# Patient Record
Sex: FEMALE | Race: WHITE | NOT HISPANIC OR LATINO | Employment: OTHER | URBAN - METROPOLITAN AREA
[De-identification: names, ages, dates, MRNs, and addresses within clinical notes are randomized per-mention and may not be internally consistent; named-entity substitution may affect disease eponyms.]

---

## 2017-02-06 DIAGNOSIS — E03.9 ACQUIRED HYPOTHYROIDISM: Primary | Chronic | ICD-10-CM

## 2017-02-06 NOTE — TELEPHONE ENCOUNTER
Levothyroxine     Last Written Prescription Date: 02/3/16  Last Quantity: 204, # refills: 3  Last Office Visit with G, P or [facility] prescribing provider: 08/31/16        TSH   Date Value Ref Range Status   12/15/2015 2.13 0.40 - 4.00 mU/L Final

## 2017-02-09 RX ORDER — LEVOTHYROXINE SODIUM 25 UG/1
TABLET ORAL
Qty: 204 TABLET | Refills: 0 | Status: SHIPPED | OUTPATIENT
Start: 2017-02-09 | End: 2017-03-28

## 2017-03-21 DIAGNOSIS — N18.4 CHRONIC KIDNEY DISEASE, STAGE IV (SEVERE) (H): Primary | ICD-10-CM

## 2017-03-21 DIAGNOSIS — D64.9 ANEMIA: ICD-10-CM

## 2017-03-22 ENCOUNTER — MYC MEDICAL ADVICE (OUTPATIENT)
Dept: FAMILY MEDICINE | Facility: CLINIC | Age: 77
End: 2017-03-22

## 2017-03-22 DIAGNOSIS — E11.22 TYPE 2 DIABETES MELLITUS WITH STAGE 4 CHRONIC KIDNEY DISEASE (H): Primary | ICD-10-CM

## 2017-03-22 DIAGNOSIS — E03.9 ACQUIRED HYPOTHYROIDISM: Chronic | ICD-10-CM

## 2017-03-22 DIAGNOSIS — N18.4 TYPE 2 DIABETES MELLITUS WITH STAGE 4 CHRONIC KIDNEY DISEASE (H): Primary | ICD-10-CM

## 2017-03-23 DIAGNOSIS — E03.9 ACQUIRED HYPOTHYROIDISM: Chronic | ICD-10-CM

## 2017-03-23 DIAGNOSIS — E11.22 TYPE 2 DIABETES MELLITUS WITH STAGE 4 CHRONIC KIDNEY DISEASE (H): ICD-10-CM

## 2017-03-23 DIAGNOSIS — N18.4 CHRONIC KIDNEY DISEASE, STAGE IV (SEVERE) (H): ICD-10-CM

## 2017-03-23 DIAGNOSIS — D64.9 ANEMIA: ICD-10-CM

## 2017-03-23 DIAGNOSIS — N18.4 TYPE 2 DIABETES MELLITUS WITH STAGE 4 CHRONIC KIDNEY DISEASE (H): ICD-10-CM

## 2017-03-23 LAB
ANION GAP SERPL CALCULATED.3IONS-SCNC: 7 MMOL/L (ref 3–14)
BUN SERPL-MCNC: 31 MG/DL (ref 7–30)
CALCIUM SERPL-MCNC: 8.9 MG/DL (ref 8.5–10.1)
CHLORIDE SERPL-SCNC: 111 MMOL/L (ref 94–109)
CO2 SERPL-SCNC: 25 MMOL/L (ref 20–32)
CREAT SERPL-MCNC: 1.68 MG/DL (ref 0.52–1.04)
CREAT UR-MCNC: 192 MG/DL
GFR SERPL CREATININE-BSD FRML MDRD: 29 ML/MIN/1.7M2
GLUCOSE SERPL-MCNC: 84 MG/DL (ref 70–99)
HBA1C MFR BLD: 6.2 % (ref 4.3–6)
HGB BLD-MCNC: 12.5 G/DL (ref 11.7–15.7)
POTASSIUM SERPL-SCNC: 4.6 MMOL/L (ref 3.4–5.3)
PROT UR-MCNC: 0.22 G/L
PROT/CREAT 24H UR: 0.11 G/G CR (ref 0–0.2)
PTH-INTACT SERPL-MCNC: 60 PG/ML (ref 12–72)
SODIUM SERPL-SCNC: 143 MMOL/L (ref 133–144)
TSH SERPL DL<=0.005 MIU/L-ACNC: 1.27 MU/L (ref 0.4–4)

## 2017-03-23 PROCEDURE — 84443 ASSAY THYROID STIM HORMONE: CPT | Performed by: INTERNAL MEDICINE

## 2017-03-23 PROCEDURE — 83036 HEMOGLOBIN GLYCOSYLATED A1C: CPT | Performed by: INTERNAL MEDICINE

## 2017-03-23 PROCEDURE — 80048 BASIC METABOLIC PNL TOTAL CA: CPT | Performed by: INTERNAL MEDICINE

## 2017-03-23 PROCEDURE — 36415 COLL VENOUS BLD VENIPUNCTURE: CPT | Performed by: INTERNAL MEDICINE

## 2017-03-23 PROCEDURE — 84156 ASSAY OF PROTEIN URINE: CPT | Performed by: INTERNAL MEDICINE

## 2017-03-23 PROCEDURE — 83970 ASSAY OF PARATHORMONE: CPT | Performed by: INTERNAL MEDICINE

## 2017-03-23 PROCEDURE — 85018 HEMOGLOBIN: CPT | Performed by: INTERNAL MEDICINE

## 2017-03-28 ENCOUNTER — OFFICE VISIT (OUTPATIENT)
Dept: FAMILY MEDICINE | Facility: CLINIC | Age: 77
End: 2017-03-28
Payer: COMMERCIAL

## 2017-03-28 VITALS
TEMPERATURE: 96.5 F | HEART RATE: 70 BPM | BODY MASS INDEX: 26 KG/M2 | WEIGHT: 161.8 LBS | SYSTOLIC BLOOD PRESSURE: 139 MMHG | HEIGHT: 66 IN | DIASTOLIC BLOOD PRESSURE: 59 MMHG

## 2017-03-28 DIAGNOSIS — H81.10 BPPV (BENIGN PAROXYSMAL POSITIONAL VERTIGO), UNSPECIFIED LATERALITY: ICD-10-CM

## 2017-03-28 DIAGNOSIS — K21.9 GASTROESOPHAGEAL REFLUX DISEASE WITHOUT ESOPHAGITIS: ICD-10-CM

## 2017-03-28 DIAGNOSIS — E11.22 TYPE 2 DIABETES MELLITUS WITH STAGE 4 CHRONIC KIDNEY DISEASE, WITHOUT LONG-TERM CURRENT USE OF INSULIN (H): Primary | ICD-10-CM

## 2017-03-28 DIAGNOSIS — E03.9 ACQUIRED HYPOTHYROIDISM: Chronic | ICD-10-CM

## 2017-03-28 DIAGNOSIS — N18.4 TYPE 2 DIABETES MELLITUS WITH STAGE 4 CHRONIC KIDNEY DISEASE, WITHOUT LONG-TERM CURRENT USE OF INSULIN (H): Primary | ICD-10-CM

## 2017-03-28 DIAGNOSIS — E78.5 HYPERLIPIDEMIA LDL GOAL <100: ICD-10-CM

## 2017-03-28 DIAGNOSIS — R10.9 FLANK PAIN: ICD-10-CM

## 2017-03-28 DIAGNOSIS — N30.00 ACUTE CYSTITIS WITHOUT HEMATURIA: ICD-10-CM

## 2017-03-28 DIAGNOSIS — I15.0 RENOVASCULAR HYPERTENSION WITH GOAL BLOOD PRESSURE LESS THAN 130/80: Chronic | ICD-10-CM

## 2017-03-28 LAB
ALBUMIN UR-MCNC: NEGATIVE MG/DL
APPEARANCE UR: ABNORMAL
BILIRUB UR QL STRIP: NEGATIVE
COLOR UR AUTO: YELLOW
GLUCOSE UR STRIP-MCNC: NEGATIVE MG/DL
HGB UR QL STRIP: NEGATIVE
KETONES UR STRIP-MCNC: NEGATIVE MG/DL
LEUKOCYTE ESTERASE UR QL STRIP: ABNORMAL
NITRATE UR QL: NEGATIVE
NON-SQ EPI CELLS #/AREA URNS LPF: ABNORMAL /LPF
PH UR STRIP: 5.5 PH (ref 5–7)
RBC #/AREA URNS AUTO: ABNORMAL /HPF (ref 0–2)
SP GR UR STRIP: 1.02 (ref 1–1.03)
URATE CRY #/AREA URNS HPF: ABNORMAL /HPF
URN SPEC COLLECT METH UR: ABNORMAL
UROBILINOGEN UR STRIP-ACNC: 0.2 EU/DL (ref 0.2–1)
WBC #/AREA URNS AUTO: ABNORMAL /HPF (ref 0–2)

## 2017-03-28 PROCEDURE — 81001 URINALYSIS AUTO W/SCOPE: CPT | Performed by: INTERNAL MEDICINE

## 2017-03-28 PROCEDURE — 99214 OFFICE O/P EST MOD 30 MIN: CPT | Performed by: INTERNAL MEDICINE

## 2017-03-28 RX ORDER — CIPROFLOXACIN 250 MG/1
250 TABLET, FILM COATED ORAL DAILY
Qty: 3 TABLET | Refills: 0 | Status: SHIPPED | OUTPATIENT
Start: 2017-03-28 | End: 2017-05-19

## 2017-03-28 RX ORDER — GLIPIZIDE 2.5 MG/1
2.5 TABLET, EXTENDED RELEASE ORAL DAILY
Qty: 90 TABLET | Refills: 3 | Status: CANCELLED | OUTPATIENT
Start: 2017-03-28

## 2017-03-28 RX ORDER — LISINOPRIL 10 MG/1
10 TABLET ORAL 2 TIMES DAILY
Qty: 180 TABLET | Refills: 3 | Status: SHIPPED | OUTPATIENT
Start: 2017-03-28 | End: 2018-05-07

## 2017-03-28 RX ORDER — LEVOTHYROXINE SODIUM 25 UG/1
TABLET ORAL
Qty: 216 TABLET | Refills: 3 | Status: SHIPPED | OUTPATIENT
Start: 2017-03-28 | End: 2018-04-23

## 2017-03-28 RX ORDER — HYDROCHLOROTHIAZIDE 50 MG/1
50 TABLET ORAL EVERY MORNING
Qty: 90 TABLET | Refills: 3 | Status: SHIPPED | OUTPATIENT
Start: 2017-03-28 | End: 2018-06-12

## 2017-03-28 RX ORDER — AMLODIPINE BESYLATE 10 MG/1
10 TABLET ORAL DAILY
Qty: 90 TABLET | Refills: 4 | Status: SHIPPED | OUTPATIENT
Start: 2017-03-28 | End: 2018-04-02

## 2017-03-28 RX ORDER — ROSUVASTATIN CALCIUM 10 MG/1
10 TABLET, COATED ORAL DAILY
Qty: 90 TABLET | Refills: 3 | Status: SHIPPED | OUTPATIENT
Start: 2017-03-28 | End: 2018-06-11

## 2017-03-28 NOTE — MR AVS SNAPSHOT
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  You may be receiving a survey in the mail from [employer] regarding your visit today.  Please take a few minutes to complete and return the survey to let us know how we are doing.      If you have questions or concerns, please contact us via [employer] or you can contact your care team at [phone].    Our Clinic hours are:  Monday 6:40 am  to 7:00 pm  Tuesday -Friday 6:40 am to 5:00 pm    The [place] outpatient lab hours are:  Monday - Friday 6:10 am to 4:45 pm  Saturdays 7:00 am to 11:00 am  Appointments are required, call [phone]    If you have clinical questions after hours or would like to schedule an appointment,  call the clinic at [phone].      [facility] [place] [address]      1. Stop Glipizide.  If blood sugar are going up see Dr. Horton.  Goal fasting blood sugar < 140, goal random blood sugar is < 170.  2. Other refills given  3. See physical therapy for the dizziness, likely vertigo  4. Can try the meclizine for dizziness until you are able to see physical therapy.  5. Fasting blood work - lipid and A1c in 6 months.      Benign Paroxysmal Positional  Vertigo  Benign paroxysmal positional vertigo (BPPV) is a problem with the inner ear. The inner ear contains the vestibular system. This system is what helps you keep your balance. BPPV causes a feeling of spinning. It is a common problem of the vestibular system.  Understanding the vestibular system  The vestibular system of the ear is made up of very tiny parts. They include the utricle, saccule, and semicircular canals. The utricle is a tiny organ that contains calcium crystals. In some people, the crystals can move into the semicircular canals. When this happens, the system no longer works as it should. This causes BPPV. Benign means it is not life-threatening. Paroxysmal means it happens suddenly. Positional means that it happens when you move your head. Vertigo is a feeling of spinning.  What causes BPPV?  Causes include injury to your head or neck. Other problems with the vestibular system may cause BPPV. In many people, the cause of BPPV is not known.  Symptoms of BPPV  You many have repeated feelings of spinning (vertigo). The vertigo usually lasts less than 1 minute. Some movements, suchas rolling over in bed, can bring on vertigo.  Diagnosing BPPV  Your primary health care provider may diagnose and treat your BPPV. Or you may see an ear, nose, and throat doctor (otolaryngologist). In some cases, you may see a nervous system doctor (neurologist).  The health care provider will ask about your symptoms and your medical history. He or she will examine you. You may have hearing and balance tests. As part of the exam, your health care provider may have you move your head and body in certain ways. If you have BPPV, the movements can bring on vertigo. Your provider will also look for abnormal movements of your eyes. You may have other tests to check your vestibular or nervous systems.  Treatment for BPPV  Your health care provider may try to move the calcium crystals. This is done by having you move your head and  neck in certain ways. This treatment is safe and often works well. You may also be told to do these movements at home. You may still have vertigo for a few weeks. Your health care provider will recheck your symptoms, usually in about a month. Special physical therapy may also be part of treatment.  In rare cases surgery may be needed for BPPV that does not go away.     When to call the health care provider  Call your health care provider right away if you have any of these:    Symptoms that do not go away with treatment    Symptoms that get worse    New symptoms        5126-2967 [employer]. [address] [place] [address]. All rights reserved. This information is not intended as a substitute for professional medical care. Always follow your healthcare professional's instructions.              Follow-ups after your visit        Additional Services     PHYSICAL THERAPY REFERRAL       *This therapy referral will be filtered to a centralized scheduling office at [facility] and the patient will receive a call to schedule an appointment at a [place] location most convenient for them. *     [facility] provides Physical Therapy evaluation and treatment and many specialty services across the [place] system.  If requesting a specialty program, please choose from the list below.    If you have not heard from the scheduling office within 2 business days, please call [phone] for all locations, with the exception of [place], please call [phone].  Treatment: Evaluation & Treatment  Special Instructions/Modalities:   Special Programs: Balance/Vestibular    Please be aware that coverage of these services is subject to the terms and limitations of your health insurance plan.  Call member services at your health plan with any benefit or coverage questions.      **Note to Provider:  If you are referring outside of [place] for the therapy appointment, please  "list the name of the location in the  special instructions  above, print the referral and give to the patient to schedule the appointment.                  Future tests that were ordered for you today     Open Future Orders        Priority Expected Expires Ordered    Hemoglobin A1c Routine 9/26/2017 12/27/2017 3/28/2017    Lipid panel reflex to direct LDL Routine 9/26/2017 12/27/2017 3/28/2017            Who to contact     If you have questions or need follow up information about today's clinic visit or your schedule please contact [facility] directly at [phone].  Normal or non-critical lab and imaging results will be communicated to you by [employer], letter or phone within 4 business days after the clinic has received the results. If you do not hear from us within 7 days, please contact the clinic through [employer] or phone. If you have a critical or abnormal lab result, we will notify you by phone as soon as possible.  Submit refill requests through [employer] or call your pharmacy and they will forward the refill request to us. Please allow 3 business days for your refill to be completed.          Additional Information About Your Visit        [employer] Information     [employer] gives you secure access to your electronic health record. If you see a primary care provider, you can also send messages to your care team and make appointments. If you have questions, please call your primary care clinic.  If you do not have a primary care provider, please call [phone] and they will assist you.        Care EveryWhere ID     This is your Care EveryWhere ID. This could be used by other organizations to access your [place] medical records  [id number]        Your Vitals Were     Pulse Temperature Height BMI (Body Mass Index)          70 96.5  F (35.8  C) (Tympanic) 5' 5.5\" (1.664 m) 26.52 kg/m2         Blood Pressure from Last 3 Encounters:   03/28/17 139/59   12/03/16 132/71   09/27/16 123/63    Weight from " Last 3 Encounters:   03/28/17 161 lb 12.8 oz (73.4 kg)   09/27/16 153 lb (69.4 kg)   09/22/16 153 lb (69.4 kg)              We Performed the Following     PHYSICAL THERAPY REFERRAL     UA reflex to Microscopic and Culture     Urine Microscopic          Today's Medication Changes          These changes are accurate as of: 3/28/17  8:17 AM.  If you have any questions, ask your nurse or doctor.               These medicines have changed or have updated prescriptions.        Dose/Directions    * ciprofloxacin 250 MG tablet   Commonly known as:  CIPRO   This may have changed:  Another medication with the same name was added. Make sure you understand how and when to take each.   Used for:  Acute cystitis with hematuria        Dose:  250 mg   Take 1 tablet (250 mg) by mouth 2 times daily   Quantity:  14 tablet   Refills:  0       * ciprofloxacin 250 MG tablet   Commonly known as:  CIPRO   This may have changed:  You were already taking a medication with the same name, and this prescription was added. Make sure you understand how and when to take each.   Used for:  Acute cystitis without hematuria   Changed by:  Li Horton DO        Dose:  250 mg   Take 1 tablet (250 mg) by mouth daily   Quantity:  3 tablet   Refills:  0       levothyroxine 25 MCG tablet   Commonly known as:  SYNTHROID   This may have changed:  additional instructions   Used for:  Acquired hypothyroidism   Changed by:  Li Horton DO        75 mcg (3 tabs) on Sunday, Monday, Wednesday, Friday. 50 mcg (2 tabs) on Tuesday, Thursday, Saturday   Quantity:  216 tablet   Refills:  3       * Notice:  This list has 2 medication(s) that are the same as other medications prescribed for you. Read the directions carefully, and ask your doctor or other care provider to review them with you.      Stop taking these medicines if you haven't already. Please contact your care team if you have questions.     glipiZIDE 2.5 MG 24 hr tablet   Commonly known  as:  glipiZIDE XL   Stopped by:  Li Horton DO                Where to get your medicines      These medications were sent to [place] [address] [place] [facility] - [address]  [address] [facility], [facility] [address]     Phone:  [phone]     amLODIPine 10 MG tablet    ciprofloxacin 250 MG tablet    hydrochlorothiazide 50 MG tablet    levothyroxine 25 MCG tablet    lisinopril 10 MG tablet    omeprazole 20 MG CR capsule    rosuvastatin 10 MG tablet                Primary Care Provider Office Phone # Fax #    Li Horton [phone]       [facility] [address] [facility] [address]        Thank you!     Thank you for choosing [facility]  for your care. Our goal is always to provide you with excellent care. Hearing back from our patients is one way we can continue to improve our services. Please take a few minutes to complete the written survey that you may receive in the mail after your visit with us. Thank you!             Your Updated Medication List - Protect others around you: Learn how to safely use, store and throw away your medicines at www.disposemymeds.org.          This list is accurate as of: 3/28/17  8:17 AM.  Always use your most recent med list.                   Brand Name Dispense Instructions for use    amLODIPine 10 MG tablet    NORVASC    90 tablet    Take 1 tablet (10 mg) by mouth daily       ascorbic acid 500 MG tablet    VITAMIN C    90 Tab    ONE TABLET twice DAILY       aspirin 325 MG EC tablet     100    1 TABLET BY MOUTH DAILY       BENEFIBER Tabs      2 TABLETS ORALLY DAILY       calcium-vitamin D 600-400 MG-UNIT per tablet    CALTRATE    100 tablet    Take 1 tablet by mouth 2 times daily.       * ciprofloxacin 250 MG tablet    CIPRO    14 tablet    Take 1 tablet (250 mg) by mouth 2 times daily       * ciprofloxacin 250 MG tablet    CIPRO    3 tablet    Take 1 tablet (250 mg) by mouth daily       cloNIDine 0.1  MG tablet    CATAPRES    180 Tab    ONE Twice DAILY as needed for SBP>160, diastolic BP>100       estradiol 0.1 MG/GM cream    ESTRACE VAGINAL    30 g    Place  vaginally. Apply 1-2 cc around urethra vaginal daily       hydrochlorothiazide 50 MG tablet    HYDRODIURIL    90 tablet    Take 1 tablet (50 mg) by mouth every morning       levothyroxine 25 MCG tablet    SYNTHROID    216 tablet    75 mcg (3 tabs) on Sunday, Monday, Wednesday, Friday. 50 mcg (2 tabs) on Tuesday, Thursday, Saturday       lisinopril 10 MG tablet    PRINIVIL/ZESTRIL    180 tablet    Take 1 tablet (10 mg) by mouth 2 times daily       multivitamin per tablet      1 TABLET BY MOUTH DAILY       omeprazole 20 MG CR capsule    priLOSEC    60 capsule    Take 20 mg every 3rd day as needed       pyridoxine 50 MG Tabs    VITAMIN B-6    1 bottle    1 daily       rosuvastatin 10 MG tablet    CRESTOR    90 tablet    Take 1 tablet (10 mg) by mouth daily       * Notice:  This list has 2 medication(s) that are the same as other medications prescribed for you. Read the directions carefully, and ask your doctor or other care provider to review them with you.

## 2017-03-28 NOTE — PATIENT INSTRUCTIONS
Thank you for choosing [facility].  You may be receiving a survey in the mail from [employer] regarding your visit today.  Please take a few minutes to complete and return the survey to let us know how we are doing.      If you have questions or concerns, please contact us via [employer] or you can contact your care team at [phone].    Our Clinic hours are:  Monday 6:40 am  to 7:00 pm  Tuesday -Friday 6:40 am to 5:00 pm    The [place] outpatient lab hours are:  Monday - Friday 6:10 am to 4:45 pm  Saturdays 7:00 am to 11:00 am  Appointments are required, call [phone]    If you have clinical questions after hours or would like to schedule an appointment,  call the clinic at [phone].      [facility] [address] [place] [address]      1. Stop Glipizide.  If blood sugar are going up see Dr. Horton.  Goal fasting blood sugar < 140, goal random blood sugar is < 170.  2. Other refills given  3. See physical therapy for the dizziness, likely vertigo  4. Can try the meclizine for dizziness until you are able to see physical therapy.  5. Fasting blood work - lipid and A1c in 6 months.      Benign Paroxysmal Positional Vertigo  Benign paroxysmal positional vertigo (BPPV) is a problem with the inner ear. The inner ear contains the vestibular system. This system is what helps you keep your balance. BPPV causes a feeling of spinning. It is a common problem of the vestibular system.  Understanding the vestibular system  The vestibular system of the ear is made up of very tiny parts. They include the utricle, saccule, and semicircular canals. The utricle is a tiny organ that contains calcium crystals. In some people, the crystals can move into the semicircular canals. When this happens, the system no longer works as it should. This causes BPPV. Benign means it is not life-threatening. Paroxysmal means it happens suddenly. Positional means that it happens when you move your head. Vertigo is  a feeling of spinning.  What causes BPPV?  Causes include injury to your head or neck. Other problems with the vestibular system may cause BPPV. In many people, the cause of BPPV is not known.  Symptoms of BPPV  You many have repeated feelings of spinning (vertigo). The vertigo usually lasts less than 1 minute. Some movements, suchas rolling over in bed, can bring on vertigo.  Diagnosing BPPV  Your primary health care provider may diagnose and treat your BPPV. Or you may see an ear, nose, and throat doctor (otolaryngologist). In some cases, you may see a nervous system doctor (neurologist).  The health care provider will ask about your symptoms and your medical history. He or she will examine you. You may have hearing and balance tests. As part of the exam, your health care provider may have you move your head and body in certain ways. If you have BPPV, the movements can bring on vertigo. Your provider will also look for abnormal movements of your eyes. You may have other tests to check your vestibular or nervous systems.  Treatment for BPPV  Your health care provider may try to move the calcium crystals. This is done by having you move your head and neck in certain ways. This treatment is safe and often works well. You may also be told to do these movements at home. You may still have vertigo for a few weeks. Your health care provider will recheck your symptoms, usually in about a month. Special physical therapy may also be part of treatment.  In rare cases surgery may be needed for BPPV that does not go away.     When to call the health care provider  Call your health care provider right away if you have any of these:    Symptoms that do not go away with treatment    Symptoms that get worse    New symptoms        [id number] [employer]. [address] [place] [address], [place] [address]. All rights reserved. This information is not intended as a substitute for professional medical care. Always follow your  healthcare professional's instructions.

## 2017-03-28 NOTE — PROGRESS NOTES
SUBJECTIVE:                                                    Lupe Lepe is a 77 year old female who presents to clinic today for the following health issues:      Diabetes Follow-up      Patient is checking blood sugars: rarely.  Results range from 120 to 140    Diabetic concerns: None     Symptoms of hypoglycemia (low blood sugar): none     Paresthesias (numbness or burning in feet) or sores: No     Date of last diabetic eye exam: due in November    She did have a low blood sugar 48, doesn't happen often     Hyperlipidemia Follow-Up      Rate your low fat/cholesterol diet?: good    Taking statin?  Yes, no muscle aches from statin    Other lipid medications/supplements?:  none     Hypertension Follow-up      Outpatient blood pressures are being checked at home.  Results are 130-135/80.    Low Salt Diet: no added salt         Amount of exercise or physical activity: 2-3 days/week for an average of 45-60 minutes    Problems taking medications regularly: No    Medication side effects: none    Diet: low salt    Chief Complaint   Patient presents with     Diabetes     Lab Result Notice     Dizziness     for about a month has noticed when lies down for bed or gets up has to sit on the side of the bed for awhile.       Refill Request     needs 90 days at a time and omprezole needs more than 30 for a month needs 60 pill and 3 months at a time.     Dizziness:  Started 1 month ago.  When laying in bed at night, feels like room is spinning.  It also occurs first thing in AM.  Lasts a few minutes. It never occurs throughout the day or with position changes.  The dizziness does not improve with closing eyes.  Staying still seems to help.  The dizziness is occurring every night and AM.    3-4 days ago noted severe left back/flank pain, lasted 12 hours or so.  Never has back problems.  Is wondering about UTI. Yesterday she noted very mild dysuria.    Current Outpatient Prescriptions   Medication Sig Dispense Refill      "levothyroxine (SYNTHROID) 25 MCG tablet Take  by mouth. (3 tabs) p.o. q. Sundays, Monday, Wednesday, Fridays. (2 tabs) p.o. q. Tuesday, Thursdays, Saturdays 204 tablet 0     rosuvastatin (CRESTOR) 10 MG tablet Take 1 tablet (10 mg) by mouth daily 90 tablet 1     omeprazole (PRILOSEC) 20 MG capsule Take 20 mg every 3rd day as needed 30 capsule 1     lisinopril (PRINIVIL,ZESTRIL) 10 MG tablet Take 1 tablet (10 mg) by mouth 2 times daily 180 tablet 3     glipiZIDE (GLIPIZIDE XL) 2.5 MG 24 hr tablet Take 1 tablet (2.5 mg) by mouth daily 90 tablet 3     amLODIPine (NORVASC) 10 MG tablet Take 1 tablet (10 mg) by mouth daily 90 tablet 4     hydrochlorothiazide (HYDRODIURIL) 50 MG tablet Take 1 tablet (50 mg) by mouth every morning 90 tablet 3     calcium-vitamin D (CALTRATE) 600-400 MG-UNIT per tablet Take 1 tablet by mouth 2 times daily. 100 tablet 3     VITAMIN C 500 MG PO TABS ONE TABLET twice DAILY 90 Tab 3     ASPIRIN 325 MG OR TBEC 1 TABLET BY MOUTH DAILY 100 3     BENEFIBER OR TABS 2 TABLETS ORALLY DAILY  0     VITAMIN B-6 50 MG OR TABS 1 daily 1 bottle 1 year     MULTIVITAMINS OR TABS 1 TABLET BY MOUTH DAILY  0     ciprofloxacin (CIPRO) 250 MG tablet Take 1 tablet (250 mg) by mouth 2 times daily 14 tablet 0     estradiol (ESTRACE VAGINAL) 0.1 MG/GM vaginal cream Place  vaginally. Apply 1-2 cc around urethra vaginal daily 30 g 11     CLONIDINE HCL 0.1 MG PO TABS ONE Twice DAILY as needed for SBP>160, diastolic BP>100 180 Tab 4       Reviewed and updated as needed this visit by clinical staff  Allergies       Reviewed and updated as needed this visit by Provider         ROS:  Constitutional, HEENT, cardiovascular, pulmonary, gi and gu systems are negative, except as otherwise noted.    OBJECTIVE:                                                    /82  Pulse 74  Temp 96.5  F (35.8  C) (Tympanic)  Ht 5' 5.5\" (1.664 m)  Wt 161 lb 12.8 oz (73.4 kg)  BMI 26.52 kg/m2  Body mass index is 26.52 kg/(m^2).   Blood " "pressure 139/59, pulse 70, temperature 96.5  F (35.8  C), temperature source Tympanic, height 5' 5.5\" (1.664 m), weight 161 lb 12.8 oz (73.4 kg).      GENERAL APPEARANCE: healthy, alert and no distress  RESP: lungs clear to auscultation - no rales, rhonchi or wheezes  CV: regular rates and rhythm, normal S1 S2, no S3 or S4 and no murmur, click or rub  MS: extremities normal- no gross deformities noted and no flank pain to palpation/percussion  NEURO: Normal strength and tone, mentation intact, speech normal, DTR symmetrically normal in lower extremities, gait normal including heel/toe/tandem walking, cranial nerves 2-12 intact and Romberg negative    Diagnostic Test Results:  Results for orders placed or performed in visit on 03/28/17 (from the past 24 hour(s))   UA reflex to Microscopic and Culture   Result Value Ref Range    Color Urine Yellow     Appearance Urine Slightly Cloudy     Glucose Urine Negative NEG mg/dL    Bilirubin Urine Negative NEG    Ketones Urine Negative NEG mg/dL    Specific Gravity Urine 1.025 1.003 - 1.035    Blood Urine Negative NEG    pH Urine 5.5 5.0 - 7.0 pH    Protein Albumin Urine Negative NEG mg/dL    Urobilinogen Urine 0.2 0.2 - 1.0 EU/dL    Nitrite Urine Negative NEG    Leukocyte Esterase Urine Small (A) NEG    Source Midstream Urine    Urine Microscopic   Result Value Ref Range    WBC Urine 2-5 (A) 0 - 2 /HPF    RBC Urine O - 2 0 - 2 /HPF    Squamous Epithelial /LPF Urine Few FEW /LPF    Uric Acid Crystals Few (A) NEG /HPF        ASSESSMENT/PLAN:                                                      1. Type 2 diabetes mellitus with stage 4 chronic kidney disease, without long-term current use of insulin (H) - stable.  Stop glipizide due to hypoglycemia, patient at goal A1c.  - Hemoglobin A1c; Future  - Lipid panel reflex to direct LDL; Future  - Urine Microscopic    2. Acquired hypothyroidism -  - stable, refill provided  - levothyroxine (SYNTHROID) 25 MCG tablet; 75 mcg (3 tabs) on " Sunday, Monday, Wednesday, Friday. 50 mcg (2 tabs) on Tuesday, Thursday, Saturday  Dispense: 216 tablet; Refill: 3    3. Hyperlipidemia LDL goal <100 -  - stable, refill provided  - rosuvastatin (CRESTOR) 10 MG tablet; Take 1 tablet (10 mg) by mouth daily  Dispense: 90 tablet; Refill: 3    4. Gastroesophageal reflux disease without esophagitis -  - stable, refill provided  - omeprazole (PRILOSEC) 20 MG CR capsule; Take 20 mg every 3rd day as needed  Dispense: 60 capsule; Refill: 3    5. Renovascular hypertension with goal blood pressure less than 130/80 -  - stable, refill provided  - lisinopril (PRINIVIL/ZESTRIL) 10 MG tablet; Take 1 tablet (10 mg) by mouth 2 times daily  Dispense: 180 tablet; Refill: 3  - amLODIPine (NORVASC) 10 MG tablet; Take 1 tablet (10 mg) by mouth daily  Dispense: 90 tablet; Refill: 4  - hydrochlorothiazide (HYDRODIURIL) 50 MG tablet; Take 1 tablet (50 mg) by mouth every morning  Dispense: 90 tablet; Refill: 3    6. BPPV (benign paroxysmal positional vertigo), unspecified laterality - symptoms seem most like BPPV.  Neuro exam negative.  Offered meclizine, patient declined which is reasonable.  - PHYSICAL THERAPY REFERRAL    7. Flank pain - symptoms and UA not convincing for UTI.  Advised to increase hydration and monitor symptoms.  If symptoms progress, ok to start antibiotic.   - UA reflex to Microscopic and Culture    8. Acute cystitis without hematuria  - ciprofloxacin (CIPRO) 250 MG tablet; Take 1 tablet (250 mg) by mouth daily  Dispense: 3 tablet; Refill: 0      Li Horton DO  [facility]

## 2017-04-07 ENCOUNTER — TRANSFERRED RECORDS (OUTPATIENT)
Dept: HEALTH INFORMATION MANAGEMENT | Facility: CLINIC | Age: 77
End: 2017-04-07

## 2017-05-07 DIAGNOSIS — E03.9 ACQUIRED HYPOTHYROIDISM: Chronic | ICD-10-CM

## 2017-05-08 NOTE — TELEPHONE ENCOUNTER
Levothyroxine    Last Written Prescription Date: 03/28/17  Last Quantity: 216, # refills: 3  Last Office Visit with G, P or [facility] prescribing provider: 03/28/17        TSH   Date Value Ref Range Status   03/23/2017 1.27 0.40 - 4.00 mU/L Final

## 2017-05-09 RX ORDER — LEVOTHYROXINE SODIUM 25 UG/1
TABLET ORAL
Qty: 204 TABLET | Refills: 0 | OUTPATIENT
Start: 2017-05-09

## 2017-05-09 NOTE — TELEPHONE ENCOUNTER
Prescription approved per [employer] [facility] Refill Protocol.    RX sent in 3/2017.  Pharmacy has script    Ada LAINEZ RN

## 2017-05-15 ENCOUNTER — HOSPITAL ENCOUNTER (OUTPATIENT)
Dept: PHYSICAL THERAPY | Facility: CLINIC | Age: 77
Setting detail: THERAPIES SERIES
End: 2017-05-15
Attending: INTERNAL MEDICINE
Payer: MEDICARE

## 2017-05-15 PROCEDURE — 97112 NEUROMUSCULAR REEDUCATION: CPT | Mod: GP | Performed by: PHYSICAL THERAPIST

## 2017-05-15 PROCEDURE — G8981 BODY POS CURRENT STATUS: HCPCS | Mod: GP,CI | Performed by: PHYSICAL THERAPIST

## 2017-05-15 PROCEDURE — G8982 BODY POS GOAL STATUS: HCPCS | Mod: GP,CH | Performed by: PHYSICAL THERAPIST

## 2017-05-15 PROCEDURE — 40000840 ZZHC STATISTIC PT VESTIBULAR VISIT: Performed by: PHYSICAL THERAPIST

## 2017-05-15 PROCEDURE — 97161 PT EVAL LOW COMPLEX 20 MIN: CPT | Mod: GP | Performed by: PHYSICAL THERAPIST

## 2017-05-15 NOTE — PROGRESS NOTES
[facility]        OUTPATIENT PHYSICAL THERAPY FUNCTIONAL EVALUATION  PLAN OF TREATMENT FOR OUTPATIENT REHABILITATION  (COMPLETE FOR INITIAL CLAIMS ONLY)  Patient's Last Name, First Name, M.I.  YOB: 1940  Lupe Lepe     Provider's Name   [facility]   Medical Record No.  [id number]     Start of Care Date:  05/15/17   Onset Date:  03/28/17   Type:     _X__PT   ____OT  ____SLP Medical Diagnosis:  motion sensitivity     PT Diagnosis:  motion sensitivity Visits from SOC:  1                              __________________________________________________________________________________  Plan of Treatment/Functional Goals:  balance training, neuromuscular re-education           GOALS     pt will be able to lie down/get up without sx in 2wk  05/29/17       pt will be able to work in garden without loss of balance in 2-4 wks  06/15/17              Therapy Frequency:  1 time/week   Predicted Duration of Therapy Intervention:  every 2 wks x4wk, pt instructed to cancel next visit if she feels 100% better    Kris Hoenk, PT                                    I CERTIFY THE NEED FOR THESE SERVICES FURNISHED UNDER        THIS PLAN OF TREATMENT AND WHILE UNDER MY CARE     (Physician co-signature of this document indicates review and certification of the therapy plan).                Certification Date From:  05/15/17   Certification Date To:  06/15/17    Referring Provider:  Dr. HANS Horton    Initial Assessment  See Epic Evaluation- Start of Care Date: 05/15/17

## 2017-05-15 NOTE — PROGRESS NOTES
"  [facility]   PHYSICAL THERAPY EVALUATION    05/15/17 0900   Quick Adds   Quick Adds Certification;Vestibular Eval   Type of Visit Initial OP PT Evaluation   General Information   Start of Care Date 05/15/17   Referring Physician Dr. HANS Horton   Orders Evaluate and Treat as Indicated   Order Date 03/28/17   Medical Diagnosis BPPV   Onset of illness/injury or Date of Surgery 03/28/17   Surgical/Medical history reviewed Yes   Pertinent history of current problem (include personal factors and/or comorbidities that impact the POC) Sx since Feb 2017, not sure on date.  Sx with lying down in bed, getting up, rolling over, sx last 10-15 seconds  Feels more moving in her head, said things have never visually spun.  However she does close her eyes to make it go away.  Hx of vertigo years ago due to ear infection and that was much worse sx of dizziness and off balance.  Was in the garden the other day, stooping over and kept falling forward, hit her nose.  So she decided she better come in.   Prior level of function comment garden, indep living, golfs weekly   Patient role/Employment history Retired   Patient/Family Goals Statement get rid of dizziness   Fall Risk Screen   Fall screen completed by PT   Per patient - Fall 2 or more times in past year? No   Per patient - Fall with injury in past year? No   Is patient a fall risk? No   System Outcome Measures   Outcome Measures BPPV   Dizziness Handicap Inventory (score out of 100) A decrease in score by 17.18 or greater indicates a clinically significant change in symptoms. 16   Was BPPV resolved at end of session? (was not BPPV)   Gait   Gait Comments gait is normal, gait with horiz head turns normal, gait with vertical turns showed path deviation an dincreased moving sensation   Balance   Balance Comments pt able to stand FT EO horiz head turns no LOB, FT EO vertical head trurns pt reached out away from body, FA 3\" EC with horiz head turns normal, vertical turns again " same arms splaying out   Balance Special Tests   Balance Special Tests Modified CTSIB Conditions   Balance Special Tests Modified CTSIB Conditions   Condition 1, seconds 30 Seconds   Condition 2, seconds 30 Seconds   Cervicogenic Screen   Neck ROM WNL   Vertebral Artery Test Normal   Transverse Ligament Test Normal   Oculomotor Exam   Smooth Pursuit Normal   Saccades Normal   VOR Normal   Infrared Goggle Exam or Frenzel Lense Exam   Vestibular Suppressant in Last 24 Hours? No   Exam completed with Room Light   Spontaneous Nystagmus Negative   Positional Testing comments lightheaded sensation sitting up from all test positions - pt reports orthostatics have been done and they were ok, - does have HX of HTN and is on many meds   [place]-Hallpike (right) Negative   [place]-Hallpike (Left) Negative   [place]-Hallpike (left) comments moving sensation getting into position   [facility] Supine Roll Test (Right) Negative   [facility] Supine Roll Test (Left) Negative   Supine Neck Extension Test Negative   Planned Therapy Interventions   Planned Therapy Interventions balance training;neuromuscular re-education   Clinical Impression   Criteria for Skilled Therapeutic Interventions Met yes, treatment indicated   PT Diagnosis motion sensitivity   Functional limitations due to impairments changing positions too quickly   Clinical Presentation Stable/Uncomplicated   Clinical Presentation Rationale HTN, meds may play roll in sx   Clinical Decision Making (Complexity) Low complexity   Therapy Frequency 1 time/week   Predicted Duration of Therapy Intervention (days/wks) every 2 wks x4wk, pt instructed to cancel next visit if she feels 100% better   Risk & Benefits of therapy have been explained Yes   Patient, Family & other staff in agreement with plan of care Yes   Education Assessment   Preferred Learning Style Listening;Pictures/video   Barriers to Learning No barriers   GOALS   PT Eval Goals 1;2   Goal 1   Goal Description pt will be able to lie  down/get up without sx in 2wk   Target Date 05/29/17   Goal 2   Goal Description pt will be able to work ingardne without loss of balance in 2-4 wks   Target Date 06/15/17   Total Evaluation Time   Total Evaluation Time (Minutes) 20   Therapy Certification   Certification date from 05/15/17   Certification date to 06/15/17   Medical Diagnosis motion sensitivity   Certification I certify the need for these services furnished under this plan of treatment and while under my care.  (Physician co-signature of this document indicates review and certification of the therapy plan).   Kris Hoenk, PT #[id number]  [facility]

## 2017-05-19 ENCOUNTER — OFFICE VISIT (OUTPATIENT)
Dept: FAMILY MEDICINE | Facility: CLINIC | Age: 77
End: 2017-05-19
Payer: COMMERCIAL

## 2017-05-19 VITALS
WEIGHT: 161 LBS | HEART RATE: 71 BPM | DIASTOLIC BLOOD PRESSURE: 72 MMHG | TEMPERATURE: 96.8 F | HEIGHT: 66 IN | BODY MASS INDEX: 25.88 KG/M2 | SYSTOLIC BLOOD PRESSURE: 117 MMHG

## 2017-05-19 DIAGNOSIS — N30.00 ACUTE CYSTITIS WITHOUT HEMATURIA: Primary | ICD-10-CM

## 2017-05-19 DIAGNOSIS — R39.89 URINARY PROBLEM: ICD-10-CM

## 2017-05-19 DIAGNOSIS — R82.90 NONSPECIFIC FINDING ON EXAMINATION OF URINE: ICD-10-CM

## 2017-05-19 LAB
ALBUMIN UR-MCNC: NEGATIVE MG/DL
APPEARANCE UR: CLEAR
BACTERIA #/AREA URNS HPF: ABNORMAL /HPF
BILIRUB UR QL STRIP: NEGATIVE
COLOR UR AUTO: YELLOW
GLUCOSE UR STRIP-MCNC: NEGATIVE MG/DL
HGB UR QL STRIP: NEGATIVE
KETONES UR STRIP-MCNC: NEGATIVE MG/DL
LEUKOCYTE ESTERASE UR QL STRIP: ABNORMAL
NITRATE UR QL: NEGATIVE
NON-SQ EPI CELLS #/AREA URNS LPF: ABNORMAL /LPF
PH UR STRIP: 6 PH (ref 5–7)
RBC #/AREA URNS AUTO: ABNORMAL /HPF (ref 0–2)
SP GR UR STRIP: 1.01 (ref 1–1.03)
URN SPEC COLLECT METH UR: ABNORMAL
UROBILINOGEN UR STRIP-ACNC: 0.2 EU/DL (ref 0.2–1)
WBC #/AREA URNS AUTO: ABNORMAL /HPF (ref 0–2)

## 2017-05-19 PROCEDURE — 81001 URINALYSIS AUTO W/SCOPE: CPT | Performed by: INTERNAL MEDICINE

## 2017-05-19 PROCEDURE — 99213 OFFICE O/P EST LOW 20 MIN: CPT | Performed by: INTERNAL MEDICINE

## 2017-05-19 PROCEDURE — 87088 URINE BACTERIA CULTURE: CPT | Performed by: INTERNAL MEDICINE

## 2017-05-19 PROCEDURE — 87186 SC STD MICRODIL/AGAR DIL: CPT | Performed by: INTERNAL MEDICINE

## 2017-05-19 PROCEDURE — 87086 URINE CULTURE/COLONY COUNT: CPT | Performed by: INTERNAL MEDICINE

## 2017-05-19 RX ORDER — CIPROFLOXACIN 250 MG/1
250 TABLET, FILM COATED ORAL DAILY
Qty: 7 TABLET | Refills: 0 | Status: SHIPPED | OUTPATIENT
Start: 2017-05-19 | End: 2017-08-03

## 2017-05-19 NOTE — MR AVS SNAPSHOT
After Visit Summary   5/19/2017    Lupe Lepe    MRN: [id number]           Patient Information     Date Of Birth          1940        Visit Information        Provider Department      5/19/2017 3:40 PM Li Horton, DO [facility]        Today's Diagnoses     Acute cystitis without hematuria    -  1    Urinary problem        Nonspecific finding on examination of urine          Care Instructions          Thank you for choosing [facility]  You may be receiving a survey in the mail from [facility] regarding your visit today.  Please take a few minutes to complete and return the survey to let us know how we are doing.      If you have questions or concerns, please contact us via [employer] or you can contact your care team at [phone].    Our Clinic hours are:  Monday 6:40 am  to 7:00 pm  Tuesday -Friday 6:40 am to 5:00 pm    The [place] outpatient lab hours are:  Monday - Friday 6:10 am to 4:45 pm  Saturdays 7:00 am to 11:00 am  Appointments are required, call [phone]    If you have clinical questions after hours or would like to schedule an appointment,  call the clinic at [phone].      [facility] [place] [address]          Follow-ups after your visit        Your next 10 appointments already scheduled     May 31, 2017  8:00 AM CDT   Vestibular Treatment with Kris Hoenk, PT   [facility] ([facility])    [address] [address] [phone]              Who to contact     If you have questions or need follow up information about today's clinic visit or your schedule please contact [facility] directly at [phone].  Normal or non-critical lab and imaging results will be communicated to you by MyChart, letter or phone within 4 business days after the clinic has received the results. If you do not hear from us within 7 days, please contact the  "clinic through [employer] or phone. If you have a critical or abnormal lab result, we will notify you by phone as soon as possible.  Submit refill requests through TORIA or call your pharmacy and they will forward the refill request to us. Please allow 3 business days for your refill to be completed.          Additional Information About Your Visit        [employer] Information     TORIA gives you secure access to your electronic health record. If you see a primary care provider, you can also send messages to your care team and make appointments. If you have questions, please call your primary care clinic.  If you do not have a primary care provider, please call [phone] and they will assist you.        Care EveryWhere ID     This is your Care EveryWhere ID. This could be used by other organizations to access your [facility] medical records  [id number]        Your Vitals Were     Pulse Temperature Height BMI (Body Mass Index)          71 96.8  F (36  C) (Tympanic) 5' 5.5\" (1.664 m) 26.38 kg/m2         Blood Pressure from Last 3 Encounters:   05/19/17 117/72   03/28/17 139/59   12/03/16 132/71    Weight from Last 3 Encounters:   05/19/17 161 lb (73 kg)   03/28/17 161 lb 12.8 oz (73.4 kg)   09/27/16 153 lb (69.4 kg)              We Performed the Following     *UA reflex to Microscopic and Culture ([place] [facility] (except [place] and [place])     Urine Culture Aerobic Bacterial     Urine Microscopic          Today's Medication Changes          These changes are accurate as of: 5/19/17  4:04 PM.  If you have any questions, ask your nurse or doctor.               Start taking these medicines.        Dose/Directions    ciprofloxacin 250 MG tablet   Commonly known as:  CIPRO   Used for:  Acute cystitis without hematuria   Started by:  Li Horton DO        Dose:  250 mg   Take 1 tablet (250 mg) by mouth daily   Quantity:  7 tablet   Refills:  0            Where to get your medicines      These " medications were sent to [facility] [address] [place] [facility] - [place], [place] - [address] [place] [address]  [address] [facility] [address]     Phone:  [phone]     ciprofloxacin 250 MG tablet                Primary Care Provider Office Phone # Fax #    Li Horton [phone]       [facility] [address] [facility] [address]        Thank you!     Thank you for choosing [facility]  for your care. Our goal is always to provide you with excellent care. Hearing back from our patients is one way we can continue to improve our services. Please take a few minutes to complete the written survey that you may receive in the mail after your visit with us. Thank you!             Your Updated Medication List - Protect others around you: Learn how to safely use, store and throw away your medicines at www.disposemymeds.org.          This list is accurate as of: 5/19/17  4:04 PM.  Always use your most recent med list.                   Brand Name Dispense Instructions for use    amLODIPine 10 MG tablet    NORVASC    90 tablet    Take 1 tablet (10 mg) by mouth daily       ascorbic acid 500 MG tablet    VITAMIN C    90 Tab    ONE TABLET twice DAILY       aspirin 325 MG EC tablet     100    1 TABLET BY MOUTH DAILY       BENEFIBER Tabs      2 TABLETS ORALLY DAILY       calcium-vitamin D 600-400 MG-UNIT per tablet    CALTRATE    100 tablet    Take 1 tablet by mouth 2 times daily.       ciprofloxacin 250 MG tablet    CIPRO    7 tablet    Take 1 tablet (250 mg) by mouth daily       cloNIDine 0.1 MG tablet    CATAPRES    180 Tab    ONE Twice DAILY as needed for SBP>160, diastolic BP>100       hydrochlorothiazide 50 MG tablet    HYDRODIURIL    90 tablet    Take 1 tablet (50 mg) by mouth every morning       levothyroxine 25 MCG tablet    SYNTHROID    216 tablet    75 mcg (3 tabs) on Sunday, Monday, Wednesday, Friday. 50 mcg (2 tabs) on Tuesday, Thursday, Saturday       lisinopril 10 MG tablet     PRINIVIL/ZESTRIL    180 tablet    Take 1 tablet (10 mg) by mouth 2 times daily       multivitamin per tablet      1 TABLET BY MOUTH DAILY       omeprazole 20 MG CR capsule    priLOSEC    60 capsule    Take 20 mg every 3rd day as needed       pyridoxine 50 MG Tabs    VITAMIN B-6    1 bottle    1 daily       rosuvastatin 10 MG tablet    CRESTOR    90 tablet    Take 1 tablet (10 mg) by mouth daily

## 2017-05-19 NOTE — NURSING NOTE
"Chief Complaint   Patient presents with     Urinary Problem     requent and burning 2 days.+     Medication Request     please reorder prilosec for 90 days is using it daily now.  Please renew       Initial /71 (BP Location: Right leg, Patient Position: Chair, Cuff Size: Adult Regular)  Pulse 71  Temp 96.8  F (36  C) (Tympanic)  Ht 5' 5.5\" (1.664 m)  Wt 161 lb (73 kg)  BMI 26.38 kg/m2 Estimated body mass index is 26.38 kg/(m^2) as calculated from the following:    Height as of this encounter: 5' 5.5\" (1.664 m).    Weight as of this encounter: 161 lb (73 kg).  Medication Reconciliation: complete  "

## 2017-05-19 NOTE — PATIENT INSTRUCTIONS
Thank you for choosing [facility].  You may be receiving a survey in the mail from [employer] regarding your visit today.  Please take a few minutes to complete and return the survey to let us know how we are doing.      If you have questions or concerns, please contact us via Lin[employer]ia or you can contact your care team at [phone].    Our Clinic hours are:  Monday 6:40 am  to 7:00 pm  Tuesday -Friday 6:40 am to 5:00 pm    The [place] outpatient lab hours are:  Monday - Friday 6:10 am to 4:45 pm  Saturdays 7:00 am to 11:00 am  Appointments are required, call [phone]    If you have clinical questions after hours or would like to schedule an appointment,  call the clinic at [phone].      [facility] [place], [place] - [address] [place] [address]

## 2017-05-19 NOTE — PROGRESS NOTES
SUBJECTIVE:                                                    Lupe Lepe is a 77 year old female who presents to clinic today for the following health issues:      URINARY TRACT SYMPTOMS     Onset: 2-3 days    Description:   Painful urination (Dysuria): YES  Blood in urine (Hematuria): no   Delay in urine (Hesitency): no     Intensity: moderate    Progression of Symptoms:  worsening    Accompanying Signs & Symptoms:  Fever/chills: no   Flank pain YES  Nausea and vomiting: no   Any vaginal symptoms: none  Abdominal/Pelvic Pain: YES   History:   History of frequent UTI's: YES  History of kidney stones: no   Sexually Active: no   Possibility of pregnancy: No    Precipitating factors:   none         Therapies Tried and outcome: Increase fluid intake      Chief Complaint   Patient presents with     Urinary Problem     requent and burning 2 days.+     Last UTI 9/16 pan-sens E Coli. She got cipro and this worked well.    Current Outpatient Prescriptions   Medication Sig Dispense Refill     levothyroxine (SYNTHROID) 25 MCG tablet 75 mcg (3 tabs) on Sunday, Monday, Wednesday, Friday. 50 mcg (2 tabs) on Tuesday, Thursday, Saturday 216 tablet 3     rosuvastatin (CRESTOR) 10 MG tablet Take 1 tablet (10 mg) by mouth daily 90 tablet 3     omeprazole (PRILOSEC) 20 MG CR capsule Take 20 mg every 3rd day as needed 60 capsule 3     lisinopril (PRINIVIL/ZESTRIL) 10 MG tablet Take 1 tablet (10 mg) by mouth 2 times daily 180 tablet 3     amLODIPine (NORVASC) 10 MG tablet Take 1 tablet (10 mg) by mouth daily 90 tablet 4     hydrochlorothiazide (HYDRODIURIL) 50 MG tablet Take 1 tablet (50 mg) by mouth every morning 90 tablet 3     calcium-vitamin D (CALTRATE) 600-400 MG-UNIT per tablet Take 1 tablet by mouth 2 times daily. 100 tablet 3     VITAMIN C 500 MG PO TABS ONE TABLET twice DAILY 90 Tab 3     ASPIRIN 325 MG OR TBEC 1 TABLET BY MOUTH DAILY 100 3     BENEFIBER OR TABS 2 TABLETS ORALLY DAILY  0     VITAMIN B-6 50 MG OR TABS  "1 daily 1 bottle 1 year     MULTIVITAMINS OR TABS 1 TABLET BY MOUTH DAILY  0     CLONIDINE HCL 0.1 MG PO TABS ONE Twice DAILY as needed for SBP>160, diastolic BP>100 180 Tab 4       Reviewed and updated as needed this visit by clinical staff  Allergies       Reviewed and updated as needed this visit by Provider         ROS:  Constitutional, HEENT, cardiovascular, pulmonary, gi and gu systems are negative, except as otherwise noted.    OBJECTIVE:                                                    /72  Pulse 71  Temp 96.8  F (36  C) (Tympanic)  Ht 5' 5.5\" (1.664 m)  Wt 161 lb (73 kg)  BMI 26.38 kg/m2  Body mass index is 26.38 kg/(m^2).  GENERAL APPEARANCE: healthy, alert and no distress  ABDOMEN: soft, nontender, without hepatosplenomegaly or masses, bowel sounds normal and no CVA tenderness  MS: extremities normal- no gross deformities noted    Diagnostic Test Results:  Results for orders placed or performed in visit on 05/19/17 (from the past 24 hour(s))   *UA reflex to Microscopic and Culture ([facility] (except [place] and [place])   Result Value Ref Range    Color Urine Yellow     Appearance Urine Clear     Glucose Urine Negative NEG mg/dL    Bilirubin Urine Negative NEG    Ketones Urine Negative NEG mg/dL    Specific Gravity Urine 1.015 1.003 - 1.035    Blood Urine Negative NEG    pH Urine 6.0 5.0 - 7.0 pH    Protein Albumin Urine Negative NEG mg/dL    Urobilinogen Urine 0.2 0.2 - 1.0 EU/dL    Nitrite Urine Negative NEG    Leukocyte Esterase Urine Small (A) NEG    Source Midstream Urine    Urine Microscopic   Result Value Ref Range    WBC Urine 25-50 (A) 0 - 2 /HPF    RBC Urine O - 2 0 - 2 /HPF    Squamous Epithelial /LPF Urine Few FEW /LPF    Bacteria Urine Few (A) NEG /HPF        ASSESSMENT/PLAN:                                                        ICD-10-CM    1. Acute cystitis without hematuria N30.00 ciprofloxacin (CIPRO) 250 MG tablet   2. Urinary problem R39.89 *UA reflex " to Microscopic and Culture ([place] (except [place] and [place])     Urine Microscopic   3. Nonspecific finding on examination of urine R82.90 Urine Culture Aerobic Bacterial       Dose adjusted due to kidney function    Li Horton,   [facility]

## 2017-05-22 LAB
BACTERIA SPEC CULT: ABNORMAL
MICRO REPORT STATUS: ABNORMAL
MICROORGANISM SPEC CULT: ABNORMAL
SPECIMEN SOURCE: ABNORMAL

## 2017-06-16 NOTE — PROGRESS NOTES
Discharge Note -Physical Therapy    NAME:  Lupe Lepe  MRN:   [id number]    S:    Pt did not follow up for therapy as recommended.    O:  Objective information is not available as pt has not returned for therapy.  Last objective information or progress note will serve as final entry.    A:   Pt did not return for further treatment.    Status of goals is unknown due to lack of followup by patient.    P:  Discharge from PT this date.      Medicare G-code:  Patient did not attend a final scheduled session prior to discharge.  Unable to determine discharge functional status.    Kris Hoenk, PT #[id number]  [facility]

## 2017-06-16 NOTE — ADDENDUM NOTE
Encounter addended by: Hoenk, Kris, PT on: 6/16/2017  1:32 PM<BR>     Actions taken: Sign clinical note, Episode resolved

## 2017-08-03 ENCOUNTER — HOSPITAL ENCOUNTER (EMERGENCY)
Facility: CLINIC | Age: 77
Discharge: HOME | End: 2017-08-03
Attending: PHYSICIAN ASSISTANT | Admitting: PHYSICIAN ASSISTANT
Payer: MEDICARE

## 2017-08-03 VITALS
RESPIRATION RATE: 16 BRPM | WEIGHT: 155 LBS | OXYGEN SATURATION: 98 % | HEIGHT: 66 IN | TEMPERATURE: 97.6 F | SYSTOLIC BLOOD PRESSURE: 135 MMHG | HEART RATE: 90 BPM | BODY MASS INDEX: 24.91 KG/M2 | DIASTOLIC BLOOD PRESSURE: 75 MMHG

## 2017-08-03 DIAGNOSIS — N30.00 ACUTE CYSTITIS WITHOUT HEMATURIA: ICD-10-CM

## 2017-08-03 LAB
ALBUMIN UR-MCNC: 10 MG/DL
APPEARANCE UR: ABNORMAL
BACTERIA #/AREA URNS HPF: ABNORMAL /HPF
BILIRUB UR QL STRIP: NEGATIVE
COLOR UR AUTO: YELLOW
GLUCOSE UR STRIP-MCNC: NEGATIVE MG/DL
HGB UR QL STRIP: NEGATIVE
HYALINE CASTS #/AREA URNS LPF: 10 /LPF (ref 0–2)
KETONES UR STRIP-MCNC: NEGATIVE MG/DL
LEUKOCYTE ESTERASE UR QL STRIP: ABNORMAL
MUCOUS THREADS #/AREA URNS LPF: PRESENT /LPF
NITRATE UR QL: POSITIVE
PH UR STRIP: 5.5 PH (ref 5–7)
RBC #/AREA URNS AUTO: 0 /HPF (ref 0–2)
SP GR UR STRIP: 1.01 (ref 1–1.03)
URN SPEC COLLECT METH UR: ABNORMAL
UROBILINOGEN UR STRIP-MCNC: NORMAL MG/DL (ref 0–2)
WBC #/AREA URNS AUTO: 121 /HPF (ref 0–2)
WBC CLUMPS #/AREA URNS HPF: PRESENT /HPF

## 2017-08-03 PROCEDURE — 81001 URINALYSIS AUTO W/SCOPE: CPT | Performed by: PHYSICIAN ASSISTANT

## 2017-08-03 PROCEDURE — 99213 OFFICE O/P EST LOW 20 MIN: CPT | Performed by: PHYSICIAN ASSISTANT

## 2017-08-03 PROCEDURE — 87088 URINE BACTERIA CULTURE: CPT | Performed by: PHYSICIAN ASSISTANT

## 2017-08-03 PROCEDURE — 99213 OFFICE O/P EST LOW 20 MIN: CPT

## 2017-08-03 PROCEDURE — 87186 SC STD MICRODIL/AGAR DIL: CPT | Performed by: PHYSICIAN ASSISTANT

## 2017-08-03 PROCEDURE — 87086 URINE CULTURE/COLONY COUNT: CPT | Performed by: PHYSICIAN ASSISTANT

## 2017-08-03 RX ORDER — CIPROFLOXACIN 250 MG/1
250 TABLET, FILM COATED ORAL DAILY
Qty: 7 TABLET | Refills: 0 | Status: SHIPPED | OUTPATIENT
Start: 2017-08-03 | End: 2017-08-10

## 2017-08-03 NOTE — ED AVS SNAPSHOT
[facility] Emergency Department    [address] [address]    Phone:  [phone]    Fax:  [phone]                                       Lupe Lepe   MRN: [id number]    Department:  [facility] Emergency Department   Date of Visit:  8/3/2017           After Visit Summary Signature Page     I have received my discharge instructions, and my questions have been answered. I have discussed any challenges I see with this plan with the nurse or doctor.    ..........................................................................................................................................  Patient/Patient Representative Signature      ..........................................................................................................................................  Patient Representative Print Name and Relationship to Patient    ..................................................               ................................................  Date                                            Time    ..........................................................................................................................................  Reviewed by Signature/Title    ...................................................              ..............................................  Date                                                            Time

## 2017-08-03 NOTE — ED AVS SNAPSHOT
[facility] Emergency Department    [address] [address]    Phone:  [phone]    Fax:  [phone]                                       Lupe Lepe   MRN: [id number]    Department:  [facility] Emergency Department   Date of Visit:  8/3/2017           Patient Information     Date Of Birth          1940        Your diagnoses for this visit were:     Acute cystitis without hematuria        You were seen by Claudia Hua PA-C.      Follow-up Information     Follow up with Li Horton DO In 2 days.    Specialty:  Internal Medicine    Why:  if no improvement or sooner if new or worsening symptoms     Contact information:    [facility]  [address] [address]  [phone]          Discharge Instructions         Anatomy of the Female Urinary Tract  Your urinary tract helps get rid of urine (your body s liquid waste). The kidneys collect chemicals and water your body doesn t need. This is turned into urine. Urine travels out of the kidneys through the ureters to the bladder. The bladder holds urine until you re ready to release it. The urethra carries urine from the bladder out of the body. The main sphincter muscle circles the mid-urethra.      Front view of female urinary tract.     Date Last Reviewed: 1/1/2017 2000-2017 [employer]. [address] [place] [address]. All rights reserved. This information is not intended as a substitute for professional medical care. Always follow your healthcare professional's instructions.          24 Hour Appointment Hotline       To make an appointment at any [facility], call [phone] ([phone]). If you don't have a family doctor or clinic, we will help you find one. [facility] are conveniently located to serve the needs of you and your family.             Review of your medicines      Our records show that you are taking the medicines listed below. If  these are incorrect, please call your family doctor or clinic.        Dose / Directions Last dose taken    amLODIPine 10 MG tablet   Commonly known as:  NORVASC   Dose:  10 mg   Quantity:  90 tablet        Take 1 tablet (10 mg) by mouth daily   Refills:  4        ascorbic acid 500 MG tablet   Commonly known as:  VITAMIN C   Quantity:  90 Tab        ONE TABLET twice DAILY   Refills:  3        aspirin 325 MG EC tablet   Quantity:  100        1 TABLET BY MOUTH DAILY   Refills:  3        BENEFIBER Tabs        2 TABLETS ORALLY DAILY   Refills:  0        calcium-vitamin D 600-400 MG-UNIT per tablet   Commonly known as:  CALTRATE   Dose:  1 tablet   Quantity:  100 tablet        Take 1 tablet by mouth 2 times daily.   Refills:  3        ciprofloxacin 250 MG tablet   Commonly known as:  CIPRO   Dose:  250 mg   Quantity:  7 tablet        Take 1 tablet (250 mg) by mouth daily for 7 days   Refills:  0        cloNIDine 0.1 MG tablet   Commonly known as:  CATAPRES   Quantity:  180 Tab        ONE Twice DAILY as needed for SBP>160, diastolic BP>100   Refills:  4        hydrochlorothiazide 50 MG tablet   Commonly known as:  HYDRODIURIL   Dose:  50 mg   Quantity:  90 tablet        Take 1 tablet (50 mg) by mouth every morning   Refills:  3        levothyroxine 25 MCG tablet   Commonly known as:  SYNTHROID   Quantity:  216 tablet        75 mcg (3 tabs) on Sunday, Monday, Wednesday, Friday. 50 mcg (2 tabs) on Tuesday, Thursday, Saturday   Refills:  3        lisinopril 10 MG tablet   Commonly known as:  PRINIVIL/ZESTRIL   Dose:  10 mg   Indication:  High Blood Pressure   Quantity:  180 tablet        Take 1 tablet (10 mg) by mouth 2 times daily   Refills:  3        multivitamin per tablet        1 TABLET BY MOUTH DAILY   Refills:  0        omeprazole 20 MG CR capsule   Commonly known as:  priLOSEC   Quantity:  60 capsule        Take 20 mg every 3rd day as needed   Refills:  3        pyridoxine 50 MG Tabs   Commonly known as:  VITAMIN B-6    Quantity:  1 bottle        1 daily   Refills:  1 year        rosuvastatin 10 MG tablet   Commonly known as:  CRESTOR   Dose:  10 mg   Quantity:  90 tablet        Take 1 tablet (10 mg) by mouth daily   Refills:  3                Prescriptions were sent or printed at these locations (1 Prescription)                   [facility] [address] [facility] [employer] - [address] [address]    Telephone:  [phone]   Fax:  [phone]   Hours:                  E-Prescribed (1 of 1)         ciprofloxacin (CIPRO) 250 MG tablet                Procedures and tests performed during your visit     UA reflex to Microscopic      Orders Needing Specimen Collection     None      Pending Results     No orders found from 8/1/2017 to 8/4/2017.            Pending Culture Results     No orders found from 8/1/2017 to 8/4/2017.            Pending Results Instructions     If you had any lab results that were not finalized at the time of your Discharge, you can call the ED Lab Result RN at [phone]. You will be contacted by this team for any positive Lab results or changes in treatment. The nurses are available 7 days a week from 10A to 6:30P.  You can leave a message 24 hours per day and they will return your call.        Test Results From Your Hospital Stay        8/3/2017  7:30 PM      Component Results     Component Value Ref Range & Units Status    Color Urine Yellow  Final    Appearance Urine Slightly Cloudy  Final    Glucose Urine Negative NEG mg/dL Final    Bilirubin Urine Negative NEG Final    Ketones Urine Negative NEG mg/dL Final    Specific Gravity Urine 1.013 1.003 - 1.035 Final    Blood Urine Negative NEG Final    pH Urine 5.5 5.0 - 7.0 pH Final    Protein Albumin Urine 10 (A) NEG mg/dL Final    Urobilinogen mg/dL Normal 0.0 - 2.0 mg/dL Final    Nitrite Urine Positive (A) NEG Final    Leukocyte Esterase Urine Large (A) NEG Final    Source Midstream Urine  Final    RBC Urine 0 0 - 2 /HPF  Final    WBC Urine 121 (H) 0 - 2 /HPF Final    WBC Clumps Present (A) NEG /HPF Final    Bacteria Urine Moderate (A) NEG /HPF Final    Mucous Urine Present (A) NEG /LPF Final    Hyaline Casts 10 (H) 0 - 2 /LPF Final                Thank you for choosing [facility]       Thank you for choosing [facility] for your care. Our goal is always to provide you with excellent care. Hearing back from our patients is one way we can continue to improve our services. Please take a few minutes to complete the written survey that you may receive in the mail after you visit with us. Thank you!        [employer] Information     [employer] gives you secure access to your electronic health record. If you see a primary care provider, you can also send messages to your care team and make appointments. If you have questions, please call your primary care clinic.  If you do not have a primary care provider, please call [phone] and they will assist you.        Care EveryWhere ID     This is your Care EveryWhere ID. This could be used by other organizations to access your [facility] medical records  [id number]        Equal Access to Services     DARIO ABDI : Hadii nya Taylor, wakasey moreno, qaalmaz hastings, martinez white . So [facility] [phone].    ATENCIÓN: Si habla español, tiene a saldana disposición servicios gratuitos de asistencia lingüística. Llame al [phone].    We comply with applicable federal civil rights laws and [place] laws. We do not discriminate on the basis of race, color, national origin, age, disability sex, sexual orientation or gender identity.            After Visit Summary       This is your record. Keep this with you and show to your community pharmacist(s) and doctor(s) at your next visit.

## 2017-08-04 NOTE — ED PROVIDER NOTES
History     Chief Complaint   Patient presents with     Urinary Frequency     Pt c/o urinary frequency and lower abdominal pain     HPI  Lupe Lepe is a 77 year old female who presents to the urgent care with concerns over possible urinary tract infection.  Beginning today patient complains of dysuria, increased frequency, urgency, suprapubic pain.  She denies any fever, chills, myalgias, cough, dyspnea, wheezing, nausea, vomiting or vaginal complaints.  She states a history of frequent urinary tract infections, most recently treated 2-3 months prior to arrival.       I have reviewed the Medications, Allergies, Past Medical and Surgical History, and Social History in the Epic system.    Allergies:   Allergies   Allergen Reactions     Bactrim      Acute renal failure and hyperkalemia     Ace Inhibitors Other (See Comments)     Hyperkalemia       Atorvastatin Calcium Itching     ELEVATED LFTS     Crestor [Rosuvastatin Calcium] Other (See Comments)     Elevated LFTS   In high doses     Penicillins Hives       No current facility-administered medications on file prior to encounter.   Current Outpatient Prescriptions on File Prior to Encounter:  ciprofloxacin (CIPRO) 250 MG tablet Take 1 tablet (250 mg) by mouth daily   levothyroxine (SYNTHROID) 25 MCG tablet 75 mcg (3 tabs) on Sunday, Monday, Wednesday, Friday. 50 mcg (2 tabs) on Tuesday, Thursday, Saturday   rosuvastatin (CRESTOR) 10 MG tablet Take 1 tablet (10 mg) by mouth daily   omeprazole (PRILOSEC) 20 MG CR capsule Take 20 mg every 3rd day as needed   lisinopril (PRINIVIL/ZESTRIL) 10 MG tablet Take 1 tablet (10 mg) by mouth 2 times daily   amLODIPine (NORVASC) 10 MG tablet Take 1 tablet (10 mg) by mouth daily   hydrochlorothiazide (HYDRODIURIL) 50 MG tablet Take 1 tablet (50 mg) by mouth every morning   calcium-vitamin D (CALTRATE) 600-400 MG-UNIT per tablet Take 1 tablet by mouth 2 times daily.   VITAMIN C 500 MG PO TABS ONE TABLET twice DAILY    CLONIDINE HCL 0.1 MG PO TABS ONE Twice DAILY as needed for SBP>160, diastolic BP>100   ASPIRIN 325 MG OR TBEC 1 TABLET BY MOUTH DAILY   BENEFIBER OR TABS 2 TABLETS ORALLY DAILY   VITAMIN B-6 50 MG OR TABS 1 daily   MULTIVITAMINS OR TABS 1 TABLET BY MOUTH DAILY       Patient Active Problem List   Diagnosis     Paroxysmal atrial fibrillation (H)     Renovascular hypertension with goal blood pressure less than 130/80     Acquired hypothyroidism     Osteopenia     Giant cell arteritis (H)     Stricture of artery (H)     Renal artery stenosis (H)     Gastroesophageal reflux disease without esophagitis     Abdominal cramps     Frequent Urinary Tract Infections     Type 2 diabetes mellitus with stage 4 chronic kidney disease, without long-term current use of insulin (H)     Hyperlipidemia LDL goal <100     Advanced directives, counseling/discussion     Anemia of renal disease     Iron deficiency anemia, unspecified     Chronic kidney disease, stage IV (severe) (H)     CKD (chronic kidney disease) stage 4, GFR 15-29 ml/min (H)       Past Surgical History:   Procedure Laterality Date     COLONOSCOPY  8/25/2008     HC COLONOSCOPY THRU STOMA, DIAGNOSTIC  2000, 5/06, 8/08    Normal     HYSTERECTOMY, SIS  1970's     SURGICAL HISTORY OF -   1948    SIS, for fibroids     SURGICAL HISTORY OF -   12/16/2008    Esophagogastroduodenoscopy with biopsy     TONSILLECTOMY      Tonsilectomy       Social History   Substance Use Topics     Smoking status: Never Smoker     Smokeless tobacco: Never Used     Alcohol use No       Most Recent Immunizations   Administered Date(s) Administered     Influenza (H1N1) 12/21/2009     Influenza (High Dose) 3 valent vaccine 10/13/2016     Influenza (IIV3) 09/12/2011     Pneumococcal (PCV 13) 10/06/2015     Pneumococcal 23 valent 03/09/2009     TD (ADULT, 7+) 09/29/2008     Zoster vaccine, live 03/19/2012       BMI: Estimated body mass index is 25.02 kg/(m^2) as calculated from the following:    Height  "as of this encounter: 1.676 m (5' 6\").    Weight as of this encounter: 70.3 kg (155 lb).  Review of Systems  CONSTITUTIONAL:NEGATIVE for fever, chills, change in weight  INTEGUMENTARY/SKIN: NEGATIVE for worrisome rashes, moles or lesions  RESP:NEGATIVE for significant cough or SOB  GI: POSITIVE for suprapubic abdominal pain and NEGATIVE for nausea, vomiting or diarrhea   : POSITIVE for dysuria, increased frequency, urgency, burning NEGATIVE for hematuria   Physical Exam   /75  Pulse 90  Temp 97.6  F (36.4  C) (Oral)  Resp 16  Ht 1.676 m (5' 6\")  Wt 70.3 kg (155 lb)  SpO2 98%  BMI 25.02 kg/m2  Physical Exam  GENERAL APPEARANCE: healthy, alert and no distress  RESP: lungs clear to auscultation - no rales, rhonchi or wheezes  CV: regular rates and rhythm, normal S1 S2, no murmur noted  ABDOMEN:  soft, nontender, no HSM or masses and bowel sounds normal  BACK: No CVA tenderness  SKIN: no suspicious lesions or rashes  ED Course     ED Course     Procedures        Critical Care time:  none            Labs Ordered and Resulted from Time of ED Arrival Up to the Time of Departure from the ED   URINE MACROSCOPIC WITH REFLEX TO MICRO - Abnormal; Notable for the following:        Result Value    Protein Albumin Urine 10 (*)     Nitrite Urine Positive (*)     Leukocyte Esterase Urine Large (*)     WBC Urine 121 (*)     WBC Clumps Present (*)     Bacteria Urine Moderate (*)     Mucous Urine Present (*)     Hyaline Casts 10 (*)     All other components within normal limits     Assessments & Plan (with Medical Decision Making)     I have reviewed the nursing notes.    I have reviewed the findings, diagnosis, plan and need for follow up with the patient.       Discharge Medication List as of 8/3/2017  7:43 PM        Final diagnoses:   Acute cystitis without hematuria    77-year-old female with history of frequent urinary tract infections presents to the urgent care with concerns over dysuria, increased frequency, " urgency which began earlier today.  She had stable vital signs upon arrival.  Physical exam findings as described above.  Her urinalysis positive for infection.  I do not suspect pyelonephritis or nephrolithiasis at this time.  Patient will be discharged home stable on Cipro 250 mg daily given her renal function pending urine culture results from today's visit.  She was instructed to follow up with PCP if no improvement in three days.  Worrisome reasons to seek care sooner discussed.      Disclaimer: This note consists of symbols derived from keyboarding, dictation, and/or voice recognition software. As a result, there may be errors in the script that have gone undetected.  Please consider this when interpreting information found in the chart.    8/3/2017   [facility] EMERGENCY DEPARTMENT     Claudia Hua PA-C  08/04/17 0135

## 2017-08-04 NOTE — DISCHARGE INSTRUCTIONS
Anatomy of the Female Urinary Tract  Your urinary tract helps get rid of urine (your body s liquid waste). The kidneys collect chemicals and water your body doesn t need. This is turned into urine. Urine travels out of the kidneys through the ureters to the bladder. The bladder holds urine until you re ready to release it. The urethra carries urine from the bladder out of the body. The main sphincter muscle circles the mid-urethra.      Front view of female urinary tract.     Date Last Reviewed: 1/1/2017 2000-2017 [employer]. [address] [place] [address], [place] [address]. All rights reserved. This information is not intended as a substitute for professional medical care. Always follow your healthcare professional's instructions.

## 2017-09-22 ENCOUNTER — TRANSFERRED RECORDS (OUTPATIENT)
Dept: HEALTH INFORMATION MANAGEMENT | Facility: CLINIC | Age: 77
End: 2017-09-22

## 2017-09-28 ENCOUNTER — ALLIED HEALTH/NURSE VISIT (OUTPATIENT)
Dept: FAMILY MEDICINE | Facility: CLINIC | Age: 77
End: 2017-09-28
Payer: COMMERCIAL

## 2017-09-28 DIAGNOSIS — Z23 NEED FOR PROPHYLACTIC VACCINATION AND INOCULATION AGAINST INFLUENZA: Primary | ICD-10-CM

## 2017-09-28 PROCEDURE — 90471 IMMUNIZATION ADMIN: CPT

## 2017-09-28 PROCEDURE — 90662 IIV NO PRSV INCREASED AG IM: CPT

## 2017-09-28 PROCEDURE — 99207 ZZC NO CHARGE NURSE ONLY: CPT

## 2017-09-28 NOTE — PROGRESS NOTES
Injectable Influenza Immunization Documentation    1.  Is the person to be vaccinated sick today?   No    2. Does the person to be vaccinated have an allergy to a component   of the vaccine?   No    3. Has the person to be vaccinated ever had a serious reaction   to influenza vaccine in the past?   No    4. Has the person to be vaccinated ever had Guillain-Barré syndrome?   No    Form completed by Laney Cornell

## 2017-09-28 NOTE — MR AVS SNAPSHOT
After Visit Summary   9/28/2017    Lupe Lepe    MRN: [id number]           Patient Information     Date Of Birth          1940        Visit Information        Provider Department      9/28/2017 3:20 PM [facility]        Today's Diagnoses     Need for prophylactic vaccination and inoculation against influenza    -  1       Follow-ups after your visit        Who to contact     If you have questions or need follow up information about today's clinic visit or your schedule please contact [facility] directly at [phone].  Normal or non-critical lab and imaging results will be communicated to you by Competitorhart, letter or phone within 4 business days after the clinic has received the results. If you do not hear from us within 7 days, please contact the clinic through QuesComt or phone. If you have a critical or abnormal lab result, we will notify you by phone as soon as possible.  Submit refill requests through [email or web address] or call your pharmacy and they will forward the refill request to us. Please allow 3 business days for your refill to be completed.          Additional Information About Your Visit        MyChart Information     [email or web address] gives you secure access to your electronic health record. If you see a primary care provider, you can also send messages to your care team and make appointments. If you have questions, please call your primary care clinic.  If you do not have a primary care provider, please call [phone] and they will assist you.        Care EveryWhere ID     This is your Care EveryWhere ID. This could be used by other organizations to access your [place] medical records  [id number]         Blood Pressure from Last 3 Encounters:   08/03/17 135/75   05/19/17 117/72   03/28/17 139/59    Weight from Last 3 Encounters:   08/03/17 155 lb (70.3 kg)   05/19/17 161 lb (73 kg)   03/28/17 161 lb 12.8 oz (73.4 kg)              We  Performed the Following     FLU VACCINE, INCREASED ANTIGEN, PRESV FREE, AGE 65+ [68442]     Vaccine Administration, Initial [40575]        Primary Care Provider Office Phone # Fax #    Li Horton [phone] [address] [facility] [address]        Equal Access to Services     DARIO ABDI : Hadii aad ku hadasho Soomaali, waaxda luqadaha, qaybta kaalmada adeegyada, waxbarbara meyerin hayaan adesteph khjuliantrista white . So [facility] [phone].    ATENCIÓN: Si habla español, tiene a saldana disposición servicios gratuitos de asistencia lingüística. Llame al [phone].    We comply with applicable federal civil rights laws and [place] laws. We do not discriminate on the basis of race, color, national origin, age, disability sex, sexual orientation or gender identity.            Thank you!     Thank you for choosing [facility]  for your care. Our goal is always to provide you with excellent care. Hearing back from our patients is one way we can continue to improve our services. Please take a few minutes to complete the written survey that you may receive in the mail after your visit with us. Thank you!             Your Updated Medication List - Protect others around you: Learn how to safely use, store and throw away your medicines at www.disposemymeds.org.          This list is accurate as of: 9/28/17  3:42 PM.  Always use your most recent med list.                   Brand Name Dispense Instructions for use Diagnosis    amLODIPine 10 MG tablet    NORVASC    90 tablet    Take 1 tablet (10 mg) by mouth daily    Renovascular hypertension with goal blood pressure less than 130/80       ascorbic acid 500 MG tablet    VITAMIN C    90 Tab    ONE TABLET twice DAILY    UTI (urinary tract infection)       aspirin 325 MG EC tablet     100    1 TABLET BY MOUTH DAILY        BENEFIBER Tabs      2 TABLETS ORALLY DAILY        calcium-vitamin D 600-400 MG-UNIT per tablet    CALTRATE    100 tablet     Take 1 tablet by mouth 2 times daily.    Disorder of bone and cartilage, unspecified       cloNIDine 0.1 MG tablet    CATAPRES    180 Tab    ONE Twice DAILY as needed for SBP>160, diastolic BP>100    Essential hypertension, benign       hydrochlorothiazide 50 MG tablet    HYDRODIURIL    90 tablet    Take 1 tablet (50 mg) by mouth every morning    Renovascular hypertension with goal blood pressure less than 130/80       levothyroxine 25 MCG tablet    SYNTHROID    216 tablet    75 mcg (3 tabs) on Sunday, Monday, Wednesday, Friday. 50 mcg (2 tabs) on Tuesday, Thursday, Saturday    Acquired hypothyroidism       lisinopril 10 MG tablet    PRINIVIL/ZESTRIL    180 tablet    Take 1 tablet (10 mg) by mouth 2 times daily    Renovascular hypertension with goal blood pressure less than 130/80       multivitamin per tablet      1 TABLET BY MOUTH DAILY        omeprazole 20 MG CR capsule    priLOSEC    60 capsule    Take 20 mg every 3rd day as needed    Gastroesophageal reflux disease without esophagitis       pyridoxine 50 MG Tabs    VITAMIN B-6    1 bottle    1 daily    Disturbance of skin sensation       rosuvastatin 10 MG tablet    CRESTOR    90 tablet    Take 1 tablet (10 mg) by mouth daily    Hyperlipidemia LDL goal <100

## 2017-10-09 ENCOUNTER — DOCUMENTATION ONLY (OUTPATIENT)
Dept: OTHER | Facility: CLINIC | Age: 77
End: 2017-10-09

## 2017-10-09 DIAGNOSIS — Z71.89 ACP (ADVANCE CARE PLANNING): Chronic | ICD-10-CM

## 2017-11-13 ENCOUNTER — HOSPITAL ENCOUNTER (EMERGENCY)
Facility: CLINIC | Age: 77
Discharge: HOME | End: 2017-11-13
Attending: PHYSICIAN ASSISTANT | Admitting: PHYSICIAN ASSISTANT
Payer: MEDICARE

## 2017-11-13 ENCOUNTER — ALLIED HEALTH/NURSE VISIT (OUTPATIENT)
Dept: FAMILY MEDICINE | Facility: CLINIC | Age: 77
End: 2017-11-13
Payer: COMMERCIAL

## 2017-11-13 VITALS
TEMPERATURE: 97.5 F | SYSTOLIC BLOOD PRESSURE: 115 MMHG | BODY MASS INDEX: 25.02 KG/M2 | DIASTOLIC BLOOD PRESSURE: 67 MMHG | WEIGHT: 155 LBS | OXYGEN SATURATION: 96 % | HEART RATE: 86 BPM | RESPIRATION RATE: 16 BRPM

## 2017-11-13 DIAGNOSIS — R30.0 DYSURIA: Primary | ICD-10-CM

## 2017-11-13 DIAGNOSIS — N30.01 ACUTE CYSTITIS WITH HEMATURIA: ICD-10-CM

## 2017-11-13 DIAGNOSIS — R35.0 URINARY FREQUENCY: ICD-10-CM

## 2017-11-13 LAB
ALBUMIN UR-MCNC: 30 MG/DL
APPEARANCE UR: ABNORMAL
BACTERIA #/AREA URNS HPF: ABNORMAL /HPF
BILIRUB UR QL STRIP: NEGATIVE
COLOR UR AUTO: YELLOW
GLUCOSE UR STRIP-MCNC: NEGATIVE MG/DL
HGB UR QL STRIP: ABNORMAL
KETONES UR STRIP-MCNC: NEGATIVE MG/DL
LEUKOCYTE ESTERASE UR QL STRIP: ABNORMAL
MUCOUS THREADS #/AREA URNS LPF: PRESENT /LPF
NITRATE UR QL: POSITIVE
PH UR STRIP: 5.5 PH (ref 5–7)
RBC #/AREA URNS AUTO: 7 /HPF (ref 0–2)
SOURCE: ABNORMAL
SP GR UR STRIP: 1.01 (ref 1–1.03)
UROBILINOGEN UR STRIP-MCNC: NORMAL MG/DL (ref 0–2)
WBC #/AREA URNS AUTO: 132 /HPF (ref 0–2)
WBC CLUMPS #/AREA URNS HPF: PRESENT /HPF
YEAST #/AREA URNS HPF: ABNORMAL /HPF

## 2017-11-13 PROCEDURE — 99207 ZZC NO CHARGE NURSE ONLY: CPT

## 2017-11-13 PROCEDURE — 87088 URINE BACTERIA CULTURE: CPT | Performed by: NURSE PRACTITIONER

## 2017-11-13 PROCEDURE — 87186 SC STD MICRODIL/AGAR DIL: CPT | Performed by: NURSE PRACTITIONER

## 2017-11-13 PROCEDURE — 99213 OFFICE O/P EST LOW 20 MIN: CPT

## 2017-11-13 PROCEDURE — 99213 OFFICE O/P EST LOW 20 MIN: CPT | Performed by: PHYSICIAN ASSISTANT

## 2017-11-13 PROCEDURE — 87086 URINE CULTURE/COLONY COUNT: CPT | Performed by: NURSE PRACTITIONER

## 2017-11-13 PROCEDURE — 81001 URINALYSIS AUTO W/SCOPE: CPT | Performed by: NURSE PRACTITIONER

## 2017-11-13 RX ORDER — CIPROFLOXACIN 250 MG/1
250 TABLET, FILM COATED ORAL 2 TIMES DAILY
Qty: 14 TABLET | Refills: 0 | Status: SHIPPED | OUTPATIENT
Start: 2017-11-13 | End: 2017-11-20

## 2017-11-13 ASSESSMENT — ENCOUNTER SYMPTOMS
FREQUENCY: 1
DYSURIA: 1

## 2017-11-13 NOTE — ED PROVIDER NOTES
History     Chief Complaint   Patient presents with     UTI     s/s of UTI over the weekend.      HPI    Lupe Lepe is a 77 year old female who  presents today with urinary symptoms. Symptoms of dysuria, urgency, frequency, burning and suprapubic pain and pressure have been going on for 5day(s).  Hematuria no.  sudden onset, still present and worseningand mild.  This patient does have a history of urinary tract infections.   Vaginal symptoms none   Patient denies fevers, nausea/vomiting, abdominal pain, or mid back pain. Patient states she has stage 4 kidney disease, but usually takes cipro for her UTIs.     Problem list, Medication list, Allergies, and Medical/Social/Surgical histories reviewed in [facility] and updated as appropriate.      Problem List:    Patient Active Problem List    Diagnosis Date Noted     Renal artery stenosis (H) 08/25/2006     Priority: High     Left. Likely due to temporal arteritis. S/p left angioplasty 8/06. Increased BP 10/06. Renal duplex- Increased velocity left RA, ratios within normal limits. Right RA velocity slightly increased. MRA- normal left renal artery. Moderate right renal artery stenosis. Probable splenic artery stenosis. Angio 10/06 with bilateral renal artery angioplasty. Admit [facility] increased BP 11/06- renal angiogram this time shows no stenosis and no gradient. Renal ultrasound 6/08- normal. Renal ultrasound 6/08- normal with elevated pressures.  MRA 4/10- minimal narrowing main right renal artery.       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 Duplex 7/06- Right SCV, brachial biphasic. Right radial monophasic, right ulnar biphasic-triphasic. Left SCV triphasic. Left brachial biphasic, left radial and ulnar monophasic. Brachial wrist indices  0.57 right/0.58 left. Angio 8/06- c/w arteritis, right RA stenosis. Repeat angiogram 6/06 no intracranial arteritis or stenosis, mild left intracranial artery  plaque. Duplex 9/06- Right SCV and axillary triphasic. Right brachial, radial and ulnar monophasic. Left SCV biphasic-triphasic.  Left axillary and brachial monophasic. Left radial and ulnar monophasic-biphasic. Brachial wrist indices 0.63 right/0.60 left.  Duplex 2/08 unchanged. MRA 4/10- right vertebral occluded  Followed by Dr Aly at [place]        Giant cell arteritis (H) 03/27/2006     Priority: High     Prolonged low grade temps. Normal wbc, lft, UA, BCX. , CRP 92. Normal monospot, lymes screen. CT chest abdomen and pelvis negative 4/06. Bcx negative. BM BX negative. Fungal, AFB culture negative. Temporal artery biopsy positive. Flare with prednisone taper. Cellcept started 11/06, stopped 8/08 due to frequent UTIs. Started MTX 12/08 after [place] eval. Off prednisone 8/09.     Needs blood pressure checked in legs.  Sees DR. Ivory [facility]ly at [facility].       CKD (chronic kidney disease) stage 4, GFR 15-29 ml/min (H) 08/31/2016     Priority: Medium     Proteinuria on ACEi, follows with Dr. Clarke of [facility]       Iron deficiency anemia, unspecified 08/30/2016     Priority: Medium     Diagnosis updated by automated process. Provider to review and confirm.       Chronic kidney disease, stage IV (severe) (H) 08/30/2016     Priority: Medium     Anemia of renal disease 06/09/2016     Priority: Medium     Type 2 diabetes mellitus with stage 4 chronic kidney disease, without long-term current use of insulin (H) 10/31/2010     Priority: Medium     DX 2006. 2003 2hour-glucose 202. FBS >126x2 1/06. No retinopathy, peripheral neuropathy.  Was on insulin previously, was able to get off this in 2014 or so.       Hyperlipidemia LDL goal <100 10/31/2010     Priority: Medium     Renovascular hypertension with goal blood pressure less than 130/80      Priority: Medium     Hospitalized with malignant HTN [facility] 11/06. Angio- no recurrant renal artery stenosis. Metanephrines normal.  Aldosterone was undetectably  low.  Urine catecholamines within normal limits.  Renin activity normal.        ACP (advance care planning) 05/06/2011     Priority: Low     Advance Care Planning 6/9/2016: ACP Review of Chart / Resources Provided:  Reviewed chart for advance care plan.  Lupe Lepe has no plan or code status on file however states presence of ACP document. Copy requested.   Added by Ivelisse Olivares  Pt has completed an Advance/Health Care Directive (HCD), to bring in copy to be scanned into Epic.           Frequent Urinary Tract Infections 01/23/2009     Priority: Low     Cysto 7/09 normal        Abdominal cramps 10/16/2008     Priority: Low     Episodic severe cramping associated with emesis since 2007. GI evaluation 10/08. [place] evaluation 12/08. 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 Rx jeanne-pac for H. Pylori with resolution of symptoms 1/09. Recurrent 2012, shorter symptoms.        Gastroesophageal reflux disease without esophagitis      Priority: Low     Paroxysmal atrial fibrillation (H)      Priority: Low     paroxymal single episode 2002. ECHO, Adenosine cardiolite normal.       Acquired hypothyroidism      Priority: Low     Osteopenia      Priority: Low     dexa 1999, 2003. Dexa 7/06 -lumbar spine -1.9,  right femur -1.0,  left femur is -1.6. Boniva started 2007.  Dexa 2/08- L spine -2.4, right femoral neck -1.4, left femoral neck -2.0. Dexa 5/11- L1-L4  -1.8 ,  right femoral neck -1.7, left femoral neck -1.9.            Past Medical History:    Past Medical History:   Diagnosis Date     Abdominal cramps 10/16/2008     Abnormal CT scan 1/12/2009     Closed fracture of unspecified part of fibula      Hemorrhage of rectum and anus      HERPES ZOSTER NOS 5/23/2007     HYPOSMIA      IRON DEFIC ANEMIA NOS 3/27/2006     JOINT EFFUSION-L/LEG 4/7/2006     Other closed fractures of distal end of radius (alone)      Polymyalgia rheumatica (H)        Past Surgical History:    Past Surgical History:    Procedure Laterality Date     COLONOSCOPY  8/25/2008      COLONOSCOPY THRU STOMA, DIAGNOSTIC  2000, 5/06, 8/08    Normal     HYSTERECTOMY, SIS  1970's     SURGICAL HISTORY OF -   1948    SIS, for fibroids     SURGICAL HISTORY OF -   12/16/2008    Esophagogastroduodenoscopy with biopsy     TONSILLECTOMY      Tonsilectomy       Family History:    Family History   Problem Relation Age of Onset     CANCER Mother      ovarian CA     CANCER Father      lung CA     HEART DISEASE Maternal Grandmother      HEART DISEASE Maternal Grandfather      Depression Sister      sucide     Breast Cancer Sister        Social History:  Marital Status:   [2]  Social History   Substance Use Topics     Smoking status: Never Smoker     Smokeless tobacco: Never Used     Alcohol use No        Medications:      ciprofloxacin (CIPRO) 250 MG tablet   levothyroxine (SYNTHROID) 25 MCG tablet   rosuvastatin (CRESTOR) 10 MG tablet   omeprazole (PRILOSEC) 20 MG CR capsule   lisinopril (PRINIVIL/ZESTRIL) 10 MG tablet   amLODIPine (NORVASC) 10 MG tablet   hydrochlorothiazide (HYDRODIURIL) 50 MG tablet   calcium-vitamin D (CALTRATE) 600-400 MG-UNIT per tablet   VITAMIN C 500 MG PO TABS   CLONIDINE HCL 0.1 MG PO TABS   ASPIRIN 325 MG OR TBEC   BENEFIBER OR TABS   VITAMIN B-6 50 MG OR TABS   MULTIVITAMINS OR TABS         Review of Systems   Genitourinary: Positive for dysuria, frequency and urgency.   All other systems reviewed and are negative.      Physical Exam   BP: 115/67 (LLE per pt request)  Pulse: 86  Temp: 97.5  F (36.4  C)  Resp: 16  Weight: 70.3 kg (155 lb)  SpO2: 96 %      Physical Exam     /67  Pulse 86  Temp 97.5  F (36.4  C) (Oral)  Resp 16  Wt 70.3 kg (155 lb)  SpO2 96%  BMI 25.02 kg/m2    GENERAL APPEARANCE: healthy, alert and no distress  RESP: lungs clear to auscultation - no rales, rhonchi or wheezes  CV: regular rates and rhythm, normal S1 S2, no murmur noted  ABDOMEN:  soft, nontender, no HSM or masses and bowel  sounds normal  BACK: No CVA tenderness  SKIN: no suspicious lesions or rashes    Results for orders placed or performed during the hospital encounter of 11/13/17 (from the past 24 hour(s))   UA with Microscopic   Result Value Ref Range    Color Urine Yellow     Appearance Urine Slightly Cloudy     Glucose Urine Negative NEG^Negative mg/dL    Bilirubin Urine Negative NEG^Negative    Ketones Urine Negative NEG^Negative mg/dL    Specific Gravity Urine 1.011 1.003 - 1.035    Blood Urine Trace (A) NEG^Negative    pH Urine 5.5 5.0 - 7.0 pH    Protein Albumin Urine 30 (A) NEG^Negative mg/dL    Urobilinogen mg/dL Normal 0.0 - 2.0 mg/dL    Nitrite Urine Positive (A) NEG^Negative    Leukocyte Esterase Urine Large (A) NEG^Negative    Source Midstream Urine     WBC Urine 132 (H) 0 - 2 /HPF    RBC Urine 7 (H) 0 - 2 /HPF    WBC Clumps Present (A) NEG^Negative /HPF    Bacteria Urine Few (A) NEG^Negative /HPF    Yeast Urine Few (A) NEG^Negative /HPF    Mucous Urine Present (A) NEG^Negative /LPF         ED Course     ED Course     Procedures              Critical Care time:  none               Labs Ordered and Resulted from Time of ED Arrival Up to the Time of Departure from the ED   ROUTINE UA WITH MICROSCOPIC - Abnormal; Notable for the following:        Result Value    Blood Urine Trace (*)     Protein Albumin Urine 30 (*)     Nitrite Urine Positive (*)     Leukocyte Esterase Urine Large (*)     WBC Urine 132 (*)     RBC Urine 7 (*)     WBC Clumps Present (*)     Bacteria Urine Few (*)     Yeast Urine Few (*)     Mucous Urine Present (*)     All other components within normal limits   URINE CULTURE AEROBIC BACTERIAL       Assessments & Plan (with Medical Decision Making)     I have reviewed the nursing notes.    I have reviewed the findings, diagnosis, plan and need for follow up with the patient.    Will treat with cipro 250mg BID x 7 days due to patient history of tolerating this medication in the past with her kidney issues.         Discharge Medication List as of 11/13/2017  1:00 PM      START taking these medications    Details   ciprofloxacin (CIPRO) 250 MG tablet Take 1 tablet (250 mg) by mouth 2 times daily for 7 days, Disp-14 tablet, R-0, E-Prescribe             Final diagnoses:   Acute cystitis with hematuria       11/13/2017   [facility] EMERGENCY DEPARTMENT     Codie Nugent PA-C  11/13/17 [id number]

## 2017-11-13 NOTE — MR AVS SNAPSHOT
"              After Visit Summary   11/13/2017    Lupe Lepe    MRN: [id number]           Patient Information     Date Of Birth          1940        Visit Information        Provider Department      11/13/2017 11:15 AM FL ROBERT MEAD/MASOOD RN [facility]        Today's Diagnoses     Dysuria    -  1    Urinary frequency           Follow-ups after your visit        Your next 10 appointments already scheduled     Nov 15, 2017  8:00 AM CST   SHORT with Li Horton, DO   [facility] ([facility]    [address] [address]   [phone]            Nov 17, 2017  1:45 PM CST   MA SCREENING DIGITAL BILATERAL with [address] [facility] ([facility])    [address] [address]   [phone]           Do not use any powder, lotion or deodorant under your arms or on your breast. If you do, we will ask you to remove it before your exam.  Wear comfortable, two-piece clothing.  If you have any allergies, tell your care team.  Bring any previous mammograms from other facilities or have them mailed to the breast center. Three-dimensional (3D) mammograms are available at [place] locations in [facility], [place], [place], [facility], [place], [place], and [place]. [facility] locations include [place] and [facility] in [place]. Benefits of 3D mammograms include: - Improved rate of cancer detection - Decreases your chance of having to go back for more tests, which means fewer: - \"False-positive\" results (This means that there is an abnormal area but it isn't cancer.) - Invasive testing procedures, such as a biopsy or surgery - Can provide clearer images of the breast if you have dense breast tissue. 3D mammography is an optional exam that anyone can have with a 2D mammogram. It doesn't replace or take the place of a 2D mammogram. 2D mammograms remain an effective screening test for " all women.  Not all insurance companies cover the cost of a 3D mammogram. Check with your insurance.              Who to contact     If you have questions or need follow up information about today's clinic visit or your schedule please contact [facility] directly at [phone].  Normal or non-critical lab and imaging results will be communicated to you by MyChart, letter or phone within 4 business days after the clinic has received the results. If you do not hear from us within 7 days, please contact the clinic through MovieSethart or phone. If you have a critical or abnormal lab result, we will notify you by phone as soon as possible.  Submit refill requests through Virdia or call your pharmacy and they will forward the refill request to us. Please allow 3 business days for your refill to be completed.          Additional Information About Your Visit        MovieSethart Information     Virdia gives you secure access to your electronic health record. If you see a primary care provider, you can also send messages to your care team and make appointments. If you have questions, please call your primary care clinic.  If you do not have a primary care provider, please call [phone] and they will assist you.        Care EveryWhere ID     This is your Care EveryWhere ID. This could be used by other organizations to access your [place] medical records  [id number]         Blood Pressure from Last 3 Encounters:   11/13/17 115/67   08/03/17 135/75   05/19/17 117/72    Weight from Last 3 Encounters:   11/13/17 155 lb (70.3 kg)   08/03/17 155 lb (70.3 kg)   05/19/17 161 lb (73 kg)              Today, you had the following     No orders found for display       Primary Care Provider Office Phone # Fax #    Li Ivory Horton [phone] [address] [facility] [address]        Equal Access to Services     ADRIO ABDI : Gatito Taylor, maria g moreno, moi yi  martinez hastingssteph adalibernadine maganaaan ah. So [facility] [phone].    ATENCIÓN: Si habla goldie, tiene a saldana disposición servicios gratuitos de asistencia lingüística. Srinivas al [phone].    We comply with applicable federal civil rights laws and [place] laws. We do not discriminate on the basis of race, color, national origin, age, disability, sex, sexual orientation, or gender identity.            Thank you!     Thank you for choosing [facility]  for your care. Our goal is always to provide you with excellent care. Hearing back from our patients is one way we can continue to improve our services. Please take a few minutes to complete the written survey that you may receive in the mail after your visit with us. Thank you!             Your Updated Medication List - Protect others around you: Learn how to safely use, store and throw away your medicines at www.disposemymeds.org.          This list is accurate as of: 11/13/17 12:21 PM.  Always use your most recent med list.                   Brand Name Dispense Instructions for use Diagnosis    amLODIPine 10 MG tablet    NORVASC    90 tablet    Take 1 tablet (10 mg) by mouth daily    Renovascular hypertension with goal blood pressure less than 130/80       ascorbic acid 500 MG tablet    VITAMIN C    90 Tab    ONE TABLET twice DAILY    UTI (urinary tract infection)       aspirin 325 MG EC tablet     100    1 TABLET BY MOUTH DAILY        BENEFIBER Tabs      2 TABLETS ORALLY DAILY        calcium-vitamin D 600-400 MG-UNIT per tablet    CALTRATE    100 tablet    Take 1 tablet by mouth 2 times daily.    Disorder of bone and cartilage, unspecified       cloNIDine 0.1 MG tablet    CATAPRES    180 Tab    ONE Twice DAILY as needed for SBP>160, diastolic BP>100    Essential hypertension, benign       hydrochlorothiazide 50 MG tablet    HYDRODIURIL    90 tablet    Take 1 tablet (50 mg) by mouth every morning    Renovascular hypertension with goal blood  pressure less than 130/80       levothyroxine 25 MCG tablet    SYNTHROID    216 tablet    75 mcg (3 tabs) on Sunday, Monday, Wednesday, Friday. 50 mcg (2 tabs) on Tuesday, Thursday, Saturday    Acquired hypothyroidism       lisinopril 10 MG tablet    PRINIVIL/ZESTRIL    180 tablet    Take 1 tablet (10 mg) by mouth 2 times daily    Renovascular hypertension with goal blood pressure less than 130/80       multivitamin per tablet      1 TABLET BY MOUTH DAILY        omeprazole 20 MG CR capsule    priLOSEC    60 capsule    Take 20 mg every 3rd day as needed    Gastroesophageal reflux disease without esophagitis       pyridoxine 50 MG Tabs    VITAMIN B-6    1 bottle    1 daily    Disturbance of skin sensation       rosuvastatin 10 MG tablet    CRESTOR    90 tablet    Take 1 tablet (10 mg) by mouth daily    Hyperlipidemia LDL goal <100

## 2017-11-13 NOTE — ED AVS SNAPSHOT
[facility] Emergency Department    [address] [address]    Phone:  [phone]    Fax:  [phone]                                       Lupe Lepe   MRN: [id number]    Department:  [facility] Emergency Department   Date of Visit:  11/13/2017           After Visit Summary Signature Page     I have received my discharge instructions, and my questions have been answered. I have discussed any challenges I see with this plan with the nurse or doctor.    ..........................................................................................................................................  Patient/Patient Representative Signature      ..........................................................................................................................................  Patient Representative Print Name and Relationship to Patient    ..................................................               ................................................  Date                                            Time    ..........................................................................................................................................  Reviewed by Signature/Title    ...................................................              ..............................................  Date                                                            Time

## 2017-11-13 NOTE — NURSING NOTE
Pt reports that she has had a history of UTI's.  Over the past couple days, she had developed painful urination and urinary frequency again.  Pt feels certain she has another UTI.  Denies visible blood in urine, fever, of kidney (flank) pain.    Last office visit 5/2017.    Dr Horton is out of the office today.  Pt needs to be seen by a provider for further evaluation.    Pt elects to go to urgent care/ED as she does not want to wait for a clinic appt.  Tashia Elizabeth RN

## 2017-11-14 LAB
BACTERIA SPEC CULT: ABNORMAL
Lab: ABNORMAL
SPECIMEN SOURCE: ABNORMAL

## 2017-11-15 ENCOUNTER — ANESTHESIA EVENT (OUTPATIENT)
Dept: GASTROENTEROLOGY | Facility: CLINIC | Age: 77
End: 2017-11-15
Payer: MEDICARE

## 2017-11-15 ENCOUNTER — OFFICE VISIT (OUTPATIENT)
Dept: FAMILY MEDICINE | Facility: CLINIC | Age: 77
End: 2017-11-15
Payer: COMMERCIAL

## 2017-11-15 VITALS
SYSTOLIC BLOOD PRESSURE: 133 MMHG | DIASTOLIC BLOOD PRESSURE: 78 MMHG | TEMPERATURE: 96.8 F | WEIGHT: 159 LBS | HEART RATE: 88 BPM | BODY MASS INDEX: 25.66 KG/M2

## 2017-11-15 DIAGNOSIS — E11.22 TYPE 2 DIABETES MELLITUS WITH STAGE 4 CHRONIC KIDNEY DISEASE, WITHOUT LONG-TERM CURRENT USE OF INSULIN (H): ICD-10-CM

## 2017-11-15 DIAGNOSIS — M31.6 GIANT CELL ARTERITIS (H): ICD-10-CM

## 2017-11-15 DIAGNOSIS — K92.0 COFFEE GROUND EMESIS: Primary | ICD-10-CM

## 2017-11-15 DIAGNOSIS — N18.4 TYPE 2 DIABETES MELLITUS WITH STAGE 4 CHRONIC KIDNEY DISEASE, WITHOUT LONG-TERM CURRENT USE OF INSULIN (H): ICD-10-CM

## 2017-11-15 LAB
ALBUMIN SERPL-MCNC: 3.8 G/DL (ref 3.4–5)
ALP SERPL-CCNC: 104 U/L (ref 40–150)
ALT SERPL W P-5'-P-CCNC: 25 U/L (ref 0–50)
ANION GAP SERPL CALCULATED.3IONS-SCNC: 11 MMOL/L (ref 3–14)
AST SERPL W P-5'-P-CCNC: 17 U/L (ref 0–45)
BILIRUB SERPL-MCNC: 0.5 MG/DL (ref 0.2–1.3)
BUN SERPL-MCNC: 44 MG/DL (ref 7–30)
CALCIUM SERPL-MCNC: 8.9 MG/DL (ref 8.5–10.1)
CHLORIDE SERPL-SCNC: 110 MMOL/L (ref 94–109)
CHOLEST SERPL-MCNC: 120 MG/DL
CO2 SERPL-SCNC: 21 MMOL/L (ref 20–32)
CREAT SERPL-MCNC: 2.27 MG/DL (ref 0.52–1.04)
CRP SERPL-MCNC: 16.4 MG/L (ref 0–8)
ERYTHROCYTE [DISTWIDTH] IN BLOOD BY AUTOMATED COUNT: 11.9 % (ref 10–15)
ERYTHROCYTE [SEDIMENTATION RATE] IN BLOOD BY WESTERGREN METHOD: 47 MM/H (ref 0–30)
GFR SERPL CREATININE-BSD FRML MDRD: 21 ML/MIN/1.7M2
GLUCOSE SERPL-MCNC: 126 MG/DL (ref 70–99)
HBA1C MFR BLD: 6.6 % (ref 4.3–6)
HCT VFR BLD AUTO: 35.4 % (ref 35–47)
HDLC SERPL-MCNC: 46 MG/DL
HGB BLD-MCNC: 12 G/DL (ref 11.7–15.7)
IRON SATN MFR SERPL: 21 % (ref 15–46)
IRON SERPL-MCNC: 51 UG/DL (ref 35–180)
LDLC SERPL CALC-MCNC: 52 MG/DL
MCH RBC QN AUTO: 29.3 PG (ref 26.5–33)
MCHC RBC AUTO-ENTMCNC: 33.9 G/DL (ref 31.5–36.5)
MCV RBC AUTO: 86 FL (ref 78–100)
NONHDLC SERPL-MCNC: 74 MG/DL
PLATELET # BLD AUTO: 186 10E9/L (ref 150–450)
POTASSIUM SERPL-SCNC: 5.1 MMOL/L (ref 3.4–5.3)
PROT SERPL-MCNC: 7.4 G/DL (ref 6.8–8.8)
RBC # BLD AUTO: 4.1 10E12/L (ref 3.8–5.2)
SODIUM SERPL-SCNC: 142 MMOL/L (ref 133–144)
TIBC SERPL-MCNC: 242 UG/DL (ref 240–430)
TRIGL SERPL-MCNC: 110 MG/DL
WBC # BLD AUTO: 4.9 10E9/L (ref 4–11)

## 2017-11-15 PROCEDURE — 80061 LIPID PANEL: CPT | Performed by: INTERNAL MEDICINE

## 2017-11-15 PROCEDURE — 85027 COMPLETE CBC AUTOMATED: CPT | Performed by: INTERNAL MEDICINE

## 2017-11-15 PROCEDURE — 86140 C-REACTIVE PROTEIN: CPT | Performed by: INTERNAL MEDICINE

## 2017-11-15 PROCEDURE — 83550 IRON BINDING TEST: CPT | Performed by: INTERNAL MEDICINE

## 2017-11-15 PROCEDURE — 99214 OFFICE O/P EST MOD 30 MIN: CPT | Performed by: INTERNAL MEDICINE

## 2017-11-15 PROCEDURE — 83036 HEMOGLOBIN GLYCOSYLATED A1C: CPT | Performed by: INTERNAL MEDICINE

## 2017-11-15 PROCEDURE — 36415 COLL VENOUS BLD VENIPUNCTURE: CPT | Performed by: INTERNAL MEDICINE

## 2017-11-15 PROCEDURE — 85652 RBC SED RATE AUTOMATED: CPT | Performed by: INTERNAL MEDICINE

## 2017-11-15 PROCEDURE — 83540 ASSAY OF IRON: CPT | Performed by: INTERNAL MEDICINE

## 2017-11-15 PROCEDURE — 80053 COMPREHEN METABOLIC PANEL: CPT | Performed by: INTERNAL MEDICINE

## 2017-11-15 RX ORDER — OMEPRAZOLE 40 MG/1
40 CAPSULE, DELAYED RELEASE ORAL 2 TIMES DAILY
Qty: 28 CAPSULE | Refills: 0 | Status: SHIPPED | OUTPATIENT
Start: 2017-11-15 | End: 2017-11-29

## 2017-11-15 ASSESSMENT — ENCOUNTER SYMPTOMS: DYSRHYTHMIAS: 1

## 2017-11-15 NOTE — MR AVS SNAPSHOT
"              After Visit Summary   11/15/2017    Lupe Lepe    MRN: [id number]           Patient Information     Date Of Birth          1940        Visit Information        Provider Department      11/15/2017 8:00 AM Li Horton, DO [facility]        Today's Diagnoses     Coffee ground emesis    -  1      Care Instructions    1. Schedule upper endoscopy ASAP.  Please call [phone] to schedule  2. Blood work today.  3. Stop aspirin until after upper endoscopy  4. Start Omeprazole 40 mg twice daily x 2 weeks.  Longer course may be needed depending on upper endoscopy  5. If another episode of vomiting black liquid, be seen in ER          Follow-ups after your visit        Additional Services     GASTROENTEROLOGY ADULT REF PROCEDURE ONLY                 Your next 10 appointments already scheduled     Nov 17, 2017  1:45 PM CST   MA SCREENING DIGITAL BILATERAL with [id number] [place] [address] Imaging ([facility])    [address] [facility] [id number] [phone]           Do not use any powder, lotion or deodorant under your arms or on your breast. If you do, we will ask you to remove it before your exam.  Wear comfortable, two-piece clothing.  If you have any allergies, tell your care team.  Bring any previous mammograms from other facilities or have them mailed to the breast center. Three-dimensional (3D) mammograms are available at [place] locations in [facility], [place], [place], [facility], [place], [place], and [place]. -[facility] locations include [place] and [facility] [place] in [place]. Benefits of 3D mammograms include: - Improved rate of cancer detection - Decreases your chance of having to go back for more tests, which means fewer: - \"False-positive\" results (This means that there is an abnormal area but it isn't cancer.) - Invasive testing procedures, such as a biopsy or surgery - Can provide clearer images of the " breast if you have dense breast tissue. 3D mammography is an optional exam that anyone can have with a 2D mammogram. It doesn't replace or take the place of a 2D mammogram. 2D mammograms remain an effective screening test for all women.  Not all insurance companies cover the cost of a 3D mammogram. Check with your insurance.              Who to contact     If you have questions or need follow up information about today's clinic visit or your schedule please contact [facility] directly at [phone].  Normal or non-critical lab and imaging results will be communicated to you by Yuenimeihart, letter or phone within 4 business days after the clinic has received the results. If you do not hear from us within 7 days, please contact the clinic through iMove or phone. If you have a critical or abnormal lab result, we will notify you by phone as soon as possible.  Submit refill requests through iMove or call your pharmacy and they will forward the refill request to us. Please allow 3 business days for your refill to be completed.          Additional Information About Your Visit        iMove Information     iMove gives you secure access to your electronic health record. If you see a primary care provider, you can also send messages to your care team and make appointments. If you have questions, please call your primary care clinic.  If you do not have a primary care provider, please call [phone] and they will assist you.        Care EveryWhere ID     This is your Care EveryWhere ID. This could be used by other organizations to access your [place] medical records  [id number]        Your Vitals Were     Pulse Temperature BMI (Body Mass Index)             88 96.8  F (36  C) (Tympanic) 25.66 kg/m2          Blood Pressure from Last 3 Encounters:   11/15/17 133/78   11/13/17 115/67   08/03/17 135/75    Weight from Last 3 Encounters:   11/15/17 159 lb (72.1 kg)   11/13/17 155 lb (70.3 kg)   08/03/17 155  lb (70.3 kg)              We Performed the Following     CBC with platelets     Comprehensive metabolic panel     CRP, inflammation     ESR: Erythrocyte sedimentation rate     GASTROENTEROLOGY ADULT REF PROCEDURE ONLY     Iron and iron binding capacity          Today's Medication Changes          These changes are accurate as of: 11/15/17  8:49 AM.  If you have any questions, ask your nurse or doctor.               These medicines have changed or have updated prescriptions.        Dose/Directions    omeprazole 40 MG capsule   Commonly known as:  priLOSEC   This may have changed:    - medication strength  - how much to take  - how to take this  - when to take this  - additional instructions   Used for:  Coffee ground emesis   Changed by:  Li Horton DO        Dose:  40 mg   Take 1 capsule (40 mg) by mouth 2 times daily for 14 days Take 30-60 minutes before a meal.   Quantity:  28 capsule   Refills:  0            Where to get your medicines      These medications were sent to [place] [address] [facility] - [address] [address]     Phone:  [phone]     omeprazole 40 MG capsule                Primary Care Provider Office Phone # Fax #    Li Horton [phone] [address] [facility] [address]        Equal Access to Services     DARIO ABDI AH: Hadii nya abelo Soangie, waaxda luqadaha, qaybta kaalmada adeegyada, martinez carney. So [facility] [phone].    ATENCIÓN: Si habla español, tiene a saldana disposición servicios gratuitos de asistencia lingüística. Srinivas al [phone].    We comply with applicable federal civil rights laws and [place] laws. We do not discriminate on the basis of race, color, national origin, age, disability, sex, sexual orientation, or gender identity.            Thank you!     Thank you for choosing [facility]  for your care. Our goal is always to provide  you with excellent care. Hearing back from our patients is one way we can continue to improve our services. Please take a few minutes to complete the written survey that you may receive in the mail after your visit with us. Thank you!             Your Updated Medication List - Protect others around you: Learn how to safely use, store and throw away your medicines at www.disposemymeds.org.          This list is accurate as of: 11/15/17  8:49 AM.  Always use your most recent med list.                   Brand Name Dispense Instructions for use Diagnosis    amLODIPine 10 MG tablet    NORVASC    90 tablet    Take 1 tablet (10 mg) by mouth daily    Renovascular hypertension with goal blood pressure less than 130/80       ascorbic acid 500 MG tablet    VITAMIN C    90 Tab    ONE TABLET twice DAILY    UTI (urinary tract infection)       aspirin 325 MG EC tablet     100    1 TABLET BY MOUTH DAILY        BENEFIBER Tabs      2 TABLETS ORALLY DAILY        calcium-vitamin D 600-400 MG-UNIT per tablet    CALTRATE    100 tablet    Take 1 tablet by mouth 2 times daily.    Disorder of bone and cartilage, unspecified       ciprofloxacin 250 MG tablet    CIPRO    14 tablet    Take 1 tablet (250 mg) by mouth 2 times daily for 7 days        cloNIDine 0.1 MG tablet    CATAPRES    180 Tab    ONE Twice DAILY as needed for SBP>160, diastolic BP>100    Essential hypertension, benign       hydrochlorothiazide 50 MG tablet    HYDRODIURIL    90 tablet    Take 1 tablet (50 mg) by mouth every morning    Renovascular hypertension with goal blood pressure less than 130/80       levothyroxine 25 MCG tablet    SYNTHROID    216 tablet    75 mcg (3 tabs) on Sunday, Monday, Wednesday, Friday. 50 mcg (2 tabs) on Tuesday, Thursday, Saturday    Acquired hypothyroidism       lisinopril 10 MG tablet    PRINIVIL/ZESTRIL    180 tablet    Take 1 tablet (10 mg) by mouth 2 times daily    Renovascular hypertension with goal blood pressure less than 130/80        multivitamin per tablet      1 TABLET BY MOUTH DAILY        omeprazole 40 MG capsule    priLOSEC    28 capsule    Take 1 capsule (40 mg) by mouth 2 times daily for 14 days Take 30-60 minutes before a meal.    Coffee ground emesis       pyridoxine 50 MG Tabs    VITAMIN B-6    1 bottle    1 daily    Disturbance of skin sensation       rosuvastatin 10 MG tablet    CRESTOR    90 tablet    Take 1 tablet (10 mg) by mouth daily    Hyperlipidemia LDL goal <100

## 2017-11-15 NOTE — PATIENT INSTRUCTIONS
1. Schedule upper endoscopy ASAP.  Please call [phone] to schedule  2. Blood work today.  3. Stop aspirin until after upper endoscopy  4. Start Omeprazole 40 mg twice daily x 2 weeks.  Longer course may be needed depending on upper endoscopy  5. If another episode of vomiting black liquid, be seen in ER

## 2017-11-15 NOTE — PROGRESS NOTES
SUBJECTIVE:   Lupe Lepe is a 77 year old female who presents to clinic today for the following health issues:    Concern - stomach upset  Onset: 3-4 weeks    Description:   Abdominal pain/cramping/upset, nausea, vomiting, heartburn    Intensity: moderate    Progression of Symptoms:  same and intermittent    Accompanying Signs & Symptoms:  Random times and no warning of symptoms    Previous history of similar problem:   none    Precipitating factors:   Worsened by: n/a    Alleviating factors:Improved by: n/a    Therapies Tried and outcome: none    Started about 1 month ago.  Episodes are occurring ~ 1-2 x week.    Gets episodes of abdominal cramping followed by vomiting.  Feels crummy next day.    Rarely eats at restaurants or food cooked by other people    She had episode of coffee ground emesis on 11/5.    Doesn't eat gluten x 2 years, and hasn't had heartburn in 2 years.      On 11/5 she had severe heartburn, but otherwise no recent gerd.    In between episodes she has no pain, no nausea, no stool changes, no fevers, normal appetite.     She doesn't use NSAIDs.  No caffeine or EtOH.  She takes the full dose aspirin due to GCA and renal artery stenosis.  Follows with Vascular Dr. Aly at [place].    Recently treated for UTI on 11/13.  Given cipro      Current Outpatient Prescriptions   Medication Sig Dispense Refill     ciprofloxacin (CIPRO) 250 MG tablet Take 1 tablet (250 mg) by mouth 2 times daily for 7 days 14 tablet 0     levothyroxine (SYNTHROID) 25 MCG tablet 75 mcg (3 tabs) on Sunday, Monday, Wednesday, Friday. 50 mcg (2 tabs) on Tuesday, Thursday, Saturday 216 tablet 3     rosuvastatin (CRESTOR) 10 MG tablet Take 1 tablet (10 mg) by mouth daily 90 tablet 3     omeprazole (PRILOSEC) 20 MG CR capsule Take 20 mg every 3rd day as needed 60 capsule 3     lisinopril (PRINIVIL/ZESTRIL) 10 MG tablet Take 1 tablet (10 mg) by mouth 2 times daily 180 tablet 3     amLODIPine (NORVASC) 10 MG tablet Take 1  tablet (10 mg) by mouth daily 90 tablet 4     hydrochlorothiazide (HYDRODIURIL) 50 MG tablet Take 1 tablet (50 mg) by mouth every morning 90 tablet 3     CLONIDINE HCL 0.1 MG PO TABS ONE Twice DAILY as needed for SBP>160, diastolic BP>100 180 Tab 4     ASPIRIN 325 MG OR TBEC 1 TABLET BY MOUTH DAILY 100 3     BENEFIBER OR TABS 2 TABLETS ORALLY DAILY  0     VITAMIN B-6 50 MG OR TABS 1 daily 1 bottle 1 year     MULTIVITAMINS OR TABS 1 TABLET BY MOUTH DAILY  0     calcium-vitamin D (CALTRATE) 600-400 MG-UNIT per tablet Take 1 tablet by mouth 2 times daily. (Patient not taking: Reported on 11/15/2017) 100 tablet 3     VITAMIN C 500 MG PO TABS ONE TABLET twice DAILY (Patient not taking: No sig reported) 90 Tab 3       Reviewed and updated as needed this visit by clinical staffTobacco  Allergies  Med Hx  Surg Hx  Fam Hx  Soc Hx      Reviewed and updated as needed this visit by Provider         ROS:  Constitutional, HEENT, cardiovascular, pulmonary, gi and gu systems are negative, except as otherwise noted.      OBJECTIVE:   /78 (BP Location: Right leg, Patient Position: Sitting, Cuff Size: Adult Regular)  Pulse 88  Temp 96.8  F (36  C) (Tympanic)  Wt 159 lb (72.1 kg)  BMI 25.66 kg/m2  Body mass index is 25.66 kg/(m^2).  GENERAL APPEARANCE: healthy, alert and no distress  NECK: no adenopathy, no asymmetry, masses, or scars and thyroid normal to palpation  RESP: lungs clear to auscultation - no rales, rhonchi or wheezes  CV: regular rates and rhythm, normal S1 S2, no S3 or S4 and no murmur, click or rub  ABDOMEN: soft, nontender, without hepatosplenomegaly or masses and bowel sounds normal    Results for orders placed or performed in visit on 11/15/17 (from the past 24 hour(s))   CBC with platelets   Result Value Ref Range    WBC 4.9 4.0 - 11.0 10e9/L    RBC Count 4.10 3.8 - 5.2 10e12/L    Hemoglobin 12.0 11.7 - 15.7 g/dL    Hematocrit 35.4 35.0 - 47.0 %    MCV 86 78 - 100 fl    MCH 29.3 26.5 - 33.0 pg    MCHC  33.9 31.5 - 36.5 g/dL    RDW 11.9 10.0 - 15.0 %    Platelet Count 186 150 - 450 10e9/L   Comprehensive metabolic panel   Result Value Ref Range    Sodium 142 133 - 144 mmol/L    Potassium 5.1 3.4 - 5.3 mmol/L    Chloride 110 (H) 94 - 109 mmol/L    Carbon Dioxide 21 20 - 32 mmol/L    Anion Gap 11 3 - 14 mmol/L    Glucose 126 (H) 70 - 99 mg/dL    Urea Nitrogen 44 (H) 7 - 30 mg/dL    Creatinine 2.27 (H) 0.52 - 1.04 mg/dL    GFR Estimate 21 (L) >60 mL/min/1.7m2    GFR Estimate If Black 25 (L) >60 mL/min/1.7m2    Calcium 8.9 8.5 - 10.1 mg/dL    Bilirubin Total 0.5 0.2 - 1.3 mg/dL    Albumin 3.8 3.4 - 5.0 g/dL    Protein Total 7.4 6.8 - 8.8 g/dL    Alkaline Phosphatase 104 40 - 150 U/L    ALT 25 0 - 50 U/L    AST 17 0 - 45 U/L   ESR: Erythrocyte sedimentation rate   Result Value Ref Range    Sed Rate 47 (H) 0 - 30 mm/h   CRP, inflammation   Result Value Ref Range    CRP Inflammation 16.4 (H) 0.0 - 8.0 mg/L   Iron and iron binding capacity   Result Value Ref Range    Iron 51 35 - 180 ug/dL    Iron Binding Cap 242 240 - 430 ug/dL    Iron Saturation Index 21 15 - 46 %   Hemoglobin A1c   Result Value Ref Range    Hemoglobin A1C 6.6 (H) 4.3 - 6.0 %   Lipid panel reflex to direct LDL   Result Value Ref Range    Cholesterol 120 <200 mg/dL    Triglycerides 110 <150 mg/dL    HDL Cholesterol 46 (L) >49 mg/dL    LDL Cholesterol Calculated 52 <100 mg/dL    Non HDL Cholesterol 74 <130 mg/dL         ASSESSMENT/PLAN:       1. Coffee ground emesis - the nausea and vomiting episodes sound like gastric ulcer or gastritis.  She had 1 episode of coffee ground emesis 1 week ago which warrants EGD.  Stop ASA, start PPI.  May need longer than 2 week course pending EGD evaluation.  EGD scheduled for tomorrow  - GASTROENTEROLOGY ADULT REF PROCEDURE ONLY  - CBC with platelets  - Comprehensive metabolic panel  - ESR: Erythrocyte sedimentation rate  - CRP, inflammation  - Iron and iron binding capacity  - omeprazole (PRILOSEC) 40 MG capsule; Take 1  capsule (40 mg) by mouth 2 times daily for 14 days Take 30-60 minutes before a meal.  Dispense: 28 capsule; Refill: 0    2. Type 2 diabetes mellitus with stage 4 chronic kidney disease, without long-term current use of insulin (H)  - Hemoglobin A1c  - Lipid panel reflex to direct LDL    3. Giant cell arteritis (H) - the arteritis was nearly 10 years ago, so she will be fine off ASA for a short time period.  Her inflammatory markers are slightly elevated, so if EGD is entirely normal, then may consider more testing - other vasculitis, etc.    1. Schedule upper endoscopy ASAP.  Please call [phone] to schedule  2. Blood work today.  3. Stop aspirin until after upper endoscopy  4. Start Omeprazole 40 mg twice daily x 2 weeks.  Longer course may be needed depending on upper endoscopy  5. If another episode of vomiting black liquid, be seen in ER    Li Horton, DO  [facility]

## 2017-11-15 NOTE — NURSING NOTE
"Chief Complaint   Patient presents with     Abdominal Pain     abdominal pain/upset, nausea, heartburn, vomited black last week, cramping       Initial /78 (BP Location: Right leg, Patient Position: Sitting, Cuff Size: Adult Regular)  Pulse 88  Temp 96.8  F (36  C) (Tympanic)  Wt 159 lb (72.1 kg)  BMI 25.66 kg/m2 Estimated body mass index is 25.66 kg/(m^2) as calculated from the following:    Height as of 8/3/17: 5' 6\" (1.676 m).    Weight as of this encounter: 159 lb (72.1 kg).  Medication Reconciliation: complete    "

## 2017-11-15 NOTE — ANESTHESIA PREPROCEDURE EVALUATION
Anesthesia Evaluation     . Pt has had prior anesthetic. Type: General           ROS/MED HX    ENT/Pulmonary: Comment: HYPOSMIA - neg pulmonary ROS     Neurologic:  - neg neurologic ROS     Cardiovascular: Comment: Giant cell arteritis (H)     Stricture of artery (H)      Paroxysmal atrial fibrillation     Mitral regurg    (+) Dyslipidemia, hypertension----. : . . . :. dysrhythmias a-fib, . Previous cardiac testing date:results:date: results:ECG reviewed date:12-15-15 results:Sinus Rhythm   Low voltage -possible pulmonary disease.     ABNORMAL date: results:          METS/Exercise Tolerance:     Hematologic:     (+) Anemia, -      Musculoskeletal: Comment: Osteopenia  Polymyalgia rheumatica         GI/Hepatic: Comment: Hemorrhage of rectum and anus  Abdominal cramps  coffee ground emesis    (+) GERD       Renal/Genitourinary: Comment: Chronic kidney disease, stage IV (severe)   Frequent Urinary Tract Infections  Renal artery stenosis  Renovascular hypertension with goal blood pressure less than 130/80    (+) chronic renal disease,       Endo:     (+) type II DM thyroid problem hypothyroidism, .      Psychiatric:  - neg psychiatric ROS       Infectious Disease:  - neg infectious disease ROS       Malignancy:         Other: Comment: HERPES ZOSTER                    Physical Exam  Normal systems: cardiovascular, pulmonary and dental    Airway   Mallampati: II  TM distance: >3 FB  Neck ROM: full    Dental     Cardiovascular       Pulmonary                     Anesthesia Plan      History & Physical Review  History and physical reviewed and following examination; no interval change.    ASA Status:  3 .    NPO Status:  > 8 hours    Plan for MAC Reason for MAC:  Other - see comments  PONV prophylaxis:  Ondansetron (or other 5HT-3) and Dexamethasone or Solumedrol       Postoperative Care  Postoperative pain management:  IV analgesics and Oral pain medications.      Consents  Anesthetic plan, risks, benefits and  alternatives discussed with:  Patient..                          .

## 2017-11-16 ENCOUNTER — SURGERY (OUTPATIENT)
Age: 77
End: 2017-11-16

## 2017-11-16 ENCOUNTER — HOSPITAL ENCOUNTER (OUTPATIENT)
Facility: CLINIC | Age: 77
Discharge: HOME | End: 2017-11-16
Attending: SURGERY | Admitting: SURGERY
Payer: MEDICARE

## 2017-11-16 ENCOUNTER — ANESTHESIA (OUTPATIENT)
Dept: GASTROENTEROLOGY | Facility: CLINIC | Age: 77
End: 2017-11-16
Payer: MEDICARE

## 2017-11-16 VITALS
TEMPERATURE: 97.9 F | OXYGEN SATURATION: 96 % | RESPIRATION RATE: 16 BRPM | DIASTOLIC BLOOD PRESSURE: 79 MMHG | SYSTOLIC BLOOD PRESSURE: 122 MMHG

## 2017-11-16 LAB — UPPER GI ENDOSCOPY: NORMAL

## 2017-11-16 PROCEDURE — 25000128 H RX IP 250 OP 636: Performed by: SURGERY

## 2017-11-16 PROCEDURE — 25000125 ZZHC RX 250: Performed by: SURGERY

## 2017-11-16 PROCEDURE — 43235 EGD DIAGNOSTIC BRUSH WASH: CPT | Performed by: SURGERY

## 2017-11-16 PROCEDURE — 25000128 H RX IP 250 OP 636: Performed by: NURSE ANESTHETIST, CERTIFIED REGISTERED

## 2017-11-16 PROCEDURE — 25000125 ZZHC RX 250: Performed by: NURSE ANESTHETIST, CERTIFIED REGISTERED

## 2017-11-16 PROCEDURE — 37000008 ZZH ANESTHESIA TECHNICAL FEE, 1ST 30 MIN: Performed by: SURGERY

## 2017-11-16 RX ORDER — SODIUM CHLORIDE, SODIUM LACTATE, POTASSIUM CHLORIDE, CALCIUM CHLORIDE 600; 310; 30; 20 MG/100ML; MG/100ML; MG/100ML; MG/100ML
INJECTION, SOLUTION INTRAVENOUS CONTINUOUS
Status: DISCONTINUED | OUTPATIENT
Start: 2017-11-16 | End: 2017-11-16 | Stop reason: HOSPADM

## 2017-11-16 RX ORDER — LIDOCAINE 40 MG/G
CREAM TOPICAL
Status: DISCONTINUED | OUTPATIENT
Start: 2017-11-16 | End: 2017-11-16 | Stop reason: HOSPADM

## 2017-11-16 RX ORDER — PROPOFOL 10 MG/ML
INJECTION, EMULSION INTRAVENOUS CONTINUOUS PRN
Status: DISCONTINUED | OUTPATIENT
Start: 2017-11-16 | End: 2017-11-16

## 2017-11-16 RX ORDER — ONDANSETRON 2 MG/ML
4 INJECTION INTRAMUSCULAR; INTRAVENOUS
Status: DISCONTINUED | OUTPATIENT
Start: 2017-11-16 | End: 2017-11-16 | Stop reason: HOSPADM

## 2017-11-16 RX ORDER — SODIUM CHLORIDE 9 MG/ML
INJECTION, SOLUTION INTRAVENOUS CONTINUOUS
Status: DISCONTINUED | OUTPATIENT
Start: 2017-11-16 | End: 2017-11-16 | Stop reason: HOSPADM

## 2017-11-16 RX ORDER — PROPOFOL 10 MG/ML
INJECTION, EMULSION INTRAVENOUS PRN
Status: DISCONTINUED | OUTPATIENT
Start: 2017-11-16 | End: 2017-11-16

## 2017-11-16 RX ADMIN — SODIUM CHLORIDE, POTASSIUM CHLORIDE, SODIUM LACTATE AND CALCIUM CHLORIDE: 600; 310; 30; 20 INJECTION, SOLUTION INTRAVENOUS at 06:52

## 2017-11-16 RX ADMIN — PROPOFOL 40 MG: 10 INJECTION, EMULSION INTRAVENOUS at 07:37

## 2017-11-16 RX ADMIN — PROPOFOL 100 MCG/KG/MIN: 10 INJECTION, EMULSION INTRAVENOUS at 07:37

## 2017-11-16 RX ADMIN — LIDOCAINE HYDROCHLORIDE 1 ML: 10 INJECTION, SOLUTION EPIDURAL; INFILTRATION; INTRACAUDAL; PERINEURAL at 06:52

## 2017-11-16 RX ADMIN — LIDOCAINE HYDROCHLORIDE 50 MG: 10 INJECTION, SOLUTION EPIDURAL; INFILTRATION; INTRACAUDAL; PERINEURAL at 07:37

## 2017-11-16 NOTE — H&P
77 year old year old female here for upper endoscopy for hematemesis.        Patient Active Problem List   Diagnosis     Paroxysmal atrial fibrillation (H)     Renovascular hypertension with goal blood pressure less than 130/80     Acquired hypothyroidism     Osteopenia     Giant cell arteritis (H)     Stricture of artery (H)     Renal artery stenosis (H)     Gastroesophageal reflux disease without esophagitis     Abdominal cramps     Frequent Urinary Tract Infections     Type 2 diabetes mellitus with stage 4 chronic kidney disease, without long-term current use of insulin (H)     Hyperlipidemia LDL goal <100     ACP (advance care planning)     Anemia of renal disease     Iron deficiency anemia, unspecified     Chronic kidney disease, stage IV (severe) (H)     CKD (chronic kidney disease) stage 4, GFR 15-29 ml/min (H)       Past Medical History:   Diagnosis Date     Abdominal cramps 10/16/2008    Episodic severe cramping associated with emesis since 2007. GI evaluation 10/08. [facility] evaluation 12/08. 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 Rx jeanne-pac for H. 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 1 year follow-up recommended. CT 5/11-  previously seen cystic structure in pancreas is no longer seen, mass in left kideney is unchanged from prior [facility] study. This suggests a nonaggressive process andargues against even a low grade liposarcoma     Closed fracture of unspecified part of fibula     1997     Hemorrhage of rectum and anus     negative colonoscopy 2000, food poisoning     HERPES ZOSTER NOS 5/23/2007    left  flank 5/07     HYPOSMIA     negative MRI head 1999, now resolved     IRON DEFIC ANEMIA NOS 3/27/2006    Microcytic. B12, folate normal. Retic count 1.8,  %FE 6, Ferritin 163. OBT negativex3. BM biopsy negative 4/06 with no stainable iron stores. EGD 5/06- mild  esophagitis. Colonoscopy 5/06- normal. Resolved with iron, treatment of temporal arteritis.     JOINT EFFUSION-L/LEG 4/7/2006    Right knee. Mri 4/06-   Extensive tears of both the medial and lateral menisci.  Moderate chondromalacia patella with mild chondromalacia elsewhere in the knee. Moderate-sized joint effusion. No other abnormal mass is identified.      Other closed fractures of distal end of radius (alone)     1996     Polymyalgia rheumatica (H)     History of steroid reponsive myalgias and moderately elevated ESR 1999, 2000, 2004       Past Surgical History:   Procedure Laterality Date     COLONOSCOPY  8/25/2008      COLONOSCOPY THRU STOMA, DIAGNOSTIC  2000, 5/06, 8/08    Normal     HYSTERECTOMY, SIS  1970's     SURGICAL HISTORY OF -   1948    SIS, for fibroids     SURGICAL HISTORY OF -   12/16/2008    Esophagogastroduodenoscopy with biopsy     TONSILLECTOMY      Tonsilectomy       Family History   Problem Relation Age of Onset     CANCER Mother      ovarian CA     CANCER Father      lung CA     HEART DISEASE Maternal Grandmother      HEART DISEASE Maternal Grandfather      Depression Sister      sucide     Breast Cancer Sister        No current outpatient prescriptions on file.       Allergies   Allergen Reactions     Bactrim      Acute renal failure and hyperkalemia     Ace Inhibitors Other (See Comments)     Hyperkalemia       Atorvastatin Calcium Itching     ELEVATED LFTS     Crestor [Rosuvastatin Calcium] Other (See Comments)     Elevated LFTS   In high doses     Penicillins Hives     Sulfamethoxazole-Trimethoprim      Other reaction(s): Other - Describe In Comment Field  Kidney shutdown     Ciprofloxacin Itching and Rash     Has taken oral without problems       Pt reports that she has never smoked. She has never used smokeless tobacco. She reports that she does not drink alcohol or use illicit drugs.    Exam:    Awake, Alert OX3  Lungs - CTA bilaterally  CV - RRR, no murmurs, distal pulses  intact  Abd - soft, non-distended, non-tender, +BS  Extr - No cyanosis or edema    A/P 77 year old year old female in need of upper endoscopy for hematemesis. Risks, benefits, alternatives, and complications were discussed including the possibility of perforation and the patient agreed to proceed.    Angel Guillen MD

## 2017-11-16 NOTE — ANESTHESIA CARE TRANSFER NOTE
Patient: Lupe Lepe    Procedure(s):  Gastroscopy - Wound Class: II-Clean Contaminated    Diagnosis: coffee ground emesis  Diagnosis Additional Information: No value filed.    Anesthesia Type:   MAC     Note:  Airway :Room Air  Patient transferred to:Phase II  Handoff Report: Identifed the Patient, Identified the Reponsible Provider, Reviewed the pertinent medical history, Discussed the surgical course, Reviewed Intra-OP anesthesia mangement and issues during anesthesia, Set expectations for post-procedure period and Allowed opportunity for questions and acknowledgement of understanding      Vitals: (Last set prior to Anesthesia Care Transfer)    CRNA VITALS  11/16/2017 0717 - 11/16/2017 0747      11/16/2017             Pulse: 79    Ht Rate: 77    SpO2: (!)  89 %                Electronically Signed By: SHAWNA Silva CRNA  November 16, 2017  7:47 AM

## 2017-11-16 NOTE — ANESTHESIA POSTPROCEDURE EVALUATION
Patient: Lupe Lepe    Procedure(s):  Gastroscopy - Wound Class: II-Clean Contaminated    Diagnosis:coffee ground emesis  Diagnosis Additional Information: No value filed.    Anesthesia Type:  MAC    Note:  Anesthesia Post Evaluation    Patient location during evaluation: Bedside  Patient participation: Able to fully participate in evaluation  Pain management: adequate  Airway patency: patent  Cardiovascular status: acceptable  Respiratory status: acceptable  Hydration status: stable  PONV: none     Anesthetic complications: None          Last vitals:  Vitals:    11/16/17 0642   BP: 116/64   Resp: 16   Temp: 36.6  C (97.9  F)   SpO2: 95%         Electronically Signed By: SHAWNA Silva CRNA  November 16, 2017  7:48 AM

## 2017-11-17 ENCOUNTER — HOSPITAL ENCOUNTER (OUTPATIENT)
Dept: MAMMOGRAPHY | Facility: CLINIC | Age: 77
Discharge: HOME | End: 2017-11-17
Attending: INTERNAL MEDICINE | Admitting: INTERNAL MEDICINE
Payer: MEDICARE

## 2017-11-17 DIAGNOSIS — Z12.31 VISIT FOR SCREENING MAMMOGRAM: ICD-10-CM

## 2017-11-17 PROCEDURE — G0202 SCR MAMMO BI INCL CAD: HCPCS

## 2017-11-22 ENCOUNTER — TELEPHONE (OUTPATIENT)
Dept: FAMILY MEDICINE | Facility: CLINIC | Age: 77
End: 2017-11-22

## 2017-11-22 NOTE — TELEPHONE ENCOUNTER
She spoke to kidney provider - her GFR is elevated and in stage 4 kidney disease. GFR is 21. She has appt next Thursday 11/30. She already started the ASA. She is going to pass on starting omeprazole because of her kidneys.

## 2017-11-22 NOTE — TELEPHONE ENCOUNTER
Follow-up from upper endoscopy.  Patient should complete omeprazole 2 week course.  Ok to restart aspirin after omeprazole is complete.  If symptoms return, patient should be seen again.

## 2017-11-30 ENCOUNTER — TRANSFERRED RECORDS (OUTPATIENT)
Dept: HEALTH INFORMATION MANAGEMENT | Facility: CLINIC | Age: 77
End: 2017-11-30

## 2017-12-04 DIAGNOSIS — N18.4 CHRONIC KIDNEY DISEASE, STAGE IV (SEVERE) (H): Primary | ICD-10-CM

## 2017-12-18 DIAGNOSIS — N18.4 CHRONIC KIDNEY DISEASE, STAGE IV (SEVERE) (H): ICD-10-CM

## 2017-12-18 LAB
ANION GAP SERPL CALCULATED.3IONS-SCNC: 7 MMOL/L (ref 3–14)
BUN SERPL-MCNC: 40 MG/DL (ref 7–30)
CALCIUM SERPL-MCNC: 9.1 MG/DL (ref 8.5–10.1)
CHLORIDE SERPL-SCNC: 110 MMOL/L (ref 94–109)
CO2 SERPL-SCNC: 22 MMOL/L (ref 20–32)
CREAT SERPL-MCNC: 1.79 MG/DL (ref 0.52–1.04)
GFR SERPL CREATININE-BSD FRML MDRD: 27 ML/MIN/1.7M2
GLUCOSE SERPL-MCNC: 126 MG/DL (ref 70–99)
POTASSIUM SERPL-SCNC: 4.8 MMOL/L (ref 3.4–5.3)
SODIUM SERPL-SCNC: 139 MMOL/L (ref 133–144)

## 2017-12-18 PROCEDURE — 36415 COLL VENOUS BLD VENIPUNCTURE: CPT | Performed by: INTERNAL MEDICINE

## 2017-12-18 PROCEDURE — 80048 BASIC METABOLIC PNL TOTAL CA: CPT | Performed by: INTERNAL MEDICINE

## 2018-01-02 NOTE — H&P
[facility]  [facility]  [address]  [facility] [place], [place] [address]  [phone]  Dept: [phone]    OPHTHALMOLOGY PRE-OPERATIVE  HISTORY AND PHYSICAL    DATE OF H/P:  2018    DATE OF SURGERY:  2018  PROCEDURE:  Procedure(s):  PHACOEMULSIFICATION WITH STANDARD INTRAOCULAR LENS IMPLANT, Right Eye  LENS IMPLANT:  ZCB00 +25.5  REFRACTIVE GOAL:  PL Sph  SURGEON:  Sekou Dobbins MD    ANESTHESIA:  TOPICAL / MAC    OR CASE REQUIREMENTS:    DEMOGRAPHICS:  Demographic Information on Lupe Lepe:    Lupe Lepe  Gender: female  : 1940  [address] [facility] [id number] [phone] (home) NONE (work)    Medical Record: [id number]  Social Security Number: [id number]  Pharmacy: [facility] [id number] [place] [address]  Primary Care Provider: Li Horton    Parent's names are: Data Unavailable (mother) and Data Unavailable (father).    Insurance: Payor: [employer] / Plan: [employer] PLATINUM BLUE / Product Type: PPO /     OCULAR HISTORY:  Cataracts    HISTORIES:  Past Medical History:   Diagnosis Date     Abdominal cramps 10/16/2008    Episodic severe cramping associated with emesis since . GI evaluation 10/08. [facility] evaluation . 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 Rx jeanne-pac for H. 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 1 year follow-up recommended. CT -  previously seen cystic structure in pancreas is no longer seen, mass in left kideney is unchanged from prior [facility] study. This suggests a nonaggressive process andargues against even a low grade liposarcoma     Closed fracture of unspecified part of fibula          Hemorrhage of rectum and anus     negative colonoscopy , food poisoning     HERPES ZOSTER NOS 2007    left  flank      HYPOSMIA     negative MRI head  1999, now resolved     IRON DEFIC ANEMIA NOS 3/27/2006    Microcytic. B12, folate normal. Retic count 1.8,  %FE 6, Ferritin 163. OBT negativex3. BM biopsy negative 4/06 with no stainable iron stores. EGD 5/06- mild esophagitis. Colonoscopy 5/06- normal. Resolved with iron, treatment of temporal arteritis.     JOINT EFFUSION-L/LEG 4/7/2006    Right knee. Mri 4/06-   Extensive tears of both the medial and lateral menisci.  Moderate chondromalacia patella with mild chondromalacia elsewhere in the knee. Moderate-sized joint effusion. No other abnormal mass is identified.      Other closed fractures of distal end of radius (alone)     1996     Polymyalgia rheumatica (H)     History of steroid reponsive myalgias and moderately elevated ESR 1999, 2000, 2004       Past Surgical History:   Procedure Laterality Date     COLONOSCOPY  8/25/2008     ESOPHAGOSCOPY, GASTROSCOPY, DUODENOSCOPY (EGD), COMBINED N/A 11/16/2017    Procedure: COMBINED ESOPHAGOSCOPY, GASTROSCOPY, DUODENOSCOPY (EGD);  Gastroscopy;  Surgeon: Angel Guillen MD;  Location: [facility] COLONOSCOPY THRU STOMA, DIAGNOSTIC  2000, 5/06, 8/08    Normal     HYSTERECTOMY, SIS  1970's     SURGICAL HISTORY OF -   1948    SIS, for fibroids     SURGICAL HISTORY OF -   12/16/2008    Esophagogastroduodenoscopy with biopsy     TONSILLECTOMY      Tonsilectomy       Family History   Problem Relation Age of Onset     CANCER Mother      ovarian CA     CANCER Father      lung CA     HEART DISEASE Maternal Grandmother      HEART DISEASE Maternal Grandfather      Depression Sister      sucide     Breast Cancer Sister        Social History   Substance Use Topics     Smoking status: Never Smoker     Smokeless tobacco: Never Used     Alcohol use No       MEDICATIONS:  No current facility-administered medications for this encounter.      Current Outpatient Prescriptions   Medication Sig     levothyroxine (SYNTHROID) 25 MCG tablet 75 mcg (3 tabs) on Sunday, Monday, Wednesday, Friday.  50 mcg (2 tabs) on Tuesday, Thursday, Saturday     rosuvastatin (CRESTOR) 10 MG tablet Take 1 tablet (10 mg) by mouth daily     lisinopril (PRINIVIL/ZESTRIL) 10 MG tablet Take 1 tablet (10 mg) by mouth 2 times daily     amLODIPine (NORVASC) 10 MG tablet Take 1 tablet (10 mg) by mouth daily     hydrochlorothiazide (HYDRODIURIL) 50 MG tablet Take 1 tablet (50 mg) by mouth every morning     calcium-vitamin D (CALTRATE) 600-400 MG-UNIT per tablet Take 1 tablet by mouth 2 times daily.     VITAMIN C 500 MG PO TABS ONE TABLET twice DAILY     CLONIDINE HCL 0.1 MG PO TABS ONE Twice DAILY as needed for SBP>160, diastolic BP>100     ASPIRIN 325 MG OR TBEC 1 TABLET BY MOUTH DAILY     BENEFIBER OR TABS 2 TABLETS ORALLY DAILY     VITAMIN B-6 50 MG OR TABS 1 daily     MULTIVITAMINS OR TABS 1 TABLET BY MOUTH DAILY       ALLERGIES:     Allergies   Allergen Reactions     Bactrim      Acute renal failure and hyperkalemia     Ace Inhibitors Other (See Comments)     Hyperkalemia       Atorvastatin Calcium Itching     ELEVATED LFTS     Crestor [Rosuvastatin Calcium] Other (See Comments)     Elevated LFTS   In high doses     Penicillins Hives     Sulfamethoxazole-Trimethoprim      Other reaction(s): Other - Describe In Comment Field  Kidney shutdown     Ciprofloxacin Itching and Rash     Has taken oral without problems       PERTINENT SYSTEMS REVIEW:    1. Yes: CAD, s/p stent - Do you have a history of heart attack, stroke, stent, bypass or surgery on an artery in the head, neck, heart or legs?  2. No - Do you ever have any pain or discomfort in your chest?  3. No - Do you have a history of  Heart Failure?  4. No - Are you troubled by shortness of breath when walking: On the level, up a slight hill or at night?  5. No - Do you currently have a cold, bronchitis or other respiratory infection?  6. No - Do you have a cough, shortness of breath or wheezing?  7. No - Do you sometimes get pains in the calves of your legs when you walk?  8. No -  Do you or anyone in your family have previous history of blood clots?  9. No - Do you or does anyone in your family have a serious bleeding problem such as prolonged bleeding following surgeries or cuts?  10. No - Have you ever had problems with anemia or been told to take iron pills?  11. No - Have you had any abnormal blood loss such as black, tarry or bloody stools, or abnormal vaginal bleeding?  12. No - Have you ever had a blood transfusion?  13. No - Have you or any of your relatives ever had problems with anesthesia?  14. No - Do you have sleep apnea, excessive snoring or daytime drowsiness?  15. No - Do you have any prosthetic heart valves?  16. No - Do you have prosthetic joints?    EXAMINATION:  Vitals were reviewed                       Vison:  Va, right - 20/25; left - 20/25   BAT, right - 20/80, left - 20/80  HEENT:  Cataract, otherwise unremarkable.  LUNGS:  Clear  CV:  Regular rate and rhythm without murmur  ABD:  Soft and nontender  NEURO:  Alert and nonfocal    IMPRESSION:  Patient cleared for ophthalmic surgery.  Low risk with monitored, light sedation.  I have assessed the patient's DVT risk, and no additional orders necessary.    PLAN:  Procedure(s):  PHACOEMULSIFICATION WITH STANDARD INTRAOCULAR LENS IMPLANT, Right Eye      Sekou Dobbins MD

## 2018-01-05 ENCOUNTER — ANESTHESIA EVENT (OUTPATIENT)
Dept: SURGERY | Facility: CLINIC | Age: 78
End: 2018-01-05
Payer: MEDICARE

## 2018-01-05 ASSESSMENT — ENCOUNTER SYMPTOMS: DYSRHYTHMIAS: 1

## 2018-01-05 NOTE — ANESTHESIA PREPROCEDURE EVALUATION
Anesthesia Evaluation     . Pt has had prior anesthetic. Type: General and MAC    No history of anesthetic complications          ROS/MED HX    ENT/Pulmonary:  - neg pulmonary ROS     Neurologic:  - neg neurologic ROS     Cardiovascular: Comment: Giant cell arteritis    (+) Dyslipidemia, hypertension----. : . . . :. dysrhythmias a-fib, . Previous cardiac testing date:results:date: results:ECG reviewed date:12/15/15 results:Sinus Rhythm   Low voltage -possible pulmonary disease.     ABNORMAL date: results:          METS/Exercise Tolerance:     Hematologic:     (+) Anemia, -      Musculoskeletal:   (+) arthritis, , , other musculoskeletal- Osteopenia      GI/Hepatic:     (+) GERD Asymptomatic on medication,       Renal/Genitourinary: Comment: Renal artery stenosis    (+) chronic renal disease (Stage 4 CKD), type: CRI, Pt does not require dialysis,       Endo:     (+) type II DM (no longer- was d/t pt taking steroids for extended period of time) thyroid problem hypothyroidism, .      Psychiatric:  - neg psychiatric ROS       Infectious Disease:  - neg infectious disease ROS       Malignancy:      - no malignancy   Other:                     Physical Exam  Normal systems: cardiovascular, pulmonary and dental    Airway   Mallampati: II  TM distance: >3 FB  Neck ROM: full    Dental     Cardiovascular       Pulmonary                     Anesthesia Plan      History & Physical Review  History and physical reviewed and following examination; no interval change.    ASA Status:  3 .    NPO Status:  > 8 hours    Plan for MAC Reason for MAC:  Deep or markedly invasive procedure (G8)         Postoperative Care      Consents  Anesthetic plan, risks, benefits and alternatives discussed with:  Patient..                          .

## 2018-01-08 ENCOUNTER — ANESTHESIA (OUTPATIENT)
Dept: SURGERY | Facility: CLINIC | Age: 78
End: 2018-01-08
Payer: MEDICARE

## 2018-01-08 ENCOUNTER — HOSPITAL ENCOUNTER (OUTPATIENT)
Facility: CLINIC | Age: 78
Discharge: HOME | End: 2018-01-08
Attending: OPHTHALMOLOGY | Admitting: OPHTHALMOLOGY
Payer: MEDICARE

## 2018-01-08 ENCOUNTER — SURGERY (OUTPATIENT)
Age: 78
End: 2018-01-08

## 2018-01-08 VITALS
HEIGHT: 66 IN | BODY MASS INDEX: 24.91 KG/M2 | DIASTOLIC BLOOD PRESSURE: 63 MMHG | WEIGHT: 155 LBS | OXYGEN SATURATION: 95 % | TEMPERATURE: 98.1 F | SYSTOLIC BLOOD PRESSURE: 142 MMHG | HEART RATE: 76 BPM | RESPIRATION RATE: 16 BRPM

## 2018-01-08 PROCEDURE — 25000128 H RX IP 250 OP 636: Performed by: OPHTHALMOLOGY

## 2018-01-08 PROCEDURE — 25000125 ZZHC RX 250: Performed by: OPHTHALMOLOGY

## 2018-01-08 PROCEDURE — 71000022 ZZH RECOVERY CATRACT PACKAGE: Performed by: OPHTHALMOLOGY

## 2018-01-08 PROCEDURE — 37000012 ZZH ANESTHESIA CATARACT PACKAGE: Performed by: OPHTHALMOLOGY

## 2018-01-08 PROCEDURE — 25000128 H RX IP 250 OP 636: Performed by: NURSE ANESTHETIST, CERTIFIED REGISTERED

## 2018-01-08 PROCEDURE — 25000125 ZZHC RX 250: Performed by: NURSE ANESTHETIST, CERTIFIED REGISTERED

## 2018-01-08 PROCEDURE — 36000025 ZZH CATARACT SURGICAL PACKAGE: Performed by: OPHTHALMOLOGY

## 2018-01-08 PROCEDURE — V2632 POST CHMBR INTRAOCULAR LENS: HCPCS | Performed by: OPHTHALMOLOGY

## 2018-01-08 DEVICE — EYE IMP IOL AMO PCL TECNIS ZCB00 25.5: Type: IMPLANTABLE DEVICE | Site: EYE | Status: FUNCTIONAL

## 2018-01-08 RX ORDER — BALANCED SALT SOLUTION 6.4; .75; .48; .3; 3.9; 1.7 MG/ML; MG/ML; MG/ML; MG/ML; MG/ML; MG/ML
SOLUTION OPHTHALMIC PRN
Status: DISCONTINUED | OUTPATIENT
Start: 2018-01-08 | End: 2018-01-08 | Stop reason: HOSPADM

## 2018-01-08 RX ORDER — PROPARACAINE HYDROCHLORIDE 5 MG/ML
SOLUTION/ DROPS OPHTHALMIC PRN
Status: DISCONTINUED | OUTPATIENT
Start: 2018-01-08 | End: 2018-01-08 | Stop reason: HOSPADM

## 2018-01-08 RX ORDER — PHENYLEPHRINE HYDROCHLORIDE 25 MG/ML
1 SOLUTION/ DROPS OPHTHALMIC
Status: COMPLETED | OUTPATIENT
Start: 2018-01-08 | End: 2018-01-08

## 2018-01-08 RX ORDER — LIDOCAINE 40 MG/G
CREAM TOPICAL
Status: DISCONTINUED | OUTPATIENT
Start: 2018-01-08 | End: 2018-01-08 | Stop reason: HOSPADM

## 2018-01-08 RX ORDER — SODIUM CHLORIDE, SODIUM LACTATE, POTASSIUM CHLORIDE, CALCIUM CHLORIDE 600; 310; 30; 20 MG/100ML; MG/100ML; MG/100ML; MG/100ML
INJECTION, SOLUTION INTRAVENOUS CONTINUOUS
Status: DISCONTINUED | OUTPATIENT
Start: 2018-01-08 | End: 2018-01-08 | Stop reason: HOSPADM

## 2018-01-08 RX ORDER — TROPICAMIDE 10 MG/ML
1 SOLUTION/ DROPS OPHTHALMIC
Status: COMPLETED | OUTPATIENT
Start: 2018-01-08 | End: 2018-01-08

## 2018-01-08 RX ORDER — ONDANSETRON 2 MG/ML
4 INJECTION INTRAMUSCULAR; INTRAVENOUS EVERY 30 MIN PRN
Status: DISCONTINUED | OUTPATIENT
Start: 2018-01-08 | End: 2018-01-08 | Stop reason: HOSPADM

## 2018-01-08 RX ORDER — ONDANSETRON 4 MG/1
4 TABLET, ORALLY DISINTEGRATING ORAL EVERY 30 MIN PRN
Status: DISCONTINUED | OUTPATIENT
Start: 2018-01-08 | End: 2018-01-08 | Stop reason: HOSPADM

## 2018-01-08 RX ORDER — NALOXONE HYDROCHLORIDE 0.4 MG/ML
.1-.4 INJECTION, SOLUTION INTRAMUSCULAR; INTRAVENOUS; SUBCUTANEOUS
Status: DISCONTINUED | OUTPATIENT
Start: 2018-01-08 | End: 2018-01-08 | Stop reason: HOSPADM

## 2018-01-08 RX ORDER — FENTANYL CITRATE 50 UG/ML
25-50 INJECTION, SOLUTION INTRAMUSCULAR; INTRAVENOUS
Status: DISCONTINUED | OUTPATIENT
Start: 2018-01-08 | End: 2018-01-08 | Stop reason: HOSPADM

## 2018-01-08 RX ORDER — MEPERIDINE HYDROCHLORIDE 25 MG/ML
12.5 INJECTION INTRAMUSCULAR; INTRAVENOUS; SUBCUTANEOUS
Status: DISCONTINUED | OUTPATIENT
Start: 2018-01-08 | End: 2018-01-08 | Stop reason: HOSPADM

## 2018-01-08 RX ORDER — CYCLOPENTOLATE HYDROCHLORIDE 10 MG/ML
1 SOLUTION/ DROPS OPHTHALMIC
Status: COMPLETED | OUTPATIENT
Start: 2018-01-08 | End: 2018-01-08

## 2018-01-08 RX ADMIN — PHENYLEPHRINE HYDROCHLORIDE 1 DROP: 25 SOLUTION/ DROPS OPHTHALMIC at 09:55

## 2018-01-08 RX ADMIN — Medication 1.4 ML GIVEN: at 11:16

## 2018-01-08 RX ADMIN — CYCLOPENTOLATE HYDROCHLORIDE 1 DROP: 10 SOLUTION OPHTHALMIC at 10:01

## 2018-01-08 RX ADMIN — EPINEPHRINE 0.7 ML: 1 INJECTION, SOLUTION INTRAMUSCULAR; SUBCUTANEOUS at 11:17

## 2018-01-08 RX ADMIN — PHENYLEPHRINE HYDROCHLORIDE 1 DROP: 25 SOLUTION/ DROPS OPHTHALMIC at 10:09

## 2018-01-08 RX ADMIN — TROPICAMIDE 1 DROP: 10 SOLUTION/ DROPS OPHTHALMIC at 09:56

## 2018-01-08 RX ADMIN — CYCLOPENTOLATE HYDROCHLORIDE 1 DROP: 10 SOLUTION OPHTHALMIC at 10:08

## 2018-01-08 RX ADMIN — LIDOCAINE HYDROCHLORIDE 0.5 TUBE: 20 JELLY TOPICAL at 11:16

## 2018-01-08 RX ADMIN — CYCLOPENTOLATE HYDROCHLORIDE 1 DROP: 10 SOLUTION OPHTHALMIC at 09:54

## 2018-01-08 RX ADMIN — MIDAZOLAM 1 MG: 1 INJECTION INTRAMUSCULAR; INTRAVENOUS at 11:08

## 2018-01-08 RX ADMIN — BALANCED SALT SOLUTION 250 ML: 6.4; .75; .48; .3; 3.9; 1.7 SOLUTION OPHTHALMIC at 11:16

## 2018-01-08 RX ADMIN — MOXIFLOXACIN HYDROCHLORIDE 0.7 ML: 5 SOLUTION/ DROPS OPHTHALMIC at 11:27

## 2018-01-08 RX ADMIN — PROPARACAINE HYDROCHLORIDE 2 DROP: 5 SOLUTION/ DROPS OPHTHALMIC at 11:16

## 2018-01-08 RX ADMIN — PHENYLEPHRINE HYDROCHLORIDE 1 DROP: 25 SOLUTION/ DROPS OPHTHALMIC at 10:02

## 2018-01-08 RX ADMIN — MIDAZOLAM 1 MG: 1 INJECTION INTRAMUSCULAR; INTRAVENOUS at 11:05

## 2018-01-08 RX ADMIN — TROPICAMIDE 1 DROP: 10 SOLUTION/ DROPS OPHTHALMIC at 10:10

## 2018-01-08 RX ADMIN — TROPICAMIDE 1 DROP: 10 SOLUTION/ DROPS OPHTHALMIC at 10:03

## 2018-01-08 RX ADMIN — LIDOCAINE HYDROCHLORIDE 0.2 ML: 10 INJECTION, SOLUTION EPIDURAL; INFILTRATION; INTRACAUDAL; PERINEURAL at 09:52

## 2018-01-08 NOTE — ANESTHESIA POSTPROCEDURE EVALUATION
Patient: Lupe Lepe    Procedure(s):  Right Cataract Removal with Implant - Wound Class: I-Clean    Diagnosis:cataract  Diagnosis Additional Information: No value filed.    Anesthesia Type:  MAC    Note:  Anesthesia Post Evaluation    Patient location during evaluation: Phase 2 and Bedside  Patient participation: Able to fully participate in evaluation  Level of consciousness: awake and alert  Pain management: adequate  Airway patency: patent  Cardiovascular status: acceptable  Respiratory status: acceptable  Hydration status: acceptable  PONV: none     Anesthetic complications: None          Last vitals:  Vitals:    01/08/18 0937 01/08/18 1135   BP: 139/84 142/63   Pulse: 85 76   Resp: 16 16   Temp: 36.7  C (98.1  F)    SpO2: 98% 95%         Electronically Signed By: SHAWNA Barnes CRNA  January 8, 2018  11:53 AM

## 2018-01-08 NOTE — OP NOTE
OPHTHALMOLOGY OPERATIVE NOTE    PATIENT: Lupe Lepe  DATE OF SURGERY: 1/8/2018  PREOPERATIVE DIAGNOSIS:  Senile Nuclear Cataract, Right eye  POSTOPERATIVE DIAGNOSIS:  Senile Nuclear Cataract, Right eye  OPERATIVE PROCEDURE:  Phacoemulsification with placement of intraocular lens  SURGEON:  Sekou Dobbins MD  ANESTHESIA:  Topical / MAC  EBL:  None  SPECIMENS:  None  COMPLICATIONS:  None    PROCEDURE:  The patient was brought to the operating room at [facility].  The right eye was prepped and draped in the usual fashion for cataract surgery.  A wire lid speculum was inserted.  A super sharp blade was used to make a paracentesis at the 11 O'clock position.  The super sharp blade was used to make a partial thickness temporal groove, which was 3 mm in length.  0.8 mL of non-preserved epi-Shugarcaine was injected into the anterior chamber.  Viscoelastic was used to inflate the anterior chamber through a cannula.  A 2.5 mm microkeratome was used to make a temporal clear corneal incision in a two-plane fashion.  A cystotome needle and forceps were used to make a capsulorrhexis.  Hydrodissection and hydrodelineation were performed with Balance Salt Solution.  The lens was then phacoemulsified and removed without complications.  The cortical material was removed with bimanual irrigation and aspiration.  The capsular bag was filled with viscoelastic.  A posterior chamber intraocular lens, preselected and recorded, was folded and inserted into the capsular bag.  The viscoelastic was removed with the irrigation and aspiration tip.  Balanced Salt Solution and Vigamox was used to refill the anterior chamber.  The wounds were checked for water tightness and required no suture.  The wire lid speculum was removed.  The patient's right eye was cleaned and a drop of each post-operative drop was placed, followed by a hurt shield.  The patient tolerated the procedure well, and there were no  complications.      Sekou Dobbins MD

## 2018-01-08 NOTE — ANESTHESIA CARE TRANSFER NOTE
Patient: Lupe Lepe    Procedure(s):  Right Cataract Removal with Implant - Wound Class: I-Clean    Diagnosis: cataract  Diagnosis Additional Information: No value filed.    Anesthesia Type:   MAC     Note:  Airway :Room Air  Patient transferred to:Phase II  Comments: Patient's VSS. Spontaneous respirations. Patient awake and oriented. IV patent. Report to RN.Handoff Report: Identifed the Patient, Identified the Reponsible Provider, Reviewed the pertinent medical history, Discussed the surgical course, Reviewed Intra-OP anesthesia mangement and issues during anesthesia, Set expectations for post-procedure period and Allowed opportunity for questions and acknowledgement of understanding      Vitals: (Last set prior to Anesthesia Care Transfer)    CRNA VITALS  1/8/2018 1059 - 1/8/2018 1129      1/8/2018             Pulse: 74    SpO2: 94 %                Electronically Signed By: SHAWNA Barnes CRNA  January 8, 2018  11:29 AM

## 2018-01-19 ENCOUNTER — OFFICE VISIT (OUTPATIENT)
Dept: FAMILY MEDICINE | Facility: CLINIC | Age: 78
End: 2018-01-19
Payer: COMMERCIAL

## 2018-01-19 VITALS
WEIGHT: 158 LBS | DIASTOLIC BLOOD PRESSURE: 78 MMHG | OXYGEN SATURATION: 97 % | SYSTOLIC BLOOD PRESSURE: 172 MMHG | HEART RATE: 72 BPM | TEMPERATURE: 97 F | BODY MASS INDEX: 25.5 KG/M2

## 2018-01-19 DIAGNOSIS — N18.4 TYPE 2 DIABETES MELLITUS WITH STAGE 4 CHRONIC KIDNEY DISEASE, WITHOUT LONG-TERM CURRENT USE OF INSULIN (H): ICD-10-CM

## 2018-01-19 DIAGNOSIS — N30.00 ACUTE CYSTITIS WITHOUT HEMATURIA: Primary | ICD-10-CM

## 2018-01-19 DIAGNOSIS — E11.22 TYPE 2 DIABETES MELLITUS WITH STAGE 4 CHRONIC KIDNEY DISEASE, WITHOUT LONG-TERM CURRENT USE OF INSULIN (H): ICD-10-CM

## 2018-01-19 DIAGNOSIS — R82.90 NONSPECIFIC FINDING ON EXAMINATION OF URINE: ICD-10-CM

## 2018-01-19 DIAGNOSIS — R30.0 DYSURIA: ICD-10-CM

## 2018-01-19 LAB
ALBUMIN UR-MCNC: NEGATIVE MG/DL
APPEARANCE UR: CLEAR
BACTERIA #/AREA URNS HPF: ABNORMAL /HPF
BILIRUB UR QL STRIP: NEGATIVE
COLOR UR AUTO: YELLOW
GLUCOSE UR STRIP-MCNC: NEGATIVE MG/DL
HGB UR QL STRIP: NEGATIVE
KETONES UR STRIP-MCNC: NEGATIVE MG/DL
LEUKOCYTE ESTERASE UR QL STRIP: ABNORMAL
NITRATE UR QL: NEGATIVE
NON-SQ EPI CELLS #/AREA URNS LPF: ABNORMAL /LPF
PH UR STRIP: 6.5 PH (ref 5–7)
RBC #/AREA URNS AUTO: ABNORMAL /HPF
SOURCE: ABNORMAL
SP GR UR STRIP: 1.01 (ref 1–1.03)
UROBILINOGEN UR STRIP-ACNC: 0.2 EU/DL (ref 0.2–1)
WBC #/AREA URNS AUTO: >100 /HPF

## 2018-01-19 PROCEDURE — 87186 SC STD MICRODIL/AGAR DIL: CPT | Performed by: NURSE PRACTITIONER

## 2018-01-19 PROCEDURE — 87088 URINE BACTERIA CULTURE: CPT | Performed by: NURSE PRACTITIONER

## 2018-01-19 PROCEDURE — 99213 OFFICE O/P EST LOW 20 MIN: CPT | Performed by: NURSE PRACTITIONER

## 2018-01-19 PROCEDURE — 80048 BASIC METABOLIC PNL TOTAL CA: CPT | Performed by: NURSE PRACTITIONER

## 2018-01-19 PROCEDURE — 81001 URINALYSIS AUTO W/SCOPE: CPT | Performed by: NURSE PRACTITIONER

## 2018-01-19 PROCEDURE — 82043 UR ALBUMIN QUANTITATIVE: CPT | Performed by: NURSE PRACTITIONER

## 2018-01-19 PROCEDURE — 87086 URINE CULTURE/COLONY COUNT: CPT | Performed by: NURSE PRACTITIONER

## 2018-01-19 PROCEDURE — 36415 COLL VENOUS BLD VENIPUNCTURE: CPT | Performed by: NURSE PRACTITIONER

## 2018-01-19 RX ORDER — CIPROFLOXACIN 250 MG/1
250 TABLET, FILM COATED ORAL 2 TIMES DAILY
Qty: 10 TABLET | Refills: 0 | Status: SHIPPED | OUTPATIENT
Start: 2018-01-19 | End: 2019-07-02

## 2018-01-19 NOTE — MR AVS SNAPSHOT
"              After Visit Summary   1/19/2018    Lupe Lpee    MRN: [id number]           Patient Information     Date Of Birth          1940        Visit Information        Provider Department      1/19/2018 2:00 PM Fatimah Abdullahi APRN CNP [facility]        Today's Diagnoses     Dysuria    -  1    Nonspecific finding on examination of urine        Acute cystitis without hematuria          Care Instructions    Antibiotics as directed   Urine culture is pending, will contact you if there is a change in treatment therapy.   Drink plenty of fluids. Prevention and treatment of UTI's discussed.   Signs and symptoms of pyelonephritis mentioned.   RTC if any worsening symptoms or if not improving as expected.   After completion of your antibiotic return for a repeat urinalysis.   You will just need to call the clinic to make a lab only appointment   Follow-up with your primary care provider next week and as needed.    Indications for emergent return to emergency department discussed with patient, who verbalized good understanding and agreement.  Patient understands the limitations of today's evaluation.            * BLADDER INFECTION,Female (Adult)    A bladder infection (\"cystitis\" or \"UTI\") usually causes a constant urge to urinate and a burning when passing urine. Urine may be cloudy, smelly or dark. There may be pain in the lower abdomen. A bladder infection occurs when bacteria from the vaginal area enter the bladder opening (urethra). This can occur from sexual intercourse, wearing tight clothing, dehydration and other factors.  HOME CARE:  1. Drink lots of fluids (at least 6-8 glasses a day, unless you must restrict fluids for other medical reasons). This will force the medicine into your urinary system and flush the bacteria out of your body. Cranberry juice has been shown to help clear out the bacteria.  2. Avoid sexual intercourse until your symptoms are gone.  3. A bladder " infection is treated with antibiotics. You may also be given Pyridium (generic = phenazopyridine) to reduce the burning sensation. This medicine will cause your urine to become a bright orange color. The orange urine may stain clothing. You may wear a pad or panty-liner to protect clothing.  PREVENTING FUTURE INFECTIONS:  1. Always wipe from front to back after a bowel movement.  2. Keep the genital area clean and dry.  3. Drink plenty of fluids each day to avoid dehydration.  4. Urinate right after intercourse to flush out the bladder.  5. Wear cotton underwear and cotton-lined panty hose; avoid tight-fitting pants.  6. If you are on birth control pills and are having frequent bladder infections, discuss with your doctor.  FOLLOW UP: Return to this facility or see your doctor if ALL symptoms are not gone after three days of treatment.  GET PROMPT MEDICAL ATTENTION if any of the following occur:    Fever over 101 F (38.3 C)    No improvement by the third day of treatment    Increasing back or abdominal pain    Repeated vomiting; unable to keep medicine down    Weakness, dizziness or fainting    Vaginal discharge    Pain, redness or swelling in the labia (outer vaginal area)    1099-3954 [employer]. [address] [place] [address]. All rights reserved. This information is not intended as a substitute for professional medical care. Always follow your healthcare professional's instructions.  This information has been modified by your health care provider with permission from the publisher.            Follow-ups after your visit        Your next 10 appointments already scheduled     Jan 29, 2018   Procedure with Sekou Dobbins MD   [facility] (--)    [address] [place] [address] [phone]           The medical center is located at [address] [facility]. (between [address] in [place], [place] miles north of [place]).              Future tests  that were ordered for you today     Open Future Orders        Priority Expected Expires Ordered    **UA reflex to Microscopic FUTURE 14d Routine 1/26/2018 2/2/2018 1/19/2018            Who to contact     If you have questions or need follow up information about today's clinic visit or your schedule please contact [facility] directly at [phone].  Normal or non-critical lab and imaging results will be communicated to you by MyChart, letter or phone within 4 business days after the clinic has received the results. If you do not hear from us within 7 days, please contact the clinic through [employer] or phone. If you have a critical or abnormal lab result, we will notify you by phone as soon as possible.  Submit refill requests through [employer] or call your pharmacy and they will forward the refill request to us. Please allow 3 business days for your refill to be completed.          Additional Information About Your Visit        [employer] Information     [employer] gives you secure access to your electronic health record. If you see a primary care provider, you can also send messages to your care team and make appointments. If you have questions, please call your primary care clinic.  If you do not have a primary care provider, please call [phone] and they will assist you.        Care EveryWhere ID     This is your Care EveryWhere ID. This could be used by other organizations to access your [facility] medical records  [id number]        Your Vitals Were     Pulse Temperature Pulse Oximetry BMI (Body Mass Index)          72 97  F (36.1  C) (Tympanic) 97% 25.5 kg/m2         Blood Pressure from Last 3 Encounters:   01/19/18 172/78   01/08/18 142/63   11/16/17 122/79    Weight from Last 3 Encounters:   01/19/18 158 lb (71.7 kg)   01/08/18 155 lb (70.3 kg)   11/15/17 159 lb (72.1 kg)              We Performed the Following     *UA reflex to Microscopic and Culture ([place] and [facility] (except [place] and [place])     Urine Culture Aerobic Bacterial     Urine Microscopic          Today's Medication Changes          These changes are accurate as of: 1/19/18  2:30 PM.  If you have any questions, ask your nurse or doctor.               Start taking these medicines.        Dose/Directions    ciprofloxacin 250 MG tablet   Commonly known as:  CIPRO   Used for:  Acute cystitis without hematuria   Started by:  Fatimah Abdullahi APRN CNP        Dose:  250 mg   Take 1 tablet (250 mg) by mouth 2 times daily for 5 days   Quantity:  10 tablet   Refills:  0            Where to get your medicines      These medications were sent to [place] [address] [place] [facility] - [address] [address]     Phone:  [phone]     ciprofloxacin 250 MG tablet                Primary Care Provider Office Phone # Fax #    Li Dodson DO [place] [phone] [address] [facility] [address]        Equal Access to Services     ME[facility]UL [facility] Hospital[facility]ANS : Hadii nya galicia hadasho Soomaali, waaxda luqadaha, qaybta kaalmada adeegyada, martinez aguilar haycindi white . So [facility] [phone].    ATENCIÓN: Si habla español, tiene a saldana disposición servicios gratuitos de asistencia lingüística. Srinivas al [phone].    We comply with applicable federal civil rights laws and [place] laws. We do not discriminate on the basis of race, color, national origin, age, disability, sex, sexual orientation, or gender identity.            Thank you!     Thank you for choosing [facility]  for your care. Our goal is always to provide you with excellent care. Hearing back from our patients is one way we can continue to improve our services. Please take a few minutes to complete the written survey that you may receive in the mail after your visit with us. Thank you!             Your Updated Medication List - Protect others around you: Learn how to safely use, store and throw away your  medicines at www.disposemymeds.org.          This list is accurate as of: 1/19/18  2:30 PM.  Always use your most recent med list.                   Brand Name Dispense Instructions for use Diagnosis    amLODIPine 10 MG tablet    NORVASC    90 tablet    Take 1 tablet (10 mg) by mouth daily    Renovascular hypertension with goal blood pressure less than 130/80       aspirin 325 MG EC tablet     100    1 TABLET BY MOUTH DAILY        BENEFIBER Tabs      2 TABLETS ORALLY DAILY        calcium-vitamin D 600-400 MG-UNIT per tablet    CALTRATE    100 tablet    Take 1 tablet by mouth 2 times daily.    Disorder of bone and cartilage, unspecified       ciprofloxacin 250 MG tablet    CIPRO    10 tablet    Take 1 tablet (250 mg) by mouth 2 times daily for 5 days    Acute cystitis without hematuria       hydrochlorothiazide 50 MG tablet    HYDRODIURIL    90 tablet    Take 1 tablet (50 mg) by mouth every morning    Renovascular hypertension with goal blood pressure less than 130/80       levothyroxine 25 MCG tablet    SYNTHROID    216 tablet    75 mcg (3 tabs) on Sunday, Monday, Wednesday, Friday. 50 mcg (2 tabs) on Tuesday, Thursday, Saturday    Acquired hypothyroidism       lisinopril 10 MG tablet    PRINIVIL/ZESTRIL    180 tablet    Take 1 tablet (10 mg) by mouth 2 times daily    Renovascular hypertension with goal blood pressure less than 130/80       multivitamin per tablet      1 TABLET BY MOUTH DAILY        pyridoxine 50 MG Tabs    VITAMIN B-6    1 bottle    1 daily    Disturbance of skin sensation       rosuvastatin 10 MG tablet    CRESTOR    90 tablet    Take 1 tablet (10 mg) by mouth daily    Hyperlipidemia LDL goal <100

## 2018-01-19 NOTE — PATIENT INSTRUCTIONS
"Antibiotics as directed   Urine culture is pending, will contact you if there is a change in treatment therapy.   Drink plenty of fluids. Prevention and treatment of UTI's discussed.   Signs and symptoms of pyelonephritis mentioned.   RTC if any worsening symptoms or if not improving as expected.   After completion of your antibiotic return for a repeat urinalysis.   You will just need to call the clinic to make a lab only appointment   Follow-up with your primary care provider next week and as needed.    Indications for emergent return to emergency department discussed with patient, who verbalized good understanding and agreement.  Patient understands the limitations of today's evaluation.            * BLADDER INFECTION,Female (Adult)    A bladder infection (\"cystitis\" or \"UTI\") usually causes a constant urge to urinate and a burning when passing urine. Urine may be cloudy, smelly or dark. There may be pain in the lower abdomen. A bladder infection occurs when bacteria from the vaginal area enter the bladder opening (urethra). This can occur from sexual intercourse, wearing tight clothing, dehydration and other factors.  HOME CARE:  1. Drink lots of fluids (at least 6-8 glasses a day, unless you must restrict fluids for other medical reasons). This will force the medicine into your urinary system and flush the bacteria out of your body. Cranberry juice has been shown to help clear out the bacteria.  2. Avoid sexual intercourse until your symptoms are gone.  3. A bladder infection is treated with antibiotics. You may also be given Pyridium (generic = phenazopyridine) to reduce the burning sensation. This medicine will cause your urine to become a bright orange color. The orange urine may stain clothing. You may wear a pad or panty-liner to protect clothing.  PREVENTING FUTURE INFECTIONS:  1. Always wipe from front to back after a bowel movement.  2. Keep the genital area clean and dry.  3. Drink plenty of fluids " each day to avoid dehydration.  4. Urinate right after intercourse to flush out the bladder.  5. Wear cotton underwear and cotton-lined panty hose; avoid tight-fitting pants.  6. If you are on birth control pills and are having frequent bladder infections, discuss with your doctor.  FOLLOW UP: Return to this facility or see your doctor if ALL symptoms are not gone after three days of treatment.  GET PROMPT MEDICAL ATTENTION if any of the following occur:    Fever over 101 F (38.3 C)    No improvement by the third day of treatment    Increasing back or abdominal pain    Repeated vomiting; unable to keep medicine down    Weakness, dizziness or fainting    Vaginal discharge    Pain, redness or swelling in the labia (outer vaginal area)    2234-3187 [facility]. [address] [place] [address], [place] [address]. All rights reserved. This information is not intended as a substitute for professional medical care. Always follow your healthcare professional's instructions.  This information has been modified by your health care provider with permission from the publisher.

## 2018-01-19 NOTE — NURSING NOTE
"Chief Complaint   Patient presents with     UTI       Initial /78 (BP Location: Right leg, Cuff Size: Adult Regular)  Pulse 72  Temp 97  F (36.1  C) (Tympanic)  Wt 158 lb (71.7 kg)  SpO2 97%  BMI 25.5 kg/m2 Estimated body mass index is 25.5 kg/(m^2) as calculated from the following:    Height as of 1/8/18: 5' 6\" (1.676 m).    Weight as of this encounter: 158 lb (71.7 kg).  Medication Reconciliation: complete    Health Maintenance that is potentially due pending provider review:  NONE    n/a    Is there anyone who you would like to be able to receive your results? Not Applicable  If yes have patient fill out KERRY  Barry Asif M.A.        "

## 2018-01-19 NOTE — PROGRESS NOTES
SUBJECTIVE:   Lupe Lepe is a 77 year old female who presents to clinic today for the following health issues:    URINARY TRACT SYMPTOMS  Onset: This morning     Description:   Painful urination (Dysuria): YES  Blood in urine (Hematuria): no   Delay in urine (Hesitency): yes- slightly     Intensity: mild    Progression of Symptoms:  same    Accompanying Signs & Symptoms:  Fever/chills: no   Flank pain no   Nausea and vomiting: no   Any vaginal symptoms: none  Abdominal/Pelvic Pain: no     History:   History of frequent UTI's: YES  History of kidney stones: no, just kidney disease  Sexually Active: no   Possibility of pregnancy: No    Precipitating factors:   Burning     Therapies Tried and outcome: Increase fluid intake      Problem list and histories reviewed & adjusted, as indicated.  Additional history: as documented    Patient Active Problem List   Diagnosis     Paroxysmal atrial fibrillation (H)     Renovascular hypertension with goal blood pressure less than 130/80     Acquired hypothyroidism     Osteopenia     Giant cell arteritis (H)     Stricture of artery (H)     Renal artery stenosis (H)     Gastroesophageal reflux disease without esophagitis     Abdominal cramps     Frequent Urinary Tract Infections     Type 2 diabetes mellitus with stage 4 chronic kidney disease, without long-term current use of insulin (H)     Hyperlipidemia LDL goal <100     ACP (advance care planning)     Anemia of renal disease     Iron deficiency anemia, unspecified     Chronic kidney disease, stage IV (severe) (H)     CKD (chronic kidney disease) stage 4, GFR 15-29 ml/min (H)     Past Surgical History:   Procedure Laterality Date     COLONOSCOPY  8/25/2008     ESOPHAGOSCOPY, GASTROSCOPY, DUODENOSCOPY (EGD), COMBINED N/A 11/16/2017    Procedure: COMBINED ESOPHAGOSCOPY, GASTROSCOPY, DUODENOSCOPY (EGD);  Gastroscopy;  Surgeon: Angel Guillen MD;  Location: [facility] COLONOSCOPY THRU STOMA, DIAGNOSTIC  2000, 5/06, 8/08     Normal     HYSTERECTOMY, SIS  1970's     PHACOEMULSIFICATION WITH STANDARD INTRAOCULAR LENS IMPLANT Right 1/8/2018    Procedure: PHACOEMULSIFICATION WITH STANDARD INTRAOCULAR LENS IMPLANT;  Right Cataract Removal with Implant;  Surgeon: Sekou Dobbins MD;  Location: [place] OR     SURGICAL HISTORY OF -   1948    [facility], for fibroids     SURGICAL HISTORY OF -   12/16/2008    Esophagogastroduodenoscopy with biopsy     TONSILLECTOMY      Tonsilectomy       Social History   Substance Use Topics     Smoking status: Never Smoker     Smokeless tobacco: Never Used     Alcohol use No     Family History   Problem Relation Age of Onset     CANCER Mother      ovarian CA     CANCER Father      lung CA     HEART DISEASE Maternal Grandmother      HEART DISEASE Maternal Grandfather      Depression Sister      sucide     Breast Cancer Sister          Current Outpatient Prescriptions   Medication Sig Dispense Refill     levothyroxine (SYNTHROID) 25 MCG tablet 75 mcg (3 tabs) on Sunday, Monday, Wednesday, Friday. 50 mcg (2 tabs) on Tuesday, Thursday, Saturday 216 tablet 3     rosuvastatin (CRESTOR) 10 MG tablet Take 1 tablet (10 mg) by mouth daily 90 tablet 3     lisinopril (PRINIVIL/ZESTRIL) 10 MG tablet Take 1 tablet (10 mg) by mouth 2 times daily 180 tablet 3     amLODIPine (NORVASC) 10 MG tablet Take 1 tablet (10 mg) by mouth daily 90 tablet 4     hydrochlorothiazide (HYDRODIURIL) 50 MG tablet Take 1 tablet (50 mg) by mouth every morning 90 tablet 3     calcium-vitamin D (CALTRATE) 600-400 MG-UNIT per tablet Take 1 tablet by mouth 2 times daily. 100 tablet 3     ASPIRIN 325 MG OR TBEC 1 TABLET BY MOUTH DAILY 100 3     BENEFIBER OR TABS 2 TABLETS ORALLY DAILY  0     VITAMIN B-6 50 MG OR TABS 1 daily 1 bottle 1 year     MULTIVITAMINS OR TABS 1 TABLET BY MOUTH DAILY  0     Allergies   Allergen Reactions     Bactrim      Acute renal failure and hyperkalemia     Ace Inhibitors Other (See Comments)     Hyperkalemia       Atorvastatin  Calcium Itching     ELEVATED LFTS     Crestor [Rosuvastatin Calcium] Other (See Comments)     Elevated LFTS   In high doses     Penicillins Hives     Sulfamethoxazole-Trimethoprim      Other reaction(s): Other - Describe In Comment Field  Kidney shutdown     Ciprofloxacin Itching and Rash     Has taken oral without problems         Reviewed and updated as needed this visit by clinical staff     Reviewed and updated as needed this visit by Provider         ROS:  Constitutional, HEENT, cardiovascular, pulmonary, gi and gu systems are negative, except as otherwise noted.      OBJECTIVE:   /78 (BP Location: Right leg, Cuff Size: Adult Regular)  Pulse 72  Temp 97  F (36.1  C) (Tympanic)  Wt 158 lb (71.7 kg)  SpO2 97%  BMI 25.5 kg/m2  Body mass index is 25.5 kg/(m^2).   GENERAL: healthy, alert and no distress  EYES: Eyes grossly normal to inspection, PERRL and conjunctivae and sclerae normal  HENT: ear canals and TM's normal, nose and mouth without ulcers or lesions  NECK: no adenopathy, no asymmetry, masses, or scars and thyroid normal to palpation  RESP: lungs clear to auscultation - no rales, rhonchi or wheezes  CV: regular rate and rhythm, normal S1 S2, no S3 or S4, no murmur, click or rub, no peripheral edema and peripheral pulses strong  ABDOMEN: soft, nontender, no hepatosplenomegaly, no masses and bowel sounds normal  MS: no gross musculoskeletal defects noted, no edema  SKIN: no suspicious lesions or rashes  NEURO: Normal strength and tone, mentation intact and speech normal  PSYCH: mentation appears normal, affect normal/bright    Results for orders placed or performed in visit on 01/19/18   *UA reflex to Microscopic and Culture ([place] [facility] (except [place] and [place])   Result Value Ref Range    Color Urine Yellow     Appearance Urine Clear     Glucose Urine Negative NEG^Negative mg/dL    Bilirubin Urine Negative NEG^Negative    Ketones Urine Negative NEG^Negative mg/dL     Specific Gravity Urine 1.010 1.003 - 1.035    Blood Urine Negative NEG^Negative    pH Urine 6.5 5.0 - 7.0 pH    Protein Albumin Urine Negative NEG^Negative mg/dL    Urobilinogen Urine 0.2 0.2 - 1.0 EU/dL    Nitrite Urine Negative NEG^Negative    Leukocyte Esterase Urine Large (A) NEG^Negative    Source Midstream Urine    Urine Microscopic   Result Value Ref Range    WBC Urine >100 (A) OTO2^O - 2 /HPF    RBC Urine O - 2 OTO2^O - 2 /HPF    Squamous Epithelial /LPF Urine Few FEW^Few /LPF    Bacteria Urine Few (A) NEG^Negative /HPF         ASSESSMENT:       ICD-10-CM    1. Acute cystitis without hematuria N30.00 ciprofloxacin (CIPRO) 250 MG tablet     **UA reflex to Microscopic FUTURE 14d     Basic metabolic panel   2. Type 2 diabetes mellitus with stage 4 chronic kidney disease, without long-term current use of insulin (H) E11.22 Albumin Random Urine Quantitative with Creat Ratio    N18.4    3. Nonspecific finding on examination of urine R82.90 Urine Culture Aerobic Bacterial   4. Dysuria R30.0 *UA reflex to Microscopic and Culture ([place] [facility] (except [place] and [place])     Urine Microscopic     Urine Culture Aerobic Bacterial         PLAN:   Patient has chronic renal disease will start on Cipro 250 mg twice daily for 5 days creatinine clearance was 58.  Will repeat another basic metabolic panel to ensure the creatinine has not elevated.  Will recheck urine lab only visit in 5 days if cleared no further intervention if still present we will extend out the Cipro.         Patient Instructions   Antibiotics as directed   Urine culture is pending, will contact you if there is a change in treatment therapy.   Drink plenty of fluids. Prevention and treatment of UTI's discussed.   Signs and symptoms of pyelonephritis mentioned.   RTC if any worsening symptoms or if not improving as expected.   After completion of your antibiotic return for a repeat urinalysis.   You will just need to call the clinic to  "make a lab only appointment   Follow-up with your primary care provider next week and as needed.    Indications for emergent return to emergency department discussed with patient, who verbalized good understanding and agreement.  Patient understands the limitations of today's evaluation.            * BLADDER INFECTION,Female (Adult)    A bladder infection (\"cystitis\" or \"UTI\") usually causes a constant urge to urinate and a burning when passing urine. Urine may be cloudy, smelly or dark. There may be pain in the lower abdomen. A bladder infection occurs when bacteria from the vaginal area enter the bladder opening (urethra). This can occur from sexual intercourse, wearing tight clothing, dehydration and other factors.  HOME CARE:  1. Drink lots of fluids (at least 6-8 glasses a day, unless you must restrict fluids for other medical reasons). This will force the medicine into your urinary system and flush the bacteria out of your body. Cranberry juice has been shown to help clear out the bacteria.  2. Avoid sexual intercourse until your symptoms are gone.  3. A bladder infection is treated with antibiotics. You may also be given Pyridium (generic = phenazopyridine) to reduce the burning sensation. This medicine will cause your urine to become a bright orange color. The orange urine may stain clothing. You may wear a pad or panty-liner to protect clothing.  PREVENTING FUTURE INFECTIONS:  1. Always wipe from front to back after a bowel movement.  2. Keep the genital area clean and dry.  3. Drink plenty of fluids each day to avoid dehydration.  4. Urinate right after intercourse to flush out the bladder.  5. Wear cotton underwear and cotton-lined panty hose; avoid tight-fitting pants.  6. If you are on birth control pills and are having frequent bladder infections, discuss with your doctor.  FOLLOW UP: Return to this facility or see your doctor if ALL symptoms are not gone after three days of treatment.  GET PROMPT " MEDICAL ATTENTION if any of the following occur:    Fever over 101 F (38.3 C)    No improvement by the third day of treatment    Increasing back or abdominal pain    Repeated vomiting; unable to keep medicine down    Weakness, dizziness or fainting    Vaginal discharge    Pain, redness or swelling in the labia (outer vaginal area)    1728-2224 [employer]. [address] [place] [address]. All rights reserved. This information is not intended as a substitute for professional medical care. Always follow your healthcare professional's instructions.  This information has been modified by your health care provider with permission from the publisher.          SHAWNA Juan [facility]

## 2018-01-20 LAB
ANION GAP SERPL CALCULATED.3IONS-SCNC: 6 MMOL/L (ref 3–14)
BUN SERPL-MCNC: 31 MG/DL (ref 7–30)
CALCIUM SERPL-MCNC: 9.4 MG/DL (ref 8.5–10.1)
CHLORIDE SERPL-SCNC: 108 MMOL/L (ref 94–109)
CO2 SERPL-SCNC: 23 MMOL/L (ref 20–32)
CREAT SERPL-MCNC: 1.51 MG/DL (ref 0.52–1.04)
CREAT UR-MCNC: 40 MG/DL
GFR SERPL CREATININE-BSD FRML MDRD: 33 ML/MIN/1.7M2
GLUCOSE SERPL-MCNC: 92 MG/DL (ref 70–99)
MICROALBUMIN UR-MCNC: 9 MG/L
MICROALBUMIN/CREAT UR: 21.66 MG/G CR (ref 0–25)
POTASSIUM SERPL-SCNC: 5.1 MMOL/L (ref 3.4–5.3)
SODIUM SERPL-SCNC: 137 MMOL/L (ref 133–144)

## 2018-01-22 LAB
BACTERIA SPEC CULT: ABNORMAL
Lab: ABNORMAL
SPECIMEN SOURCE: ABNORMAL

## 2018-01-23 ENCOUNTER — OFFICE VISIT (OUTPATIENT)
Dept: OTOLARYNGOLOGY | Facility: CLINIC | Age: 78
End: 2018-01-23
Payer: COMMERCIAL

## 2018-01-23 VITALS
BODY MASS INDEX: 25.39 KG/M2 | TEMPERATURE: 97.9 F | DIASTOLIC BLOOD PRESSURE: 63 MMHG | WEIGHT: 158 LBS | SYSTOLIC BLOOD PRESSURE: 130 MMHG | HEIGHT: 66 IN | HEART RATE: 79 BPM

## 2018-01-23 DIAGNOSIS — H61.22 IMPACTED CERUMEN OF LEFT EAR: Primary | ICD-10-CM

## 2018-01-23 PROCEDURE — 69210 REMOVE IMPACTED EAR WAX UNI: CPT | Mod: LT | Performed by: OTOLARYNGOLOGY

## 2018-01-23 PROCEDURE — 99212 OFFICE O/P EST SF 10 MIN: CPT | Mod: 25 | Performed by: OTOLARYNGOLOGY

## 2018-01-23 ASSESSMENT — PAIN SCALES - GENERAL: PAINLEVEL: NO PAIN (0)

## 2018-01-23 NOTE — MR AVS SNAPSHOT
After Visit Summary   1/23/2018    Lupe Lepe    MRN: [id number]           Patient Information     Date Of Birth          1940        Visit Information        Provider Department      1/23/2018 3:30 PM Roberto Rivas MD [facility]        Today's Diagnoses     Impacted cerumen of left ear    -  1      Care Instructions    Per Physician's instructions            Follow-ups after your visit        Your next 10 appointments already scheduled     Jan 29, 2018   Procedure with Sekou Dobbins MD   [facility] (--)    [address] [id number] [phone]           The medical center is located at [address]. (between [address] and [address] in [place], [place] miles north of [place]).              Who to contact     If you have questions or need follow up information about today's clinic visit or your schedule please contact [facility] directly at [phone].  Normal or non-critical lab and imaging results will be communicated to you by [employer], letter or phone within 4 business days after the clinic has received the results. If you do not hear from us within 7 days, please contact the clinic through [employer] or phone. If you have a critical or abnormal lab result, we will notify you by phone as soon as possible.  Submit refill requests through [employer] or call your pharmacy and they will forward the refill request to us. Please allow 3 business days for your refill to be completed.          Additional Information About Your Visit        MyChart Information     [employer] gives you secure access to your electronic health record. If you see a primary care provider, you can also send messages to your care team and make appointments. If you have questions, please call your primary care clinic.  If you do not have a primary care provider, please call [phone] and they will assist you.        Care EveryWhere ID     This  "is your Care EveryWhere ID. This could be used by other organizations to access your [place] medical records  [id number]        Your Vitals Were     Pulse Temperature Height BMI (Body Mass Index)          79 97.9  F (36.6  C) (Oral) 1.676 m (5' 6\") 25.5 kg/m2         Blood Pressure from Last 3 Encounters:   01/23/18 130/63   01/19/18 172/78   01/08/18 142/63    Weight from Last 3 Encounters:   01/23/18 71.7 kg (158 lb)   01/19/18 71.7 kg (158 lb)   01/08/18 70.3 kg (155 lb)              We Performed the Following     Remove [facility]        Primary Care Provider Office Phone # Fax #    Li HortonDO [phone] [address] [address]        Equal Access to Services     DARIO ABDI : Hadii nya galicia hadasho Soomaali, waaxda luqadaha, qaybta kaalmada adeegyada, martinez aguilar haycindi white . So [facility] [phone].    ATENCIÓN: Si habla español, tiene a saldana disposición servicios gratuitos de asistencia lingüística. Llame al [phone].    We comply with applicable federal civil rights laws and [place] laws. We do not discriminate on the basis of race, color, national origin, age, disability, sex, sexual orientation, or gender identity.            Thank you!     Thank you for choosing [facility]  for your care. Our goal is always to provide you with excellent care. Hearing back from our patients is one way we can continue to improve our services. Please take a few minutes to complete the written survey that you may receive in the mail after your visit with us. Thank you!             Your Updated Medication List - Protect others around you: Learn how to safely use, store and throw away your medicines at www.disposemymeds.org.          This list is accurate as of: 1/23/18  4:21 PM.  Always use your most recent med list.                   Brand Name Dispense Instructions for use Diagnosis    amLODIPine 10 MG tablet    NORVASC    90 tablet    Take 1 " tablet (10 mg) by mouth daily    Renovascular hypertension with goal blood pressure less than 130/80       aspirin 325 MG EC tablet     100    1 TABLET BY MOUTH DAILY        BENEFIBER Tabs      2 TABLETS ORALLY DAILY        calcium-vitamin D 600-400 MG-UNIT per tablet    CALTRATE    100 tablet    Take 1 tablet by mouth 2 times daily.    Disorder of bone and cartilage, unspecified       ciprofloxacin 250 MG tablet    CIPRO    10 tablet    Take 1 tablet (250 mg) by mouth 2 times daily for 5 days    Acute cystitis without hematuria       hydrochlorothiazide 50 MG tablet    HYDRODIURIL    90 tablet    Take 1 tablet (50 mg) by mouth every morning    Renovascular hypertension with goal blood pressure less than 130/80       levothyroxine 25 MCG tablet    SYNTHROID    216 tablet    75 mcg (3 tabs) on Sunday, Monday, Wednesday, Friday. 50 mcg (2 tabs) on Tuesday, Thursday, Saturday    Acquired hypothyroidism       lisinopril 10 MG tablet    PRINIVIL/ZESTRIL    180 tablet    Take 1 tablet (10 mg) by mouth 2 times daily    Renovascular hypertension with goal blood pressure less than 130/80       multivitamin per tablet      1 TABLET BY MOUTH DAILY        pyridoxine 50 MG Tabs    VITAMIN B-6    1 bottle    1 daily    Disturbance of skin sensation       rosuvastatin 10 MG tablet    CRESTOR    90 tablet    Take 1 tablet (10 mg) by mouth daily    Hyperlipidemia LDL goal <100

## 2018-01-23 NOTE — NURSING NOTE
"Initial /63 (BP Location: Right leg, Patient Position: Sitting, Cuff Size: Adult Regular)  Pulse 79  Temp 97.9  F (36.6  C) (Oral)  Ht 1.676 m (5' 6\")  Wt 71.7 kg (158 lb)  BMI 25.5 kg/m2 Estimated body mass index is 25.5 kg/(m^2) as calculated from the following:    Height as of this encounter: 1.676 m (5' 6\").    Weight as of this encounter: 71.7 kg (158 lb). .    Padmini Beckwith CMA    "

## 2018-01-23 NOTE — H&P
[facility]  TOTAL EYE CARE  [address]  [facility] [address]  [phone]  Dept: [phone]    OPHTHALMOLOGY PRE-OPERATIVE  HISTORY AND PHYSICAL    DATE OF H/P:  2018    DATE OF SURGERY:  2018  PROCEDURE:  Procedure(s):  PHACOEMULSIFICATION WITH STANDARD INTRAOCULAR LENS IMPLANT, Left Eye  LENS IMPLANT:  [id number] +25.5  REFRACTIVE GOAL:  PL Sph  SURGEON:  Sekou Dobbins MD    ANESTHESIA:  TOPICAL / MAC    OR CASE REQUIREMENTS:    DEMOGRAPHICS:  Demographic Information on Lupe Lepe:    Lupe Lepe  Gender: female  : 1940  [address] [facility] [id number] [phone] (home) NONE (work)    Medical Record: [id number]  Social Security Number: [id number]  Pharmacy: [facility] [id number] [place] [address]  Primary Care Provider: Li Horton    Parent's names are: Data Unavailable (mother) and Data Unavailable (father).    Insurance: Payor: GÃ¼dpod / Plan: GÃ[employer]dpod PLATINUM BLUE / Product Type: PPO /     OCULAR HISTORY:  Cataracts, s/p IOL OD.    HISTORIES:  Past Medical History:   Diagnosis Date     Abdominal cramps 10/16/2008    Episodic severe cramping associated with emesis since . GI evaluation 10/08. [place] evaluation . 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 Rx jeanne-pac for H. 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 1 year follow-up recommended. CT -  previously seen cystic structure in pancreas is no longer seen, mass in left kideney is unchanged from prior [facility] study. This suggests a nonaggressive process andargues against even a low grade liposarcoma     Arthritis      Closed fracture of unspecified part of fibula          Heart disease      Hemorrhage of rectum and anus     negative colonoscopy , food poisoning     HERPES ZOSTER NOS 2007    left   flank 5/07     Hypertension      HYPOSMIA     negative MRI head 1999, now resolved     IRON DEFIC ANEMIA NOS 3/27/2006    Microcytic. B12, folate normal. Retic count 1.8,  %FE 6, Ferritin 163. OBT negativex3. BM biopsy negative 4/06 with no stainable iron stores. EGD 5/06- mild esophagitis. Colonoscopy 5/06- normal. Resolved with iron, treatment of temporal arteritis.     JOINT EFFUSION-L/LEG 4/7/2006    Right knee. Mri 4/06-   Extensive tears of both the medial and lateral menisci.  Moderate chondromalacia patella with mild chondromalacia elsewhere in the knee. Moderate-sized joint effusion. No other abnormal mass is identified.      Other closed fractures of distal end of radius (alone)     1996     Polymyalgia rheumatica (H)     History of steroid reponsive myalgias and moderately elevated ESR 1999, 2000, 2004     Thyroid disease        Past Surgical History:   Procedure Laterality Date     COLONOSCOPY  8/25/2008     ESOPHAGOSCOPY, GASTROSCOPY, DUODENOSCOPY (EGD), COMBINED N/A 11/16/2017    Procedure: COMBINED ESOPHAGOSCOPY, GASTROSCOPY, DUODENOSCOPY (EGD);  Gastroscopy;  Surgeon: Angel Guillen MD;  Location: [facility]     HC COLONOSCOPY THRU STOMA, DIAGNOSTIC  2000, 5/06, 8/08    Normal     HYSTERECTOMY, SIS  1970's     PHACOEMULSIFICATION WITH STANDARD INTRAOCULAR LENS IMPLANT Right 1/8/2018    Procedure: PHACOEMULSIFICATION WITH STANDARD INTRAOCULAR LENS IMPLANT;  Right Cataract Removal with Implant;  Surgeon: Sekou Dobbins MD;  Location: [facility]     SURGICAL HISTORY OF -   1948    SIS, for fibroids     SURGICAL HISTORY OF -   12/16/2008    Esophagogastroduodenoscopy with biopsy     TONSILLECTOMY      Tonsilectomy       Family History   Problem Relation Age of Onset     CANCER Mother      ovarian CA     CANCER Father      lung CA     HEART DISEASE Maternal Grandmother      HEART DISEASE Maternal Grandfather      Depression Sister      sucide     Breast Cancer Sister        Social History   Substance Use  Topics     Smoking status: Never Smoker     Smokeless tobacco: Never Used     Alcohol use No       MEDICATIONS:  No current facility-administered medications for this encounter.      Current Outpatient Prescriptions   Medication Sig     ciprofloxacin (CIPRO) 250 MG tablet Take 1 tablet (250 mg) by mouth 2 times daily for 5 days     levothyroxine (SYNTHROID) 25 MCG tablet 75 mcg (3 tabs) on Sunday, Monday, Wednesday, Friday. 50 mcg (2 tabs) on Tuesday, Thursday, Saturday     rosuvastatin (CRESTOR) 10 MG tablet Take 1 tablet (10 mg) by mouth daily     lisinopril (PRINIVIL/ZESTRIL) 10 MG tablet Take 1 tablet (10 mg) by mouth 2 times daily     amLODIPine (NORVASC) 10 MG tablet Take 1 tablet (10 mg) by mouth daily     hydrochlorothiazide (HYDRODIURIL) 50 MG tablet Take 1 tablet (50 mg) by mouth every morning     calcium-vitamin D (CALTRATE) 600-400 MG-UNIT per tablet Take 1 tablet by mouth 2 times daily.     ASPIRIN 325 MG OR TBEC 1 TABLET BY MOUTH DAILY     BENEFIBER OR TABS 2 TABLETS ORALLY DAILY     VITAMIN B-6 50 MG OR TABS 1 daily     MULTIVITAMINS OR TABS 1 TABLET BY MOUTH DAILY       ALLERGIES:     Allergies   Allergen Reactions     Bactrim      Acute renal failure and hyperkalemia     Ace Inhibitors Other (See Comments)     Hyperkalemia       Atorvastatin Calcium Itching     ELEVATED LFTS     Crestor [Rosuvastatin Calcium] Other (See Comments)     Elevated LFTS   In high doses     Penicillins Hives     Sulfamethoxazole-Trimethoprim      Other reaction(s): Other - Describe In Comment Field  Kidney shutdown     Ciprofloxacin Itching and Rash     Has taken oral without problems       PERTINENT SYSTEMS REVIEW:    1. No - Do you have a history of heart attack, stroke, stent, bypass or surgery on an artery in the head, neck, heart or legs?  2. No - Do you ever have any pain or discomfort in your chest?  3. No - Do you have a history of  Heart Failure?  4. No - Are you troubled by shortness of breath when walking: On  the level, up a slight hill or at night?  5. No - Do you currently have a cold, bronchitis or other respiratory infection?  6. No - Do you have a cough, shortness of breath or wheezing?  7. No - Do you sometimes get pains in the calves of your legs when you walk?  8. No - Do you or anyone in your family have previous history of blood clots?  9. No - Do you or does anyone in your family have a serious bleeding problem such as prolonged bleeding following surgeries or cuts?  10. No - Have you ever had problems with anemia or been told to take iron pills?  11. No - Have you had any abnormal blood loss such as black, tarry or bloody stools, or abnormal vaginal bleeding?  12. No - Have you ever had a blood transfusion?  13. No - Have you or any of your relatives ever had problems with anesthesia?  14. No - Do you have sleep apnea, excessive snoring or daytime drowsiness?  15. No - Do you have any prosthetic heart valves?  16. No - Do you have prosthetic joints?    EXAMINATION:  Vitals were reviewed                       Vison:  Va, left - 20/40   BAT, left - 20/80  HEENT:  Cataract, otherwise unremarkable.  LUNGS:  Clear  CV:  Regular rate and rhythm without murmur  ABD:  Soft and nontender  NEURO:  Alert and nonfocal    IMPRESSION:  Patient cleared for ophthalmic surgery.  Low risk with monitored, light sedation.  I have assessed the patient's DVT risk, and no additional orders necessary.    PLAN:  Procedure(s):  PHACOEMULSIFICATION WITH STANDARD INTRAOCULAR LENS IMPLANT, Left Eye      Sekou Dobbins MD

## 2018-01-23 NOTE — PROGRESS NOTES
History of Present Illness - Lupe Lepe is a 77 year old female who presents with a 1 week history of intermittent decrease in hearing in the left ear. She does not wear hearing aids and has no prior history of ear surgery. She has experienced cerumen impactions in the past. She does not use q-tips.    Past Medical History -   Patient Active Problem List   Diagnosis     Paroxysmal atrial fibrillation (H)     Renovascular hypertension with goal blood pressure less than 130/80     Acquired hypothyroidism     Osteopenia     Giant cell arteritis (H)     Stricture of artery (H)     Renal artery stenosis (H)     Gastroesophageal reflux disease without esophagitis     Abdominal cramps     Frequent Urinary Tract Infections     Type 2 diabetes mellitus with stage 4 chronic kidney disease, without long-term current use of insulin (H)     Hyperlipidemia LDL goal <100     ACP (advance care planning)     Anemia of renal disease     Iron deficiency anemia, unspecified     Chronic kidney disease, stage IV (severe) (H)     CKD (chronic kidney disease) stage 4, GFR 15-29 ml/min (H)       Current Medications -   Current Outpatient Prescriptions:      ciprofloxacin (CIPRO) 250 MG tablet, Take 1 tablet (250 mg) by mouth 2 times daily for 5 days, Disp: 10 tablet, Rfl: 0     levothyroxine (SYNTHROID) 25 MCG tablet, 75 mcg (3 tabs) on Sunday, Monday, Wednesday, Friday. 50 mcg (2 tabs) on Tuesday, Thursday, Saturday, Disp: 216 tablet, Rfl: 3     rosuvastatin (CRESTOR) 10 MG tablet, Take 1 tablet (10 mg) by mouth daily, Disp: 90 tablet, Rfl: 3     lisinopril (PRINIVIL/ZESTRIL) 10 MG tablet, Take 1 tablet (10 mg) by mouth 2 times daily, Disp: 180 tablet, Rfl: 3     amLODIPine (NORVASC) 10 MG tablet, Take 1 tablet (10 mg) by mouth daily, Disp: 90 tablet, Rfl: 4     hydrochlorothiazide (HYDRODIURIL) 50 MG tablet, Take 1 tablet (50 mg) by mouth every morning, Disp: 90 tablet, Rfl: 3     calcium-vitamin D (CALTRATE) 600-400 MG-UNIT per  "tablet, Take 1 tablet by mouth 2 times daily., Disp: 100 tablet, Rfl: 3     ASPIRIN 325 MG OR TBEC, 1 TABLET BY MOUTH DAILY, Disp: 100, Rfl: 3     BENEFIBER OR TABS, 2 TABLETS ORALLY DAILY, Disp: , Rfl: 0     VITAMIN B-6 50 MG OR TABS, 1 daily, Disp: 1 bottle, Rfl: 1 year     MULTIVITAMINS OR TABS, 1 TABLET BY MOUTH DAILY, Disp: , Rfl: 0    Allergies -   Allergies   Allergen Reactions     Bactrim      Acute renal failure and hyperkalemia     Ace Inhibitors Other (See Comments)     Hyperkalemia       Atorvastatin Calcium Itching     ELEVATED LFTS     Crestor [Rosuvastatin Calcium] Other (See Comments)     Elevated LFTS   In high doses     Penicillins Hives     Sulfamethoxazole-Trimethoprim      Other reaction(s): Other - Describe In Comment Field  Kidney shutdown     Ciprofloxacin Itching and Rash     Has taken oral without problems       Social History -   Social History     Social History     Marital status:      Spouse name: N/A     Number of children: N/A     Years of education: N/A     Social History Main Topics     Smoking status: Never Smoker     Smokeless tobacco: Never Used     Alcohol use No     Drug use: No     Sexual activity: Yes     Partners: Male     Other Topics Concern     Parent/Sibling W/ Cabg, Mi Or Angioplasty Before 65f 55m? No     Social History Narrative       Family History -   Family History   Problem Relation Age of Onset     CANCER Mother      ovarian CA     CANCER Father      lung CA     HEART DISEASE Maternal Grandmother      HEART DISEASE Maternal Grandfather      Depression Sister      sucide     Breast Cancer Sister        Review of Systems - As per HPI and PMHx, otherwise 7 system review of the head and neck negative. 10+ system review negative.    Exam:  /63 (BP Location: Right leg, Patient Position: Sitting, Cuff Size: Adult Regular)  Pulse 79  Temp 97.9  F (36.6  C) (Oral)  Ht 1.676 m (5' 6\")  Wt 71.7 kg (158 lb)  BMI 25.5 kg/m2  General - The patient is well " nourished and well developed, and appears to have good nutritional status.  Alert and oriented to person and place, answers questions and cooperates with examination appropriately.   Head and Face - Normocephalic and atraumatic, with no gross asymmetry noted of the contour of the facial features.  The facial nerve is intact, with strong symmetric movements.  Eyes - Extraocular movements intact.  Sclera were not icteric or injected, conjunctiva were pink and moist.  Ears - After cerumen removed, normal ear exam.    Procedure: Cerumen Disimpaction  Diagnosis: Cerumen Impaction    Procedure and Findings  Ears - On examination of the ears, I found that the  ears were impacted with dense cerumen obscuring visualization of the left TM.  Therefore, I positioned the patient and I used the binocular surgical microscope to assist in visualization of the affected ear(s).  I began by using a cerumen loop to gently lift the edges of the cerumen mass away from the walls of the external canal.  Once I did this, I was able to suction away fragments of wax and debris using suction.  Once the mass was loose enough, the entire plug was pulled from the canal with microforceps.  The tympanic membrane was intact without sign of perforation or middle ear effusion and minimal ear canal trauma.             A/P - Lupe Lepe is a 77 year old female with left cerumen impaction and collapsed external ear canal. I removed the cerumen and she felt much better. I advised her to return if there is concern about hearing being down in the future.      Dr. Roberto Rivas MD  Otolaryngology  [facility]

## 2018-01-23 NOTE — LETTER
1/23/2018         RE: Lupe Lepe  [address] [facility] [address]        Dear Colleague,    Thank you for referring your patient, Lupe Lepe, to the [facility]. Please see a copy of my visit note below.    History of Present Illness - Lupe Lepe is a 77 year old female who presents with a 1 week history of intermittent decrease in hearing in the left ear. She does not wear hearing aids and has no prior history of ear surgery. She has experienced cerumen impactions in the past. She does not use q-tips.    Past Medical History -   Patient Active Problem List   Diagnosis     Paroxysmal atrial fibrillation (H)     Renovascular hypertension with goal blood pressure less than 130/80     Acquired hypothyroidism     Osteopenia     Giant cell arteritis (H)     Stricture of artery (H)     Renal artery stenosis (H)     Gastroesophageal reflux disease without esophagitis     Abdominal cramps     Frequent Urinary Tract Infections     Type 2 diabetes mellitus with stage 4 chronic kidney disease, without long-term current use of insulin (H)     Hyperlipidemia LDL goal <100     ACP (advance care planning)     Anemia of renal disease     Iron deficiency anemia, unspecified     Chronic kidney disease, stage IV (severe) (H)     CKD (chronic kidney disease) stage 4, GFR 15-29 ml/min (H)       Current Medications -   Current Outpatient Prescriptions:      ciprofloxacin (CIPRO) 250 MG tablet, Take 1 tablet (250 mg) by mouth 2 times daily for 5 days, Disp: 10 tablet, Rfl: 0     levothyroxine (SYNTHROID) 25 MCG tablet, 75 mcg (3 tabs) on Sunday, Monday, Wednesday, Friday. 50 mcg (2 tabs) on Tuesday, Thursday, Saturday, Disp: 216 tablet, Rfl: 3     rosuvastatin (CRESTOR) 10 MG tablet, Take 1 tablet (10 mg) by mouth daily, Disp: 90 tablet, Rfl: 3     lisinopril (PRINIVIL/ZESTRIL) 10 MG tablet, Take 1 tablet (10 mg) by mouth 2 times daily, Disp: 180 tablet, Rfl: 3     amLODIPine (NORVASC)  10 MG tablet, Take 1 tablet (10 mg) by mouth daily, Disp: 90 tablet, Rfl: 4     hydrochlorothiazide (HYDRODIURIL) 50 MG tablet, Take 1 tablet (50 mg) by mouth every morning, Disp: 90 tablet, Rfl: 3     calcium-vitamin D (CALTRATE) 600-400 MG-UNIT per tablet, Take 1 tablet by mouth 2 times daily., Disp: 100 tablet, Rfl: 3     ASPIRIN 325 MG OR TBEC, 1 TABLET BY MOUTH DAILY, Disp: 100, Rfl: 3     BENEFIBER OR TABS, 2 TABLETS ORALLY DAILY, Disp: , Rfl: 0     VITAMIN B-6 50 MG OR TABS, 1 daily, Disp: 1 bottle, Rfl: 1 year     MULTIVITAMINS OR TABS, 1 TABLET BY MOUTH DAILY, Disp: , Rfl: 0    Allergies -   Allergies   Allergen Reactions     Bactrim      Acute renal failure and hyperkalemia     Ace Inhibitors Other (See Comments)     Hyperkalemia       Atorvastatin Calcium Itching     ELEVATED LFTS     Crestor [Rosuvastatin Calcium] Other (See Comments)     Elevated LFTS   In high doses     Penicillins Hives     Sulfamethoxazole-Trimethoprim      Other reaction(s): Other - Describe In Comment Field  Kidney shutdown     Ciprofloxacin Itching and Rash     Has taken oral without problems       Social History -   Social History     Social History     Marital status:      Spouse name: N/A     Number of children: N/A     Years of education: N/A     Social History Main Topics     Smoking status: Never Smoker     Smokeless tobacco: Never Used     Alcohol use No     Drug use: No     Sexual activity: Yes     Partners: Male     Other Topics Concern     Parent/Sibling W/ Cabg, Mi Or Angioplasty Before 65f 55m? No     Social History Narrative       Family History -   Family History   Problem Relation Age of Onset     CANCER Mother      ovarian CA     CANCER Father      lung CA     HEART DISEASE Maternal Grandmother      HEART DISEASE Maternal Grandfather      Depression Sister      sucide     Breast Cancer Sister        Review of Systems - As per HPI and PMHx, otherwise 7 system review of the head and neck negative. 10+ system  "review negative.    Exam:  /63 (BP Location: Right leg, Patient Position: Sitting, Cuff Size: Adult Regular)  Pulse 79  Temp 97.9  F (36.6  C) (Oral)  Ht 1.676 m (5' 6\")  Wt 71.7 kg (158 lb)  BMI 25.5 kg/m2  General - The patient is well nourished and well developed, and appears to have good nutritional status.  Alert and oriented to person and place, answers questions and cooperates with examination appropriately.   Head and Face - Normocephalic and atraumatic, with no gross asymmetry noted of the contour of the facial features.  The facial nerve is intact, with strong symmetric movements.  Eyes - Extraocular movements intact.  Sclera were not icteric or injected, conjunctiva were pink and moist.  Ears - After cerumen removed, normal ear exam.    Procedure: Cerumen Disimpaction  Diagnosis: Cerumen Impaction    Procedure and Findings  Ears - On examination of the ears, I found that the  ears were impacted with dense cerumen obscuring visualization of the left TM.  Therefore, I positioned the patient and I used the binocular surgical microscope to assist in visualization of the affected ear(s).  I began by using a cerumen loop to gently lift the edges of the cerumen mass away from the walls of the external canal.  Once I did this, I was able to suction away fragments of wax and debris using suction.  Once the mass was loose enough, the entire plug was pulled from the canal with microforceps.  The tympanic membrane was intact without sign of perforation or middle ear effusion and minimal ear canal trauma.             A/P - Lupemilton Lepe is a 77 year old female with left cerumen impaction and collapsed external ear canal. I removed the cerumen and she felt much better. I advised her to return if there is concern about hearing being down in the future.      Dr. Roberto Rivas MD  Otolaryngology  [facility]      Again, thank you for allowing me to participate in the care of your patient.  "       Sincerely,        Roberto Rivas MD

## 2018-01-26 ENCOUNTER — ANESTHESIA EVENT (OUTPATIENT)
Dept: SURGERY | Facility: CLINIC | Age: 78
End: 2018-01-26
Payer: MEDICARE

## 2018-01-26 ASSESSMENT — ENCOUNTER SYMPTOMS: DYSRHYTHMIAS: 1

## 2018-01-26 NOTE — ANESTHESIA PREPROCEDURE EVALUATION
Anesthesia Evaluation     . Pt has had prior anesthetic. Type: General and MAC    No history of anesthetic complications          ROS/MED HX    ENT/Pulmonary:  - neg pulmonary ROS     Neurologic:  - neg neurologic ROS     Cardiovascular: Comment: Giant cell arteritis    (+) Dyslipidemia, hypertension----. : . . . :. dysrhythmias a-fib, . Previous cardiac testing date:results:date: results:ECG reviewed date:12/15/15 results:Sinus Rhythm   Low voltage -possible pulmonary disease.     ABNORMAL date: results:          METS/Exercise Tolerance:  4 - Raking leaves, gardening   Hematologic:     (+) Anemia, -      Musculoskeletal:   (+) arthritis, , , other musculoskeletal- Osteopenia      GI/Hepatic:     (+) GERD Asymptomatic on medication,       Renal/Genitourinary: Comment: Renal artery stenosis    (+) chronic renal disease (Stage 4 CKD), type: CRI, Pt does not require dialysis,       Endo:     (+) type II DM (no longer- was d/t pt taking steroids for extended period of time) Last HgA1c: 6.6 date: 11/15/17 thyroid problem hypothyroidism, .      Psychiatric:  - neg psychiatric ROS       Infectious Disease:  - neg infectious disease ROS       Malignancy:      - no malignancy   Other: Comment: Left cataract                      Physical Exam  Normal systems: cardiovascular, pulmonary and dental    Airway   Mallampati: II  TM distance: >3 FB  Neck ROM: full    Dental     Cardiovascular       Pulmonary                         Anesthesia Plan      History & Physical Review      ASA Status:  3 .    NPO Status:  > 8 hours    Plan for MAC Reason for MAC:  Procedure to face, neck, head or breast         Postoperative Care  Postoperative pain management:  Oral pain medications.      Consents  Anesthetic plan, risks, benefits and alternatives discussed with:  Patient..                          .

## 2018-01-26 NOTE — PROGRESS NOTES
Please Notify Lupe  of test results urine culture positive on cipro which is sensitive. As discussed should return to a lab only appointment to recheck urine.     Fatimah Abdullahi CNP

## 2018-01-29 ENCOUNTER — HOSPITAL ENCOUNTER (OUTPATIENT)
Facility: CLINIC | Age: 78
Discharge: HOME | End: 2018-01-29
Attending: OPHTHALMOLOGY | Admitting: OPHTHALMOLOGY
Payer: MEDICARE

## 2018-01-29 ENCOUNTER — ANESTHESIA (OUTPATIENT)
Dept: SURGERY | Facility: CLINIC | Age: 78
End: 2018-01-29
Payer: MEDICARE

## 2018-01-29 ENCOUNTER — SURGERY (OUTPATIENT)
Age: 78
End: 2018-01-29

## 2018-01-29 VITALS
SYSTOLIC BLOOD PRESSURE: 150 MMHG | OXYGEN SATURATION: 95 % | HEIGHT: 66 IN | TEMPERATURE: 98.6 F | RESPIRATION RATE: 16 BRPM | DIASTOLIC BLOOD PRESSURE: 76 MMHG | WEIGHT: 158 LBS | BODY MASS INDEX: 25.39 KG/M2

## 2018-01-29 PROCEDURE — 36000025 ZZH CATARACT SURGICAL PACKAGE: Performed by: OPHTHALMOLOGY

## 2018-01-29 PROCEDURE — 71000022 ZZH RECOVERY CATRACT PACKAGE: Performed by: OPHTHALMOLOGY

## 2018-01-29 PROCEDURE — 25000125 ZZHC RX 250: Performed by: OPHTHALMOLOGY

## 2018-01-29 PROCEDURE — 25000128 H RX IP 250 OP 636: Performed by: OPHTHALMOLOGY

## 2018-01-29 PROCEDURE — 25000128 H RX IP 250 OP 636: Performed by: NURSE ANESTHETIST, CERTIFIED REGISTERED

## 2018-01-29 PROCEDURE — 25000125 ZZHC RX 250: Performed by: NURSE ANESTHETIST, CERTIFIED REGISTERED

## 2018-01-29 PROCEDURE — 37000012 ZZH ANESTHESIA CATARACT PACKAGE: Performed by: OPHTHALMOLOGY

## 2018-01-29 PROCEDURE — V2632 POST CHMBR INTRAOCULAR LENS: HCPCS | Performed by: OPHTHALMOLOGY

## 2018-01-29 DEVICE — EYE IMP IOL AMO PCL TECNIS ZCB00 25.5: Type: IMPLANTABLE DEVICE | Site: EYE | Status: FUNCTIONAL

## 2018-01-29 RX ORDER — LIDOCAINE 40 MG/G
CREAM TOPICAL
Status: DISCONTINUED | OUTPATIENT
Start: 2018-01-29 | End: 2018-01-29 | Stop reason: HOSPADM

## 2018-01-29 RX ORDER — PHENYLEPHRINE HYDROCHLORIDE 25 MG/ML
1 SOLUTION/ DROPS OPHTHALMIC
Status: COMPLETED | OUTPATIENT
Start: 2018-01-29 | End: 2018-01-29

## 2018-01-29 RX ORDER — SODIUM CHLORIDE, SODIUM LACTATE, POTASSIUM CHLORIDE, CALCIUM CHLORIDE 600; 310; 30; 20 MG/100ML; MG/100ML; MG/100ML; MG/100ML
INJECTION, SOLUTION INTRAVENOUS CONTINUOUS
Status: DISCONTINUED | OUTPATIENT
Start: 2018-01-29 | End: 2018-01-29 | Stop reason: HOSPADM

## 2018-01-29 RX ORDER — PROPARACAINE HYDROCHLORIDE 5 MG/ML
SOLUTION/ DROPS OPHTHALMIC PRN
Status: DISCONTINUED | OUTPATIENT
Start: 2018-01-29 | End: 2018-01-29 | Stop reason: HOSPADM

## 2018-01-29 RX ORDER — CYCLOPENTOLATE HYDROCHLORIDE 10 MG/ML
1 SOLUTION/ DROPS OPHTHALMIC
Status: COMPLETED | OUTPATIENT
Start: 2018-01-29 | End: 2018-01-29

## 2018-01-29 RX ORDER — BALANCED SALT SOLUTION 6.4; .75; .48; .3; 3.9; 1.7 MG/ML; MG/ML; MG/ML; MG/ML; MG/ML; MG/ML
SOLUTION OPHTHALMIC PRN
Status: DISCONTINUED | OUTPATIENT
Start: 2018-01-29 | End: 2018-01-29 | Stop reason: HOSPADM

## 2018-01-29 RX ORDER — TROPICAMIDE 10 MG/ML
1 SOLUTION/ DROPS OPHTHALMIC
Status: COMPLETED | OUTPATIENT
Start: 2018-01-29 | End: 2018-01-29

## 2018-01-29 RX ADMIN — PHENYLEPHRINE HYDROCHLORIDE 1 DROP: 25 SOLUTION/ DROPS OPHTHALMIC at 09:49

## 2018-01-29 RX ADMIN — TROPICAMIDE 1 DROP: 10 SOLUTION/ DROPS OPHTHALMIC at 09:46

## 2018-01-29 RX ADMIN — TROPICAMIDE 1 DROP: 10 SOLUTION/ DROPS OPHTHALMIC at 09:41

## 2018-01-29 RX ADMIN — CYCLOPENTOLATE HYDROCHLORIDE 1 DROP: 10 SOLUTION/ DROPS OPHTHALMIC at 09:45

## 2018-01-29 RX ADMIN — EPINEPHRINE 0.5 ML: 1 INJECTION, SOLUTION INTRAMUSCULAR; SUBCUTANEOUS at 10:49

## 2018-01-29 RX ADMIN — CYCLOPENTOLATE HYDROCHLORIDE 1 DROP: 10 SOLUTION/ DROPS OPHTHALMIC at 09:41

## 2018-01-29 RX ADMIN — LIDOCAINE HYDROCHLORIDE 1 ML: 10 INJECTION, SOLUTION EPIDURAL; INFILTRATION; INTRACAUDAL; PERINEURAL at 09:43

## 2018-01-29 RX ADMIN — CYCLOPENTOLATE HYDROCHLORIDE 1 DROP: 10 SOLUTION/ DROPS OPHTHALMIC at 09:49

## 2018-01-29 RX ADMIN — BALANCED SALT SOLUTION 200 ML: 6.4; .75; .48; .3; 3.9; 1.7 SOLUTION OPHTHALMIC at 10:57

## 2018-01-29 RX ADMIN — PROPARACAINE HYDROCHLORIDE 2 DROP: 5 SOLUTION/ DROPS OPHTHALMIC at 10:50

## 2018-01-29 RX ADMIN — MOXIFLOXACIN HYDROCHLORIDE 0.5 ML: 5 SOLUTION/ DROPS OPHTHALMIC at 11:00

## 2018-01-29 RX ADMIN — MIDAZOLAM 2 MG: 1 INJECTION INTRAMUSCULAR; INTRAVENOUS at 10:40

## 2018-01-29 RX ADMIN — TROPICAMIDE 1 DROP: 10 SOLUTION/ DROPS OPHTHALMIC at 09:50

## 2018-01-29 RX ADMIN — LIDOCAINE HYDROCHLORIDE 1 TUBE: 20 JELLY TOPICAL at 10:50

## 2018-01-29 RX ADMIN — PHENYLEPHRINE HYDROCHLORIDE 1 DROP: 25 SOLUTION/ DROPS OPHTHALMIC at 09:39

## 2018-01-29 RX ADMIN — PHENYLEPHRINE HYDROCHLORIDE 1 DROP: 25 SOLUTION/ DROPS OPHTHALMIC at 09:45

## 2018-01-29 NOTE — OP NOTE
CATARACT OPERATIVE NOTE    PATIENT: Lupe Lepe  DATE OF SURGERY: 1/29/2018  PREOPERATIVE DIAGNOSIS:  Senile Nuclear Cataract, Left eye  POSTOPERATIVE DIAGNOSIS:  Senile Nuclear Cataract, Left eye  OPERATIVE PROCEDURE:  Phacoemulsification and placement of intraocular lens  SURGEON:  Sekou Dobbins MD  ANESTHESIA:  Topical / MAC  EBL:  None  SPECIMENS:  None  COMPLICATIONS:  None    PROCEDURE:  The patient was brought to the operating room at [facility].  The left eye was prepped and draped in the usual fashion for cataract surgery.  A wire lid speculum was inserted.  A super sharp blade was used to make a paracentesis at the 5 O'clock position.  The super sharp blade was used to make a partial thickness temporal groove, which was 3 mm in length.  0.8 mL of non-preserved epi-Shugarcaine was injected into the anterior chamber.  Viscoelastic was used to inflate the anterior chamber through a cannula.  A 2.5 mm microkeratome was used to make a temporal clear corneal incision in a two-plane fashion.  A cystotome needle and forceps were used to make a capsulorrhexis.  Hydrodissection and hydrodelineation were performed with Balance Salt Solution.  The lens was then phacoemulsified and removed without complications.  The cortical material was removed with bimanual irrigation and aspiration.  The capsular bag was filled with viscoelastic.  A posterior chamber intraocular lens, preselected and recorded, was folded and inserted into the capsular bag.  The viscoelastic was removed with the irrigation and aspiration tip.  Balanced Salt Solution with Vigamox, 150mg/0.1mL, was used to refill the anterior chamber.  The wounds were checked for water tightness and required no suture.  The wire lid speculum was removed.  The patient's left eye was cleaned and a drop of each post-operative drop was placed, followed by a hurt shield.  The patient tolerated the procedure well, and there were no  complications.      Sekou Dobbins MD

## 2018-01-29 NOTE — ANESTHESIA CARE TRANSFER NOTE
Patient: Lupe Lepe    Procedure(s):  Left Cataract Removal with Implant - Wound Class: I-Clean    Diagnosis: cataract  Diagnosis Additional Information: No value filed.    Anesthesia Type:   MAC     Note:  Airway :Room Air  Patient transferred to:Phase II  Handoff Report: Identifed the Patient, Identified the Reponsible Provider, Reviewed the pertinent medical history, Discussed the surgical course, Reviewed Intra-OP anesthesia mangement and issues during anesthesia, Set expectations for post-procedure period and Allowed opportunity for questions and acknowledgement of understanding      Vitals: (Last set prior to Anesthesia Care Transfer)    CRNA VITALS  1/29/2018 1030 - 1/29/2018 1100      1/29/2018             Pulse: 71    SpO2: 95 %                Electronically Signed By: SHAWNA [place] CRNA  January 29, 2018  11:00 AM

## 2018-02-05 DIAGNOSIS — N30.00 ACUTE CYSTITIS WITHOUT HEMATURIA: ICD-10-CM

## 2018-02-05 LAB
ALBUMIN UR-MCNC: NEGATIVE MG/DL
APPEARANCE UR: CLEAR
BILIRUB UR QL STRIP: NEGATIVE
COLOR UR AUTO: YELLOW
GLUCOSE UR STRIP-MCNC: NEGATIVE MG/DL
HGB UR QL STRIP: NEGATIVE
KETONES UR STRIP-MCNC: NEGATIVE MG/DL
LEUKOCYTE ESTERASE UR QL STRIP: ABNORMAL
NITRATE UR QL: NEGATIVE
PH UR STRIP: 5.5 PH (ref 5–7)
RBC #/AREA URNS AUTO: ABNORMAL /HPF
SOURCE: ABNORMAL
SP GR UR STRIP: 1.01 (ref 1–1.03)
UROBILINOGEN UR STRIP-ACNC: 0.2 EU/DL (ref 0.2–1)
WBC #/AREA URNS AUTO: ABNORMAL /HPF

## 2018-02-05 PROCEDURE — 81001 URINALYSIS AUTO W/SCOPE: CPT | Performed by: NURSE PRACTITIONER

## 2018-02-06 NOTE — PROGRESS NOTES
Please Notify Lupe  of test results repeat urine negative for infection.  IF symptom persist should be reseen     Fatimah Abdullahi CNP

## 2018-04-02 DIAGNOSIS — I15.0 RENOVASCULAR HYPERTENSION WITH GOAL BLOOD PRESSURE LESS THAN 130/80: Chronic | ICD-10-CM

## 2018-04-02 NOTE — TELEPHONE ENCOUNTER
Routing refill request to provider for review/approval because:  Labs out of range:  See below    Lianne Romero RN

## 2018-04-02 NOTE — TELEPHONE ENCOUNTER
"Requested Prescriptions   Pending Prescriptions Disp Refills     amLODIPine (NORVASC) 10 MG tablet [Pharmacy Med Name: AMLODIPINE BESYLATE 10 MG TAB]  Last Written Prescription Date:  03/2/17  Last Fill Quantity: 90,  # refills: 3   Last office visit: 1/19/2018 with prescribing provider:  01/19/18   Future Office Visit:     90 tablet 2     Sig: TAKE 1 TABLET (10 MG) BY MOUTH DAILY    Calcium Channel Blockers Protocol  Failed    4/2/2018  1:06 AM       Failed - Blood pressure under 140/90 in past 12 months    BP Readings from Last 3 Encounters:   01/29/18 150/76   01/23/18 130/63   01/19/18 172/78          Failed - Normal serum creatinine on file in past 12 months    Recent Labs   Lab Test  01/19/18   1431   CR  1.51*          Passed - Recent (12 mo) or future (30 days) visit within the authorizing provider's specialty    Patient had office visit in the last 12 months or has a visit in the next 30 days with authorizing provider or within the authorizing provider's specialty.  See \"Patient Info\" tab in inbasket, or \"Choose Columns\" in Meds & Orders section of the refill encounter.           Passed - Patient is age 18 or older       Passed - No active pregnancy on record       Passed - No positive pregnancy test in past 12 months          "

## 2018-04-03 RX ORDER — AMLODIPINE BESYLATE 10 MG/1
TABLET ORAL
Qty: 90 TABLET | Refills: 3 | Status: SHIPPED | OUTPATIENT
Start: 2018-04-03 | End: 2019-03-25

## 2018-04-23 DIAGNOSIS — E03.9 ACQUIRED HYPOTHYROIDISM: Chronic | ICD-10-CM

## 2018-04-23 NOTE — TELEPHONE ENCOUNTER
"Requested Prescriptions   Pending Prescriptions Disp Refills     levothyroxine (SYNTHROID/LEVOTHROID) 25 MCG tablet [Pharmacy Med Name: LEVOTHYROXINE 25 MCG TABLET]  Last Written Prescription Date:  17  Last Fill Quantity: 216,  # refills: 3   Last office visit: 2018 with prescribing provider:  18   Future Office Visit:     216 tablet 3     Si MCG (3 TABS) ON , MONDAY, WEDNESDAY, FRIDAY. 50 MCG (2 TABS) ON TUESDAY, THURSDAY, SATURDAY    Thyroid Protocol Failed    2018  5:30 PM       Failed - Normal TSH on file in past 12 months    Recent Labs   Lab Test  17   0912   TSH  1.27          Passed - Patient is 12 years or older       Passed - Recent (12 mo) or future (30 days) visit within the authorizing provider's specialty    Patient had office visit in the last 12 months or has a visit in the next 30 days with authorizing provider or within the authorizing provider's specialty.  See \"Patient Info\" tab in inbasket, or \"Choose Columns\" in Meds & Orders section of the refill encounter.         Passed - No active pregnancy on record    If patient is pregnant or has had a positive pregnancy test, please check TSH       Passed - No positive pregnancy test in past 12 months    If patient is pregnant or has had a positive pregnancy test, please check TSH          "

## 2018-04-24 RX ORDER — LEVOTHYROXINE SODIUM 25 UG/1
TABLET ORAL
Qty: 216 TABLET | Refills: 3 | Status: SHIPPED | OUTPATIENT
Start: 2018-04-24 | End: 2019-03-18

## 2018-04-24 NOTE — TELEPHONE ENCOUNTER
Routing refill request to provider for review/approval because:  Labs not current:  TSH    Thank you  Ada LAINEZ RN

## 2018-05-07 ENCOUNTER — TELEPHONE (OUTPATIENT)
Dept: FAMILY MEDICINE | Facility: CLINIC | Age: 78
End: 2018-05-07

## 2018-05-07 DIAGNOSIS — E03.9 ACQUIRED HYPOTHYROIDISM: Chronic | ICD-10-CM

## 2018-05-07 DIAGNOSIS — I15.0 RENOVASCULAR HYPERTENSION WITH GOAL BLOOD PRESSURE LESS THAN 130/80: Chronic | ICD-10-CM

## 2018-05-07 LAB — TSH SERPL DL<=0.005 MIU/L-ACNC: 1.15 MU/L (ref 0.4–4)

## 2018-05-07 PROCEDURE — 36415 COLL VENOUS BLD VENIPUNCTURE: CPT | Performed by: INTERNAL MEDICINE

## 2018-05-07 PROCEDURE — 84443 ASSAY THYROID STIM HORMONE: CPT | Performed by: INTERNAL MEDICINE

## 2018-05-07 NOTE — TELEPHONE ENCOUNTER
"Requested Prescriptions   Pending Prescriptions Disp Refills     lisinopril (PRINIVIL/ZESTRIL) 10 MG tablet [Pharmacy Med Name: LISINOPRIL 10 MG TABLET]  Last Written Prescription Date:  03/28/2017  Last Fill Quantity: 180,  # refills: 3   Last office visit: 1/19/2018 with prescribing provider:  km   Future Office Visit:     180 tablet 2     Sig: TAKE 1 TABLET (10 MG) BY MOUTH 2 TIMES DAILY    ACE Inhibitors (Including Combos) Protocol Failed    5/7/2018  1:26 AM       Failed - Blood pressure under 140/90 in past 12 months    BP Readings from Last 3 Encounters:   01/29/18 150/76   01/23/18 130/63   01/19/18 172/78                Failed - Normal serum creatinine on file in past 12 months    Recent Labs   Lab Test  01/19/18   1431   CR  1.51*            Passed - Recent (12 mo) or future (30 days) visit within the authorizing provider's specialty    Patient had office visit in the last 12 months or has a visit in the next 30 days with authorizing provider or within the authorizing provider's specialty.  See \"Patient Info\" tab in inbasket, or \"Choose Columns\" in Meds & Orders section of the refill encounter.           Passed - Patient is age 18 or older       Passed - No active pregnancy on record       Passed - Normal serum potassium on file in past 12 months    Recent Labs   Lab Test  01/19/18   1431   POTASSIUM  5.1            Passed - No positive pregnancy test in past 12 months        Axel Pantoja RT (R)    "

## 2018-05-08 RX ORDER — LISINOPRIL 10 MG/1
TABLET ORAL
Qty: 180 TABLET | Refills: 3 | Status: SHIPPED | OUTPATIENT
Start: 2018-05-08 | End: 2019-05-21

## 2018-05-10 ENCOUNTER — TELEPHONE (OUTPATIENT)
Dept: FAMILY MEDICINE | Facility: CLINIC | Age: 78
End: 2018-05-10

## 2018-05-10 ENCOUNTER — ALLIED HEALTH/NURSE VISIT (OUTPATIENT)
Dept: FAMILY MEDICINE | Facility: CLINIC | Age: 78
End: 2018-05-10
Payer: COMMERCIAL

## 2018-05-10 VITALS — HEART RATE: 69 BPM | OXYGEN SATURATION: 94 % | SYSTOLIC BLOOD PRESSURE: 127 MMHG | DIASTOLIC BLOOD PRESSURE: 71 MMHG

## 2018-05-10 DIAGNOSIS — Z01.30 BP CHECK: Primary | ICD-10-CM

## 2018-05-10 PROCEDURE — 99207 ZZC NO CHARGE NURSE ONLY: CPT

## 2018-05-10 NOTE — TELEPHONE ENCOUNTER
Pt in clinic today for BP check.  Pt takes HCTZ 50, Norvasc 10 mg once daily and lisinopril 10 mg BID .  Pt denies symptoms of hypertension including HA blurry vision, numbness/tingling or chest pain.     /71 (BP Location: Left leg, Patient Position: Supine, Cuff Size: Adult Regular)  Pulse 69  SpO2 94%    Elva TALAVERA RN

## 2018-05-10 NOTE — MR AVS SNAPSHOT
After Visit Summary   5/10/2018    Lupe Lepe    MRN: [id number]           Patient Information     Date Of Birth          1940        Visit Information        Provider Department      5/10/2018 2:00 PM [facility]        Today's Diagnoses     BP check    -  1       Follow-ups after your visit        Who to contact     If you have questions or need follow up information about today's clinic visit or your schedule please contact [facility] directly at [phone].  Normal or non-critical lab and imaging results will be communicated to you by Vital Insighthart, letter or phone within 4 business days after the clinic has received the results. If you do not hear from us within 7 days, please contact the clinic through [employer] Dxt or phone. If you have a critical or abnormal lab result, we will notify you by phone as soon as possible.  Submit refill requests through TapMyBack or call your pharmacy and they will forward the refill request to us. Please allow 3 business days for your refill to be completed.          Additional Information About Your Visit        MyChart Information     TapMyBack gives you secure access to your electronic health record. If you see a primary care provider, you can also send messages to your care team and make appointments. If you have questions, please call your primary care clinic.  If you do not have a primary care provider, please call [phone] and they will assist you.        Care EveryWhere ID     This is your Care EveryWhere ID. This could be used by other organizations to access your [place] medical records  [id number]        Your Vitals Were     Pulse Pulse Oximetry                69 94%           Blood Pressure from Last 3 Encounters:   05/10/18 127/71   01/29/18 150/76   01/23/18 130/63    Weight from Last 3 Encounters:   01/29/18 158 lb (71.7 kg)   01/23/18 158 lb (71.7 kg)   01/19/18 158 lb (71.7 kg)              Today, you  had the following     No orders found for display       Primary Care Provider Office Phone # Fax #    Li Horton,  [phone] [address] [facility] [address]        Equal Access to Services     DARIO ABDI : Hadii nya galicia hadyeno Soomaali, waaxda luqadaha, qaybta kaalmada adeegyada, waxbarbara betsyin elizabethn tian kenisha christopher carney. So [facility] [phone].    ATENCIÓN: Si habla español, tiene a saldana disposición servicios gratuitos de asistencia lingüística. Llame al [phone].    We comply with applicable federal civil rights laws and [place] laws. We do not discriminate on the basis of race, color, national origin, age, disability, sex, sexual orientation, or gender identity.            Thank you!     Thank you for choosing [facility]  for your care. Our goal is always to provide you with excellent care. Hearing back from our patients is one way we can continue to improve our services. Please take a few minutes to complete the written survey that you may receive in the mail after your visit with us. Thank you!             Your Updated Medication List - Protect others around you: Learn how to safely use, store and throw away your medicines at www.disposemymeds.org.          This list is accurate as of 5/10/18  2:08 PM.  Always use your most recent med list.                   Brand Name Dispense Instructions for use Diagnosis    amLODIPine 10 MG tablet    NORVASC    90 tablet    TAKE 1 TABLET (10 MG) BY MOUTH DAILY    Renovascular hypertension with goal blood pressure less than 130/80       aspirin 325 MG EC tablet     100    1 TABLET BY MOUTH DAILY        BENEFIBER Tabs      2 TABLETS ORALLY DAILY        calcium-vitamin D 600-400 MG-UNIT per tablet    CALTRATE    100 tablet    Take 1 tablet by mouth 2 times daily.    Disorder of bone and cartilage, unspecified       hydrochlorothiazide 50 MG tablet    HYDRODIURIL    90 tablet    Take 1 tablet (50 mg) by mouth every morning     Renovascular hypertension with goal blood pressure less than 130/80       levothyroxine 25 MCG tablet    SYNTHROID/LEVOTHROID    216 tablet    75 MCG (3 TABS) ON SUNDAY, MONDAY, WEDNESDAY, FRIDAY. 50 MCG (2 TABS) ON TUESDAY, THURSDAY, SATURDAY    Acquired hypothyroidism       lisinopril 10 MG tablet    PRINIVIL/ZESTRIL    180 tablet    TAKE 1 TABLET (10 MG) BY MOUTH 2 TIMES DAILY    Renovascular hypertension with goal blood pressure less than 130/80       multivitamin per tablet      1 TABLET BY MOUTH DAILY        pyridoxine 50 MG Tabs    VITAMIN B-6    1 bottle    1 daily    Disturbance of skin sensation       rosuvastatin 10 MG tablet    CRESTOR    90 tablet    Take 1 tablet (10 mg) by mouth daily    Hyperlipidemia LDL goal <100

## 2018-06-03 ENCOUNTER — HOSPITAL ENCOUNTER (EMERGENCY)
Facility: CLINIC | Age: 78
Discharge: HOME | End: 2018-06-03
Attending: PHYSICIAN ASSISTANT | Admitting: PHYSICIAN ASSISTANT
Payer: MEDICARE

## 2018-06-03 VITALS
DIASTOLIC BLOOD PRESSURE: 78 MMHG | HEIGHT: 66 IN | OXYGEN SATURATION: 97 % | BODY MASS INDEX: 24.91 KG/M2 | SYSTOLIC BLOOD PRESSURE: 138 MMHG | TEMPERATURE: 97 F | WEIGHT: 155 LBS | RESPIRATION RATE: 16 BRPM

## 2018-06-03 DIAGNOSIS — N39.0 URINARY TRACT INFECTION WITHOUT HEMATURIA, SITE UNSPECIFIED: Primary | ICD-10-CM

## 2018-06-03 LAB
ALBUMIN UR-MCNC: NEGATIVE MG/DL
APPEARANCE UR: ABNORMAL
BACTERIA #/AREA URNS HPF: ABNORMAL /HPF
BILIRUB UR QL STRIP: NEGATIVE
COLOR UR AUTO: YELLOW
GLUCOSE UR STRIP-MCNC: NEGATIVE MG/DL
HGB UR QL STRIP: ABNORMAL
HYALINE CASTS #/AREA URNS LPF: 4 /LPF (ref 0–2)
KETONES UR STRIP-MCNC: NEGATIVE MG/DL
LEUKOCYTE ESTERASE UR QL STRIP: ABNORMAL
NITRATE UR QL: NEGATIVE
PH UR STRIP: 5 PH (ref 5–7)
RBC #/AREA URNS AUTO: 3 /HPF (ref 0–2)
SOURCE: ABNORMAL
SP GR UR STRIP: 1.01 (ref 1–1.03)
SQUAMOUS #/AREA URNS AUTO: <1 /HPF (ref 0–1)
UROBILINOGEN UR STRIP-MCNC: 0 MG/DL (ref 0–2)
WBC #/AREA URNS AUTO: 124 /HPF (ref 0–5)
WBC CLUMPS #/AREA URNS HPF: PRESENT /HPF

## 2018-06-03 PROCEDURE — 99213 OFFICE O/P EST LOW 20 MIN: CPT | Performed by: PHYSICIAN ASSISTANT

## 2018-06-03 PROCEDURE — 81001 URINALYSIS AUTO W/SCOPE: CPT | Performed by: PHYSICIAN ASSISTANT

## 2018-06-03 PROCEDURE — 87186 SC STD MICRODIL/AGAR DIL: CPT | Performed by: PHYSICIAN ASSISTANT

## 2018-06-03 PROCEDURE — 87088 URINE BACTERIA CULTURE: CPT | Performed by: PHYSICIAN ASSISTANT

## 2018-06-03 PROCEDURE — 87086 URINE CULTURE/COLONY COUNT: CPT | Performed by: PHYSICIAN ASSISTANT

## 2018-06-03 PROCEDURE — G0463 HOSPITAL OUTPT CLINIC VISIT: HCPCS

## 2018-06-03 RX ORDER — CIPROFLOXACIN 250 MG/1
250 TABLET, FILM COATED ORAL 2 TIMES DAILY
Qty: 20 TABLET | Refills: 0 | Status: SHIPPED | OUTPATIENT
Start: 2018-06-03 | End: 2018-06-13

## 2018-06-03 ASSESSMENT — ENCOUNTER SYMPTOMS
FLANK PAIN: 0
HEMATURIA: 0
CONSTITUTIONAL NEGATIVE: 1
FEVER: 0
FREQUENCY: 1
ROS GI COMMENTS: SUPRAPUBIC PRESSURE
BACK PAIN: 1
DYSURIA: 1

## 2018-06-03 NOTE — ED PROVIDER NOTES
History     Chief Complaint   Patient presents with     UTI     HPI  Lupe Lepe is a 78 year old female with hx CKD stage 4, renal artery stenosis, type 2 diabetes mellitus, paroxysmal atrial fibrillation who presents with complaints of dysuria and increased urinary urgency and frequency for the past 2 days.  Patient states the symptoms feel consistent with her past urinary tract infections.  She has history of recurrent UTIs.  Patient has not been on antibiotics about the past 5 months.  Patient also complains of associated left-sided low back pain and suprapubic abdominal pressure.  She denies fevers, chills, nausea, vomiting, flank pain, or hematuria.      Problem List:    Patient Active Problem List    Diagnosis Date Noted     Renal artery stenosis (H) 08/25/2006     Priority: High     Left. Likely due to temporal arteritis. S/p left angioplasty 8/06. Increased BP 10/06. Renal duplex- Increased velocity left RA, ratios within normal limits. Right RA velocity slightly increased. MRA- normal left renal artery. Moderate right renal artery stenosis. Probable splenic artery stenosis. Angio 10/06 with bilateral renal artery angioplasty. Admit [facility] increased BP 11/06- renal angiogram this time shows no stenosis and no gradient. Renal ultrasound 6/08- normal. Renal ultrasound 6/08- normal with elevated pressures.  MRA 4/10- minimal narrowing main right renal artery.       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 Duplex 7/06- Right SCV, brachial biphasic. Right radial monophasic, right ulnar biphasic-triphasic. Left SCV triphasic. Left brachial biphasic, left radial and ulnar monophasic. Brachial wrist indices  0.57 right/0.58 left. Angio 8/06- c/w arteritis, right RA stenosis. Repeat angiogram 6/06 no intracranial arteritis or stenosis, mild left intracranial artery plaque. Duplex 9/06- Right SCV and axillary triphasic. Right brachial, radial  and ulnar monophasic. Left SCV biphasic-triphasic.  Left axillary and brachial monophasic. Left radial and ulnar monophasic-biphasic. Brachial wrist indices 0.63 right/0.60 left.  Duplex 2/08 unchanged. MRA 4/10- right vertebral occluded  Followed by Dr Aly at [place]        Giant cell arteritis (H) 03/27/2006     Priority: High     Prolonged low grade temps. Normal wbc, lft, UA, BCX. , CRP 92. Normal monospot, lymes screen. CT chest abdomen and pelvis negative 4/06. Bcx negative. BM BX negative. Fungal, AFB culture negative. Temporal artery biopsy positive. Flare with prednisone taper. Cellcept started 11/06, stopped 8/08 due to frequent UTIs. Started MTX 12/08 after [place] eval. Off prednisone 8/09.     Needs blood pressure checked in legs.  Sees DR. Ivory Aly at [facility].       CKD (chronic kidney disease) stage 4, GFR 15-29 ml/min (H) 08/31/2016     Priority: Medium     Proteinuria on ACEi, follows with Dr. Clarke of [facility]       Iron deficiency anemia, unspecified 08/30/2016     Priority: Medium     Diagnosis updated by automated process. Provider to review and confirm.       Chronic kidney disease, stage IV (severe) (H) 08/30/2016     Priority: Medium     Anemia of renal disease 06/09/2016     Priority: Medium     Type 2 diabetes mellitus with stage 4 chronic kidney disease, without long-term current use of insulin (H) 10/31/2010     Priority: Medium     DX 2006. 2003 2hour-glucose 202. FBS >126x2 1/06. No retinopathy, peripheral neuropathy.  Was on insulin previously, was able to get off this in 2014 or so.       Hyperlipidemia LDL goal <100 10/31/2010     Priority: Medium     Renovascular hypertension with goal blood pressure less than 130/80      Priority: Medium     Hospitalized with malignant HTN [facility] 11/06. Angio- no recurrant renal artery stenosis. Metanephrines normal.  Aldosterone was undetectably low.  Urine catecholamines within normal limits.  Renin activity normal.         ACP (advance care planning) 05/06/2011     Priority: Low     Advance Care Planning 6/9/2016: ACP Review of Chart / Resources Provided:  Reviewed chart for advance care plan.  Lupe Lepe has no plan or code status on file however states presence of ACP document. Copy requested.   Added by Ivelisse Olivares  Pt has completed an Advance/Health Care Directive (HCD), to bring in copy to be scanned into Epic.           Frequent Urinary Tract Infections 01/23/2009     Priority: Low     Cysto 7/09 normal        Abdominal cramps 10/16/2008     Priority: Low     Episodic severe cramping associated with emesis since 2007. GI evaluation 10/08. [place] evaluation 12/08. 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 Rx jeanne-pac for H. Pylori with resolution of symptoms 1/09. Recurrent 2012, shorter symptoms.        Gastroesophageal reflux disease without esophagitis      Priority: Low     Paroxysmal atrial fibrillation (H)      Priority: Low     paroxymal single episode 2002. ECHO, Adenosine cardiolite normal.       Acquired hypothyroidism      Priority: Low     Osteopenia      Priority: Low     dexa 1999, 2003. Dexa 7/06 -lumbar spine -1.9,  right femur -1.0,  left femur is -1.6. Boniva started 2007.  Dexa 2/08- L spine -2.4, right femoral neck -1.4, left femoral neck -2.0. Dexa 5/11- L1-L4  -1.8 ,  right femoral neck -1.7, left femoral neck -1.9.            Past Medical History:    Past Medical History:   Diagnosis Date     Abdominal cramps 10/16/2008     Abnormal CT scan 1/12/2009     Arthritis      Closed fracture of unspecified part of fibula      Heart disease      Hemorrhage of rectum and anus      HERPES ZOSTER NOS 5/23/2007     Hypertension      HYPOSMIA      IRON DEFIC ANEMIA NOS 3/27/2006     JOINT EFFUSION-L/LEG 4/7/2006     Other closed fractures of distal end of radius (alone)      Polymyalgia rheumatica (H)      Thyroid disease        Past Surgical History:    Past Surgical History:    Procedure Laterality Date     COLONOSCOPY  8/25/2008     ESOPHAGOSCOPY, GASTROSCOPY, DUODENOSCOPY (EGD), COMBINED N/A 11/16/2017    Procedure: COMBINED ESOPHAGOSCOPY, GASTROSCOPY, DUODENOSCOPY (EGD);  Gastroscopy;  Surgeon: Angel Guillen MD;  Location: [facility]     HC COLONOSCOPY THRU STOMA, DIAGNOSTIC  2000, 5/06, 8/08    Normal     HYSTERECTOMY, SIS  1970's     PHACOEMULSIFICATION WITH STANDARD INTRAOCULAR LENS IMPLANT Right 1/8/2018    Procedure: PHACOEMULSIFICATION WITH STANDARD INTRAOCULAR LENS IMPLANT;  Right Cataract Removal with Implant;  Surgeon: Sekou Dobbins MD;  Location: [facility]     PHACOEMULSIFICATION WITH STANDARD INTRAOCULAR LENS IMPLANT Left 1/29/2018    Procedure: PHACOEMULSIFICATION WITH STANDARD INTRAOCULAR LENS IMPLANT;  Left Cataract Removal with Implant;  Surgeon: Sekou Dobbins MD;  Location: [facility]     SURGICAL HISTORY OF -   1948    SIS, for fibroids     SURGICAL HISTORY OF -   12/16/2008    Esophagogastroduodenoscopy with biopsy     TONSILLECTOMY      Tonsilectomy       Family History:    Family History   Problem Relation Age of Onset     CANCER Mother      ovarian CA     CANCER Father      lung CA     HEART DISEASE Maternal Grandmother      HEART DISEASE Maternal Grandfather      Depression Sister      sucide     Breast Cancer Sister        Social History:  Marital Status:   [2]  Social History   Substance Use Topics     Smoking status: Never Smoker     Smokeless tobacco: Never Used     Alcohol use No        Medications:      ciprofloxacin (CIPRO) 250 MG tablet   amLODIPine (NORVASC) 10 MG tablet   ASPIRIN 325 MG OR TBEC   BENEFIBER OR TABS   calcium-vitamin D (CALTRATE) 600-400 MG-UNIT per tablet   hydrochlorothiazide (HYDRODIURIL) 50 MG tablet   levothyroxine (SYNTHROID/LEVOTHROID) 25 MCG tablet   lisinopril (PRINIVIL/ZESTRIL) 10 MG tablet   MULTIVITAMINS OR TABS   omeprazole (PRILOSEC) 20 MG CR capsule   rosuvastatin (CRESTOR) 10 MG tablet   VITAMIN B-6 50 MG  "OR TABS         Review of Systems   Constitutional: Negative.  Negative for fever.   Gastrointestinal:        Suprapubic pressure   Genitourinary: Positive for dysuria, frequency and urgency. Negative for flank pain and hematuria.   Musculoskeletal: Positive for back pain.   Skin: Negative.    All other systems reviewed and are negative.      Physical Exam   BP: 138/78  Heart Rate: 80  Temp: 97  F (36.1  C)  Resp: 16  Height: 167.6 cm (5' 6\")  Weight: 70.3 kg (155 lb)  SpO2: 97 %      Physical Exam   Constitutional: She appears well-developed and well-nourished. No distress.   HENT:   Head: Normocephalic and atraumatic.   Pulmonary/Chest: Effort normal.   Abdominal: Soft. She exhibits no distension. There is no tenderness. There is no rebound, no guarding and no CVA tenderness.   Neurological: She is alert.   Skin: Skin is warm and dry.       ED Course     ED Course     Procedures      Results for orders placed or performed during the hospital encounter of 06/03/18 (from the past 24 hour(s))   UA with Microscopic   Result Value Ref Range    Color Urine Yellow     Appearance Urine Slightly Cloudy     Glucose Urine Negative NEG^Negative mg/dL    Bilirubin Urine Negative NEG^Negative    Ketones Urine Negative NEG^Negative mg/dL    Specific Gravity Urine 1.012 1.003 - 1.035    Blood Urine Small (A) NEG^Negative    pH Urine 5.0 5.0 - 7.0 pH    Protein Albumin Urine Negative NEG^Negative mg/dL    Urobilinogen mg/dL 0.0 0.0 - 2.0 mg/dL    Nitrite Urine Negative NEG^Negative    Leukocyte Esterase Urine Large (A) NEG^Negative    Source Midstream Urine     WBC Urine 124 (H) 0 - 5 /HPF    RBC Urine 3 (H) 0 - 2 /HPF    WBC Clumps Present (A) NEG^Negative /HPF    Bacteria Urine Few (A) NEG^Negative /HPF    Squamous Epithelial /HPF Urine <1 0 - 1 /HPF    Hyaline Casts 4 (H) 0 - 2 /LPF       Medications - No data to display    Assessments & Plan (with Medical Decision Making)     Pt is a 78 year old female with hx CKD stage 4, " renal artery stenosis, type 2 diabetes mellitus, paroxysmal atrial fibrillation who presents with complaints of dysuria and increased urinary urgency and frequency for the past 2 days.  Patient states the symptoms feel consistent with her past urinary tract infections.  She has history of recurrent UTIs.  Patient has not been on antibiotics about the past 5 months.  Patient also complains of associated left-sided low back pain and suprapubic abdominal pressure.  Pt is afebrile on arrival.  Exam as above.  Urine was positive for large leuk est, 124 WBCs, and WBC clumps.  Urine was sent for culture.  Discussed results with patient.  Return precautions were reviewed.  Hand-outs were provided.    Patient was sent with Ciprofloxacin (pt states that she has tolerated oral Cipro without difficulties multiple times in the past) and was instructed to follow-up with PCP if no improvement in 5-7 days for continued care and management or sooner if new or worsening symptoms.  She is to return to the ED for persistent and/or worsening symptoms.  Patient expressed understanding of the diagnosis and plan and was discharged home in good condition.    I have reviewed the nursing notes.    I have reviewed the findings, diagnosis, plan and need for follow up with the patient.    New Prescriptions    CIPROFLOXACIN (CIPRO) 250 MG TABLET    Take 1 tablet (250 mg) by mouth 2 times daily for 10 days       Final diagnoses:   Urinary tract infection without hematuria, site unspecified       6/3/2018   [facility] EMERGENCY DEPARTMENT      Disclaimer:  This note consists of symbols derived from keyboarding, dictation and/or voice recognition software.  As a result, there may be errors in the script that have gone undetected.  Please consider this when interpreting information found in this chart.     Mariah Montiel PA-C  06/03/18 [id number]

## 2018-06-03 NOTE — ED AVS SNAPSHOT
[facility] Emergency Department    [address] [address]    Phone:  [phone]    Fax:  [phone]                                       Lupe Lepe   MRN: [id number]    Department:  [facility] Emergency Department   Date of Visit:  6/3/2018           After Visit Summary Signature Page     I have received my discharge instructions, and my questions have been answered. I have discussed any challenges I see with this plan with the nurse or doctor.    ..........................................................................................................................................  Patient/Patient Representative Signature      ..........................................................................................................................................  Patient Representative Print Name and Relationship to Patient    ..................................................               ................................................  Date                                            Time    ..........................................................................................................................................  Reviewed by Signature/Title    ...................................................              ..............................................  Date                                                            Time

## 2018-06-03 NOTE — DISCHARGE INSTRUCTIONS
Urinary Tract Infections in Women    Urinary tract infections (UTIs) are most often caused by bacteria. These bacteria enter the urinary tract. The bacteria may come from outside the body. Or they may travel from the skin outside the rectum or vagina into the urethra. Female anatomy makes it easy for bacteria from the bowel to enter a woman s urinary tract, which is the most common source of UTI. This means women develop UTIs more often than men. Pain in or around the urinary tract is a common UTI symptom. But the only way to know for sure if you have a UTI for the healthcare provider to test your urine. The two tests that may be done are the urinalysis and urine culture.  Types of UTIs    Cystitis. A bladder infection (cystitis) is the most common UTI in women. You may have urgent or frequent urination. You may also have pain, burning when you urinate, and bloody urine.    Urethritis. This is an inflamed urethra, which is the tube that carries urine from the bladder to outside the body. You may have lower stomach or back pain. You may also have urgent or frequent urination.    Pyelonephritis. This is a kidney infection. If not treated, it can be serious and damage your kidneys. In severe cases, you may need to stay in the hospital. You may have a fever and lower back pain.  Medicines to treat a UTI  Most UTIs are treated with antibiotics. These kill the bacteria. The length of time you need to take them depends on the type of infection. It may be as short as 3 days. If you have repeated UTIs, you may need a low-dose antibiotic for several months. Take antibiotics exactly as directed. Don t stop taking them until all of the medicine is gone. If you stop taking the antibiotic too soon, the infection may not go away. You may also develop a resistance to the antibiotic. This can make it much harder to treat.  Lifestyle changes to treat and prevent UTIs  The lifestyle changes below will help get rid of your UTI. They  may also help prevent future UTIs.    Drink plenty of fluids. This includes water, juice, or other caffeine-free drinks. Fluids help flush bacteria out of your body.    Empty your bladder. Always empty your bladder when you feel the urge to urinate. And always urinate before going to sleep. Urine that stays in your bladder can lead to infection. Try to urinate before and after sex as well.    Practice good personal hygiene. Wipe yourself from front to back after using the toilet. This helps keep bacteria from getting into the urethra.    Use condoms during sex. These help prevent UTIs caused by sexually transmitted bacteria. Also don't use spermicides during sex. These can increase the risk for UTIs. Choose other forms of birth control instead. For women who tend to get UTIs after sex, a low-dose of a preventive antibiotic may be used. Be sure to discuss this option with your healthcare provider.    Follow up with your healthcare provider as directed. He or she may test to make sure the infection has cleared. If needed, more treatment may be started.  Date Last Reviewed: 1/1/2017 2000-2017 [employer]. [address] [place] [address]. All rights reserved. This information is not intended as a substitute for professional medical care. Always follow your healthcare professional's instructions.

## 2018-06-03 NOTE — ED AVS SNAPSHOT
[facility]    [address] [facility] [address]    Phone:  [phone]    Fax:  [phone]                                       Lupe Lepe   MRN: [id number]    Department:  [facility]   Date of Visit:  6/3/2018           Patient Information     Date Of Birth          1940        Your diagnoses for this visit were:     Urinary tract infection without hematuria, site unspecified        You were seen by Mariah Montiel PA-C.      Follow-up Information     Follow up with Li Horton DO. Call in 5 days.    Specialty:  Internal Medicine    Why:  As needed, For persistent symptoms    Contact information:    [address] [place] [id number] [phone]          Follow up with [facility].    Specialty:  EMERGENCY MEDICINE    Why:  As needed, If symptoms worsen    Contact information:    [address] [place] [id number] [phone]    Additional information:    The medical center is located at   [address] [place] [address] (between [facility] and   [place] [address] in [place], four miles north   of [place]).        Discharge Instructions         Urinary Tract Infections in Women    Urinary tract infections (UTIs) are most often caused by bacteria. These bacteria enter the urinary tract. The bacteria may come from outside the body. Or they may travel from the skin outside the rectum or vagina into the urethra. Female anatomy makes it easy for bacteria from the bowel to enter a woman s urinary tract, which is the most common source of UTI. This means women develop UTIs more often than men. Pain in or around the urinary tract is a common UTI symptom. But the only way to know for sure if you have a UTI for the healthcare provider to test your urine. The two tests that may be done are the urinalysis and urine culture.  Types of UTIs    Cystitis. A bladder infection (cystitis) is the most common UTI in women.  You may have urgent or frequent urination. You may also have pain, burning when you urinate, and bloody urine.    Urethritis. This is an inflamed urethra, which is the tube that carries urine from the bladder to outside the body. You may have lower stomach or back pain. You may also have urgent or frequent urination.    Pyelonephritis. This is a kidney infection. If not treated, it can be serious and damage your kidneys. In severe cases, you may need to stay in the hospital. You may have a fever and lower back pain.  Medicines to treat a UTI  Most UTIs are treated with antibiotics. These kill the bacteria. The length of time you need to take them depends on the type of infection. It may be as short as 3 days. If you have repeated UTIs, you may need a low-dose antibiotic for several months. Take antibiotics exactly as directed. Don t stop taking them until all of the medicine is gone. If you stop taking the antibiotic too soon, the infection may not go away. You may also develop a resistance to the antibiotic. This can make it much harder to treat.  Lifestyle changes to treat and prevent UTIs  The lifestyle changes below will help get rid of your UTI. They may also help prevent future UTIs.    Drink plenty of fluids. This includes water, juice, or other caffeine-free drinks. Fluids help flush bacteria out of your body.    Empty your bladder. Always empty your bladder when you feel the urge to urinate. And always urinate before going to sleep. Urine that stays in your bladder can lead to infection. Try to urinate before and after sex as well.    Practice good personal hygiene. Wipe yourself from front to back after using the toilet. This helps keep bacteria from getting into the urethra.    Use condoms during sex. These help prevent UTIs caused by sexually transmitted bacteria. Also don't use spermicides during sex. These can increase the risk for UTIs. Choose other forms of birth control instead. For women who tend  to get UTIs after sex, a low-dose of a preventive antibiotic may be used. Be sure to discuss this option with your healthcare provider.    Follow up with your healthcare provider as directed. He or she may test to make sure the infection has cleared. If needed, more treatment may be started.  Date Last Reviewed: 1/1/2017 2000-2017 [employer]. [address] [place] [address]. All rights reserved. This information is not intended as a substitute for professional medical care. Always follow your healthcare professional's instructions.          24 Hour Appointment Hotline       To make an appointment at any [facility], call [phone] ([phone]). If you don't have a family doctor or clinic, we will help you find one. [facility] clinics are conveniently located to serve the needs of you and your family.             Review of your medicines      START taking        Dose / Directions Last dose taken    ciprofloxacin 250 MG tablet   Commonly known as:  CIPRO   Dose:  250 mg   Quantity:  20 tablet        Take 1 tablet (250 mg) by mouth 2 times daily for 10 days   Refills:  0          Our records show that you are taking the medicines listed below. If these are incorrect, please call your family doctor or clinic.        Dose / Directions Last dose taken    amLODIPine 10 MG tablet   Commonly known as:  NORVASC   Quantity:  90 tablet        TAKE 1 TABLET (10 MG) BY MOUTH DAILY   Refills:  3        aspirin 325 MG EC tablet   Quantity:  100        1 TABLET BY MOUTH DAILY   Refills:  3        BENEFIBER Tabs        2 TABLETS ORALLY DAILY   Refills:  0        calcium-vitamin D 600-400 MG-UNIT per tablet   Commonly known as:  CALTRATE   Dose:  1 tablet   Quantity:  100 tablet        Take 1 tablet by mouth 2 times daily.   Refills:  3        hydrochlorothiazide 50 MG tablet   Commonly known as:  HYDRODIURIL   Dose:  50 mg   Quantity:  90 tablet        Take 1 tablet (50 mg) by mouth every  morning   Refills:  3        levothyroxine 25 MCG tablet   Commonly known as:  SYNTHROID/LEVOTHROID   Quantity:  216 tablet        75 MCG (3 TABS) ON SUNDAY, MONDAY, WEDNESDAY, FRIDAY. 50 MCG (2 TABS) ON TUESDAY, THURSDAY, SATURDAY   Refills:  3        lisinopril 10 MG tablet   Commonly known as:  PRINIVIL/ZESTRIL   Quantity:  180 tablet        TAKE 1 TABLET (10 MG) BY MOUTH 2 TIMES DAILY   Refills:  3        multivitamin per tablet        1 TABLET BY MOUTH DAILY   Refills:  0        omeprazole 20 MG CR capsule   Commonly known as:  priLOSEC        TAKE 1 CAPSULE BY MOUTH EVERY 3RD DAY AS NEEDED   Refills:  3        pyridoxine 50 MG Tabs   Commonly known as:  VITAMIN B-6   Quantity:  1 bottle        1 daily   Refills:  1 year        rosuvastatin 10 MG tablet   Commonly known as:  CRESTOR   Dose:  10 mg   Quantity:  90 tablet        Take 1 tablet (10 mg) by mouth daily   Refills:  3                Prescriptions were sent or printed at these locations (1 Prescription)                   [facility] [address] [place] [id number]    Telephone:  [phone]   Fax:  [phone]   Hours:                  E-Prescribed (1 of 1)         ciprofloxacin (CIPRO) 250 MG tablet                Procedures and tests performed during your visit     UA with Microscopic    Urine Culture      Orders Needing Specimen Collection     None      Pending Results     Date and Time Order Name Status Description    6/3/2018 1400 Urine Culture In process             Pending Culture Results     Date and Time Order Name Status Description    6/3/2018 1400 Urine Culture In process             Pending Results Instructions     If you had any lab results that were not finalized at the time of your Discharge, you can call the ED Lab Result RN at [phone]. You will be contacted by this team for any positive Lab results or changes in treatment. The nurses are available 7 days a week from [facility]  to 6:30P.  You can leave a message 24 hours per day and they will return your call.        Test Results From Your Hospital Stay        6/3/2018  2:21 PM      Component Results     Component Value Ref Range & Units Status    Color Urine Yellow  Final    Appearance Urine Slightly Cloudy  Final    Glucose Urine Negative NEG^Negative mg/dL Final    Bilirubin Urine Negative NEG^Negative Final    Ketones Urine Negative NEG^Negative mg/dL Final    Specific Gravity Urine 1.012 1.003 - 1.035 Final    Blood Urine Small (A) NEG^Negative Final    pH Urine 5.0 5.0 - 7.0 pH Final    Protein Albumin Urine Negative NEG^Negative mg/dL Final    Urobilinogen mg/dL 0.0 0.0 - 2.0 mg/dL Final    Nitrite Urine Negative NEG^Negative Final    Leukocyte Esterase Urine Large (A) NEG^Negative Final    Source Midstream Urine  Final    WBC Urine 124 (H) 0 - 5 /HPF Final    RBC Urine 3 (H) 0 - 2 /HPF Final    WBC Clumps Present (A) NEG^Negative /HPF Final    Bacteria Urine Few (A) NEG^Negative /HPF Final    Squamous Epithelial /HPF Urine <1 0 - 1 /HPF Final    Hyaline Casts 4 (H) 0 - 2 /LPF Final         6/3/2018  2:08 PM                Thank you for choosing [facility]       Thank you for choosing [facility] for your care. Our goal is always to provide you with excellent care. Hearing back from our patients is one way we can continue to improve our services. Please take a few minutes to complete the written survey that you may receive in the mail after you visit with us. Thank you!        [employer] Information     [employer] gives you secure access to your electronic health record. If you see a primary care provider, you can also send messages to your care team and make appointments. If you have questions, please call your primary care clinic.  If you do not have a primary care provider, please call [phone] and they will assist you.        Care EveryWhere ID     This is your Care EveryWhere ID. This could be used by other organizations to access  your [facility] medical records  [id number]        Equal Access to Services     DARIO ABDI : Gatito Taylor, maria g moreno, moi hastings, martinez carney. So [facility] [phone].    ATENCIÓN: Si habla español, tiene a saldana disposición servicios gratuitos de asistencia lingüística. Llame al [phone].    We comply with applicable federal civil rights laws and [place] laws. We do not discriminate on the basis of race, color, national origin, age, disability, sex, sexual orientation, or gender identity.            After Visit Summary       This is your record. Keep this with you and show to your community pharmacist(s) and doctor(s) at your next visit.

## 2018-06-03 NOTE — ED TRIAGE NOTES
Patient here for UTI, symptoms started 3 days ago.  Patient presents ambulatory to the urgent care.  UA ordered per protocol.

## 2018-06-04 LAB
BACTERIA SPEC CULT: ABNORMAL
Lab: ABNORMAL
SPECIMEN SOURCE: ABNORMAL

## 2018-06-11 DIAGNOSIS — E78.5 HYPERLIPIDEMIA LDL GOAL <100: ICD-10-CM

## 2018-06-11 NOTE — TELEPHONE ENCOUNTER
"Requested Prescriptions   Pending Prescriptions Disp Refills     rosuvastatin (CRESTOR) 10 MG tablet [Pharmacy Med Name: ROSUVASTATIN CALCIUM 10 MG TAB]  Last Written Prescription Date:  03/28/17  Last Fill Quantity: 90,  # refills: 3   Last office visit: 5/10/2018 with prescribing provider:  05/10/18   Future Office Visit:     90 tablet 3     Sig: TAKE 1 TABLET (10 MG) BY MOUTH DAILY    Statins Protocol Passed    6/11/2018  1:52 AM       Passed - LDL on file in past 12 months    Recent Labs   Lab Test  11/15/17   0856   LDL  52            Passed - No abnormal creatine kinase in past 12 months    Recent Labs   Lab Test  02/16/11   1025   CKT  72          Passed - Recent (12 mo) or future (30 days) visit within the authorizing provider's specialty    Patient had office visit in the last 12 months or has a visit in the next 30 days with authorizing provider or within the authorizing provider's specialty.  See \"Patient Info\" tab in inbasket, or \"Choose Columns\" in Meds & Orders section of the refill encounter.           Passed - Patient is age 18 or older       Passed - No active pregnancy on record       Passed - No positive pregnancy test in past 12 months          "

## 2018-06-12 ENCOUNTER — TELEPHONE (OUTPATIENT)
Dept: FAMILY MEDICINE | Facility: CLINIC | Age: 78
End: 2018-06-12

## 2018-06-12 DIAGNOSIS — I15.0 RENOVASCULAR HYPERTENSION WITH GOAL BLOOD PRESSURE LESS THAN 130/80: Chronic | ICD-10-CM

## 2018-06-12 DIAGNOSIS — K21.9 GASTROESOPHAGEAL REFLUX DISEASE WITHOUT ESOPHAGITIS: ICD-10-CM

## 2018-06-12 RX ORDER — ROSUVASTATIN CALCIUM 10 MG/1
TABLET, COATED ORAL
Qty: 90 TABLET | Refills: 3 | Status: SHIPPED | OUTPATIENT
Start: 2018-06-12 | End: 2019-05-21

## 2018-06-12 NOTE — TELEPHONE ENCOUNTER
Routing refill request to provider for review/approval because:  Drug interaction warning  Macarena Fernando RNC

## 2018-06-13 NOTE — TELEPHONE ENCOUNTER
"Requested Prescriptions   Pending Prescriptions Disp Refills     omeprazole (PRILOSEC) 20 MG CR capsule [Pharmacy Med Name: OMEPRAZOLE DR 20 MG CAPSULE]  Last Written Prescription Date:  03/17/2018  HIstorical  Last Fill Quantity: 0,  # refills: 0   Last office visit: 01/19/2018 with prescribing provider:  km   Future Office Visit:     60 capsule 2     Sig: TAKE 1 CAPSULE BY MOUTH EVERY 3RD DAY AS NEEDED    PPI Protocol Passed    6/12/2018  6:07 PM       Passed - Not on Clopidogrel (unless Pantoprazole ordered)       Passed - No diagnosis of osteoporosis on record       Passed - Recent (12 mo) or future (30 days) visit within the authorizing provider's specialty    Patient had office visit in the last 12 months or has a visit in the next 30 days with authorizing provider or within the authorizing provider's specialty.  See \"Patient Info\" tab in inbasket, or \"Choose Columns\" in Meds & Orders section of the refill encounter.           Passed - Patient is age 18 or older       Passed - No active pregnacy on record       Passed - No positive pregnancy test in past 12 months        hydrochlorothiazide (HYDRODIURIL) 50 MG tablet [Pharmacy Med Name: HYDROCHLOROTHIAZIDE 50 MG TAB]  Last Written Prescription Date:  03/28/2017  Last Fill Quantity: 90,  # refills: 3   Last office visit: 01/19/2018 with prescribing provider:  km   Future Office Visit:     90 tablet 3     Sig: TAKE 1 TABLET (50 MG) BY MOUTH EVERY MORNING    Diuretics (Including Combos) Protocol Failed    6/12/2018  6:07 PM       Failed - Normal serum creatinine on file in past 12 months    Recent Labs   Lab Test  01/19/18   1431   CR  1.51*             Passed - Blood pressure under 140/90 in past 12 months    BP Readings from Last 3 Encounters:   06/03/18 138/78   05/10/18 127/71   01/29/18 150/76                Passed - Recent (12 mo) or future (30 days) visit within the authorizing provider's specialty    Patient had office visit in the last 12 months or " "has a visit in the next 30 days with authorizing provider or within the authorizing provider's specialty.  See \"Patient Info\" tab in inbasket, or \"Choose Columns\" in Meds & Orders section of the refill encounter.           Passed - Patient is age 18 or older       Passed - No active pregancy on record       Passed - Normal serum potassium on file in past 12 months    Recent Labs   Lab Test  01/19/18   1431   POTASSIUM  5.1                   Passed - Normal serum sodium on file in past 12 months    Recent Labs   Lab Test  01/19/18   1431   NA  137             Passed - No positive pregnancy test in past 12 months        Axel Pantoja RT (R)    "

## 2018-06-18 RX ORDER — HYDROCHLOROTHIAZIDE 50 MG/1
TABLET ORAL
Qty: 90 TABLET | Refills: 0 | Status: SHIPPED | OUTPATIENT
Start: 2018-06-18 | End: 2018-09-13

## 2018-06-18 NOTE — TELEPHONE ENCOUNTER
Message left for patient to return call to clinic.  CSS - ok to deliver message below.    Tresa CISNEROS RN

## 2018-06-18 NOTE — TELEPHONE ENCOUNTER
Refilled, recommend BMP recheck, lab only.     Felicia Walker, SHAWNA CNP, covering for Dr. Horton

## 2018-06-19 DIAGNOSIS — I15.0 RENOVASCULAR HYPERTENSION WITH GOAL BLOOD PRESSURE LESS THAN 130/80: Chronic | ICD-10-CM

## 2018-06-19 LAB
ANION GAP SERPL CALCULATED.3IONS-SCNC: 6 MMOL/L (ref 3–14)
BUN SERPL-MCNC: 42 MG/DL (ref 7–30)
CALCIUM SERPL-MCNC: 9 MG/DL (ref 8.5–10.1)
CHLORIDE SERPL-SCNC: 111 MMOL/L (ref 94–109)
CO2 SERPL-SCNC: 24 MMOL/L (ref 20–32)
CREAT SERPL-MCNC: 1.74 MG/DL (ref 0.52–1.04)
GFR SERPL CREATININE-BSD FRML MDRD: 28 ML/MIN/1.7M2
GLUCOSE SERPL-MCNC: 112 MG/DL (ref 70–99)
POTASSIUM SERPL-SCNC: 5 MMOL/L (ref 3.4–5.3)
SODIUM SERPL-SCNC: 141 MMOL/L (ref 133–144)

## 2018-06-19 PROCEDURE — 36415 COLL VENOUS BLD VENIPUNCTURE: CPT | Performed by: NURSE PRACTITIONER

## 2018-06-19 PROCEDURE — 80048 BASIC METABOLIC PNL TOTAL CA: CPT | Performed by: NURSE PRACTITIONER

## 2018-06-22 ENCOUNTER — TRANSFERRED RECORDS (OUTPATIENT)
Dept: HEALTH INFORMATION MANAGEMENT | Facility: CLINIC | Age: 78
End: 2018-06-22

## 2018-06-29 PROBLEM — I77.1 SUBCLAVIAN ARTERIAL STENOSIS (H): Status: ACTIVE | Noted: 2018-06-29

## 2018-06-30 ENCOUNTER — NURSE TRIAGE (OUTPATIENT)
Dept: NURSING | Facility: CLINIC | Age: 78
End: 2018-06-30

## 2018-06-30 NOTE — TELEPHONE ENCOUNTER
"  Reason for Disposition    [1] Sudden onset of severe flank pain AND [2] age > 60     \"I had diarrhea and flank pain \"suddenly\" started yesterday. It's continuing today. I was recently(6/3) in the ER(see epic) for the same sx and treated.\" Denies fever or other sx. Rates pain 6/10. Advised ER.    Additional Information    Negative: Passed out (i.e., lost consciousness, collapsed and was not responding)    Negative: Shock suspected (e.g., cold/pale/clammy skin, too weak to stand, low BP, rapid pulse)    Negative: Difficult to awaken or acting confused  (e.g., disoriented, slurred speech)    Negative: Sounds like a life-threatening emergency to the triager    Negative: Followed a major injury to the back (e.g., MVA, fall > 10 feet or 3 meters, penetrating injury, etc.)    Negative: Back pain or flank pain during pregnancy    Negative: Upper, mid or lower back pain that occurs mainly in the midline    Negative: [1] SEVERE pain (e.g., excruciating, scale 8-10) AND [2] present > 1 hour    Negative: [1] SEVERE pain (e.g., excruciating, scale 8-10) AND [2] not improved after pain medicine    Protocols used: FLANK PAIN-ADULT-    "

## 2018-07-30 NOTE — ED AVS SNAPSHOT
[facility]    [address] [facility] [address]    Phone:  [phone]    Fax:  [phone]                                       Lupe Lepe   MRN: [id number]    Department:  [facility]   Date of Visit:  11/13/2017           Patient Information     Date Of Birth          1940        Your diagnoses for this visit were:     Acute cystitis with hematuria        You were seen by Codie Nugent PA-C.      Follow-up Information     Follow up with Li Horton DO In 3 days.    Specialty:  Internal Medicine    Why:  As needed    Contact information:    [address] [facility] [id number] [phone]          Discharge Instructions       Use Medication as directed    Patient advised to call for any lab results (if obtained during visit) within 2-3 days.   Drink plenty of fluids.     Prevention and treatment of UTI's discussed.  Signs and symptoms of pyelonephritis mentioned.  Follow up with primary care provider for recheck in 3 days if no improvement of urinary symptoms.   Patient to return to clinic sooner if not improving as expected.   Go to ER if any worsening symptoms or signs/symptoms of phylonephritis occur.           Future Appointments        Provider Department Dept Phone Center    11/15/2017 8:00 AM Li Horton DO [facility] [phone] [facility]    11/17/2017 1:45 PM [facility] [place] [place] ROOM 2 [facility] [phone] [facility]      24 Hour Appointment Hotline       To make an appointment at any [facility], call [phone] ([phone]). If you don't have a family doctor or clinic, we will help you find one. [facility] are conveniently located to serve the needs of you and your family.             Review of your medicines      START taking        Dose / Directions Last dose taken    ciprofloxacin 250 MG tablet   Commonly known as:  CIPRO   Dose:  250 mg   Quantity:  ----- Message from Neelam Castrejon CNM sent at 7/30/2018  4:26 PM CDT -----  Regarding: FW: question about this patient   Can we please contact Jana and let her know we should repeat a CMP and bile acids when she is able to get into the lab. It is ok to wait for her next appointment unless her symptoms get worse again.  Thanks  felicia  ----- Message -----  From: Gracy Medina MD  Sent: 7/30/2018   3:58 PM  To: Neelam Castrejon CNM  Subject: RE: question about this patient                  Hi,  I don't think she needs to be induced but I would repeat the labs if she still has palmar and solar itching.  Rhonda    ----- Message -----  From: Neelam Castrejon CNM  Sent: 7/30/2018  11:10 AM  To: Gracy Medina MD  Subject: question about this patient                      Jana Shirley was a trasnfer of care at 33 weeks. She had previously been seen in [place]. At her first visit she looked like she had PUPPS, the inflammed skin was on her belly, legs, elbows, chest and axilla. She also complained of palmar and solar itching, every night \"like there were bugs under her skin.\"  Her CMP and bile acids were normal at that first visit. After that visit she started drinking dandelion root tea for the benefits it has on the liver and the rash significantly improved and now she only has the palmar and solar itching \"once in a blue moon.\"   She is 36w5d. I haven't retested her bile acids because the symptoms are so significantly improved.   I don't see a reason to induce her early, but I wanted to run it past you.   Thanks  Felicia         14 tablet        Take 1 tablet (250 mg) by mouth 2 times daily for 7 days   Refills:  0          Our records show that you are taking the medicines listed below. If these are incorrect, please call your family doctor or clinic.        Dose / Directions Last dose taken    amLODIPine 10 MG tablet   Commonly known as:  NORVASC   Dose:  10 mg   Quantity:  90 tablet        Take 1 tablet (10 mg) by mouth daily   Refills:  4        ascorbic acid 500 MG tablet   Commonly known as:  VITAMIN C   Quantity:  90 Tab        ONE TABLET twice DAILY   Refills:  3        aspirin 325 MG EC tablet   Quantity:  100        1 TABLET BY MOUTH DAILY   Refills:  3        BENEFIBER Tabs        2 TABLETS ORALLY DAILY   Refills:  0        calcium-vitamin D 600-400 MG-UNIT per tablet   Commonly known as:  CALTRATE   Dose:  1 tablet   Quantity:  100 tablet        Take 1 tablet by mouth 2 times daily.   Refills:  3        cloNIDine 0.1 MG tablet   Commonly known as:  CATAPRES   Quantity:  180 Tab        ONE Twice DAILY as needed for SBP>160, diastolic BP>100   Refills:  4        hydrochlorothiazide 50 MG tablet   Commonly known as:  HYDRODIURIL   Dose:  50 mg   Quantity:  90 tablet        Take 1 tablet (50 mg) by mouth every morning   Refills:  3        levothyroxine 25 MCG tablet   Commonly known as:  SYNTHROID   Quantity:  216 tablet        75 mcg (3 tabs) on Sunday, Monday, Wednesday, Friday. 50 mcg (2 tabs) on Tuesday, Thursday, Saturday   Refills:  3        lisinopril 10 MG tablet   Commonly known as:  PRINIVIL/ZESTRIL   Dose:  10 mg   Indication:  High Blood Pressure Disorder   Quantity:  180 tablet        Take 1 tablet (10 mg) by mouth 2 times daily   Refills:  3        multivitamin per tablet        1 TABLET BY MOUTH DAILY   Refills:  0        omeprazole 20 MG CR capsule   Commonly known as:  priLOSEC   Quantity:  60 capsule        Take 20 mg every 3rd day as needed   Refills:  3        pyridoxine 50 MG Tabs   Commonly known as:  VITAMIN  B-6   Quantity:  1 bottle        1 daily   Refills:  1 year        rosuvastatin 10 MG tablet   Commonly known as:  CRESTOR   Dose:  10 mg   Quantity:  90 tablet        Take 1 tablet (10 mg) by mouth daily   Refills:  3                Prescriptions were sent or printed at these locations (1 Prescription)                   [facility] [address] [facility] - [address] [address]    Telephone:  [phone]   Fax:  [phone]   Hours:                  E-Prescribed (1 of 1)         ciprofloxacin (CIPRO) 250 MG tablet                Procedures and tests performed during your visit     UA with Microscopic    Urine Culture      Orders Needing Specimen Collection     None      Pending Results     Date and Time Order Name Status Description    11/13/2017 1208 Urine Culture In process             Pending Culture Results     Date and Time Order Name Status Description    11/13/2017 1208 Urine Culture In process             Pending Results Instructions     If you had any lab results that were not finalized at the time of your Discharge, you can call the ED Lab Result RN at [phone]. You will be contacted by this team for any positive Lab results or changes in treatment. The nurses are available 7 days a week from 10A to 6:30P.  You can leave a message 24 hours per day and they will return your call.        Test Results From Your Hospital Stay        11/13/2017 12:32 PM         11/13/2017 12:45 PM      Component Results     Component Value Ref Range & Units Status    Color Urine Yellow  Final    Appearance Urine Slightly Cloudy  Final    Glucose Urine Negative NEG^Negative mg/dL Final    Bilirubin Urine Negative NEG^Negative Final    Ketones Urine Negative NEG^Negative mg/dL Final    Specific Gravity Urine 1.011 1.003 - 1.035 Final    Blood Urine Trace (A) NEG^Negative Final    pH Urine 5.5 5.0 - 7.0 pH Final    Protein Albumin Urine 30 (A) NEG^Negative mg/dL Final     Urobilinogen mg/dL Normal 0.0 - 2.0 mg/dL Final    Nitrite Urine Positive (A) NEG^Negative Final    Leukocyte Esterase Urine Large (A) NEG^Negative Final    Source Midstream Urine  Final    WBC Urine 132 (H) 0 - 2 /HPF Final    RBC Urine 7 (H) 0 - 2 /HPF Final    WBC Clumps Present (A) NEG^Negative /HPF Final    Bacteria Urine Few (A) NEG^Negative /HPF Final    Yeast Urine Few (A) NEG^Negative /HPF Final    Mucous Urine Present (A) NEG^Negative /LPF Final                Thank you for choosing [facility]       Thank you for choosing [facility] for your care. Our goal is always to provide you with excellent care. Hearing back from our patients is one way we can continue to improve our services. Please take a few minutes to complete the written survey that you may receive in the mail after you visit with us. Thank you!        [employer] Information     [facility] gives you secure access to your electronic health record. If you see a primary care provider, you can also send messages to your care team and make appointments. If you have questions, please call your primary care clinic.  If you do not have a primary care provider, please call [phone] and they will assist you.        Care EveryWhere ID     This is your Care EveryWhere ID. This could be used by other organizations to access your [facility] medical records  [id number]        Equal Access to Services     DARIO ABDI : Hadii nya dolan Soangie, waaxda luqadaha, qaybta kaalmada adeegyada, martinez carney. So [facility] [phone].    ATENCIÓN: Si habla español, tiene a saldana disposición servicios gratuitos de asistencia lingüística. Llame al [phone].    We comply with applicable federal civil rights laws and [place] laws. We do not discriminate on the basis of race, color, national origin, age, disability, sex, sexual orientation, or gender identity.            After Visit Summary       This is your record. Keep this with you and show  to your community pharmacist(s) and doctor(s) at your next visit.

## 2018-08-12 ENCOUNTER — HOSPITAL ENCOUNTER (EMERGENCY)
Facility: CLINIC | Age: 78
Discharge: HOME | End: 2018-08-12
Attending: NURSE PRACTITIONER | Admitting: NURSE PRACTITIONER
Payer: MEDICARE

## 2018-08-12 VITALS
WEIGHT: 154 LBS | HEIGHT: 66 IN | BODY MASS INDEX: 24.75 KG/M2 | RESPIRATION RATE: 16 BRPM | OXYGEN SATURATION: 99 % | TEMPERATURE: 98 F | DIASTOLIC BLOOD PRESSURE: 67 MMHG | SYSTOLIC BLOOD PRESSURE: 136 MMHG

## 2018-08-12 DIAGNOSIS — R82.71 BACTERIURIA WITH PYURIA: ICD-10-CM

## 2018-08-12 DIAGNOSIS — R31.0 GROSS HEMATURIA: ICD-10-CM

## 2018-08-12 DIAGNOSIS — R82.81 BACTERIURIA WITH PYURIA: ICD-10-CM

## 2018-08-12 LAB
ALBUMIN UR-MCNC: NEGATIVE MG/DL
ANION GAP SERPL CALCULATED.3IONS-SCNC: 8 MMOL/L (ref 3–14)
APPEARANCE UR: ABNORMAL
BACTERIA #/AREA URNS HPF: ABNORMAL /HPF
BILIRUB UR QL STRIP: NEGATIVE
BUN SERPL-MCNC: 30 MG/DL (ref 7–30)
CALCIUM SERPL-MCNC: 9.1 MG/DL (ref 8.5–10.1)
CHLORIDE SERPL-SCNC: 111 MMOL/L (ref 94–109)
CO2 SERPL-SCNC: 21 MMOL/L (ref 20–32)
COLOR UR AUTO: YELLOW
CREAT SERPL-MCNC: 1.68 MG/DL (ref 0.52–1.04)
GFR SERPL CREATININE-BSD FRML MDRD: 29 ML/MIN/1.7M2
GLUCOSE SERPL-MCNC: 145 MG/DL (ref 70–99)
GLUCOSE UR STRIP-MCNC: NEGATIVE MG/DL
HGB UR QL STRIP: ABNORMAL
HYALINE CASTS #/AREA URNS LPF: 1 /LPF (ref 0–2)
KETONES UR STRIP-MCNC: NEGATIVE MG/DL
LEUKOCYTE ESTERASE UR QL STRIP: ABNORMAL
NITRATE UR QL: NEGATIVE
PH UR STRIP: 5 PH (ref 5–7)
POTASSIUM SERPL-SCNC: 4.9 MMOL/L (ref 3.4–5.3)
RBC #/AREA URNS AUTO: 39 /HPF (ref 0–2)
SODIUM SERPL-SCNC: 140 MMOL/L (ref 133–144)
SOURCE: ABNORMAL
SP GR UR STRIP: 1.01 (ref 1–1.03)
SQUAMOUS #/AREA URNS AUTO: <1 /HPF (ref 0–1)
UROBILINOGEN UR STRIP-MCNC: 0 MG/DL (ref 0–2)
WBC #/AREA URNS AUTO: >182 /HPF (ref 0–5)
WBC CLUMPS #/AREA URNS HPF: PRESENT /HPF

## 2018-08-12 PROCEDURE — 99283 EMERGENCY DEPT VISIT LOW MDM: CPT | Mod: Z6 | Performed by: NURSE PRACTITIONER

## 2018-08-12 PROCEDURE — 80048 BASIC METABOLIC PNL TOTAL CA: CPT | Performed by: NURSE PRACTITIONER

## 2018-08-12 PROCEDURE — 87186 SC STD MICRODIL/AGAR DIL: CPT | Performed by: NURSE PRACTITIONER

## 2018-08-12 PROCEDURE — 87086 URINE CULTURE/COLONY COUNT: CPT | Performed by: NURSE PRACTITIONER

## 2018-08-12 PROCEDURE — 81001 URINALYSIS AUTO W/SCOPE: CPT | Performed by: NURSE PRACTITIONER

## 2018-08-12 PROCEDURE — 87088 URINE BACTERIA CULTURE: CPT | Performed by: NURSE PRACTITIONER

## 2018-08-12 PROCEDURE — 99283 EMERGENCY DEPT VISIT LOW MDM: CPT

## 2018-08-12 RX ORDER — CIPROFLOXACIN 250 MG/1
250 TABLET, FILM COATED ORAL DAILY
Qty: 3 TABLET | Refills: 0 | Status: SHIPPED | OUTPATIENT
Start: 2018-08-12 | End: 2018-08-12

## 2018-08-12 RX ORDER — CIPROFLOXACIN 250 MG/1
250 TABLET, FILM COATED ORAL DAILY
Qty: 7 TABLET | Refills: 0 | Status: SHIPPED | OUTPATIENT
Start: 2018-08-12 | End: 2018-08-19

## 2018-08-12 NOTE — DISCHARGE INSTRUCTIONS
"   * BLADDER INFECTION,Female (Adult)    A bladder infection (\"cystitis\" or \"UTI\") usually causes a constant urge to urinate and a burning when passing urine. Urine may be cloudy, smelly or dark. There may be pain in the lower abdomen. A bladder infection occurs when bacteria from the vaginal area enter the bladder opening (urethra). This can occur from sexual intercourse, wearing tight clothing, dehydration and other factors.  HOME CARE:  1. Drink lots of fluids (at least 6-8 glasses a day, unless you must restrict fluids for other medical reasons). This will force the medicine into your urinary system and flush the bacteria out of your body. Cranberry juice has been shown to help clear out the bacteria.  2. Avoid sexual intercourse until your symptoms are gone.  3. A bladder infection is treated with antibiotics. You may also be given Pyridium (generic = phenazopyridine) to reduce the burning sensation. This medicine will cause your urine to become a bright orange color. The orange urine may stain clothing. You may wear a pad or panty-liner to protect clothing.  PREVENTING FUTURE INFECTIONS:  1. Always wipe from front to back after a bowel movement.  2. Keep the genital area clean and dry.  3. Drink plenty of fluids each day to avoid dehydration.  4. Urinate right after intercourse to flush out the bladder.  5. Wear cotton underwear and cotton-lined panty hose; avoid tight-fitting pants.  6. If you are on birth control pills and are having frequent bladder infections, discuss with your doctor.  FOLLOW UP: Return to this facility or see your doctor if ALL symptoms are not gone after three days of treatment.  GET PROMPT MEDICAL ATTENTION if any of the following occur:    Fever over 101 F (38.3 C)    No improvement by the third day of treatment    Increasing back or abdominal pain    Repeated vomiting; unable to keep medicine down    Weakness, dizziness or fainting    Vaginal discharge    Pain, redness or swelling in " the labia (outer vaginal area)    3950-8561 [employer], [employer]. [address] [place] [address], [place] [address]. All rights reserved. This information is not intended as a substitute for professional medical care. Always follow your healthcare professional's instructions.  This information has been modified by your health care provider with permission from the publisher.

## 2018-08-12 NOTE — ED PROVIDER NOTES
History     Chief Complaint   Patient presents with     Urgency     frequency of urination-bladder discomfort     HPI    SUBJECTIVE:                                                    Lupe Lepe is a 78 year old female who presents to urgent care today for the following health issues:    Onset: today                 Accompanying Signs & Symptoms:                  Painful urination (Dysuria):YES-                  Delay in urine (Hesitency):YES-                  Color of urine: yellow                 Blood in urine (Hematuria):no  Any vaginal symptoms: none  Fever: no  Abdominal/Pelvic Pain: YES suprapubic pain  Back pain: YES- minimal per patient  Nausea and vomiting: no    History:    History of frequent UTI's: YES- several times per year (less than 6 per patient)  History of kidney stones: no  Sexually Active: no  Possibility of pregnancy: no    Therapies tried and outcome: no     No LMP recorded. Patient has had a hysterectomy.    Additional History: stage three renal disease patient reports that she alway needs Cipro for treatment for her urinary tract infections and this is the only medication that works for her.  Patient also requesting a BUN and creatinine and a glomerular filtration rate because she has stage III renal disease.    Problem List:    Patient Active Problem List    Diagnosis Date Noted     Renal artery stenosis (H) 08/25/2006     Priority: High     Left. Likely due to temporal arteritis. S/p left angioplasty 8/06. Increased BP 10/06. Renal duplex- Increased velocity left RA, ratios within normal limits. Right RA velocity slightly increased. MRA- normal left renal artery. Moderate right renal artery stenosis. Probable splenic artery stenosis. Angio 10/06 with bilateral renal artery angioplasty. Admit [facility] increased BP 11/06- renal angiogram this time shows no stenosis and no gradient. Renal ultrasound 6/08- normal. Renal ultrasound 6/08- normal with elevated pressures.  MRA 4/10- minimal  narrowing main right renal artery.       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 Duplex 7/06- Right SCV, brachial biphasic. Right radial monophasic, right ulnar biphasic-triphasic. Left SCV triphasic. Left brachial biphasic, left radial and ulnar monophasic. Brachial wrist indices  0.57 right/0.58 left. Angio 8/06- c/w arteritis, right RA stenosis. Repeat angiogram 6/06 no intracranial arteritis or stenosis, mild left intracranial artery plaque. Duplex 9/06- Right SCV and axillary triphasic. Right brachial, radial and ulnar monophasic. Left SCV biphasic-triphasic.  Left axillary and brachial monophasic. Left radial and ulnar monophasic-biphasic. Brachial wrist indices 0.63 right/0.60 left.  Duplex 2/08 unchanged. MRA 4/10- right vertebral occluded  Followed by Dr Aly at [place]        Giant cell arteritis (H) 03/27/2006     Priority: High     Prolonged low grade temps. Normal wbc, lft, UA, BCX. , CRP 92. Normal monospot, lymes screen. CT chest abdomen and pelvis negative 4/06. Bcx negative. BM BX negative. Fungal, AFB culture negative. Temporal artery biopsy positive. Flare with prednisone taper. Cellcept started 11/06, stopped 8/08 due to frequent UTIs. Started MTX 12/08 after [place] eval. Off prednisone 8/09.     Needs blood pressure checked in legs.  Sees DR. Ivory Aly at [facility].       Subclavian arterial stenosis (H) 06/29/2018     Priority: Medium     Cannot measure blood pressure in bilateral arms.  Blood pressure readings in leg are 10-20% higher than arm.       CKD (chronic kidney disease) stage 4, GFR 15-29 ml/min (H) 08/31/2016     Priority: Medium     Proteinuria on ACEi, follows with Dr. Clarke of [facility]       Iron deficiency anemia, unspecified 08/30/2016     Priority: Medium     Diagnosis updated by automated process. Provider to review and confirm.       Chronic kidney disease, stage IV (severe) (H)  08/30/2016     Priority: Medium     Anemia of renal disease 06/09/2016     Priority: Medium     Type 2 diabetes mellitus with stage 4 chronic kidney disease, without long-term current use of insulin (H) 10/31/2010     Priority: Medium     DX 2006. 2003 2hour-glucose 202. FBS >126x2 1/06. No retinopathy, peripheral neuropathy.  Was on insulin previously, was able to get off this in 2014 or so.       Hyperlipidemia LDL goal <100 10/31/2010     Priority: Medium     Renovascular hypertension with goal blood pressure less than 130/80      Priority: Medium     Hospitalized with malignant HTN [facility] 11/06. Angio- no recurrant renal artery stenosis. Metanephrines normal.  Aldosterone was undetectably low.  Urine catecholamines within normal limits.  Renin activity normal.        ACP (advance care planning) 05/06/2011     Priority: Low     Advance Care Planning 6/9/2016: ACP Review of Chart / Resources Provided:  Reviewed chart for advance care plan.  Lupe HOWARD Eddiestephy has no plan or code status on file however states presence of ACP document. Copy requested.   Added by Ivelisse Olivares  Pt has completed an Advance/Health Care Directive (HCD), to bring in copy to be scanned into Epic.           Frequent Urinary Tract Infections 01/23/2009     Priority: Low     Cysto 7/09 normal        Abdominal cramps 10/16/2008     Priority: Low     Episodic severe cramping associated with emesis since 2007. GI evaluation 10/08. [place] evaluation 12/08. 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 Rx jeanne-pac for H. Pylori with resolution of symptoms 1/09. Recurrent 2012, shorter symptoms.        Gastroesophageal reflux disease without esophagitis      Priority: Low     Paroxysmal atrial fibrillation (H)      Priority: Low     paroxymal single episode 2002. ECHO, Adenosine cardiolite normal.       Acquired hypothyroidism      Priority: Low     Osteopenia      Priority: Low     dexa 1999, 2003. Dexa 7/06 -lumbar spine -1.9,   right femur -1.0,  left femur is -1.6. Boniva started 2007.  Dexa 2/08- L spine -2.4, right femoral neck -1.4, left femoral neck -2.0. Dexa 5/11- L1-L4  -1.8 ,  right femoral neck -1.7, left femoral neck -1.9.            Past Medical History:    Past Medical History:   Diagnosis Date     Abdominal cramps 10/16/2008     Abnormal CT scan 1/12/2009     Arthritis      Closed fracture of unspecified part of fibula      Heart disease      Hemorrhage of rectum and anus      HERPES ZOSTER NOS 5/23/2007     Hypertension      HYPOSMIA      IRON DEFIC ANEMIA NOS 3/27/2006     JOINT EFFUSION-L/LEG 4/7/2006     Other closed fractures of distal end of radius (alone)      Polymyalgia rheumatica (H)      Thyroid disease        Past Surgical History:    Past Surgical History:   Procedure Laterality Date     COLONOSCOPY  8/25/2008     ESOPHAGOSCOPY, GASTROSCOPY, DUODENOSCOPY (EGD), COMBINED N/A 11/16/2017    Procedure: COMBINED ESOPHAGOSCOPY, GASTROSCOPY, DUODENOSCOPY (EGD);  Gastroscopy;  Surgeon: Angel Guillen MD;  Location: [facility]     HC COLONOSCOPY THRU STOMA, DIAGNOSTIC  2000, 5/06, 8/08    Normal     HYSTERECTOMY, SIS  1970's     PHACOEMULSIFICATION WITH STANDARD INTRAOCULAR LENS IMPLANT Right 1/8/2018    Procedure: PHACOEMULSIFICATION WITH STANDARD INTRAOCULAR LENS IMPLANT;  Right Cataract Removal with Implant;  Surgeon: Sekou Dobbins MD;  Location: [facility]     PHACOEMULSIFICATION WITH STANDARD INTRAOCULAR LENS IMPLANT Left 1/29/2018    Procedure: PHACOEMULSIFICATION WITH STANDARD INTRAOCULAR LENS IMPLANT;  Left Cataract Removal with Implant;  Surgeon: Sekou Dobbins MD;  Location: [facility]     SURGICAL HISTORY OF -   1948    SIS, for fibroids     SURGICAL HISTORY OF -   12/16/2008    Esophagogastroduodenoscopy with biopsy     TONSILLECTOMY      Tonsilectomy       Family History:    Family History   Problem Relation Age of Onset     Cancer Mother      ovarian CA     Cancer Father      lung CA     HEART DISEASE  "Maternal Grandmother      HEART DISEASE Maternal Grandfather      Depression Sister      sucide     Breast Cancer Sister        Social History:  Marital Status:   [2]  Social History   Substance Use Topics     Smoking status: Never Smoker     Smokeless tobacco: Never Used     Alcohol use No        Medications:      ciprofloxacin (CIPRO) 250 MG tablet   amLODIPine (NORVASC) 10 MG tablet   ASPIRIN 325 MG OR TBEC   BENEFIBER OR TABS   calcium-vitamin D (CALTRATE) 600-400 MG-UNIT per tablet   hydrochlorothiazide (HYDRODIURIL) 50 MG tablet   levothyroxine (SYNTHROID/LEVOTHROID) 25 MCG tablet   lisinopril (PRINIVIL/ZESTRIL) 10 MG tablet   MULTIVITAMINS OR TABS   omeprazole (PRILOSEC) 20 MG CR capsule   rosuvastatin (CRESTOR) 10 MG tablet   VITAMIN B-6 50 MG OR TABS       Review of Systems  CONSTITUTIONAL: NEGATIVE for fever, chills, change in weight  INTEGUMENTARY/SKIN: NEGATIVE for worrisome rashes, moles or lesions  ENT/MOUTH: NEGATIVE for ear, mouth and throat problems  RESP: NEGATIVE for significant cough or SOB  CV: NEGATIVE for chest pain, palpitations or peripheral edema  GI: NEGATIVE for nausea, abdominal pain, heartburn, or change in bowel habits   female: decreased urinary stream, dysuria and Hx urinary tract infection  MUSCULOSKELETAL: NEGATIVE for significant arthralgias or myalgia    Physical Exam   BP: 136/67  Heart Rate: 101  Temp: 98  F (36.7  C)  Resp: 16  Height: 167.6 cm (5' 6\")  Weight: 69.9 kg (154 lb)  SpO2: 99 %      Physical Exam    OBJECTIVE:                                                    /67  Temp 98  F (36.7  C)  Resp 16  Ht 1.676 m (5' 6\")  Wt 69.9 kg (154 lb)  SpO2 99%  BMI 24.86 kg/m2 Body mass index is 24.86 kg/(m^2).     She appears well, afebrile.  Patient is alert and orientated and well groomed.  Chest is atraumatic respirations are even and nonlabored.  Lung sounds are clear to auscultation.  Heart sounds are regular S1-S2 without murmurs or gallops  ABDOMEN: " soft, no hepatosplenomegaly, no masses, bowel sounds normal and tender to palpation suprapubic area  Bimanual exam is deferred  Skin is normal warm and moist without rashes or lesions       ED Course     ED Course     Procedures      Results for orders placed or performed during the hospital encounter of 08/12/18 (from the past 24 hour(s))   UA with Microscopic   Result Value Ref Range    Color Urine Yellow     Appearance Urine Slightly Cloudy     Glucose Urine Negative NEG^Negative mg/dL    Bilirubin Urine Negative NEG^Negative    Ketones Urine Negative NEG^Negative mg/dL    Specific Gravity Urine 1.011 1.003 - 1.035    Blood Urine Moderate (A) NEG^Negative    pH Urine 5.0 5.0 - 7.0 pH    Protein Albumin Urine Negative NEG^Negative mg/dL    Urobilinogen mg/dL 0.0 0.0 - 2.0 mg/dL    Nitrite Urine Negative NEG^Negative    Leukocyte Esterase Urine Large (A) NEG^Negative    Source Midstream Urine     WBC Urine >182 (H) 0 - 5 /HPF    RBC Urine 39 (H) 0 - 2 /HPF    WBC Clumps Present (A) NEG^Negative /HPF    Bacteria Urine Few (A) NEG^Negative /HPF    Squamous Epithelial /HPF Urine <1 0 - 1 /HPF    Hyaline Casts 1 0 - 2 /LPF   Basic metabolic panel   Result Value Ref Range    Sodium 140 133 - 144 mmol/L    Potassium 4.9 3.4 - 5.3 mmol/L    Chloride 111 (H) 94 - 109 mmol/L    Carbon Dioxide 21 20 - 32 mmol/L    Anion Gap 8 3 - 14 mmol/L    Glucose 145 (H) 70 - 99 mg/dL    Urea Nitrogen 30 7 - 30 mg/dL    Creatinine 1.68 (H) 0.52 - 1.04 mg/dL    GFR Estimate 29 (L) >60 mL/min/1.7m2    GFR Estimate If Black 35 (L) >60 mL/min/1.7m2    Calcium 9.1 8.5 - 10.1 mg/dL       Medications - No data to display    Assessments & Plan (with Medical Decision Making)     I have reviewed the nursing notes.    I have reviewed the findings, diagnosis, plan and need for follow up with the patient.    ASSESSMENT/PLAN:                                                    (R31.0) Gross hematuria  (N39.0) Bacteriuria with pyuria  Comment: suspect  urinary tract infection and stage 3 renal disease and Creatinine is stable from June of 2018 to present and dosed cipro accordingly  Plan: cipro 250 mg daily for 7 days as patient is adament that she always needs at least 7 days of therapy    Risks, benefits and alternatives of treatments discussed. Plan agreed on.      New Prescriptions    CIPROFLOXACIN (CIPRO) 250 MG TABLET    Take 1 tablet (250 mg) by mouth daily for 7 days       Final diagnoses:   Gross hematuria   Bacteriuria with pyuria       8/12/2018   [facility] EMERGENCY DEPARTMENT     Li Pagan, SHAWNA CNP  08/12/18 8017

## 2018-08-12 NOTE — ED AVS SNAPSHOT
[facility] Emergency Department    [address] [address]    Phone:  [phone]    Fax:  [phone]                                       Lupe Lepe   MRN: [id number]    Department:  [facility] Emergency Department   Date of Visit:  8/12/2018           After Visit Summary Signature Page     I have received my discharge instructions, and my questions have been answered. I have discussed any challenges I see with this plan with the nurse or doctor.    ..........................................................................................................................................  Patient/Patient Representative Signature      ..........................................................................................................................................  Patient Representative Print Name and Relationship to Patient    ..................................................               ................................................  Date                                            Time    ..........................................................................................................................................  Reviewed by Signature/Title    ...................................................              ..............................................  Date                                                            Time

## 2018-08-12 NOTE — ED AVS SNAPSHOT
" [facility] Emergency Department    [address] [address]    Phone:  [phone]    Fax:  [phone]                                       Lupe Lepe   MRN: [id number]    Department:  [facility] Emergency Department   Date of Visit:  8/12/2018           Patient Information     Date Of Birth          1940        Your diagnoses for this visit were:     Gross hematuria     Bacteriuria with pyuria        You were seen by Li Pagan APRN CNP.      Follow-up Information     Follow up with Li Horton DO In 1 week.    Specialty:  Internal Medicine    Why:  If symptoms worsen, As needed    Contact information:    [address] [facility] [id number] [phone]          Discharge Instructions          * BLADDER INFECTION,Female (Adult)    A bladder infection (\"cystitis\" or \"UTI\") usually causes a constant urge to urinate and a burning when passing urine. Urine may be cloudy, smelly or dark. There may be pain in the lower abdomen. A bladder infection occurs when bacteria from the vaginal area enter the bladder opening (urethra). This can occur from sexual intercourse, wearing tight clothing, dehydration and other factors.  HOME CARE:  1. Drink lots of fluids (at least 6-8 glasses a day, unless you must restrict fluids for other medical reasons). This will force the medicine into your urinary system and flush the bacteria out of your body. Cranberry juice has been shown to help clear out the bacteria.  2. Avoid sexual intercourse until your symptoms are gone.  3. A bladder infection is treated with antibiotics. You may also be given Pyridium (generic = phenazopyridine) to reduce the burning sensation. This medicine will cause your urine to become a bright orange color. The orange urine may stain clothing. You may wear a pad or panty-liner to protect clothing.  PREVENTING FUTURE INFECTIONS:  1. Always wipe from front to back after a bowel movement.  2. Keep the " genital area clean and dry.  3. Drink plenty of fluids each day to avoid dehydration.  4. Urinate right after intercourse to flush out the bladder.  5. Wear cotton underwear and cotton-lined panty hose; avoid tight-fitting pants.  6. If you are on birth control pills and are having frequent bladder infections, discuss with your doctor.  FOLLOW UP: Return to this facility or see your doctor if ALL symptoms are not gone after three days of treatment.  GET PROMPT MEDICAL ATTENTION if any of the following occur:    Fever over 101 F (38.3 C)    No improvement by the third day of treatment    Increasing back or abdominal pain    Repeated vomiting; unable to keep medicine down    Weakness, dizziness or fainting    Vaginal discharge    Pain, redness or swelling in the labia (outer vaginal area)    0315-8536 [employer]. [address] [place] [address], [place] [address]. All rights reserved. This information is not intended as a substitute for professional medical care. Always follow your healthcare professional's instructions.  This information has been modified by your health care provider with permission from the publisher.      24 Hour Appointment Hotline       To make an appointment at any [facility], call [phone] ([phone]). If you don't have a family doctor or clinic, we will help you find one. [facility] clinics are conveniently located to serve the needs of you and your family.             Review of your medicines      START taking        Dose / Directions Last dose taken    ciprofloxacin 250 MG tablet   Commonly known as:  CIPRO   Dose:  250 mg   Quantity:  7 tablet        Take 1 tablet (250 mg) by mouth daily for 7 days   Refills:  0          Our records show that you are taking the medicines listed below. If these are incorrect, please call your family doctor or clinic.        Dose / Directions Last dose taken    amLODIPine 10 MG tablet   Commonly known as:  NORVASC   Quantity:  90 tablet         TAKE 1 TABLET (10 MG) BY MOUTH DAILY   Refills:  3        aspirin 325 MG EC tablet   Quantity:  100        1 TABLET BY MOUTH DAILY   Refills:  3        BENEFIBER Tabs        2 TABLETS ORALLY DAILY   Refills:  0        calcium-vitamin D 600-400 MG-UNIT per tablet   Commonly known as:  CALTRATE   Dose:  1 tablet   Quantity:  100 tablet        Take 1 tablet by mouth 2 times daily.   Refills:  3        hydrochlorothiazide 50 MG tablet   Commonly known as:  HYDRODIURIL   Quantity:  90 tablet        TAKE 1 TABLET (50 MG) BY MOUTH EVERY MORNING   Refills:  0        levothyroxine 25 MCG tablet   Commonly known as:  SYNTHROID/LEVOTHROID   Quantity:  216 tablet        75 MCG (3 TABS) ON SUNDAY, MONDAY, WEDNESDAY, FRIDAY. 50 MCG (2 TABS) ON TUESDAY, THURSDAY, SATURDAY   Refills:  3        lisinopril 10 MG tablet   Commonly known as:  PRINIVIL/ZESTRIL   Quantity:  180 tablet        TAKE 1 TABLET (10 MG) BY MOUTH 2 TIMES DAILY   Refills:  3        multivitamin per tablet        1 TABLET BY MOUTH DAILY   Refills:  0        omeprazole 20 MG CR capsule   Commonly known as:  priLOSEC   Quantity:  60 capsule        TAKE 1 CAPSULE BY MOUTH EVERY 3RD DAY AS NEEDED   Refills:  0        pyridoxine 50 MG Tabs   Commonly known as:  VITAMIN B-6   Quantity:  1 bottle        1 daily   Refills:  1 year        rosuvastatin 10 MG tablet   Commonly known as:  CRESTOR   Quantity:  90 tablet        TAKE 1 TABLET (10 MG) BY MOUTH DAILY   Refills:  3                Prescriptions were sent or printed at these locations (1 Prescription)                   [facility] [address] [place] [address] [address]    Telephone:  [phone]   Fax:  [phone]   Hours:                  E-Prescribed (1 of 1)         ciprofloxacin (CIPRO) 250 MG tablet                Procedures and tests performed during your visit     Basic metabolic panel    UA with Microscopic    Urine Culture      Orders Needing Specimen  Collection     None      Pending Results     Date and Time Order Name Status Description    8/12/2018 1335 Urine Culture In process             Pending Culture Results     Date and Time Order Name Status Description    8/12/2018 1335 Urine Culture In process             Pending Results Instructions     If you had any lab results that were not finalized at the time of your Discharge, you can call the ED Lab Result RN at [phone]. You will be contacted by this team for any positive Lab results or changes in treatment. The nurses are available 7 days a week from 10A to 6:30P.  You can leave a message 24 hours per day and they will return your call.        Test Results From Your Hospital Stay        8/12/2018  1:54 PM      Component Results     Component Value Ref Range & Units Status    Color Urine Yellow  Final    Appearance Urine Slightly Cloudy  Final    Glucose Urine Negative NEG^Negative mg/dL Final    Bilirubin Urine Negative NEG^Negative Final    Ketones Urine Negative NEG^Negative mg/dL Final    Specific Gravity Urine 1.011 1.003 - 1.035 Final    Blood Urine Moderate (A) NEG^Negative Final    pH Urine 5.0 5.0 - 7.0 pH Final    Protein Albumin Urine Negative NEG^Negative mg/dL Final    Urobilinogen mg/dL 0.0 0.0 - 2.0 mg/dL Final    Nitrite Urine Negative NEG^Negative Final    Leukocyte Esterase Urine Large (A) NEG^Negative Final    Source Midstream Urine  Final    WBC Urine >182 (H) 0 - 5 /HPF Final    RBC Urine 39 (H) 0 - 2 /HPF Final    WBC Clumps Present (A) NEG^Negative /HPF Final    Bacteria Urine Few (A) NEG^Negative /HPF Final    Squamous Epithelial /HPF Urine <1 0 - 1 /HPF Final    Hyaline Casts 1 0 - 2 /LPF Final         8/12/2018  1:37 PM         8/12/2018  2:22 PM      Component Results     Component Value Ref Range & Units Status    Sodium 140 133 - 144 mmol/L Final    Potassium 4.9 3.4 - 5.3 mmol/L Final    Chloride 111 (H) 94 - 109 mmol/L Final    Carbon Dioxide 21 20 - 32 mmol/L Final     Anion Gap 8 3 - 14 mmol/L Final    Glucose 145 (H) 70 - 99 mg/dL Final    Urea Nitrogen 30 7 - 30 mg/dL Final    Creatinine 1.68 (H) 0.52 - 1.04 mg/dL Final    GFR Estimate 29 (L) >60 mL/min/1.7m2 Final    Non  GFR Calc    GFR Estimate If Black 35 (L) >60 mL/min/1.7m2 Final    African American GFR Calc    Calcium 9.1 8.5 - 10.1 mg/dL Final                Thank you for choosing [facility]       Thank you for choosing [facility] for your care. Our goal is always to provide you with excellent care. Hearing back from our patients is one way we can continue to improve our services. Please take a few minutes to complete the written survey that you may receive in the mail after you visit with us. Thank you!        [employer] Information     [employer] gives you secure access to your electronic health record. If you see a primary care provider, you can also send messages to your care team and make appointments. If you have questions, please call your primary care clinic.  If you do not have a primary care provider, please call [phone] and they will assist you.        Care EveryWhere ID     This is your Care EveryWhere ID. This could be used by other organizations to access your [facility] medical records  [id number]        Equal Access to Services     DARIO ABDI : Gatito Taylor, waaxda lujosephadaha, qaybta kaalmada darling, martinez carney. So [facility] [phone].    ATENCIÓN: Si habla español, tiene a saldana disposición servicios gratuitos de asistencia lingüística. Llame al [phone].    We comply with applicable federal civil rights laws and [place] laws. We do not discriminate on the basis of race, color, national origin, age, disability, sex, sexual orientation, or gender identity.            After Visit Summary       This is your record. Keep this with you and show to your community pharmacist(s) and doctor(s) at your next visit.

## 2018-08-14 LAB
BACTERIA SPEC CULT: ABNORMAL
Lab: ABNORMAL
SPECIMEN SOURCE: ABNORMAL

## 2018-08-22 ENCOUNTER — OFFICE VISIT (OUTPATIENT)
Dept: FAMILY MEDICINE | Facility: CLINIC | Age: 78
End: 2018-08-22
Payer: COMMERCIAL

## 2018-08-22 VITALS
WEIGHT: 155 LBS | SYSTOLIC BLOOD PRESSURE: 136 MMHG | OXYGEN SATURATION: 98 % | DIASTOLIC BLOOD PRESSURE: 82 MMHG | TEMPERATURE: 96.3 F | HEART RATE: 63 BPM | BODY MASS INDEX: 25.02 KG/M2

## 2018-08-22 DIAGNOSIS — M31.6 GIANT CELL ARTERITIS (H): ICD-10-CM

## 2018-08-22 DIAGNOSIS — I70.1 RENAL ARTERY STENOSIS (H): ICD-10-CM

## 2018-08-22 DIAGNOSIS — R30.0 DYSURIA: Primary | ICD-10-CM

## 2018-08-22 DIAGNOSIS — I48.0 PAROXYSMAL ATRIAL FIBRILLATION (H): ICD-10-CM

## 2018-08-22 LAB
ALBUMIN UR-MCNC: NEGATIVE MG/DL
APPEARANCE UR: CLEAR
BILIRUB UR QL STRIP: NEGATIVE
COLOR UR AUTO: YELLOW
GLUCOSE UR STRIP-MCNC: NEGATIVE MG/DL
HGB UR QL STRIP: NEGATIVE
KETONES UR STRIP-MCNC: NEGATIVE MG/DL
LEUKOCYTE ESTERASE UR QL STRIP: ABNORMAL
NITRATE UR QL: NEGATIVE
PH UR STRIP: 6 PH (ref 5–7)
RBC #/AREA URNS AUTO: ABNORMAL /HPF
SOURCE: ABNORMAL
SP GR UR STRIP: 1.01 (ref 1–1.03)
UROBILINOGEN UR STRIP-ACNC: 0.2 EU/DL (ref 0.2–1)
WBC #/AREA URNS AUTO: ABNORMAL /HPF

## 2018-08-22 PROCEDURE — 81001 URINALYSIS AUTO W/SCOPE: CPT | Performed by: INTERNAL MEDICINE

## 2018-08-22 PROCEDURE — 99213 OFFICE O/P EST LOW 20 MIN: CPT | Performed by: INTERNAL MEDICINE

## 2018-08-22 NOTE — PROGRESS NOTES
SUBJECTIVE:   Lupe Lepe is a 78 year old female who presents to clinic today for the following health issues:      ED/UC Followup:    Facility:  [facility]   Date of visit: 8/12/18  Reason for visit: Gross hematuria  Current Status: Not pain, but pt wants to make sure that she doesn't have it        URINARY TRACT SYMPTOMS  Onset: Follow up E.DNino    Description:   Painful urination (Dysuria): no   Blood in urine (Hematuria): no   Delay in urine (Hesitency): YES- somewhat    Intensity: mild    Progression of Symptoms:  improving and constant    Accompanying Signs & Symptoms:  Fever/chills: no   Flank pain YES- Left  Nausea and vomiting: no   Any vaginal symptoms: none  Abdominal/Pelvic Pain: no     History:   History of frequent UTI's: YES  History of kidney stones: no   Sexually Active: no   Possibility of pregnancy: No    Precipitating factors:   na    Therapies Tried and outcome: na        Current Outpatient Prescriptions   Medication Sig Dispense Refill     amLODIPine (NORVASC) 10 MG tablet TAKE 1 TABLET (10 MG) BY MOUTH DAILY 90 tablet 3     ASPIRIN 325 MG OR TBEC 1 TABLET BY MOUTH DAILY 100 3     calcium-vitamin D (CALTRATE) 600-400 MG-UNIT per tablet Take 1 tablet by mouth 2 times daily. 100 tablet 3     hydrochlorothiazide (HYDRODIURIL) 50 MG tablet TAKE 1 TABLET (50 MG) BY MOUTH EVERY MORNING 90 tablet 0     levothyroxine (SYNTHROID/LEVOTHROID) 25 MCG tablet 75 MCG (3 TABS) ON SUNDAY, MONDAY, WEDNESDAY, FRIDAY. 50 MCG (2 TABS) ON TUESDAY, THURSDAY, SATURDAY 216 tablet 3     lisinopril (PRINIVIL/ZESTRIL) 10 MG tablet TAKE 1 TABLET (10 MG) BY MOUTH 2 TIMES DAILY 180 tablet 3     MULTIVITAMINS OR TABS 1 TABLET BY MOUTH DAILY  0     omeprazole (PRILOSEC) 20 MG CR capsule TAKE 1 CAPSULE BY MOUTH EVERY 3RD DAY AS NEEDED 60 capsule 0     rosuvastatin (CRESTOR) 10 MG tablet TAKE 1 TABLET (10 MG) BY MOUTH DAILY 90 tablet 3     VITAMIN B-6 50 MG OR TABS 1 daily 1 bottle 1 year     BENEFIBER OR TABS 2  TABLETS ORALLY DAILY  0       Reviewed and updated as needed this visit by clinical staff  Tobacco  Allergies  Meds  Problems  Med Hx  Surg Hx  Fam Hx  Soc Hx        Reviewed and updated as needed this visit by Provider  Allergies  Meds  Problems         ROS:  Constitutional, HEENT, cardiovascular, pulmonary, gi and gu systems are negative, except as otherwise noted.    OBJECTIVE:     /82 (BP Location: Right leg, Patient Position: Sitting, Cuff Size: Adult Regular)  Pulse 63  Temp 96.3  F (35.7  C) (Tympanic)  Wt 155 lb (70.3 kg)  SpO2 98%  Breastfeeding? No  BMI 25.02 kg/m2  Body mass index is 25.02 kg/(m^2).  GENERAL APPEARANCE: healthy, alert and no distress    Diagnostic Test Results:  Results for orders placed or performed in visit on 08/22/18 (from the past 24 hour(s))   UA reflex to Microscopic and Culture   Result Value Ref Range    Color Urine Yellow     Appearance Urine Clear     Glucose Urine Negative NEG^Negative mg/dL    Bilirubin Urine Negative NEG^Negative    Ketones Urine Negative NEG^Negative mg/dL    Specific Gravity Urine 1.010 1.003 - 1.035    Blood Urine Negative NEG^Negative    pH Urine 6.0 5.0 - 7.0 pH    Protein Albumin Urine Negative NEG^Negative mg/dL    Urobilinogen Urine 0.2 0.2 - 1.0 EU/dL    Nitrite Urine Negative NEG^Negative    Leukocyte Esterase Urine Trace (A) NEG^Negative    Source Midstream Urine    Urine Microscopic   Result Value Ref Range    WBC Urine 5-10 (A) OTO5^0 - 5 /HPF    RBC Urine O - 2 OTO2^O - 2 /HPF       ASSESSMENT/PLAN:     1. Dysuria - discussed that she received appropriate treatment of UTI, and given that her symptoms have resolved, no further antibiotic treatment needed at this time.  Discussed how checking UA to 'ensure infection has resolved' is fraught with complications and how we use symptoms to guide further antibiotic therapy.  - UA reflex to Microscopic and Culture  - Urine Microscopic    2. Giant cell arteritis (H) - history of,  quiet currently    3. Paroxysmal atrial fibrillation (H) - lone episode in 2002.    4. Renal artery stenosis (H) - follows with nephrology    Greater than 15 minutes was spent face-to-face with the patient by the provider.  Greater than 50% was spent in counseling or coordinating care for this patient.      Li Horton,   [facility]

## 2018-08-22 NOTE — MR AVS SNAPSHOT
After Visit Summary   8/22/2018    Lupe Lepe    MRN: [id number]           Patient Information     Date Of Birth          1940        Visit Information        Provider Department      8/22/2018 9:40 AM Li Horton, DO [facility]        Today's Diagnoses     Dysuria    -  1       Follow-ups after your visit        Who to contact     If you have questions or need follow up information about today's clinic visit or your schedule please contact [facility] directly at [phone].  Normal or non-critical lab and imaging results will be communicated to you by cfgAdvancehart, letter or phone within 4 business days after the clinic has received the results. If you do not hear from us within 7 days, please contact the clinic through Wh[employer] Communication[employer] or phone. If you have a critical or abnormal lab result, we will notify you by phone as soon as possible.  Submit refill requests through ABPathfinder or call your pharmacy and they will forward the refill request to us. Please allow 3 business days for your refill to be completed.          Additional Information About Your Visit        MyChart Information     ABPathfinder gives you secure access to your electronic health record. If you see a primary care provider, you can also send messages to your care team and make appointments. If you have questions, please call your primary care clinic.  If you do not have a primary care provider, please call [phone] and they will assist you.        Care EveryWhere ID     This is your Care EveryWhere ID. This could be used by other organizations to access your [facility] medical records  [id number]        Your Vitals Were     Pulse Temperature Pulse Oximetry Breastfeeding? BMI (Body Mass Index)       63 96.3  F (35.7  C) (Tympanic) 98% No 25.02 kg/m2        Blood Pressure from Last 3 Encounters:   08/22/18 136/82   08/12/18 136/67   06/03/18 138/78    Weight from Last 3 Encounters:   08/22/18 155 lb  (70.3 kg)   08/12/18 154 lb (69.9 kg)   06/03/18 155 lb (70.3 kg)              We Performed the Following     UA reflex to Microscopic and Culture     Urine Microscopic        Primary Care Provider Office Phone # Fax #    Li Horton [phone] [address] [facility] [address]        Equal Access to Services     DARIO ABDI : Hadii aad ku hadasho Soomaali, waaxda luqadaha, qaybta kaalmada adeegyada, waxay idiin hayaan adeeg kharash la'aan ah. So [facility] [phone].    ATENCIÓN: Si habla espbhavana, tiene a saldana disposición servicios gratuitos de asistencia lingüística. Llame al [phone].    We comply with applicable federal civil rights laws and [place] laws. We do not discriminate on the basis of race, color, national origin, age, disability, sex, sexual orientation, or gender identity.            Thank you!     Thank you for choosing [facility]  for your care. Our goal is always to provide you with excellent care. Hearing back from our patients is one way we can continue to improve our services. Please take a few minutes to complete the written survey that you may receive in the mail after your visit with us. Thank you!             Your Updated Medication List - Protect others around you: Learn how to safely use, store and throw away your medicines at www.disposemymeds.org.          This list is accurate as of 8/22/18 10:06 AM.  Always use your most recent med list.                   Brand Name Dispense Instructions for use Diagnosis    amLODIPine 10 MG tablet    NORVASC    90 tablet    TAKE 1 TABLET (10 MG) BY MOUTH DAILY    Renovascular hypertension with goal blood pressure less than 130/80       aspirin 325 MG EC tablet     100    1 TABLET BY MOUTH DAILY        BENEFIBER Tabs      2 TABLETS ORALLY DAILY        calcium-vitamin D 600-400 MG-UNIT per tablet    CALTRATE    100 tablet    Take 1 tablet by mouth 2 times daily.    Disorder of bone and cartilage,  unspecified       hydrochlorothiazide 50 MG tablet    HYDRODIURIL    90 tablet    TAKE 1 TABLET (50 MG) BY MOUTH EVERY MORNING    Renovascular hypertension with goal blood pressure less than 130/80       levothyroxine 25 MCG tablet    SYNTHROID/LEVOTHROID    216 tablet    75 MCG (3 TABS) ON SUNDAY, MONDAY, WEDNESDAY, FRIDAY. 50 MCG (2 TABS) ON TUESDAY, THURSDAY, SATURDAY    Acquired hypothyroidism       lisinopril 10 MG tablet    PRINIVIL/ZESTRIL    180 tablet    TAKE 1 TABLET (10 MG) BY MOUTH 2 TIMES DAILY    Renovascular hypertension with goal blood pressure less than 130/80       multivitamin per tablet      1 TABLET BY MOUTH DAILY        omeprazole 20 MG CR capsule    priLOSEC    60 capsule    TAKE 1 CAPSULE BY MOUTH EVERY 3RD DAY AS NEEDED    Gastroesophageal reflux disease without esophagitis       pyridoxine 50 MG Tabs    VITAMIN B-6    1 bottle    1 daily    Disturbance of skin sensation       rosuvastatin 10 MG tablet    CRESTOR    90 tablet    TAKE 1 TABLET (10 MG) BY MOUTH DAILY    Hyperlipidemia LDL goal <100

## 2018-09-10 ENCOUNTER — TRANSFERRED RECORDS (OUTPATIENT)
Dept: HEALTH INFORMATION MANAGEMENT | Facility: CLINIC | Age: 78
End: 2018-09-10

## 2018-09-13 DIAGNOSIS — I15.0 RENOVASCULAR HYPERTENSION WITH GOAL BLOOD PRESSURE LESS THAN 130/80: Chronic | ICD-10-CM

## 2018-09-14 RX ORDER — HYDROCHLOROTHIAZIDE 50 MG/1
TABLET ORAL
Qty: 90 TABLET | Refills: 0 | Status: SHIPPED | OUTPATIENT
Start: 2018-09-14 | End: 2018-09-28 | Stop reason: ALTCHOICE

## 2018-09-14 NOTE — TELEPHONE ENCOUNTER
Renal function stable. Refilled. Recommend follow up with PCP in 2-3 months for BP recheck and labs.     Felicia Walker, SHAWNA CNP, covering for Dr. Horton

## 2018-09-14 NOTE — TELEPHONE ENCOUNTER
Routing refill request to provider for review/approval because:  Labs out of range:  Cr 1.68    Thank you    Ada LAINEZ RN

## 2018-09-14 NOTE — TELEPHONE ENCOUNTER
"Requested Prescriptions   Pending Prescriptions Disp Refills     hydrochlorothiazide (HYDRODIURIL) 50 MG tablet [Pharmacy Med Name: HYDROCHLOROTHIAZIDE 50 MG TAB] 90 tablet 0     Sig: TAKE 1 TABLET (50 MG) BY MOUTH EVERY MORNING    Diuretics (Including Combos) Protocol Failed    9/13/2018 11:41 PM       Failed - Normal serum creatinine on file in past 12 months    Recent Labs   Lab Test  08/12/18   1400   CR  1.68*             Passed - Blood pressure under 140/90 in past 12 months    BP Readings from Last 3 Encounters:   08/22/18 136/82   08/12/18 136/67   06/03/18 138/78                Passed - Recent (12 mo) or future (30 days) visit within the authorizing provider's specialty    Patient had office visit in the last 12 months or has a visit in the next 30 days with authorizing provider or within the authorizing provider's specialty.  See \"Patient Info\" tab in inbasket, or \"Choose Columns\" in Meds & Orders section of the refill encounter.           Passed - Patient is age 18 or older       Passed - No active pregancy on record       Passed - Normal serum potassium on file in past 12 months    Recent Labs   Lab Test  08/12/18   1400   POTASSIUM  4.9                   Passed - Normal serum sodium on file in past 12 months    Recent Labs   Lab Test  08/12/18   1400   NA  140             Passed - No positive pregnancy test in past 12 months        Last Written Prescription Date:  6/18/18  Last Fill Quantity: 90,  # refills: 0   Last office visit: 8/22/2018 with prescribing provider:  OLLIE   Future Office Visit:      "

## 2018-09-26 ENCOUNTER — OFFICE VISIT (OUTPATIENT)
Dept: FAMILY MEDICINE | Facility: CLINIC | Age: 78
End: 2018-09-26
Payer: COMMERCIAL

## 2018-09-26 VITALS
WEIGHT: 155 LBS | SYSTOLIC BLOOD PRESSURE: 130 MMHG | HEART RATE: 70 BPM | OXYGEN SATURATION: 96 % | TEMPERATURE: 96.6 F | BODY MASS INDEX: 25.02 KG/M2 | DIASTOLIC BLOOD PRESSURE: 64 MMHG

## 2018-09-26 DIAGNOSIS — Z23 NEED FOR PROPHYLACTIC VACCINATION AND INOCULATION AGAINST INFLUENZA: ICD-10-CM

## 2018-09-26 DIAGNOSIS — I15.0 RENOVASCULAR HYPERTENSION WITH GOAL BLOOD PRESSURE LESS THAN 130/80: Chronic | ICD-10-CM

## 2018-09-26 DIAGNOSIS — N18.4 CKD (CHRONIC KIDNEY DISEASE) STAGE 4, GFR 15-29 ML/MIN (H): Primary | Chronic | ICD-10-CM

## 2018-09-26 LAB
ANION GAP SERPL CALCULATED.3IONS-SCNC: 7 MMOL/L (ref 3–14)
BUN SERPL-MCNC: 34 MG/DL (ref 7–30)
CALCIUM SERPL-MCNC: 8.9 MG/DL (ref 8.5–10.1)
CHLORIDE SERPL-SCNC: 110 MMOL/L (ref 94–109)
CO2 SERPL-SCNC: 22 MMOL/L (ref 20–32)
CREAT SERPL-MCNC: 1.94 MG/DL (ref 0.52–1.04)
GFR SERPL CREATININE-BSD FRML MDRD: 25 ML/MIN/1.7M2
GLUCOSE SERPL-MCNC: 113 MG/DL (ref 70–99)
POTASSIUM SERPL-SCNC: 5 MMOL/L (ref 3.4–5.3)
SODIUM SERPL-SCNC: 139 MMOL/L (ref 133–144)

## 2018-09-26 PROCEDURE — 36415 COLL VENOUS BLD VENIPUNCTURE: CPT | Performed by: INTERNAL MEDICINE

## 2018-09-26 PROCEDURE — 99213 OFFICE O/P EST LOW 20 MIN: CPT | Mod: 25 | Performed by: INTERNAL MEDICINE

## 2018-09-26 PROCEDURE — 90662 IIV NO PRSV INCREASED AG IM: CPT | Performed by: INTERNAL MEDICINE

## 2018-09-26 PROCEDURE — G0008 ADMIN INFLUENZA VIRUS VAC: HCPCS | Performed by: INTERNAL MEDICINE

## 2018-09-26 PROCEDURE — 80048 BASIC METABOLIC PNL TOTAL CA: CPT | Performed by: INTERNAL MEDICINE

## 2018-09-26 RX ORDER — CLONIDINE HYDROCHLORIDE 0.1 MG/1
TABLET ORAL
Qty: 10 TABLET | Refills: 3 | Status: SHIPPED | OUTPATIENT
Start: 2018-09-26 | End: 2019-05-21

## 2018-09-26 NOTE — MR AVS SNAPSHOT
After Visit Summary   9/26/2018    Lupe Lepe    MRN: [id number]           Patient Information     Date Of Birth          1940        Visit Information        Provider Department      9/26/2018 3:20 PM Li Horton,  [facility]        Today's Diagnoses     CKD (chronic kidney disease) stage 4, GFR 15-29 ml/min (H)    -  1    Renovascular hypertension with goal blood pressure less than 130/80        Need for prophylactic vaccination and inoculation against influenza          Care Instructions    1. Check labs today.          Follow-ups after your visit        Future tests that were ordered for you today     Open Standing Orders        Priority Remaining Interval Expires Ordered    Basic metabolic panel Routine 12/12 monthly 9/26/2019 9/26/2018            Who to contact     If you have questions or need follow up information about today's clinic visit or your schedule please contact [facility] directly at [phone].  Normal or non-critical lab and imaging results will be communicated to you by Chainalyticshart, letter or phone within 4 business days after the clinic has received the results. If you do not hear from us within 7 days, please contact the clinic through Chainalyticshart or phone. If you have a critical or abnormal lab result, we will notify you by phone as soon as possible.  Submit refill requests through tenXer or call your pharmacy and they will forward the refill request to us. Please allow 3 business days for your refill to be completed.          Additional Information About Your Visit        MyChart Information     tenXer gives you secure access to your electronic health record. If you see a primary care provider, you can also send messages to your care team and make appointments. If you have questions, please call your primary care clinic.  If you do not have a primary care provider, please call [phone] and they will assist you.        Care  EveryWhere ID     This is your Care EveryWhere ID. This could be used by other organizations to access your [facility] medical records  [id number]        Your Vitals Were     Pulse Temperature Pulse Oximetry Breastfeeding? BMI (Body Mass Index)       70 96.6  F (35.9  C) (Tympanic) 96% No 25.02 kg/m2        Blood Pressure from Last 3 Encounters:   09/26/18 130/64   08/22/18 136/82   08/12/18 136/67    Weight from Last 3 Encounters:   09/26/18 155 lb (70.3 kg)   08/22/18 155 lb (70.3 kg)   08/12/18 154 lb (69.9 kg)              We Performed the Following     FLU VACCINE, INCREASED ANTIGEN, PRESV FREE, AGE 65+ [48862]     Vaccine Administration, Initial [46667]          Today's Medication Changes          These changes are accurate as of 9/26/18  3:50 PM.  If you have any questions, ask your nurse or doctor.               Start taking these medicines.        Dose/Directions    cloNIDine 0.1 MG tablet   Commonly known as:  CATAPRES   Used for:  Renovascular hypertension with goal blood pressure less than 130/80   Started by:  Li Horton,         ONE Twice DAILY as needed for SBP>180, diastolic BP>110   Quantity:  10 tablet   Refills:  3            Where to get your medicines      These medications were sent to [place] [address] [place] [id number]     Phone:  [phone]     cloNIDine 0.1 MG tablet                Primary Care Provider Office Phone # Fax #    Li Horton [phone] [address] [facility] [address]        Equal Access to Services     DARIO ABDI AH: Hadii nya ku hadasho Soomaali, waaxda luqadaha, qaybta kaalmada adedru, martinez carney. So [facility] [phone].    ATENCIÓN: Si habla español, tiene a saldana disposición servicios gratuitos de asistencia lingüística. Llame al [phone].    We comply with applicable federal civil rights laws and [place] laws. We do not  discriminate on the basis of race, color, national origin, age, disability, sex, sexual orientation, or gender identity.            Thank you!     Thank you for choosing [facility]  for your care. Our goal is always to provide you with excellent care. Hearing back from our patients is one way we can continue to improve our services. Please take a few minutes to complete the written survey that you may receive in the mail after your visit with us. Thank you!             Your Updated Medication List - Protect others around you: Learn how to safely use, store and throw away your medicines at www.disposemymeds.org.          This list is accurate as of 9/26/18  3:50 PM.  Always use your most recent med list.                   Brand Name Dispense Instructions for use Diagnosis    amLODIPine 10 MG tablet    NORVASC    90 tablet    TAKE 1 TABLET (10 MG) BY MOUTH DAILY    Renovascular hypertension with goal blood pressure less than 130/80       aspirin 325 MG EC tablet     100    1 TABLET BY MOUTH DAILY        BENEFIBER Tabs      2 TABLETS ORALLY DAILY        calcium carbonate 600 mg-vitamin D 400 units 600-400 MG-UNIT per tablet    CALTRATE    100 tablet    Take 1 tablet by mouth 2 times daily.    Disorder of bone and cartilage, unspecified       cloNIDine 0.1 MG tablet    CATAPRES    10 tablet    ONE Twice DAILY as needed for SBP>180, diastolic BP>110    Renovascular hypertension with goal blood pressure less than 130/80       hydrochlorothiazide 50 MG tablet    HYDRODIURIL    90 tablet    TAKE 1 TABLET (50 MG) BY MOUTH EVERY MORNING    Renovascular hypertension with goal blood pressure less than 130/80       levothyroxine 25 MCG tablet    SYNTHROID/LEVOTHROID    216 tablet    75 MCG (3 TABS) ON SUNDAY, MONDAY, WEDNESDAY, FRIDAY. 50 MCG (2 TABS) ON TUESDAY, THURSDAY, SATURDAY    Acquired hypothyroidism       lisinopril 10 MG tablet    PRINIVIL/ZESTRIL    180 tablet    TAKE 1 TABLET (10 MG) BY MOUTH 2 TIMES  DAILY    Renovascular hypertension with goal blood pressure less than 130/80       multivitamin per tablet      1 TABLET BY MOUTH DAILY        omeprazole 20 MG CR capsule    priLOSEC    60 capsule    TAKE 1 CAPSULE BY MOUTH EVERY 3RD DAY AS NEEDED    Gastroesophageal reflux disease without esophagitis       pyridoxine 50 MG Tabs    VITAMIN B-6    1 bottle    1 daily    Disturbance of skin sensation       rosuvastatin 10 MG tablet    CRESTOR    90 tablet    TAKE 1 TABLET (10 MG) BY MOUTH DAILY    Hyperlipidemia LDL goal <100

## 2018-09-26 NOTE — LETTER
September 27, 2018      Lupe Lepe  [address] [facility] [address]        Dear ,    We are writing to inform you of your test results.  Slight decrease in her GFR, slight increase in creatinine.  Potassium is upper end of normal at 5.      Resulted Orders   Basic metabolic panel   Result Value Ref Range    Sodium 139 133 - 144 mmol/L    Potassium 5.0 3.4 - 5.3 mmol/L    Chloride 110 (H) 94 - 109 mmol/L    Carbon Dioxide 22 20 - 32 mmol/L    Anion Gap 7 3 - 14 mmol/L    Glucose 113 (H) 70 - 99 mg/dL    Urea Nitrogen 34 (H) 7 - 30 mg/dL    Creatinine 1.94 (H) 0.52 - 1.04 mg/dL    GFR Estimate 25 (L) >60 mL/min/1.7m2      Comment:      Non  GFR Calc    GFR Estimate If Black 30 (L) >60 mL/min/1.7m2      Comment:       GFR Calc    Calcium 8.9 8.5 - 10.1 mg/dL       If you have any questions or concerns, please call the clinic at the number listed above.       Sincerely,        Li Horton DO

## 2018-09-26 NOTE — PROGRESS NOTES
Injectable Influenza Immunization Documentation    1.  Is the person to be vaccinated sick today?   No    2. Does the person to be vaccinated have an allergy to a component   of the vaccine?   No  Egg Allergy Algorithm Link    3. Has the person to be vaccinated ever had a serious reaction   to influenza vaccine in the past?   No    4. Has the person to be vaccinated ever had Guillain-Barré syndrome?   No    Form completed by SUSHIL Stover         SUBJECTIVE:   Lupe Lepe is a 78 year old female who presents to clinic today for the following health issues:      Chief Complaint   Patient presents with     Follow up Kidney Doctor     Blood test on the potassium:    No UTI symptoms today.    Pt wants to know if Dr. Horton has received the Report from the Kidney Doctor.    Medication question: CLONIDINE HCL 0.1 MG PO TABS - Pt doesn't know why she stop to have it. Also, pt unsure if this is the right dose for her, please reviewed.  She reports she takes it PRN if blood pressure > 180    [facility] [place] [facility]10.  Potassium is elevated at 5.5.  He started a new medication but she is unsure of the name.  She has not even started it yet.  The nephrologist wanted me to start this medication.  I have not received any records yet.  Patient confirmed w/his office that they will fax us the report.    She is having hyperkalemia.    [phone] Dr. Ceferino Savage    I reviewed her blood pressure meds as correct as below.      Current Outpatient Prescriptions   Medication Sig Dispense Refill     amLODIPine (NORVASC) 10 MG tablet TAKE 1 TABLET (10 MG) BY MOUTH DAILY 90 tablet 3     ASPIRIN 325 MG OR TBEC 1 TABLET BY MOUTH DAILY 100 3     BENEFIBER OR TABS 2 TABLETS ORALLY DAILY  0     calcium-vitamin D (CALTRATE) 600-400 MG-UNIT per tablet Take 1 tablet by mouth 2 times daily. 100 tablet 3     cloNIDine (CATAPRES) 0.1 MG tablet ONE Twice DAILY as needed for SBP>180, diastolic BP>110  10 tablet 3     hydrochlorothiazide (HYDRODIURIL) 50 MG tablet TAKE 1 TABLET (50 MG) BY MOUTH EVERY MORNING 90 tablet 0     levothyroxine (SYNTHROID/LEVOTHROID) 25 MCG tablet 75 MCG (3 TABS) ON SUNDAY, MONDAY, WEDNESDAY, FRIDAY. 50 MCG (2 TABS) ON TUESDAY, THURSDAY, SATURDAY 216 tablet 3     lisinopril (PRINIVIL/ZESTRIL) 10 MG tablet TAKE 1 TABLET (10 MG) BY MOUTH 2 TIMES DAILY 180 tablet 3     MULTIVITAMINS OR TABS 1 TABLET BY MOUTH DAILY  0     omeprazole (PRILOSEC) 20 MG CR capsule TAKE 1 CAPSULE BY MOUTH EVERY 3RD DAY AS NEEDED 60 capsule 0     rosuvastatin (CRESTOR) 10 MG tablet TAKE 1 TABLET (10 MG) BY MOUTH DAILY 90 tablet 3     VITAMIN B-6 50 MG OR TABS 1 daily 1 bottle 1 year       Reviewed and updated as needed this visit by clinical staff  Tobacco  Allergies  Meds  Problems  Med Hx  Surg Hx  Fam Hx  Soc Hx        Reviewed and updated as needed this visit by Provider  Allergies  Meds  Problems         ROS:  Constitutional, HEENT, cardiovascular, pulmonary, gi and gu systems are negative, except as otherwise noted.    OBJECTIVE:     /64 (BP Location: Right leg, Patient Position: Sitting, Cuff Size: Adult Regular)  Pulse 70  Temp 96.6  F (35.9  C) (Tympanic)  Wt 155 lb (70.3 kg)  SpO2 96%  Breastfeeding? No  BMI 25.02 kg/m2  Body mass index is 25.02 kg/(m^2).  GENERAL APPEARANCE: healthy, alert and no distress    Diagnostic Test Results:  No results found for this or any previous visit (from the past 24 hour(s)).    ASSESSMENT/PLAN:       ICD-10-CM    1. CKD (chronic kidney disease) stage 4, GFR 15-29 ml/min (H) N18.4 Basic metabolic panel     Basic metabolic panel   2. Renovascular hypertension with goal blood pressure less than 130/80 I15.0 cloNIDine (CATAPRES) 0.1 MG tablet   3. Need for prophylactic vaccination and inoculation against influenza Z23 FLU VACCINE, INCREASED ANTIGEN, PRESV FREE, AGE 65+ [60578]     Vaccine Administration, Initial [59820]     Await  records    9/28/2018  Addendum:    Received records from nephrology.  No changes were made to her meds.  Considered adding chlorthalidone instead of hydrochlorothiazide for better blood pressure control at her current GFR.      Li Horton, [facility]

## 2018-09-28 ENCOUNTER — TELEPHONE (OUTPATIENT)
Dept: FAMILY MEDICINE | Facility: CLINIC | Age: 78
End: 2018-09-28

## 2018-09-28 ENCOUNTER — ALLIED HEALTH/NURSE VISIT (OUTPATIENT)
Dept: FAMILY MEDICINE | Facility: CLINIC | Age: 78
End: 2018-09-28
Payer: COMMERCIAL

## 2018-09-28 DIAGNOSIS — N18.4 CKD (CHRONIC KIDNEY DISEASE) STAGE 4, GFR 15-29 ML/MIN (H): Chronic | ICD-10-CM

## 2018-09-28 DIAGNOSIS — Z00.00 HEALTHCARE MAINTENANCE: Primary | ICD-10-CM

## 2018-09-28 DIAGNOSIS — I15.0 RENOVASCULAR HYPERTENSION WITH GOAL BLOOD PRESSURE LESS THAN 130/80: Primary | Chronic | ICD-10-CM

## 2018-09-28 PROCEDURE — 99207 ZZC NO CHARGE NURSE ONLY: CPT

## 2018-09-28 RX ORDER — CHLORTHALIDONE 25 MG/1
25 TABLET ORAL DAILY
Qty: 90 TABLET | Refills: 1 | COMMUNITY
Start: 2018-09-28 | End: 2018-09-28

## 2018-09-28 RX ORDER — CHLORTHALIDONE 25 MG/1
25 TABLET ORAL DAILY
Qty: 90 TABLET | Refills: 3 | Status: SHIPPED | OUTPATIENT
Start: 2018-09-28 | End: 2019-05-21

## 2018-09-28 NOTE — TELEPHONE ENCOUNTER
I just reviewed the nephrology note and it states they were 'thinking about' this change.  But it sounds like now they have made the change.  I will update her chart.

## 2018-09-28 NOTE — MR AVS SNAPSHOT
After Visit Summary   9/28/2018    Lupe Lepe    MRN: [id number]           Patient Information     Date Of Birth          1940        Visit Information        Provider Department      9/28/2018 1:00 PM NELLY MEAD/MASOOD CABRERA [facility]        Today's Diagnoses     Healthcare maintenance    -  1       Follow-ups after your visit        Who to contact     If you have questions or need follow up information about today's clinic visit or your schedule please contact [facility] directly at [phone].  Normal or non-critical lab and imaging results will be communicated to you by MyChart, letter or phone within 4 business days after the clinic has received the results. If you do not hear from us within 7 days, please contact the clinic through [facility] or phone. If you have a critical or abnormal lab result, we will notify you by phone as soon as possible.  Submit refill requests through [employer] or call your pharmacy and they will forward the refill request to us. Please allow 3 business days for your refill to be completed.          Additional Information About Your Visit        MyChart Information     [employer] gives you secure access to your electronic health record. If you see a primary care provider, you can also send messages to your care team and make appointments. If you have questions, please call your primary care clinic.  If you do not have a primary care provider, please call [phone] and they will assist you.        Care EveryWhere ID     This is your Care EveryWhere ID. This could be used by other organizations to access your [place] medical records  [id number]         Blood Pressure from Last 3 Encounters:   09/26/18 130/64   08/22/18 136/82   08/12/18 136/67    Weight from Last 3 Encounters:   09/26/18 155 lb (70.3 kg)   08/22/18 155 lb (70.3 kg)   08/12/18 154 lb (69.9 kg)              Today, you had the following     No orders found for display        Primary Care Provider Office Phone # Fax #    Li Horton [phone] [address] [facility] [address]        Equal Access to Services     DARIO ABDI : Hadii aad ku hadyeno Soisaiahali, waaxda luqadaha, qaybta kaalmada adelisetda, martinez de christopher carney. So [facility] [phone].    ATENCIÓN: Si habla español, tiene a saldana disposición servicios gratuitos de asistencia lingüística. Llame al [phone].    We comply with applicable federal civil rights laws and [place] laws. We do not discriminate on the basis of race, color, national origin, age, disability, sex, sexual orientation, or gender identity.            Thank you!     Thank you for choosing [facility]  for your care. Our goal is always to provide you with excellent care. Hearing back from our patients is one way we can continue to improve our services. Please take a few minutes to complete the written survey that you may receive in the mail after your visit with us. Thank you!             Your Updated Medication List - Protect others around you: Learn how to safely use, store and throw away your medicines at www.disposemymeds.org.          This list is accurate as of 9/28/18  1:08 PM.  Always use your most recent med list.                   Brand Name Dispense Instructions for use Diagnosis    amLODIPine 10 MG tablet    NORVASC    90 tablet    TAKE 1 TABLET (10 MG) BY MOUTH DAILY    Renovascular hypertension with goal blood pressure less than 130/80       aspirin 325 MG EC tablet     100    1 TABLET BY MOUTH DAILY        BENEFIBER Tabs      2 TABLETS ORALLY DAILY        calcium carbonate 600 mg-vitamin D 400 units 600-400 MG-UNIT per tablet    CALTRATE    100 tablet    Take 1 tablet by mouth 2 times daily.    Disorder of bone and cartilage, unspecified       cloNIDine 0.1 MG tablet    CATAPRES    10 tablet    ONE Twice DAILY as needed for SBP>180, diastolic BP>110    Renovascular hypertension  with goal blood pressure less than 130/80       hydrochlorothiazide 50 MG tablet    HYDRODIURIL    90 tablet    TAKE 1 TABLET (50 MG) BY MOUTH EVERY MORNING    Renovascular hypertension with goal blood pressure less than 130/80       levothyroxine 25 MCG tablet    SYNTHROID/LEVOTHROID    216 tablet    75 MCG (3 TABS) ON SUNDAY, MONDAY, WEDNESDAY, FRIDAY. 50 MCG (2 TABS) ON TUESDAY, THURSDAY, SATURDAY    Acquired hypothyroidism       lisinopril 10 MG tablet    PRINIVIL/ZESTRIL    180 tablet    TAKE 1 TABLET (10 MG) BY MOUTH 2 TIMES DAILY    Renovascular hypertension with goal blood pressure less than 130/80       multivitamin per tablet      1 TABLET BY MOUTH DAILY        omeprazole 20 MG CR capsule    priLOSEC    60 capsule    TAKE 1 CAPSULE BY MOUTH EVERY 3RD DAY AS NEEDED    Gastroesophageal reflux disease without esophagitis       pyridoxine 50 MG Tabs    VITAMIN B-6    1 bottle    1 daily    Disturbance of skin sensation       rosuvastatin 10 MG tablet    CRESTOR    90 tablet    TAKE 1 TABLET (10 MG) BY MOUTH DAILY    Hyperlipidemia LDL goal <100

## 2018-09-28 NOTE — TELEPHONE ENCOUNTER
Patient states her medication was to be changed per nephrologist. Discontinue hydrochlorothiazide and change to Chlorthalidone 25 mg per Dr. Savage with [facility] [phone]. Would you like to call to confirm?    Pharmacy: [facility]    Routing to provider     Jazz NUNEZ RN

## 2018-09-28 NOTE — PROGRESS NOTES
Patient states her medication was to be changed per nephrologist. Discontinue hydrochlorothiazide and change to Chlorthalidone 25 mg per Dr. Savage with [facility] [phone]. Would you like to call to confirm?    Pharmacy: [facility]    Routing to provider     Jazz NUNEZ RN

## 2018-09-28 NOTE — TELEPHONE ENCOUNTER
The patient states that you were to send in the prescription for her and her . It looks like at the last visit, she mentions this as well. Are you comfortable with sending in the rx?     Jazz NUNEZ RN

## 2018-11-12 ENCOUNTER — MYC MEDICAL ADVICE (OUTPATIENT)
Dept: FAMILY MEDICINE | Facility: CLINIC | Age: 78
End: 2018-11-12

## 2018-11-12 ENCOUNTER — OFFICE VISIT (OUTPATIENT)
Dept: OTOLARYNGOLOGY | Facility: CLINIC | Age: 78
End: 2018-11-12
Payer: COMMERCIAL

## 2018-11-12 VITALS
WEIGHT: 155 LBS | BODY MASS INDEX: 25.02 KG/M2 | DIASTOLIC BLOOD PRESSURE: 94 MMHG | SYSTOLIC BLOOD PRESSURE: 130 MMHG | HEART RATE: 91 BPM | TEMPERATURE: 98 F

## 2018-11-12 DIAGNOSIS — H61.22 IMPACTED CERUMEN OF LEFT EAR: Primary | ICD-10-CM

## 2018-11-12 PROCEDURE — 69210 REMOVE IMPACTED EAR WAX UNI: CPT | Mod: LT | Performed by: OTOLARYNGOLOGY

## 2018-11-12 PROCEDURE — 99207 ZZC DROP WITH A PROCEDURE: CPT | Performed by: OTOLARYNGOLOGY

## 2018-11-12 ASSESSMENT — PAIN SCALES - GENERAL: PAINLEVEL: NO PAIN (0)

## 2018-11-12 NOTE — TELEPHONE ENCOUNTER
please see her mychart note requesting scopolamine patches. I do not see that we have ever prescribed this before. Advised Rony.   Macarena Fernando RNC

## 2018-11-12 NOTE — NURSING NOTE
"Initial BP (!) 130/94 (BP Location: Right leg, Patient Position: Chair, Cuff Size: Adult Regular)  Pulse 91  Temp 98  F (36.7  C) (Tympanic)  Wt 70.3 kg (155 lb)  BMI 25.02 kg/m2 Estimated body mass index is 25.02 kg/(m^2) as calculated from the following:    Height as of 8/12/18: 1.676 m (5' 6\").    Weight as of this encounter: 70.3 kg (155 lb). .    Ramila Morse LPN    "

## 2018-11-12 NOTE — LETTER
11/12/2018         RE: Lupe Lepe  [address] [facility] [address]        Dear Colleague,    Thank you for referring your patient, Lupe Lepe, to the [facility]. Please see a copy of my visit note below.    History of Present Illness - Lupe Lepe is a 78 year old female who presents with a 2 week history of feeling that her hearing is tinny on the left. She denies drainage from the ears. She denies trouble with her hearing except when there is cerumen impaction. She plans to fly to the [place] in 2 weeks.    Past Medical History -   Patient Active Problem List   Diagnosis     Paroxysmal atrial fibrillation (H)     Renovascular hypertension with goal blood pressure less than 130/80     Acquired hypothyroidism     Osteopenia     Giant cell arteritis (H)     Stricture of artery (H)     Renal artery stenosis (H)     Gastroesophageal reflux disease without esophagitis     Abdominal cramps     Frequent Urinary Tract Infections     Type 2 diabetes mellitus with stage 4 chronic kidney disease, without long-term current use of insulin (H)     Hyperlipidemia LDL goal <100     ACP (advance care planning)     Anemia of renal disease     Iron deficiency anemia, unspecified     Chronic kidney disease, stage IV (severe) (H)     CKD (chronic kidney disease) stage 4, GFR 15-29 ml/min (H)     Subclavian arterial stenosis (H)       Current Medications -   Current Outpatient Prescriptions:      amLODIPine (NORVASC) 10 MG tablet, TAKE 1 TABLET (10 MG) BY MOUTH DAILY, Disp: 90 tablet, Rfl: 3     ASPIRIN 325 MG OR TBEC, 1 TABLET BY MOUTH DAILY, Disp: 100, Rfl: 3     BENEFIBER OR TABS, 2 TABLETS ORALLY DAILY, Disp: , Rfl: 0     calcium-vitamin D (CALTRATE) 600-400 MG-UNIT per tablet, Take 1 tablet by mouth 2 times daily., Disp: 100 tablet, Rfl: 3     chlorthalidone (HYGROTON) 25 MG tablet, Take 1 tablet (25 mg) by mouth daily, Disp: 90 tablet, Rfl: 3     cloNIDine (CATAPRES) 0.1 MG  tablet, ONE Twice DAILY as needed for SBP>180, diastolic BP>110, Disp: 10 tablet, Rfl: 3     levothyroxine (SYNTHROID/LEVOTHROID) 25 MCG tablet, 75 MCG (3 TABS) ON SUNDAY, MONDAY, WEDNESDAY, FRIDAY. 50 MCG (2 TABS) ON TUESDAY, THURSDAY, SATURDAY, Disp: 216 tablet, Rfl: 3     lisinopril (PRINIVIL/ZESTRIL) 10 MG tablet, TAKE 1 TABLET (10 MG) BY MOUTH 2 TIMES DAILY, Disp: 180 tablet, Rfl: 3     MULTIVITAMINS OR TABS, 1 TABLET BY MOUTH DAILY, Disp: , Rfl: 0     omeprazole (PRILOSEC) 20 MG CR capsule, TAKE 1 CAPSULE BY MOUTH EVERY 3RD DAY AS NEEDED, Disp: 60 capsule, Rfl: 0     rosuvastatin (CRESTOR) 10 MG tablet, TAKE 1 TABLET (10 MG) BY MOUTH DAILY, Disp: 90 tablet, Rfl: 3     VITAMIN B-6 50 MG OR TABS, 1 daily, Disp: 1 bottle, Rfl: 1 year    Allergies -   Allergies   Allergen Reactions     Bactrim      Acute renal failure and hyperkalemia     Ace Inhibitors Other (See Comments)     Hyperkalemia       Atorvastatin Calcium Itching     ELEVATED LFTS     Crestor [Rosuvastatin Calcium] Other (See Comments)     Elevated LFTS   In high doses     Penicillins Hives     Sulfamethoxazole-Trimethoprim      Other reaction(s): Other - Describe In Comment Field  Kidney shutdown     Ciprofloxacin Itching and Rash     Has taken oral without problems       Social History -   Social History     Social History     Marital status:      Spouse name: N/A     Number of children: N/A     Years of education: N/A     Social History Main Topics     Smoking status: Never Smoker     Smokeless tobacco: Never Used     Alcohol use No     Drug use: No     Sexual activity: Yes     Partners: Male     Other Topics Concern     Parent/Sibling W/ Cabg, Mi Or Angioplasty Before 65f 55m? No     Social History Narrative       Family History -   Family History   Problem Relation Age of Onset     Cancer Mother      ovarian CA     Cancer Father      lung CA     HEART DISEASE Maternal Grandmother      HEART DISEASE Maternal Grandfather      Depression  Sister      abby     Breast Cancer Sister        Review of Systems - As per HPI and PMHx, otherwise 7 system review of the head and neck negative. 10+ system review negative.    Exam:  BP (!) 130/94 (BP Location: Right leg, Patient Position: Chair, Cuff Size: Adult Regular)  Pulse 91  Temp 98  F (36.7  C) (Tympanic)  Wt 70.3 kg (155 lb)  BMI 25.02 kg/m2  General - The patient is well nourished and well developed, and appears to have good nutritional status.  Alert and oriented to person and place, answers questions and cooperates with examination appropriately.   Head and Face - Normocephalic and atraumatic, with no gross asymmetry noted of the contour of the facial features.  The facial nerve is intact, with strong symmetric movements.  Eyes - Extraocular movements intact.  Sclera were not icteric or injected, conjunctiva were pink and moist.  Ears - After cerumen removed, normal ear exam bilaterally.    Procedure: Cerumen Disimpaction  Diagnosis: Cerumen Impaction    Procedure and Findings  Ears - On examination of the ears, I found that the  ears were impacted with dense cerumen obscuring visualization of the left TM.  Therefore, I positioned the patient and I used the binocular surgical microscope to assist in visualization of the affected ear(s).  I began by using a cerumen loop to gently lift the edges of the cerumen mass away from the walls of the external canal.  Once I did this, I was able to suction away fragments of wax and debris using suction.  Once the mass was loose enough, the entire plug was pulled from the canal with microforceps.  The tympanic membrane was intact without sign of perforation or middle ear effusion and minimal ear canal trauma.             A/P - Lupe HOWARD Sherley is a 78 year old female with left cerumen impaction and collapsed external ear canal. I removed the cerumen and she felt much better. I advised her to return if there is concern about hearing being down in the  future.    Patient to follow up with Primary Care provider regarding elevated blood pressure.    Dr. Roberto Rivas MD  Otolaryngology  [facility]      Again, thank you for allowing me to participate in the care of your patient.        Sincerely,        Roberto Rivas MD

## 2018-11-12 NOTE — PROGRESS NOTES
History of Present Illness - Lupe Lepe is a 78 year old female who presents with a 2 week history of feeling that her hearing is tinny on the left. She denies drainage from the ears. She denies trouble with her hearing except when there is cerumen impaction. She plans to fly to the [place] in 2 weeks.    Past Medical History -   Patient Active Problem List   Diagnosis     Paroxysmal atrial fibrillation (H)     Renovascular hypertension with goal blood pressure less than 130/80     Acquired hypothyroidism     Osteopenia     Giant cell arteritis (H)     Stricture of artery (H)     Renal artery stenosis (H)     Gastroesophageal reflux disease without esophagitis     Abdominal cramps     Frequent Urinary Tract Infections     Type 2 diabetes mellitus with stage 4 chronic kidney disease, without long-term current use of insulin (H)     Hyperlipidemia LDL goal <100     ACP (advance care planning)     Anemia of renal disease     Iron deficiency anemia, unspecified     Chronic kidney disease, stage IV (severe) (H)     CKD (chronic kidney disease) stage 4, GFR 15-29 ml/min (H)     Subclavian arterial stenosis (H)       Current Medications -   Current Outpatient Prescriptions:      amLODIPine (NORVASC) 10 MG tablet, TAKE 1 TABLET (10 MG) BY MOUTH DAILY, Disp: 90 tablet, Rfl: 3     ASPIRIN 325 MG OR TBEC, 1 TABLET BY MOUTH DAILY, Disp: 100, Rfl: 3     BENEFIBER OR TABS, 2 TABLETS ORALLY DAILY, Disp: , Rfl: 0     calcium-vitamin D (CALTRATE) 600-400 MG-UNIT per tablet, Take 1 tablet by mouth 2 times daily., Disp: 100 tablet, Rfl: 3     chlorthalidone (HYGROTON) 25 MG tablet, Take 1 tablet (25 mg) by mouth daily, Disp: 90 tablet, Rfl: 3     cloNIDine (CATAPRES) 0.1 MG tablet, ONE Twice DAILY as needed for SBP>180, diastolic BP>110, Disp: 10 tablet, Rfl: 3     levothyroxine (SYNTHROID/LEVOTHROID) 25 MCG tablet, 75 MCG (3 TABS) ON SUNDAY, MONDAY, WEDNESDAY, FRIDAY. 50 MCG (2 TABS) ON TUESDAY, THURSDAY, SATURDAY, Disp: 216  tablet, Rfl: 3     lisinopril (PRINIVIL/ZESTRIL) 10 MG tablet, TAKE 1 TABLET (10 MG) BY MOUTH 2 TIMES DAILY, Disp: 180 tablet, Rfl: 3     MULTIVITAMINS OR TABS, 1 TABLET BY MOUTH DAILY, Disp: , Rfl: 0     omeprazole (PRILOSEC) 20 MG CR capsule, TAKE 1 CAPSULE BY MOUTH EVERY 3RD DAY AS NEEDED, Disp: 60 capsule, Rfl: 0     rosuvastatin (CRESTOR) 10 MG tablet, TAKE 1 TABLET (10 MG) BY MOUTH DAILY, Disp: 90 tablet, Rfl: 3     VITAMIN B-6 50 MG OR TABS, 1 daily, Disp: 1 bottle, Rfl: 1 year    Allergies -   Allergies   Allergen Reactions     Bactrim      Acute renal failure and hyperkalemia     Ace Inhibitors Other (See Comments)     Hyperkalemia       Atorvastatin Calcium Itching     ELEVATED LFTS     Crestor [Rosuvastatin Calcium] Other (See Comments)     Elevated LFTS   In high doses     Penicillins Hives     Sulfamethoxazole-Trimethoprim      Other reaction(s): Other - Describe In Comment Field  Kidney shutdown     Ciprofloxacin Itching and Rash     Has taken oral without problems       Social History -   Social History     Social History     Marital status:      Spouse name: N/A     Number of children: N/A     Years of education: N/A     Social History Main Topics     Smoking status: Never Smoker     Smokeless tobacco: Never Used     Alcohol use No     Drug use: No     Sexual activity: Yes     Partners: Male     Other Topics Concern     Parent/Sibling W/ Cabg, Mi Or Angioplasty Before 65f 55m? No     Social History Narrative       Family History -   Family History   Problem Relation Age of Onset     Cancer Mother      ovarian CA     Cancer Father      lung CA     HEART DISEASE Maternal Grandmother      HEART DISEASE Maternal Grandfather      Depression Sister      sucide     Breast Cancer Sister        Review of Systems - As per HPI and PMHx, otherwise 7 system review of the head and neck negative. 10+ system review negative.    Exam:  BP (!) 130/94 (BP Location: Right leg, Patient Position: Chair, Cuff Size:  Adult Regular)  Pulse 91  Temp 98  F (36.7  C) (Tympanic)  Wt 70.3 kg (155 lb)  BMI 25.02 kg/m2  General - The patient is well nourished and well developed, and appears to have good nutritional status.  Alert and oriented to person and place, answers questions and cooperates with examination appropriately.   Head and Face - Normocephalic and atraumatic, with no gross asymmetry noted of the contour of the facial features.  The facial nerve is intact, with strong symmetric movements.  Eyes - Extraocular movements intact.  Sclera were not icteric or injected, conjunctiva were pink and moist.  Ears - After cerumen removed, normal ear exam bilaterally.    Procedure: Cerumen Disimpaction  Diagnosis: Cerumen Impaction    Procedure and Findings  Ears - On examination of the ears, I found that the  ears were impacted with dense cerumen obscuring visualization of the left TM.  Therefore, I positioned the patient and I used the binocular surgical microscope to assist in visualization of the affected ear(s).  I began by using a cerumen loop to gently lift the edges of the cerumen mass away from the walls of the external canal.  Once I did this, I was able to suction away fragments of wax and debris using suction.  Once the mass was loose enough, the entire plug was pulled from the canal with microforceps.  The tympanic membrane was intact without sign of perforation or middle ear effusion and minimal ear canal trauma.             A/P - Lupe Lepe is a 78 year old female with left cerumen impaction and collapsed external ear canal. I removed the cerumen and she felt much better. I advised her to return if there is concern about hearing being down in the future.    Patient to follow up with Primary Care provider regarding elevated blood pressure.    Dr. Roberto Rivas MD  Otolaryngology  [facility]

## 2018-11-13 ENCOUNTER — ALLIED HEALTH/NURSE VISIT (OUTPATIENT)
Dept: FAMILY MEDICINE | Facility: CLINIC | Age: 78
End: 2018-11-13
Payer: COMMERCIAL

## 2018-11-13 ENCOUNTER — TELEPHONE (OUTPATIENT)
Dept: FAMILY MEDICINE | Facility: CLINIC | Age: 78
End: 2018-11-13

## 2018-11-13 DIAGNOSIS — K21.9 GASTROESOPHAGEAL REFLUX DISEASE WITHOUT ESOPHAGITIS: Primary | ICD-10-CM

## 2018-11-13 DIAGNOSIS — T75.3XXA MOTION SICKNESS, INITIAL ENCOUNTER: Primary | ICD-10-CM

## 2018-11-13 PROCEDURE — 99207 ZZC NO CHARGE NURSE ONLY: CPT

## 2018-11-13 RX ORDER — SCOLOPAMINE TRANSDERMAL SYSTEM 1 MG/1
PATCH, EXTENDED RELEASE TRANSDERMAL
Qty: 2 PATCH | Refills: 0 | Status: SHIPPED | OUTPATIENT
Start: 2018-11-13 | End: 2020-04-30

## 2018-11-13 NOTE — TELEPHONE ENCOUNTER
Dr. Horton,      Patient stopped into the clinic to ask for scopolamine patch for her cruise, 8 days. I have looked up medication as this patient is 78 y.o.  There is a warning for the elderly.  She also has CKD.  I have pended for you approval. Tracie CLIFTON RN

## 2018-11-13 NOTE — MR AVS SNAPSHOT
After Visit Summary   11/13/2018    Lupe Lepe    MRN: [id number]           Patient Information     Date Of Birth          1940        Visit Information        Provider Department      11/13/2018 1:00 PM NELLY MEAD/MASOOD CABRERA [facility]        Today's Diagnoses     Gastroesophageal reflux disease without esophagitis    -  1       Follow-ups after your visit        Who to contact     If you have questions or need follow up information about today's clinic visit or your schedule please contact [facility] directly at [phone].  Normal or non-critical lab and imaging results will be communicated to you by [employer], letter or phone within 4 business days after the clinic has received the results. If you do not hear from us within 7 days, please contact the clinic through [employer] or phone. If you have a critical or abnormal lab result, we will notify you by phone as soon as possible.  Submit refill requests through [employer] or call your pharmacy and they will forward the refill request to us. Please allow 3 business days for your refill to be completed.          Additional Information About Your Visit        MyChart Information     [employer] gives you secure access to your electronic health record. If you see a primary care provider, you can also send messages to your care team and make appointments. If you have questions, please call your primary care clinic.  If you do not have a primary care provider, please call [phone] and they will assist you.        Care EveryWhere ID     This is your Care EveryWhere ID. This could be used by other organizations to access your [place] medical records  [id number]         Blood Pressure from Last 3 Encounters:   11/12/18 (!) 130/94   09/26/18 130/64   08/22/18 136/82    Weight from Last 3 Encounters:   11/12/18 155 lb (70.3 kg)   09/26/18 155 lb (70.3 kg)   08/22/18 155 lb (70.3 kg)              Today, you had the following      No orders found for display       Primary Care Provider Office Phone # Fax #    Li Horton [phone] [address] [facility] [address]        Equal Access to Services     DARIO ABDI : Hadii aad ku hadyeno Soisaiahali, waaxda luqadaha, qaybta kaalmada adeegyada, waxbarbara johnsonn poojasteph de christopher carney. So [facility] [phone].    ATENCIÓN: Si habla español, tiene a saldana disposición servicios gratuitos de asistencia lingüística. Llame al [phone].    We comply with applicable federal civil rights laws and [place] laws. We do not discriminate on the basis of race, color, national origin, age, disability, sex, sexual orientation, or gender identity.            Thank you!     Thank you for choosing [facility]  for your care. Our goal is always to provide you with excellent care. Hearing back from our patients is one way we can continue to improve our services. Please take a few minutes to complete the written survey that you may receive in the mail after your visit with us. Thank you!             Your Updated Medication List - Protect others around you: Learn how to safely use, store and throw away your medicines at www.disposemymeds.org.          This list is accurate as of 11/13/18  1:08 PM.  Always use your most recent med list.                   Brand Name Dispense Instructions for use Diagnosis    amLODIPine 10 MG tablet    NORVASC    90 tablet    TAKE 1 TABLET (10 MG) BY MOUTH DAILY    Renovascular hypertension with goal blood pressure less than 130/80       aspirin 325 MG EC tablet     100    1 TABLET BY MOUTH DAILY        BENEFIBER Tabs      2 TABLETS ORALLY DAILY        calcium carbonate 600 mg-vitamin D 400 units 600-400 MG-UNIT per tablet    CALTRATE    100 tablet    Take 1 tablet by mouth 2 times daily.    Disorder of bone and cartilage, unspecified       chlorthalidone 25 MG tablet    HYGROTON    90 tablet    Take 1 tablet (25 mg) by mouth daily     Renovascular hypertension with goal blood pressure less than 130/80, CKD (chronic kidney disease) stage 4, GFR 15-29 ml/min (H)       cloNIDine 0.1 MG tablet    CATAPRES    10 tablet    ONE Twice DAILY as needed for SBP>180, diastolic BP>110    Renovascular hypertension with goal blood pressure less than 130/80       levothyroxine 25 MCG tablet    SYNTHROID/LEVOTHROID    216 tablet    75 MCG (3 TABS) ON SUNDAY, MONDAY, WEDNESDAY, FRIDAY. 50 MCG (2 TABS) ON TUESDAY, THURSDAY, SATURDAY    Acquired hypothyroidism       lisinopril 10 MG tablet    PRINIVIL/ZESTRIL    180 tablet    TAKE 1 TABLET (10 MG) BY MOUTH 2 TIMES DAILY    Renovascular hypertension with goal blood pressure less than 130/80       multivitamin per tablet      1 TABLET BY MOUTH DAILY        omeprazole 20 MG CR capsule    priLOSEC    60 capsule    TAKE 1 CAPSULE BY MOUTH EVERY 3RD DAY AS NEEDED    Gastroesophageal reflux disease without esophagitis       pyridoxine 50 MG Tabs    VITAMIN B-6    1 bottle    1 daily    Disturbance of skin sensation       rosuvastatin 10 MG tablet    CRESTOR    90 tablet    TAKE 1 TABLET (10 MG) BY MOUTH DAILY    Hyperlipidemia LDL goal <100

## 2018-11-29 ENCOUNTER — TELEPHONE (OUTPATIENT)
Dept: FAMILY MEDICINE | Facility: CLINIC | Age: 78
End: 2018-11-29

## 2018-11-29 NOTE — TELEPHONE ENCOUNTER
scopolamine (TRANSDERM) 72 hr patch 2 patch 0 11/13/2018  --   Sig: Apply 1 patch to hairless area behind one ear at least 4 hours before travel.  Remove old patch and change every 3 days (72 hours).   Class: E-Prescribe   Order: [id number]   E-Prescribing Status: Receipt confirmed by pharmacy (11/13/2018  4:44 PM CST)     It also looks like there has been a PA initiated this afternoon for this medication.     Verbal order given to pharmacist.      Jazz NUNEZ RN

## 2018-11-29 NOTE — TELEPHONE ENCOUNTER
Prior Authorization Approval    Authorization Effective Date: 8/31/2018  Authorization Expiration Date: 11/29/2019  Medication: scopolamine (TRANSDERM) 72 hr patch - APPROVED   Approved Dose/Quantity: 2 patches for a 6 day supply  Reference #:     Insurance Company: [employer] - Phone [phone] Fax [phone]  Expected CoPay:       CoPay Card Available:      Foundation Assistance Needed:    Which Pharmacy is filling the prescription (Not needed for infusion/clinic administered): [id number] [place] [address]  Pharmacy Notified: Yes  Patient Notified: Yes

## 2018-11-29 NOTE — TELEPHONE ENCOUNTER
This PA request was submitted to the insurance by the Central Prior Authorization Team.  If you have any questions in regards to this request, we can be reached at [phone].     Thanks    PA Initiation    Medication: scopolamine (TRANSDERM) 72 hr patch  Insurance Company: [employer] - Phone [phone] Fax [phone]  Pharmacy [facility]illing the Rx[id number] [place] [address]  Filling Pharmacy Phone: [phone]  Filling Pharmacy Fax:    Start Date: 11/29/2018    We completed this urgent request via phone with the insurance at [phone].

## 2018-11-29 NOTE — TELEPHONE ENCOUNTER
[facility] calling. They have been having a lot of computer problems and would like this script faxed to [phone]. They can no longer see it in the system.  Lianne Cox  CSS

## 2018-11-29 NOTE — TELEPHONE ENCOUNTER
Central Prior Authorization Team   Phone: [phone]    Prior Authorization Retail Medication Request    Medication/Dose: scopolamine (TRANSDERM) 72 hr patch  ICD code (if different than what is on RX):  Motion sickness, initial encounter [T75.3XXA]    Previously Tried and Failed:    Rationale:      Insurance Name:  Paul  BIN: [id number]  Group: [id number]  Insurance ID:  [id number]  PCN: [id number]      Pharmacy Information (if different than what is on RX)  [id number]  Phone:  [phone]

## 2018-12-05 DIAGNOSIS — K21.9 GASTROESOPHAGEAL REFLUX DISEASE WITHOUT ESOPHAGITIS: ICD-10-CM

## 2018-12-05 NOTE — TELEPHONE ENCOUNTER
"Requested Prescriptions   Pending Prescriptions Disp Refills     omeprazole (PRILOSEC) 20 MG DR capsule [Pharmacy Med Name: OMEPRAZOLE DR 20 MG CAPSULE] 60 capsule 0     Sig: TAKE 1 CAPSULE BY MOUTH EVERY 3RD DAY AS NEEDED    PPI Protocol Passed    12/5/2018  1:55 AM       Passed - Not on Clopidogrel (unless Pantoprazole ordered)       Passed - No diagnosis of osteoporosis on record       Passed - Recent (12 mo) or future (30 days) visit within the authorizing provider's specialty    Patient had office visit in the last 12 months or has a visit in the next 30 days with authorizing provider or within the authorizing provider's specialty.  See \"Patient Info\" tab in inbasket, or \"Choose Columns\" in Meds & Orders section of the refill encounter.             Passed - Patient is age 18 or older       Passed - No active pregnacy on record       Passed - No positive pregnancy test in past 12 months        Last Written Prescription Date:  6/18/18  Last Fill Quantity: 60,  # refills: 0   Last office visit: 11/13/2018 with prescribing provider:  OLLIE   Future Office Visit:      "

## 2019-02-11 DIAGNOSIS — N18.4 CKD (CHRONIC KIDNEY DISEASE) STAGE 4, GFR 15-29 ML/MIN (H): Chronic | ICD-10-CM

## 2019-02-11 LAB
ANION GAP SERPL CALCULATED.3IONS-SCNC: 6 MMOL/L (ref 3–14)
BUN SERPL-MCNC: 32 MG/DL (ref 7–30)
CALCIUM SERPL-MCNC: 8.7 MG/DL (ref 8.5–10.1)
CHLORIDE SERPL-SCNC: 110 MMOL/L (ref 94–109)
CO2 SERPL-SCNC: 23 MMOL/L (ref 20–32)
CREAT SERPL-MCNC: 1.59 MG/DL (ref 0.52–1.04)
GFR SERPL CREATININE-BSD FRML MDRD: 30 ML/MIN/{1.73_M2}
GLUCOSE SERPL-MCNC: 84 MG/DL (ref 70–99)
POTASSIUM SERPL-SCNC: 4.8 MMOL/L (ref 3.4–5.3)
SODIUM SERPL-SCNC: 139 MMOL/L (ref 133–144)

## 2019-02-11 PROCEDURE — 36415 COLL VENOUS BLD VENIPUNCTURE: CPT | Performed by: INTERNAL MEDICINE

## 2019-02-11 PROCEDURE — 80048 BASIC METABOLIC PNL TOTAL CA: CPT | Performed by: INTERNAL MEDICINE

## 2019-02-22 ENCOUNTER — TRANSFERRED RECORDS (OUTPATIENT)
Dept: HEALTH INFORMATION MANAGEMENT | Facility: CLINIC | Age: 79
End: 2019-02-22

## 2019-03-18 DIAGNOSIS — E03.9 ACQUIRED HYPOTHYROIDISM: Chronic | ICD-10-CM

## 2019-03-18 NOTE — TELEPHONE ENCOUNTER
"Requested Prescriptions   Pending Prescriptions Disp Refills     levothyroxine (SYNTHROID/LEVOTHROID) 25 MCG tablet  Last Written Prescription Date:  4/24/18  Last Fill Quantity: 216,  # refills: 3   Last office visit: 11/13/2018 with prescribing provider:  Clifford   Future Office Visit:     216 tablet 3    Thyroid Protocol Passed - 3/18/2019 11:17 AM       Passed - Patient is 12 years or older       Passed - Recent (12 mo) or future (30 days) visit within the authorizing provider's specialty    Patient had office visit in the last 12 months or has a visit in the next 30 days with authorizing provider or within the authorizing provider's specialty.  See \"Patient Info\" tab in inbasket, or \"Choose Columns\" in Meds & Orders section of the refill encounter.             Passed - Medication is active on med list       Passed - Normal TSH on file in past 12 months    Recent Labs   Lab Test 05/07/18  0829   TSH 1.15             Passed - No active pregnancy on record    If patient is pregnant or has had a positive pregnancy test, please check TSH.         Passed - No positive pregnancy test in past 12 months    If patient is pregnant or has had a positive pregnancy test, please check TSH.            "

## 2019-03-20 DIAGNOSIS — E03.9 ACQUIRED HYPOTHYROIDISM: Chronic | ICD-10-CM

## 2019-03-20 RX ORDER — LEVOTHYROXINE SODIUM 25 UG/1
TABLET ORAL
Qty: 144 TABLET | Refills: 0 | Status: SHIPPED | OUTPATIENT
Start: 2019-03-20 | End: 2019-03-26 | Stop reason: DRUGHIGH

## 2019-03-20 NOTE — TELEPHONE ENCOUNTER
Prescription approved per [facility] refill protocol. Maki PALACIOS RN

## 2019-03-20 NOTE — TELEPHONE ENCOUNTER
"Requested Prescriptions   Pending Prescriptions Disp Refills     levothyroxine (SYNTHROID/LEVOTHROID) 25 MCG tablet [Pharmacy Med Name: LEVOTHYROXINE 25 MCG TABLET] 216 tablet 3     Si MCG (3 TABS) ON , MONDAY, WEDNESDAY, FRIDAY. 50 MCG (2 TABS) ON TUESDAY, THURSDAY, SATURDAY    Thyroid Protocol Passed - 3/20/2019  1:36 AM       Passed - Patient is 12 years or older       Passed - Recent (12 mo) or future (30 days) visit within the authorizing provider's specialty    Patient had office visit in the last 12 months or has a visit in the next 30 days with authorizing provider or within the authorizing provider's specialty.  See \"Patient Info\" tab in inbasket, or \"Choose Columns\" in Meds & Orders section of the refill encounter.             Passed - Medication is active on med list       Passed - Normal TSH on file in past 12 months    Recent Labs   Lab Test 18  0829   TSH 1.15             Passed - No active pregnancy on record    If patient is pregnant or has had a positive pregnancy test, please check TSH.         Passed - No positive pregnancy test in past 12 months    If patient is pregnant or has had a positive pregnancy test, please check TSH.          Last Written Prescription Date:  18  Last Fill Quantity: 216,  # refills: 3   Last office visit: 2018 with prescribing provider:  Clifford   Future Office Visit:      "

## 2019-03-22 RX ORDER — LEVOTHYROXINE SODIUM 25 UG/1
TABLET ORAL
Qty: 216 TABLET | Refills: 0 | OUTPATIENT
Start: 2019-03-22

## 2019-03-25 ENCOUNTER — TELEPHONE (OUTPATIENT)
Dept: FAMILY MEDICINE | Facility: CLINIC | Age: 79
End: 2019-03-25

## 2019-03-25 DIAGNOSIS — I15.0 RENOVASCULAR HYPERTENSION WITH GOAL BLOOD PRESSURE LESS THAN 130/80: Chronic | ICD-10-CM

## 2019-03-25 DIAGNOSIS — E03.9 ACQUIRED HYPOTHYROIDISM: Primary | Chronic | ICD-10-CM

## 2019-03-25 RX ORDER — LEVOTHYROXINE SODIUM 75 UG/1
TABLET ORAL
Qty: 48 TABLET | Refills: 0 | Status: SHIPPED | OUTPATIENT
Start: 2019-03-25 | End: 2019-05-21

## 2019-03-25 RX ORDER — LEVOTHYROXINE SODIUM 50 UG/1
TABLET ORAL
Qty: 36 TABLET | Refills: 0 | Status: SHIPPED | OUTPATIENT
Start: 2019-03-25 | End: 2019-05-21

## 2019-03-25 NOTE — TELEPHONE ENCOUNTER
Reason for Call:  Other med question    Detailed comments: Patient is wondering why she only got 56 thyroid pills.  She states she always gets 216 pills at a time. She is very upset about this.    Phone Number Patient can be reached at: Home number on file [phone] (home)    Best Time: any    Can we leave a detailed message on this number? YES    Call taken on 3/25/2019 at 2:48 PM by Lauren Marte

## 2019-03-25 NOTE — TELEPHONE ENCOUNTER
Dr. Horton,    I have called the patient.  She did not  her synthroid Rx as she wants 90 day supply at a time.  I did discuss with her why is she getting her Rx this way?  Per patient it has always been this way but she is agreeable with cleaning it up.  Can we give this patient a Rx for synthroid 75 mcg for Sunday, Monday, Wednesday, Friday.  Synthroid 50 mcg for Tuesdays, Thursdays, Saturdays.?  I have pended for your approval.  Patient knows she needs a lab test done in May.  I have ordered this too. I have not dc'd the old Rx.Tracie CLIFTON RN

## 2019-03-25 NOTE — TELEPHONE ENCOUNTER
"Requested Prescriptions   Pending Prescriptions Disp Refills     amLODIPine (NORVASC) 10 MG tablet [Pharmacy Med Name: AMLODIPINE BESYLATE 10 MG TAB]  Last Written Prescription Date:  4/3/2018  Last Fill Quantity: 90,  # refills: 3   Last office visit: 9/26/2018 with prescribing provider:  Clifford    Future Office Visit:     90 tablet 3     Sig: TAKE 1 TABLET (10 MG) BY MOUTH DAILY    Calcium Channel Blockers Protocol  Failed - 3/25/2019  1:48 AM       Failed - Blood pressure under 140/90 in past 12 months    BP Readings from Last 3 Encounters:   11/12/18 (!) 130/94   09/26/18 130/64   08/22/18 136/82                Failed - Normal serum creatinine on file in past 12 months    Recent Labs   Lab Test 02/11/19  1109   CR 1.59*            Passed - Recent (12 mo) or future (30 days) visit within the authorizing provider's specialty    Patient had office visit in the last 12 months or has a visit in the next 30 days with authorizing provider or within the authorizing provider's specialty.  See \"Patient Info\" tab in inbasket, or \"Choose Columns\" in Meds & Orders section of the refill encounter.             Passed - Medication is active on med list       Passed - Patient is age 18 or older       Passed - No active pregnancy on record       Passed - No positive pregnancy test in past 12 months          "

## 2019-03-26 RX ORDER — AMLODIPINE BESYLATE 10 MG/1
TABLET ORAL
Qty: 90 TABLET | Refills: 3 | Status: SHIPPED | OUTPATIENT
Start: 2019-03-26 | End: 2020-03-15

## 2019-03-26 NOTE — TELEPHONE ENCOUNTER
Patient is called with the Rx being sent in for her and the lab being ordered for her in may. Tracie CLIFTON RN

## 2019-05-15 DIAGNOSIS — E11.9 DIABETES MELLITUS (H): Primary | ICD-10-CM

## 2019-05-15 NOTE — PROGRESS NOTES
SUBJECTIVE:   Lupe Lepe is a 79 year old female who presents to clinic today for the following   health issues:      Diabetes Follow-up      Patient is checking blood sugars: rarely.  Results range from 84 to 198    Diabetic concerns: None     Symptoms of hypoglycemia (low blood sugar): none     Paresthesias (numbness or burning in feet) or sores: No     Date of last diabetic eye exam: no reports in Epic - Pt has signed a KERRY for [facility] sent us the report.    BP Readings from Last 2 Encounters:   05/21/19 110/68   11/12/18 (!) 130/94     Hemoglobin A1C (%)   Date Value   05/21/2019 6.4 (H)   11/15/2017 6.6 (H)     LDL Cholesterol Calculated (mg/dL)   Date Value   05/21/2019 67   11/15/2017 52       Diabetes Management Resources  Hyperlipidemia Follow-Up      Rate your low fat/cholesterol diet?: good    Taking statin?  Yes, no muscle aches from statin    Other lipid medications/supplements?:  none    Amount of exercise or physical activity: 2-3 days/week for an average of 30-45 minutes    Problems taking medications regularly: No    Medication side effects: none    Diet: regular (no restrictions)     Osteopenia: was on Boniva in [id number].        Reviewed  and updated as needed this visit by clinical staff  Tobacco  Allergies  Meds  Problems  Med Hx  Surg Hx  Fam Hx  Soc Hx          Reviewed and updated as needed this visit by Provider  Problems         Current Outpatient Medications   Medication Sig Dispense Refill     amLODIPine (NORVASC) 10 MG tablet TAKE 1 TABLET (10 MG) BY MOUTH DAILY 90 tablet 3     ASPIRIN 325 MG OR TBEC 1 TABLET BY MOUTH DAILY 100 3     chlorthalidone (HYGROTON) 25 MG tablet Take 1 tablet (25 mg) by mouth daily 90 tablet 3     cholecalciferol (D-VI-SOL,VITAMIN D3) 400 units/mL (10 mcg/mL) LIQD liquid Take 400 Units by mouth daily       cloNIDine (CATAPRES) 0.1 MG tablet ONE Twice DAILY as needed for SBP>180, diastolic BP>110 10 tablet 3     levothyroxine  "(SYNTHROID/LEVOTHROID) 50 MCG tablet Take on Tuesdays, Thursdays, and Saturdays. 36 tablet 0     levothyroxine (SYNTHROID/LEVOTHROID) 75 MCG tablet On Sundays, Mondays, Wednesdays, and Fridays 48 tablet 0     lisinopril (PRINIVIL/ZESTRIL) 10 MG tablet TAKE 1 TABLET (10 MG) BY MOUTH 2 TIMES DAILY 180 tablet 3     MULTIVITAMINS OR TABS 1 TABLET BY MOUTH DAILY  0     omeprazole (PRILOSEC) 20 MG DR capsule TAKE 1 CAPSULE BY MOUTH EVERY 3RD DAY AS NEEDED 60 capsule 1     Probiotic Product (PROBIOTIC-10 PO)        rosuvastatin (CRESTOR) 10 MG tablet TAKE 1 TABLET (10 MG) BY MOUTH DAILY 90 tablet 3     VITAMIN B-6 50 MG OR TABS 1 daily 1 bottle 1 year     BENEFIBER OR TABS 2 TABLETS ORALLY DAILY  0     calcium-vitamin D (CALTRATE) 600-400 MG-UNIT per tablet Take 1 tablet by mouth 2 times daily. (Patient not taking: Reported on 5/21/2019) 100 tablet 3     scopolamine (TRANSDERM) 72 hr patch Apply 1 patch to hairless area behind one ear at least 4 hours before travel.  Remove old patch and change every 3 days (72 hours). (Patient not taking: Reported on 5/21/2019) 2 patch 0       Review of Systems   ROS COMP: Constitutional, HEENT, cardiovascular, pulmonary, gi and gu systems are negative, except as otherwise noted.      Objective    /68   Pulse 68   Temp 96.4  F (35.8  C) (Tympanic)   Resp 12   Ht 1.676 m (5' 6\")   Wt 69.9 kg (154 lb)   SpO2 97%   Breastfeeding? No   BMI 24.86 kg/m    Body mass index is 24.86 kg/m .  Physical Exam   GENERAL APPEARANCE: healthy, alert and no distress    Diagnostic Test Results:  Labs reviewed in Epic  Results for orders placed or performed in visit on 05/21/19 (from the past 24 hour(s))   **TSH with free T4 reflex FUTURE anytime   Result Value Ref Range    TSH 1.64 0.40 - 4.00 mU/L   Albumin Random Urine Quantitative with Creat Ratio   Result Value Ref Range    Creatinine Urine 239 mg/dL    Albumin Urine mg/L 21 mg/L    Albumin Urine mg/g Cr 8.79 0 - 25 mg/g Cr   Lipid panel " reflex to direct LDL Fasting   Result Value Ref Range    Cholesterol 156 <200 mg/dL    Triglycerides 154 (H) <150 mg/dL    HDL Cholesterol 58 >49 mg/dL    LDL Cholesterol Calculated 67 <100 mg/dL    Non HDL Cholesterol 98 <130 mg/dL   **A1C FUTURE 1yr   Result Value Ref Range    Hemoglobin A1C 6.4 (H) 0 - 5.6 %   Basic metabolic panel   Result Value Ref Range    Sodium 137 133 - 144 mmol/L    Potassium 4.5 3.4 - 5.3 mmol/L    Chloride 109 94 - 109 mmol/L    Carbon Dioxide 23 20 - 32 mmol/L    Anion Gap 5 3 - 14 mmol/L    Glucose 135 (H) 70 - 99 mg/dL    Urea Nitrogen 31 (H) 7 - 30 mg/dL    Creatinine 1.86 (H) 0.52 - 1.04 mg/dL    GFR Estimate 25 (L) >60 mL/min/[1.73_m2]    GFR Estimate If Black 29 (L) >60 mL/min/[1.73_m2]    Calcium 9.4 8.5 - 10.1 mg/dL           Assessment & Plan     1. Type 2 diabetes mellitus with stage 4 chronic kidney disease, without long-term current use of insulin (H) - 6 diet controlled.  Advised patient to check blood sugar 1-2 times per month and recheck A1c in 6 to 12 months    2. CKD (chronic kidney disease) stage 4, GFR 15-29 ml/min (H) -refills provided.  Blood pressure is well controlled.  Follows with nephrology.  Due for bone density test.  If has worsening bone density, only Prolia could be used due to her reduced GFR.  - DEXA HIP/PELVIS/SPINE - Future; Future  - chlorthalidone (HYGROTON) 25 MG tablet; Take 1 tablet (25 mg) by mouth daily  Dispense: 90 tablet; Refill: 3    3. Renal artery stenosis (H) -overdue for follow-up with vascular, patient is aware    4. Paroxysmal atrial fibrillation (H) -not on anticoagulation, due to single episode in 2002.    5. Renovascular hypertension with goal blood pressure less than 130/80 -stable, refill provided  - chlorthalidone (HYGROTON) 25 MG tablet; Take 1 tablet (25 mg) by mouth daily  Dispense: 90 tablet; Refill: 3  - cloNIDine (CATAPRES) 0.1 MG tablet; ONE Twice DAILY as needed for SBP>180, diastolic BP>110  Dispense: 10 tablet; Refill:  3  - lisinopril (PRINIVIL/ZESTRIL) 10 MG tablet; Take 1 tablet (10 mg) by mouth 2 times daily  Dispense: 180 tablet; Refill: 3    6. History of giant cell arteritis -in 2006, off prednisone since 2009    7. Acquired hypothyroidism -stable, refill provided  - levothyroxine (SYNTHROID/LEVOTHROID) 50 MCG tablet; Take on Tuesdays, Thursdays, and Saturdays.  Dispense: 36 tablet; Refill: 3  - levothyroxine (SYNTHROID/LEVOTHROID) 75 MCG tablet; On Sundays, Mondays, Wednesdays, and Fridays  Dispense: 48 tablet; Refill: 3    8. Gastroesophageal reflux disease without esophagitis -stable, refill provided  - omeprazole (PRILOSEC) 20 MG DR capsule; TAKE 1 CAPSULE BY MOUTH EVERY 3RD DAY AS NEEDED  Dispense: 60 capsule; Refill: 11    9. Hyperlipidemia LDL goal <100 -stable, refill provided  - rosuvastatin (CRESTOR) 10 MG tablet; Take 1 tablet (10 mg) by mouth daily  Dispense: 90 tablet; Refill: 3    10. Menopausal and perimenopausal disorder   - DEXA HIP/PELVIS/SPINE - Future; Future       Patient Instructions   1. Radiology test was ordered - bone density.  Please call [phone] to schedule.  If you have osteoporosis , treatment would be with prolia 6 mg injection every 6 months.  Given in Dr. Horton's clinic      Return in about 6 months (around 11/21/2019) for Diabetes Check with lab prior.      Dr. Li Horton,   [facility]

## 2019-05-21 ENCOUNTER — OFFICE VISIT (OUTPATIENT)
Dept: FAMILY MEDICINE | Facility: CLINIC | Age: 79
End: 2019-05-21
Payer: COMMERCIAL

## 2019-05-21 VITALS
RESPIRATION RATE: 12 BRPM | WEIGHT: 154 LBS | SYSTOLIC BLOOD PRESSURE: 110 MMHG | TEMPERATURE: 96.4 F | HEART RATE: 68 BPM | DIASTOLIC BLOOD PRESSURE: 68 MMHG | HEIGHT: 66 IN | BODY MASS INDEX: 24.75 KG/M2 | OXYGEN SATURATION: 97 %

## 2019-05-21 DIAGNOSIS — E11.9 DIABETES MELLITUS (H): ICD-10-CM

## 2019-05-21 DIAGNOSIS — E03.9 ACQUIRED HYPOTHYROIDISM: Chronic | ICD-10-CM

## 2019-05-21 DIAGNOSIS — E11.22 TYPE 2 DIABETES MELLITUS WITH STAGE 4 CHRONIC KIDNEY DISEASE, WITHOUT LONG-TERM CURRENT USE OF INSULIN (H): Primary | ICD-10-CM

## 2019-05-21 DIAGNOSIS — N18.4 CKD (CHRONIC KIDNEY DISEASE) STAGE 4, GFR 15-29 ML/MIN (H): Chronic | ICD-10-CM

## 2019-05-21 DIAGNOSIS — N18.4 TYPE 2 DIABETES MELLITUS WITH STAGE 4 CHRONIC KIDNEY DISEASE, WITHOUT LONG-TERM CURRENT USE OF INSULIN (H): Primary | ICD-10-CM

## 2019-05-21 DIAGNOSIS — I70.1 RENAL ARTERY STENOSIS (H): ICD-10-CM

## 2019-05-21 DIAGNOSIS — Z87.39 HISTORY OF GIANT CELL ARTERITIS: ICD-10-CM

## 2019-05-21 DIAGNOSIS — N95.9 MENOPAUSAL AND PERIMENOPAUSAL DISORDER: ICD-10-CM

## 2019-05-21 DIAGNOSIS — E78.5 HYPERLIPIDEMIA LDL GOAL <100: ICD-10-CM

## 2019-05-21 DIAGNOSIS — I48.0 PAROXYSMAL ATRIAL FIBRILLATION (H): ICD-10-CM

## 2019-05-21 DIAGNOSIS — I15.0 RENOVASCULAR HYPERTENSION WITH GOAL BLOOD PRESSURE LESS THAN 130/80: Chronic | ICD-10-CM

## 2019-05-21 DIAGNOSIS — K21.9 GASTROESOPHAGEAL REFLUX DISEASE WITHOUT ESOPHAGITIS: ICD-10-CM

## 2019-05-21 LAB
ANION GAP SERPL CALCULATED.3IONS-SCNC: 5 MMOL/L (ref 3–14)
BUN SERPL-MCNC: 31 MG/DL (ref 7–30)
CALCIUM SERPL-MCNC: 9.4 MG/DL (ref 8.5–10.1)
CHLORIDE SERPL-SCNC: 109 MMOL/L (ref 94–109)
CHOLEST SERPL-MCNC: 156 MG/DL
CO2 SERPL-SCNC: 23 MMOL/L (ref 20–32)
CREAT SERPL-MCNC: 1.86 MG/DL (ref 0.52–1.04)
CREAT UR-MCNC: 239 MG/DL
GFR SERPL CREATININE-BSD FRML MDRD: 25 ML/MIN/{1.73_M2}
GLUCOSE SERPL-MCNC: 135 MG/DL (ref 70–99)
HBA1C MFR BLD: 6.4 % (ref 0–5.6)
HDLC SERPL-MCNC: 58 MG/DL
LDLC SERPL CALC-MCNC: 67 MG/DL
MICROALBUMIN UR-MCNC: 21 MG/L
MICROALBUMIN/CREAT UR: 8.79 MG/G CR (ref 0–25)
NONHDLC SERPL-MCNC: 98 MG/DL
POTASSIUM SERPL-SCNC: 4.5 MMOL/L (ref 3.4–5.3)
SODIUM SERPL-SCNC: 137 MMOL/L (ref 133–144)
TRIGL SERPL-MCNC: 154 MG/DL
TSH SERPL DL<=0.005 MIU/L-ACNC: 1.64 MU/L (ref 0.4–4)

## 2019-05-21 PROCEDURE — 80048 BASIC METABOLIC PNL TOTAL CA: CPT | Performed by: INTERNAL MEDICINE

## 2019-05-21 PROCEDURE — 36415 COLL VENOUS BLD VENIPUNCTURE: CPT | Performed by: INTERNAL MEDICINE

## 2019-05-21 PROCEDURE — 84443 ASSAY THYROID STIM HORMONE: CPT | Performed by: INTERNAL MEDICINE

## 2019-05-21 PROCEDURE — 80061 LIPID PANEL: CPT | Performed by: INTERNAL MEDICINE

## 2019-05-21 PROCEDURE — 83036 HEMOGLOBIN GLYCOSYLATED A1C: CPT | Performed by: INTERNAL MEDICINE

## 2019-05-21 PROCEDURE — 99214 OFFICE O/P EST MOD 30 MIN: CPT | Performed by: INTERNAL MEDICINE

## 2019-05-21 PROCEDURE — 82043 UR ALBUMIN QUANTITATIVE: CPT | Performed by: INTERNAL MEDICINE

## 2019-05-21 RX ORDER — CHLORTHALIDONE 25 MG/1
25 TABLET ORAL DAILY
Qty: 90 TABLET | Refills: 3 | Status: SHIPPED | OUTPATIENT
Start: 2019-05-21 | End: 2020-05-27

## 2019-05-21 RX ORDER — CLONIDINE HYDROCHLORIDE 0.1 MG/1
TABLET ORAL
Qty: 10 TABLET | Refills: 3 | Status: SHIPPED | OUTPATIENT
Start: 2019-05-21

## 2019-05-21 RX ORDER — ROSUVASTATIN CALCIUM 10 MG/1
10 TABLET, COATED ORAL DAILY
Qty: 90 TABLET | Refills: 3 | Status: SHIPPED | OUTPATIENT
Start: 2019-05-21 | End: 2020-06-12

## 2019-05-21 RX ORDER — LEVOTHYROXINE SODIUM 50 UG/1
TABLET ORAL
Qty: 36 TABLET | Refills: 3 | Status: SHIPPED | OUTPATIENT
Start: 2019-05-21 | End: 2019-06-28

## 2019-05-21 RX ORDER — LEVOTHYROXINE SODIUM 75 UG/1
TABLET ORAL
Qty: 48 TABLET | Refills: 3 | Status: SHIPPED | OUTPATIENT
Start: 2019-05-21 | End: 2019-06-28

## 2019-05-21 RX ORDER — LISINOPRIL 10 MG/1
10 TABLET ORAL 2 TIMES DAILY
Qty: 180 TABLET | Refills: 3 | Status: SHIPPED | OUTPATIENT
Start: 2019-05-21 | End: 2020-08-14

## 2019-05-21 ASSESSMENT — MIFFLIN-ST. JEOR: SCORE: 1180.29

## 2019-05-21 NOTE — PATIENT INSTRUCTIONS
1. Radiology test was ordered - bone density.  Please call [phone] to schedule.  If you have osteoporosis , treatment would be with prolia 6 mg injection every 6 months.  Given in Dr. Horton's clinic

## 2019-06-28 ENCOUNTER — ALLIED HEALTH/NURSE VISIT (OUTPATIENT)
Dept: FAMILY MEDICINE | Facility: CLINIC | Age: 79
End: 2019-06-28
Payer: COMMERCIAL

## 2019-06-28 DIAGNOSIS — Z00.00 HEALTHCARE MAINTENANCE: Primary | ICD-10-CM

## 2019-06-28 DIAGNOSIS — E03.9 ACQUIRED HYPOTHYROIDISM: Chronic | ICD-10-CM

## 2019-06-28 PROCEDURE — 99207 ZZC NO CHARGE NURSE ONLY: CPT

## 2019-06-28 RX ORDER — LEVOTHYROXINE SODIUM 75 UG/1
TABLET ORAL
Qty: 36 TABLET | Refills: 3 | Status: SHIPPED | OUTPATIENT
Start: 2019-06-28 | End: 2020-03-13

## 2019-06-28 RX ORDER — LEVOTHYROXINE SODIUM 50 UG/1
TABLET ORAL
Qty: 48 TABLET | Refills: 3 | Status: SHIPPED | OUTPATIENT
Start: 2019-06-28 | End: 2020-03-13

## 2019-06-28 NOTE — TELEPHONE ENCOUNTER
Patient stops by clinic. Needs update on Levothyroxine. She states she has been taking 50 mcg 4 times weekly (Tuesday, Thursday, Saturday, and Sundays) and 75mcg 3 times weekly (Monday, Wednesday, and Friday).     The previous rx was opposite and the patient states this is incorrect.     Medication sig updated and pended for signature if appropriate.      YAMILKA GagnonN, RN

## 2019-07-02 ENCOUNTER — OFFICE VISIT (OUTPATIENT)
Dept: FAMILY MEDICINE | Facility: CLINIC | Age: 79
End: 2019-07-02
Payer: COMMERCIAL

## 2019-07-02 VITALS
BODY MASS INDEX: 24.91 KG/M2 | DIASTOLIC BLOOD PRESSURE: 84 MMHG | HEART RATE: 75 BPM | TEMPERATURE: 97.4 F | WEIGHT: 155 LBS | SYSTOLIC BLOOD PRESSURE: 136 MMHG | RESPIRATION RATE: 14 BRPM | OXYGEN SATURATION: 97 % | HEIGHT: 66 IN

## 2019-07-02 DIAGNOSIS — N30.00 ACUTE CYSTITIS WITHOUT HEMATURIA: Primary | ICD-10-CM

## 2019-07-02 DIAGNOSIS — R30.0 DYSURIA: ICD-10-CM

## 2019-07-02 DIAGNOSIS — N18.4 CKD (CHRONIC KIDNEY DISEASE) STAGE 4, GFR 15-29 ML/MIN (H): Chronic | ICD-10-CM

## 2019-07-02 LAB
ALBUMIN UR-MCNC: NEGATIVE MG/DL
ANION GAP SERPL CALCULATED.3IONS-SCNC: 5 MMOL/L (ref 3–14)
APPEARANCE UR: CLEAR
BACTERIA #/AREA URNS HPF: ABNORMAL /HPF
BILIRUB UR QL STRIP: NEGATIVE
BUN SERPL-MCNC: 32 MG/DL (ref 7–30)
CALCIUM SERPL-MCNC: 9.1 MG/DL (ref 8.5–10.1)
CHLORIDE SERPL-SCNC: 110 MMOL/L (ref 94–109)
CO2 SERPL-SCNC: 23 MMOL/L (ref 20–32)
COLOR UR AUTO: YELLOW
CREAT SERPL-MCNC: 1.69 MG/DL (ref 0.52–1.04)
GFR SERPL CREATININE-BSD FRML MDRD: 28 ML/MIN/{1.73_M2}
GLUCOSE SERPL-MCNC: 182 MG/DL (ref 70–99)
GLUCOSE UR STRIP-MCNC: NEGATIVE MG/DL
HGB UR QL STRIP: NEGATIVE
KETONES UR STRIP-MCNC: NEGATIVE MG/DL
LEUKOCYTE ESTERASE UR QL STRIP: ABNORMAL
NITRATE UR QL: NEGATIVE
PH UR STRIP: 5.5 PH (ref 5–7)
POTASSIUM SERPL-SCNC: 4.9 MMOL/L (ref 3.4–5.3)
RBC #/AREA URNS AUTO: ABNORMAL /HPF
SODIUM SERPL-SCNC: 138 MMOL/L (ref 133–144)
SOURCE: ABNORMAL
SP GR UR STRIP: 1.01 (ref 1–1.03)
UROBILINOGEN UR STRIP-ACNC: 0.2 EU/DL (ref 0.2–1)
WBC #/AREA URNS AUTO: ABNORMAL /HPF

## 2019-07-02 PROCEDURE — 80048 BASIC METABOLIC PNL TOTAL CA: CPT | Performed by: NURSE PRACTITIONER

## 2019-07-02 PROCEDURE — 99213 OFFICE O/P EST LOW 20 MIN: CPT | Performed by: NURSE PRACTITIONER

## 2019-07-02 PROCEDURE — 36415 COLL VENOUS BLD VENIPUNCTURE: CPT | Performed by: NURSE PRACTITIONER

## 2019-07-02 PROCEDURE — 81001 URINALYSIS AUTO W/SCOPE: CPT | Performed by: NURSE PRACTITIONER

## 2019-07-02 RX ORDER — CIPROFLOXACIN 250 MG/1
250 TABLET, FILM COATED ORAL 2 TIMES DAILY
Qty: 10 TABLET | Refills: 0 | Status: SHIPPED | OUTPATIENT
Start: 2019-07-02 | End: 2020-04-30

## 2019-07-02 ASSESSMENT — MIFFLIN-ST. JEOR: SCORE: 1184.83

## 2019-07-02 ASSESSMENT — PAIN SCALES - GENERAL: PAINLEVEL: MODERATE PAIN (4)

## 2019-07-02 NOTE — NURSING NOTE
"Initial There were no vitals taken for this visit. Estimated body mass index is 24.86 kg/m  as calculated from the following:    Height as of 5/21/19: 1.676 m (5' 6\").    Weight as of 5/21/19: 69.9 kg (154 lb). .    Concha Hines on 7/2/2019 at 3:34 PM    "

## 2019-07-02 NOTE — PROGRESS NOTES
Subjective     Lupe Lepe is a 79 year old female who presents to clinic today for the following health issues:  Chief Complaint   Patient presents with     Dysuria     x 2 days     Chronic Disease Management     would like GFR drawn today         HPI   URINARY TRACT SYMPTOMS      Duration: x 2 days    Description  dysuria and urgency    Intensity:  moderate    Accompanying signs and symptoms:  Fever/chills: no   Flank pain no   Nausea and vomiting: no   Vaginal symptoms: none  Abdominal/Pelvic Pain: YES    History  History of frequent UTI's: YES  History of kidney stones: no   Sexually Active: no   Possibility of pregnancy: No    Precipitating or alleviating factors: None    Therapies tried and outcome: course of antibiotics - Ciprofloxacin   Outcome: works well. Patient has allergies to other abx.     Last episode was last year time frame   No fevers, no flank pain,  No nausea/vomiting  No diarrhea.    Chronic Kidney Disease Follow-up      Current NSAID use?  No      Amount of exercise or physical activity: 2-3 days/week for an average of 30-45 minutes    Problems taking medications regularly: No    Medication side effects: none    Diet: regular (no restrictions)    Patient is requesting a GFR be drawn today due to feeling ill (UTI symptoms).          Patient Active Problem List   Diagnosis     Paroxysmal atrial fibrillation (H)     Renovascular hypertension with goal blood pressure less than 130/80     Acquired hypothyroidism     Osteopenia of multiple sites     History of giant cell arteritis     Stricture of artery (H)     Renal artery stenosis (H)     Gastroesophageal reflux disease without esophagitis     Abdominal cramps     Frequent Urinary Tract Infections     Type 2 diabetes mellitus with stage 4 chronic kidney disease, without long-term current use of insulin (H)     Hyperlipidemia LDL goal <100     ACP (advance care planning)     Anemia of renal disease     Iron deficiency anemia, unspecified      Chronic kidney disease, stage IV (severe) (H)     CKD (chronic kidney disease) stage 4, GFR 15-29 ml/min (H)     Subclavian arterial stenosis (H)     Past Surgical History:   Procedure Laterality Date     COLONOSCOPY  8/25/2008     ESOPHAGOSCOPY, GASTROSCOPY, DUODENOSCOPY (EGD), COMBINED N/A 11/16/2017    Procedure: COMBINED ESOPHAGOSCOPY, GASTROSCOPY, DUODENOSCOPY (EGD);  Gastroscopy;  Surgeon: Angel Guillen MD;  Location: [facility]     HC COLONOSCOPY THRU STOMA, DIAGNOSTIC  2000, 5/06, 8/08    Normal     HYSTERECTOMY, SIS  1970's     PHACOEMULSIFICATION WITH STANDARD INTRAOCULAR LENS IMPLANT Right 1/8/2018    Procedure: PHACOEMULSIFICATION WITH STANDARD INTRAOCULAR LENS IMPLANT;  Right Cataract Removal with Implant;  Surgeon: Sekou Dobbins MD;  Location: [facility]     PHACOEMULSIFICATION WITH STANDARD INTRAOCULAR LENS IMPLANT Left 1/29/2018    Procedure: PHACOEMULSIFICATION WITH STANDARD INTRAOCULAR LENS IMPLANT;  Left Cataract Removal with Implant;  Surgeon: Sekou Dobbins MD;  Location: [facility]     SURGICAL HISTORY OF -   1948    SIS, for fibroids     SURGICAL HISTORY OF -   12/16/2008    Esophagogastroduodenoscopy with biopsy     TONSILLECTOMY      Tonsilectomy       Social History     Tobacco Use     Smoking status: Never Smoker     Smokeless tobacco: Never Used   Substance Use Topics     Alcohol use: No     Family History   Problem Relation Age of Onset     Cancer Mother         ovarian CA     Cancer Father         lung CA     Heart Disease Maternal Grandmother      Heart Disease Maternal Grandfather      Depression Sister         sucide     Breast Cancer Sister          Current Outpatient Medications   Medication Sig Dispense Refill     amLODIPine (NORVASC) 10 MG tablet TAKE 1 TABLET (10 MG) BY MOUTH DAILY 90 tablet 3     ASPIRIN 325 MG OR TBEC 1 TABLET BY MOUTH DAILY 100 3     BENEFIBER OR TABS 2 TABLETS ORALLY DAILY  0     chlorthalidone (HYGROTON) 25 MG tablet Take 1 tablet (25 mg) by mouth  daily 90 tablet 3     cholecalciferol (D-VI-SOL,VITAMIN D3) 400 units/mL (10 mcg/mL) LIQD liquid Take 400 Units by mouth daily       ciprofloxacin (CIPRO) 250 MG tablet Take 1 tablet (250 mg) by mouth 2 times daily 10 tablet 0     cloNIDine (CATAPRES) 0.1 MG tablet ONE Twice DAILY as needed for SBP>180, diastolic BP>110 10 tablet 3     levothyroxine (SYNTHROID/LEVOTHROID) 50 MCG tablet Take on Tuesdays, Thursdays, Saturdays, and Sundays 48 tablet 3     levothyroxine (SYNTHROID/LEVOTHROID) 75 MCG tablet On Mondays, Wednesdays, and Fridays 36 tablet 3     lisinopril (PRINIVIL/ZESTRIL) 10 MG tablet Take 1 tablet (10 mg) by mouth 2 times daily 180 tablet 3     MULTIVITAMINS OR TABS 1 TABLET BY MOUTH DAILY  0     omeprazole (PRILOSEC) 20 MG DR capsule TAKE 1 CAPSULE BY MOUTH EVERY 3RD DAY AS NEEDED 60 capsule 11     Probiotic Product (PROBIOTIC-10 PO)        rosuvastatin (CRESTOR) 10 MG tablet Take 1 tablet (10 mg) by mouth daily 90 tablet 3     VITAMIN B-6 50 MG OR TABS 1 daily 1 bottle 1 year     calcium-vitamin D (CALTRATE) 600-400 MG-UNIT per tablet Take 1 tablet by mouth 2 times daily. (Patient not taking: Reported on 5/21/2019) 100 tablet 3     scopolamine (TRANSDERM) 72 hr patch Apply 1 patch to hairless area behind one ear at least 4 hours before travel.  Remove old patch and change every 3 days (72 hours). (Patient not taking: Reported on 5/21/2019) 2 patch 0     Allergies   Allergen Reactions     Bactrim      Acute renal failure and hyperkalemia     Ace Inhibitors Other (See Comments)     Hyperkalemia       Atorvastatin Calcium Itching     ELEVATED LFTS     Crestor [Rosuvastatin Calcium] Other (See Comments)     Elevated LFTS   In high doses     Penicillins Hives     Sulfamethoxazole-Trimethoprim      Other reaction(s): Other - Describe In Comment Field  Kidney shutdown     Ciprofloxacin Itching and Rash     Has taken oral without problems     Recent Labs   Lab Test 05/21/19  1341 02/11/19  1109  05/07/18  0829  " 11/15/17  0856  03/23/17  0912  06/06/16  0903  05/28/15  1930 04/08/15  0808   A1C 6.4*  --   --   --   --  6.6*  --  6.2*  --  6.4*   < >  --  6.5*   LDL 67  --   --   --   --  52  --   --   --  79  --   --  75   HDL 58  --   --   --   --  46*  --   --   --  50  --   --  54   TRIG 154*  --   --   --   --  110  --   --   --  152*  --   --  176*   ALT  --   --   --   --   --  25  --   --   --   --   --  48 24   CR 1.86* 1.59*   < >  --    < > 2.27*   < >  --    < > 1.75*   < > 1.26* 1.33*   GFRESTIMATED 25* 30*   < >  --    < > 21*   < >  --    < > 28*   < > 41* 39*   GFRESTBLACK 29* 35*   < >  --    < > 25*   < >  --    < > 34*   < > 50* 47*   POTASSIUM 4.5 4.8   < >  --    < > 5.1   < >  --    < > 5.0   < > 3.7 4.6   TSH 1.64  --   --  1.15  --   --   --  1.27  --   --    < >  --   --     < > = values in this interval not displayed.      BP Readings from Last 3 Encounters:   07/02/19 136/84   05/21/19 110/68   11/12/18 (!) 130/94    Wt Readings from Last 3 Encounters:   07/02/19 70.3 kg (155 lb)   05/21/19 69.9 kg (154 lb)   11/12/18 70.3 kg (155 lb)                    Reviewed and updated as needed this visit by Provider  Tobacco  Allergies  Meds  Problems  Med Hx  Surg Hx  Fam Hx         Review of Systems   ROS COMP: Constitutional, HEENT, cardiovascular, pulmonary, GI, , musculoskeletal, neuro, skin, endocrine and psych systems are negative, except as otherwise noted.      Objective    /84   Pulse 75   Temp 97.4  F (36.3  C) (Tympanic)   Resp 14   Ht 1.676 m (5' 6\")   Wt 70.3 kg (155 lb)   SpO2 97%   BMI 25.02 kg/m    Body mass index is 25.02 kg/m .  Physical Exam   GENERAL: healthy, alert and no distress  NECK: no adenopathy, no asymmetry, masses, or scars and thyroid normal to palpation  RESP: lungs clear to auscultation - no rales, rhonchi or wheezes  CV: regular rate and rhythm, normal S1 S2, no S3 or S4, no murmur, click or rub, no peripheral edema and peripheral pulses " "strong  ABDOMEN: soft, nontender, no hepatosplenomegaly, no masses and bowel sounds normal  MS: no gross musculoskeletal defects noted, no edema    Diagnostic Test Results:  Labs reviewed in Epic  Results for orders placed or performed in visit on 07/02/19 (from the past 24 hour(s))   UA with Microscopic   Result Value Ref Range    Color Urine Yellow     Appearance Urine Clear     Glucose Urine Negative NEG^Negative mg/dL    Bilirubin Urine Negative NEG^Negative    Ketones Urine Negative NEG^Negative mg/dL    Specific Gravity Urine 1.010 1.003 - 1.035    pH Urine 5.5 5.0 - 7.0 pH    Protein Albumin Urine Negative NEG^Negative mg/dL    Urobilinogen Urine 0.2 0.2 - 1.0 EU/dL    Nitrite Urine Negative NEG^Negative    Blood Urine Negative NEG^Negative    Leukocyte Esterase Urine Large (A) NEG^Negative    Source Midstream Urine     WBC Urine 10-25 (A) OTO5^0 - 5 /HPF    RBC Urine O - 2 OTO2^O - 2 /HPF    Bacteria Urine Few (A) NEG^Negative /HPF           Assessment & Plan     1. Acute cystitis without hematuria   The risks, benefits and treatment options of prescribed medications or other treatments have been discussed with the patient. The patient verbalized their understanding and should call or follow up if no improvement or if they develop further problems.  Lower dose due to history of CKD.  Tolerated Cipro well in the past.  Alg states itching, rash - but patient reports this was to the IV Cipro.    - ciprofloxacin (CIPRO) 250 MG tablet; Take 1 tablet (250 mg) by mouth 2 times daily  Dispense: 10 tablet; Refill: 0    2. Dysuria     - UA with Microscopic    3. CKD (chronic kidney disease) stage 4, GFR 15-29 ml/min (H)   Patient requesting to check Cr and GFR.    - Basic metabolic panel     BMI:   Estimated body mass index is 25.02 kg/m  as calculated from the following:    Height as of this encounter: 1.676 m (5' 6\").    Weight as of this encounter: 70.3 kg (155 lb).           Patient Instructions   PREVENTION OF " URINARY TRACT INFECTION    AVOID CHEMICAL IRRITTANTS: bath gels,  Perfumed products,  Deoderant pads or tampons, douching    CLOTHING that increases moisture and bacterial growth:  Nylon, lycra, pantihose, pantiliners     AVOID: tight clothing and thongs    ACIDIFY URINE: cranberry tablets instead of cranberry juice (with excess sugar) to acidify urine and decrease bacterial growth.     URINATE after intercourse    FLUIDS: 6-8 glasses water per day    Prescription written for antibiotics to be taken twice daily for 5 days.  Advised to take entire course of antibiotic despite improvement in symptoms.    WONG Mar            Return if symptoms worsen or fail to improve.    Pau Lr NP  [facility] [place]

## 2019-07-02 NOTE — PATIENT INSTRUCTIONS
PREVENTION OF URINARY TRACT INFECTION    AVOID CHEMICAL IRRITTANTS: bath gels,  Perfumed products,  Deoderant pads or tampons, douching    CLOTHING that increases moisture and bacterial growth:  Nylon, lycra, pantihose, pantiliners     AVOID: tight clothing and thongs    ACIDIFY URINE: cranberry tablets instead of cranberry juice (with excess sugar) to acidify urine and decrease bacterial growth.     URINATE after intercourse    FLUIDS: 6-8 glasses water per day    Prescription written for antibiotics to be taken twice daily for 5 days.  Advised to take entire course of antibiotic despite improvement in symptoms.    WONG Mar

## 2019-07-23 ENCOUNTER — HOSPITAL ENCOUNTER (OUTPATIENT)
Dept: BONE DENSITY | Facility: CLINIC | Age: 79
Discharge: HOME | End: 2019-07-23
Attending: INTERNAL MEDICINE | Admitting: INTERNAL MEDICINE
Payer: COMMERCIAL

## 2019-07-23 DIAGNOSIS — N18.4 CKD (CHRONIC KIDNEY DISEASE) STAGE 4, GFR 15-29 ML/MIN (H): Chronic | ICD-10-CM

## 2019-07-23 DIAGNOSIS — N95.9 MENOPAUSAL AND PERIMENOPAUSAL DISORDER: ICD-10-CM

## 2019-07-23 PROCEDURE — 77080 DXA BONE DENSITY AXIAL: CPT

## 2019-09-11 ENCOUNTER — IMMUNIZATION (OUTPATIENT)
Dept: FAMILY MEDICINE | Facility: CLINIC | Age: 79
End: 2019-09-11
Payer: COMMERCIAL

## 2019-09-11 DIAGNOSIS — Z23 NEED FOR PROPHYLACTIC VACCINATION AND INOCULATION AGAINST INFLUENZA: Primary | ICD-10-CM

## 2019-09-11 PROCEDURE — 99207 ZZC NO CHARGE NURSE ONLY: CPT

## 2019-09-11 PROCEDURE — G0008 ADMIN INFLUENZA VIRUS VAC: HCPCS

## 2019-09-11 PROCEDURE — 90662 IIV NO PRSV INCREASED AG IM: CPT

## 2019-09-30 ENCOUNTER — TRANSFERRED RECORDS (OUTPATIENT)
Dept: HEALTH INFORMATION MANAGEMENT | Facility: CLINIC | Age: 79
End: 2019-09-30

## 2019-11-02 ENCOUNTER — HEALTH MAINTENANCE LETTER (OUTPATIENT)
Age: 79
End: 2019-11-02

## 2019-12-29 ENCOUNTER — APPOINTMENT (OUTPATIENT)
Dept: GENERAL RADIOLOGY | Facility: CLINIC | Age: 79
End: 2019-12-29
Attending: NURSE PRACTITIONER
Payer: COMMERCIAL

## 2019-12-29 ENCOUNTER — HOSPITAL ENCOUNTER (EMERGENCY)
Facility: CLINIC | Age: 79
Discharge: HOME | End: 2019-12-29
Attending: NURSE PRACTITIONER | Admitting: NURSE PRACTITIONER
Payer: COMMERCIAL

## 2019-12-29 VITALS
HEIGHT: 66 IN | DIASTOLIC BLOOD PRESSURE: 69 MMHG | RESPIRATION RATE: 18 BRPM | OXYGEN SATURATION: 95 % | BODY MASS INDEX: 24.43 KG/M2 | SYSTOLIC BLOOD PRESSURE: 141 MMHG | WEIGHT: 152 LBS | TEMPERATURE: 99.2 F

## 2019-12-29 DIAGNOSIS — J11.1 INFLUENZA-LIKE ILLNESS: ICD-10-CM

## 2019-12-29 LAB
FLUAV AG UPPER RESP QL IA.RAPID: NEGATIVE
FLUBV AG UPPER RESP QL IA.RAPID: NEGATIVE
INTERNAL QC OK POCT: YES

## 2019-12-29 PROCEDURE — 71046 X-RAY EXAM CHEST 2 VIEWS: CPT

## 2019-12-29 PROCEDURE — 87804 INFLUENZA ASSAY W/OPTIC: CPT | Performed by: NURSE PRACTITIONER

## 2019-12-29 PROCEDURE — 99214 OFFICE O/P EST MOD 30 MIN: CPT | Mod: Z6 | Performed by: NURSE PRACTITIONER

## 2019-12-29 PROCEDURE — G0463 HOSPITAL OUTPT CLINIC VISIT: HCPCS | Mod: 25

## 2019-12-29 PROCEDURE — G0463 HOSPITAL OUTPT CLINIC VISIT: HCPCS

## 2019-12-29 RX ORDER — OSELTAMIVIR PHOSPHATE 75 MG/1
75 CAPSULE ORAL 2 TIMES DAILY
Qty: 10 CAPSULE | Refills: 0 | Status: SHIPPED | OUTPATIENT
Start: 2019-12-29 | End: 2020-04-30

## 2019-12-29 ASSESSMENT — ENCOUNTER SYMPTOMS
CHILLS: 1
SHORTNESS OF BREATH: 0
SORE THROAT: 1
FATIGUE: 1
COUGH: 1
FEVER: 1
RHINORRHEA: 1

## 2019-12-29 ASSESSMENT — MIFFLIN-ST. JEOR: SCORE: 1171.22

## 2019-12-29 NOTE — ED AVS SNAPSHOT
[facility] Emergency Department  [address] [address]  Phone:  [phone]  Fax:  [phone]                                    Lupe Lepe   MRN: [id number]    Department:  [facility] Emergency Department   Date of Visit:  12/29/2019           After Visit Summary Signature Page    I have received my discharge instructions, and my questions have been answered. I have discussed any challenges I see with this plan with the nurse or doctor.    ..........................................................................................................................................  Patient/Patient Representative Signature      ..........................................................................................................................................  Patient Representative Print Name and Relationship to Patient    ..................................................               ................................................  Date                                   Time    ..........................................................................................................................................  Reviewed by Signature/Title    ...................................................              ..............................................  Date                                               Time          22EPIC Rev 08/18

## 2019-12-30 NOTE — ED PROVIDER NOTES
History     Chief Complaint   Patient presents with     Influenza     started on friday.  productive cough.  body aches.       HPI  Lupe Lepe is a 79 year old female with history of T2DM, hypertension, hyperlipidemia, CKD stage IV, and  A. fib, who presents to urgent care for evaluation of cough, congestion, fever, and body aches.  Symptoms started Friday evening, 2 days ago.  Cough is productive.  Denies chest pain or shortness of breath.    Allergies:  Allergies   Allergen Reactions     Bactrim      Acute renal failure and hyperkalemia     Ace Inhibitors Other (See Comments)     Hyperkalemia       Atorvastatin Calcium Itching     ELEVATED LFTS     Crestor [Rosuvastatin Calcium] Other (See Comments)     Elevated LFTS   In high doses     Penicillins Hives     Sulfamethoxazole-Trimethoprim      Other reaction(s): Other - Describe In Comment Field  Kidney shutdown     Ciprofloxacin Itching and Rash     Has taken oral without problems       Problem List:    Patient Active Problem List    Diagnosis Date Noted     Renal artery stenosis (H) 08/25/2006     Priority: High     Left. Likely due to temporal arteritis. S/p left angioplasty 8/06. Increased BP 10/06. Renal duplex- Increased velocity left RA, ratios within normal limits. Right RA velocity slightly increased. MRA- normal left renal artery. Moderate right renal artery stenosis. Probable splenic artery stenosis. Angio 10/06 with bilateral renal artery angioplasty. Admit [facility] increased BP 11/06- renal angiogram this time shows no stenosis and no gradient. Renal ultrasound 6/08- normal. Renal ultrasound 6/08- normal with elevated pressures.  MRA 4/10- minimal narrowing main right renal artery.       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 Duplex 7/06- Right SCV, brachial biphasic. Right radial monophasic, right ulnar biphasic-triphasic. Left SCV triphasic. Left brachial biphasic, left radial  and ulnar monophasic. Brachial wrist indices  0.57 right/0.58 left. Angio 8/06- c/w arteritis, right RA stenosis. Repeat angiogram 6/06 no intracranial arteritis or stenosis, mild left intracranial artery plaque. Duplex 9/06- Right SCV and axillary triphasic. Right brachial, radial and ulnar monophasic. Left SCV biphasic-triphasic.  Left axillary and brachial monophasic. Left radial and ulnar monophasic-biphasic. Brachial wrist indices 0.63 right/0.60 left.  Duplex 2/08 unchanged. MRA 4/10- right vertebral occluded  Followed by Dr Aly at [place]        History of giant cell arteritis 03/27/2006     Priority: High     Prolonged low grade temps. Normal wbc, lft, UA, BCX. , CRP 92. Normal monospot, lymes screen. CT chest abdomen and pelvis negative 4/06. Bcx negative. BM BX negative. Fungal, AFB culture negative. Temporal artery biopsy positive. Flare with prednisone taper. Cellcept started 11/06, stopped 8/08 due to frequent UTIs. Started MTX 12/08 after Am[facility] eval. Off prednisone 8/09.     Needs blood pressure checked in legs.  Sees DR. Ivory Aly at [facility].       Subclavian arterial stenosis (H) 06/29/2018     Priority: Medium     Cannot measure blood pressure in bilateral arms.  Blood pressure readings in leg are 10-20% higher than arm.       CKD (chronic kidney disease) stage 4, GFR 15-29 ml/min (H) 08/31/2016     Priority: Medium     Proteinuria on ACEi, follows with  [facility] Dr. Ceferino Savage [phone]       Iron deficiency anemia, unspecified 08/30/2016     Priority: Medium     Diagnosis updated by automated process. Provider to review and confirm.       Chronic kidney disease, stage IV (severe) (H) 08/30/2016     Priority: Medium     Anemia of renal disease 06/09/2016     Priority: Medium     Type 2 diabetes mellitus with stage 4 chronic kidney disease, without long-term current use of insulin (H) 10/31/2010     Priority: Medium     DX 2006. 2003 2hour-glucose 202.  FBS >126x2 1/06. No retinopathy, peripheral neuropathy.  Was on insulin previously, was able to get off this in 2014 or so.       Hyperlipidemia LDL goal <100 10/31/2010     Priority: Medium     Renovascular hypertension with goal blood pressure less than 130/80      Priority: Medium     Hospitalized with malignant HTN [facility] 11/06. Angio- no recurrant renal artery stenosis. Metanephrines normal.  Aldosterone was undetectably low.  Urine catecholamines within normal limits.  Renin activity normal.        ACP (advance care planning) 05/06/2011     Priority: Low     Advance Care Planning 6/9/2016: ACP Review of Chart / Resources Provided:  Reviewed chart for advance care plan.  Lupe Lepe has no plan or code status on file however states presence of ACP document. Copy requested.   Added by Ivelisse Olivares  Pt has completed an Advance/Health Care Directive (HCD), to bring in copy to be scanned into Epic.           Frequent Urinary Tract Infections 01/23/2009     Priority: Low     Cysto 7/09 normal        Abdominal cramps 10/16/2008     Priority: Low     Episodic severe cramping associated with emesis since 2007. GI evaluation 10/08. [facility] evaluation 12/08. 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 Rx jeanne-pac for H. Pylori with resolution of symptoms 1/09. Recurrent 2012, shorter symptoms.        Gastroesophageal reflux disease without esophagitis      Priority: Low     Paroxysmal atrial fibrillation (H)      Priority: Low     paroxymal single episode 2002. ECHO, Adenosine cardiolite normal.  Not on anticoagulation        Acquired hypothyroidism      Priority: Low     Osteopenia of multiple sites      Priority: Low     dexa 1999, 2003. Dexa 7/06 -lumbar spine -1.9,  right femur -1.0,  left femur is -1.6. Boniva started 2007.  Dexa 2/08- L spine -2.4, right femoral neck -1.4, left femoral neck -2.0. Dexa 5/11- L1-L4  -1.8 ,  right femoral neck -1.7, left femoral neck -1.9.            Past  Medical History:    Past Medical History:   Diagnosis Date     Abdominal cramps 10/16/2008     Abnormal CT scan 1/12/2009     Arthritis      Closed fracture of unspecified part of fibula      Heart disease      Hemorrhage of rectum and anus      HERPES ZOSTER NOS 5/23/2007     Hypertension      HYPOSMIA      IRON DEFIC ANEMIA NOS 3/27/2006     JOINT EFFUSION-L/LEG 4/7/2006     Other closed fractures of distal end of radius (alone)      Polymyalgia rheumatica (H)      Thyroid disease        Past Surgical History:    Past Surgical History:   Procedure Laterality Date     COLONOSCOPY  8/25/2008     ESOPHAGOSCOPY, GASTROSCOPY, DUODENOSCOPY (EGD), COMBINED N/A 11/16/2017    Procedure: COMBINED ESOPHAGOSCOPY, GASTROSCOPY, DUODENOSCOPY (EGD);  Gastroscopy;  Surgeon: Angel Guillen MD;  Location: [facility]     HC COLONOSCOPY THRU STOMA, DIAGNOSTIC  2000, 5/06, 8/08    Normal     HYSTERECTOMY, SIS  1970's     PHACOEMULSIFICATION WITH STANDARD INTRAOCULAR LENS IMPLANT Right 1/8/2018    Procedure: PHACOEMULSIFICATION WITH STANDARD INTRAOCULAR LENS IMPLANT;  Right Cataract Removal with Implant;  Surgeon: Sekou Dobbins MD;  Location: [facility]     PHACOEMULSIFICATION WITH STANDARD INTRAOCULAR LENS IMPLANT Left 1/29/2018    Procedure: PHACOEMULSIFICATION WITH STANDARD INTRAOCULAR LENS IMPLANT;  Left Cataract Removal with Implant;  Surgeon: Sekou Dobbins MD;  Location: [facility]     SURGICAL HISTORY OF -   1948    SIS, for fibroids     SURGICAL HISTORY OF -   12/16/2008    Esophagogastroduodenoscopy with biopsy     TONSILLECTOMY      Tonsilectomy       Family History:    Family History   Problem Relation Age of Onset     Cancer Mother         ovarian CA     Cancer Father         lung CA     Heart Disease Maternal Grandmother      Heart Disease Maternal Grandfather      Depression Sister         sucide     Breast Cancer Sister        Social History:  Marital Status:   [2]  Social History     Tobacco Use     Smoking  "status: Never Smoker     Smokeless tobacco: Never Used   Substance Use Topics     Alcohol use: No     Drug use: No        Medications:    oseltamivir (TAMIFLU) 75 MG capsule  amLODIPine (NORVASC) 10 MG tablet  ASPIRIN 325 MG OR TBEC  BENEFIBER OR TABS  calcium-vitamin D (CALTRATE) 600-400 MG-UNIT per tablet  chlorthalidone (HYGROTON) 25 MG tablet  cholecalciferol (D-VI-SOL,VITAMIN D3) 400 units/mL (10 mcg/mL) LIQD liquid  ciprofloxacin (CIPRO) 250 MG tablet  cloNIDine (CATAPRES) 0.1 MG tablet  levothyroxine (SYNTHROID/LEVOTHROID) 50 MCG tablet  levothyroxine (SYNTHROID/LEVOTHROID) 75 MCG tablet  lisinopril (PRINIVIL/ZESTRIL) 10 MG tablet  MULTIVITAMINS OR TABS  omeprazole (PRILOSEC) 20 MG DR capsule  Probiotic Product (PROBIOTIC-10 PO)  rosuvastatin (CRESTOR) 10 MG tablet  scopolamine (TRANSDERM) 72 hr patch  VITAMIN B-6 50 MG OR TABS          Review of Systems   Constitutional: Positive for chills, fatigue and fever.   HENT: Positive for congestion, rhinorrhea and sore throat.    Respiratory: Positive for cough. Negative for shortness of breath.    Cardiovascular: Negative for chest pain.       Physical Exam   BP: (!) 141/69  Heart Rate: 109  Temp: 99.2  F (37.3  C)  Resp: 18  Height: 167.6 cm (5' 6\")  Weight: 68.9 kg (152 lb)  SpO2: 95 %      Physical Exam    GENERAL APPEARANCE: alert and oriented.  Ill-appearing but nontoxic..   EYES: conjunctiva clear  HENT: bilateral ear canals clear, intact, and without inflammation. Right TM normal. Left TM normal.  Rhinorrhea.  Oropharynx without ulcers, erythema or lesions  NECK: supple, nontender, no lymphadenopathy  RESP: lungs clear to auscultation - no rales, rhonchi or wheezes  CV: Tachycardia and regular rhythm, normal S1 S2, no murmur noted      ED Course        Procedures               Results for orders placed or performed during the hospital encounter of 12/29/19 (from the past 24 hour(s))   Influenza A/B antigen POCT   Result Value Ref Range    Influenza A " negative neg    Influenza B negative neg    Internal QC OK Yes    XR Chest 2 Views    Narrative    CHEST TWO VIEW   12/29/2019 5:55 PM     HISTORY: Cough.    COMPARISON: Chest x-ray on 5/28/2015.      Impression    IMPRESSION: No acute airspace disease.       Medications - No data to display    Assessments & Plan (with Medical Decision Making)   History and exam is consistent with an influenza-like illness.  Patient's influenza test results were negative today, but I suspect this is a false negative.  Chest x-ray was obtained and is negative for evidence of consolidation or infiltrate to suggest pneumonia.  Given patient's age and symptoms being less than 48 hours she will be treated with Tamiflu.  Patient is in in agreement with the plan.  Worrisome reasons to return discussed.  I have reviewed the nursing notes.    I have reviewed the findings, diagnosis, plan and need for follow up with the patient.      New Prescriptions    OSELTAMIVIR (TAMIFLU) 75 MG CAPSULE    Take 1 capsule (75 mg) by mouth 2 times daily for 5 days       Final diagnoses:   Influenza-like illness       12/29/2019   [facility] EMERGENCY DEPARTMENT     Emily Espinosa APRN CNP  12/29/19 [id number]

## 2019-12-30 NOTE — DISCHARGE INSTRUCTIONS
Tamiflu 75 mg twice a day for 5 days.  Drink plenty fluids stay well-hydrated.  Rest.  Return for worsening symptoms as discussed.

## 2020-02-10 ENCOUNTER — HEALTH MAINTENANCE LETTER (OUTPATIENT)
Age: 80
End: 2020-02-10

## 2020-03-03 ENCOUNTER — HOSPITAL ENCOUNTER (EMERGENCY)
Facility: CLINIC | Age: 80
Discharge: HOME | End: 2020-03-03
Attending: PHYSICIAN ASSISTANT | Admitting: PHYSICIAN ASSISTANT
Payer: COMMERCIAL

## 2020-03-03 VITALS
TEMPERATURE: 98 F | WEIGHT: 154 LBS | DIASTOLIC BLOOD PRESSURE: 74 MMHG | HEART RATE: 82 BPM | SYSTOLIC BLOOD PRESSURE: 123 MMHG | RESPIRATION RATE: 16 BRPM | BODY MASS INDEX: 24.86 KG/M2

## 2020-03-03 DIAGNOSIS — N39.0 URINARY TRACT INFECTION: ICD-10-CM

## 2020-03-03 LAB
ALBUMIN UR-MCNC: NEGATIVE MG/DL
APPEARANCE UR: ABNORMAL
BACTERIA #/AREA URNS HPF: ABNORMAL /HPF
BILIRUB UR QL STRIP: NEGATIVE
COLOR UR AUTO: YELLOW
GLUCOSE UR STRIP-MCNC: NEGATIVE MG/DL
HGB UR QL STRIP: NEGATIVE
HYALINE CASTS #/AREA URNS LPF: 17 /LPF (ref 0–2)
KETONES UR STRIP-MCNC: NEGATIVE MG/DL
LEUKOCYTE ESTERASE UR QL STRIP: ABNORMAL
MUCOUS THREADS #/AREA URNS LPF: PRESENT /LPF
NITRATE UR QL: NEGATIVE
PH UR STRIP: 5 PH (ref 5–7)
RBC #/AREA URNS AUTO: 11 /HPF (ref 0–2)
SOURCE: ABNORMAL
SP GR UR STRIP: 1.02 (ref 1–1.03)
UROBILINOGEN UR STRIP-MCNC: 4 MG/DL (ref 0–2)
WBC #/AREA URNS AUTO: 83 /HPF (ref 0–5)
WBC CLUMPS #/AREA URNS HPF: PRESENT /HPF

## 2020-03-03 PROCEDURE — 87186 SC STD MICRODIL/AGAR DIL: CPT | Performed by: PHYSICIAN ASSISTANT

## 2020-03-03 PROCEDURE — 81001 URINALYSIS AUTO W/SCOPE: CPT | Performed by: PHYSICIAN ASSISTANT

## 2020-03-03 PROCEDURE — G0463 HOSPITAL OUTPT CLINIC VISIT: HCPCS | Performed by: PHYSICIAN ASSISTANT

## 2020-03-03 PROCEDURE — 87086 URINE CULTURE/COLONY COUNT: CPT | Performed by: PHYSICIAN ASSISTANT

## 2020-03-03 PROCEDURE — 87088 URINE BACTERIA CULTURE: CPT | Performed by: PHYSICIAN ASSISTANT

## 2020-03-03 PROCEDURE — 99214 OFFICE O/P EST MOD 30 MIN: CPT | Mod: Z6 | Performed by: PHYSICIAN ASSISTANT

## 2020-03-03 RX ORDER — CIPROFLOXACIN 250 MG/1
250 TABLET, FILM COATED ORAL 2 TIMES DAILY
Qty: 10 TABLET | Refills: 0 | Status: SHIPPED | OUTPATIENT
Start: 2020-03-03 | End: 2020-04-30

## 2020-03-03 ASSESSMENT — ENCOUNTER SYMPTOMS
FREQUENCY: 1
GASTROINTESTINAL NEGATIVE: 1
MUSCULOSKELETAL NEGATIVE: 1
FEVER: 0
CONSTITUTIONAL NEGATIVE: 1
DYSURIA: 1

## 2020-03-03 NOTE — ED AVS SNAPSHOT
[facility] Emergency Department  [address] [address]  Phone:  [phone]  Fax:  [phone]                                    Lupe Lepe   MRN: [id number]    Department:  [facility] Emergency Department   Date of Visit:  3/3/2020           After Visit Summary Signature Page    I have received my discharge instructions, and my questions have been answered. I have discussed any challenges I see with this plan with the nurse or doctor.    ..........................................................................................................................................  Patient/Patient Representative Signature      ..........................................................................................................................................  Patient Representative Print Name and Relationship to Patient    ..................................................               ................................................  Date                                   Time    ..........................................................................................................................................  Reviewed by Signature/Title    ...................................................              ..............................................  Date                                               Time          22EPIC Rev 08/18

## 2020-03-03 NOTE — ED PROVIDER NOTES
History     Chief Complaint   Patient presents with     UTI     1 days uti sx with no flank pain or fever     HPI  Lupe Lepe is a 79 year old female who presents with complaints of dysuria and increased urinary urgency and frequency today.  Pt has history of UTI and states these symptoms feel consistent with her past infection.  Denies fevers, chills, nausea, vomiting, abdominal pain, back or flank pain, or hematuria.      Allergies:  Allergies   Allergen Reactions     Bactrim      Acute renal failure and hyperkalemia     Ace Inhibitors Other (See Comments)     Hyperkalemia       Atorvastatin Calcium Itching     ELEVATED LFTS     Crestor [Rosuvastatin Calcium] Other (See Comments)     Elevated LFTS   In high doses     Penicillins Hives     Sulfamethoxazole-Trimethoprim      Other reaction(s): Other - Describe In Comment Field  Kidney shutdown     Ciprofloxacin Itching and Rash     Has taken oral without problems       Problem List:    Patient Active Problem List    Diagnosis Date Noted     Renal artery stenosis (H) 08/25/2006     Priority: High     Left. Likely due to temporal arteritis. S/p left angioplasty 8/06. Increased BP 10/06. Renal duplex- Increased velocity left RA, ratios within normal limits. Right RA velocity slightly increased. MRA- normal left renal artery. Moderate right renal artery stenosis. Probable splenic artery stenosis. Angio 10/06 with bilateral renal artery angioplasty. Admit [facility] increased BP 11/06- renal angiogram this time shows no stenosis and no gradient. Renal ultrasound 6/08- normal. Renal ultrasound 6/08- normal with elevated pressures.  MRA 4/10- minimal narrowing main right renal artery.       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 Duplex 7/06- Right SCV, brachial biphasic. Right radial monophasic, right ulnar biphasic-triphasic. Left SCV triphasic. Left brachial biphasic, left radial and ulnar  monophasic. Brachial wrist indices  0.57 right/0.58 left. Angio 8/06- c/w arteritis, right RA stenosis. Repeat angiogram 6/06 no intracranial arteritis or stenosis, mild left intracranial artery plaque. Duplex 9/06- Right SCV and axillary triphasic. Right brachial, radial and ulnar monophasic. Left SCV biphasic-triphasic.  Left axillary and brachial monophasic. Left radial and ulnar monophasic-biphasic. Brachial wrist indices 0.63 right/0.60 left.  Duplex 2/08 unchanged. MRA 4/10- right vertebral occluded  Followed by Dr Aly at [facility]        History of giant cell arteritis 03/27/2006     Priority: High     Prolonged low grade temps. Normal wbc, lft, UA, BCX. , CRP 92. Normal monospot, lymes screen. CT chest abdomen and pelvis negative 4/06. Bcx negative. BM BX negative. Fungal, AFB culture negative. Temporal artery biopsy positive. Flare with prednisone taper. Cellcept started 11/06, stopped 8/08 due to frequent UTIs. Started MTX 12/08 after [facility] eval. Off prednisone 8/09.     Needs blood pressure checked in legs.  Sees DR. Ivory Aly at [facility].       Subclavian arterial stenosis (H) 06/29/2018     Priority: Medium     Cannot measure blood pressure in bilateral arms.  Blood pressure readings in leg are 10-20% higher than arm.       CKD (chronic kidney disease) stage 4, GFR 15-29 ml/min (H) 08/31/2016     Priority: Medium     Proteinuria on ACEi, follows with  [facility] Dr. Ceferino Savage [phone]       Iron deficiency anemia, unspecified 08/30/2016     Priority: Medium     Diagnosis updated by automated process. Provider to review and confirm.       Chronic kidney disease, stage IV (severe) (H) 08/30/2016     Priority: Medium     Anemia of renal disease 06/09/2016     Priority: Medium     Type 2 diabetes mellitus with stage 4 chronic kidney disease, without long-term current use of insulin (H) 10/31/2010     Priority: Medium     DX 2006. 2003 2hour-glucose 202. FBS  >126x2 1/06. No retinopathy, peripheral neuropathy.  Was on insulin previously, was able to get off this in 2014 or so.       Hyperlipidemia LDL goal <100 10/31/2010     Priority: Medium     Renovascular hypertension with goal blood pressure less than 130/80      Priority: Medium     Hospitalized with malignant HTN [facility] 11/06. Angio- no recurrant renal artery stenosis. Metanephrines normal.  Aldosterone was undetectably low.  Urine catecholamines within normal limits.  Renin activity normal.        ACP (advance care planning) 05/06/2011     Priority: Low     Advance Care Planning 6/9/2016: ACP Review of Chart / Resources Provided:  Reviewed chart for advance care plan.  Lupe Lepe has no plan or code status on file however states presence of ACP document. Copy requested.   Added by Ivelisse Olivares  Pt has completed an Advance/Health Care Directive (HCD), to bring in copy to be scanned into Epic.           Frequent Urinary Tract Infections 01/23/2009     Priority: Low     Cysto 7/09 normal        Abdominal cramps 10/16/2008     Priority: Low     Episodic severe cramping associated with emesis since 2007. GI evaluation 10/08. [facility] evaluation 12/08. 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 Rx jeanne-pac for H. Pylori with resolution of symptoms 1/09. Recurrent 2012, shorter symptoms.        Gastroesophageal reflux disease without esophagitis      Priority: Low     Paroxysmal atrial fibrillation (H)      Priority: Low     paroxymal single episode 2002. ECHO, Adenosine cardiolite normal.  Not on anticoagulation        Acquired hypothyroidism      Priority: Low     Osteopenia of multiple sites      Priority: Low     dexa 1999, 2003. Dexa 7/06 -lumbar spine -1.9,  right femur -1.0,  left femur is -1.6. Boniva started 2007.  Dexa 2/08- L spine -2.4, right femoral neck -1.4, left femoral neck -2.0. Dexa 5/11- L1-L4  -1.8 ,  right femoral neck -1.7, left femoral neck -1.9.            Past  Medical History:    Past Medical History:   Diagnosis Date     Abdominal cramps 10/16/2008     Abnormal CT scan 1/12/2009     Arthritis      Closed fracture of unspecified part of fibula      Heart disease      Hemorrhage of rectum and anus      HERPES ZOSTER NOS 5/23/2007     Hypertension      HYPOSMIA      IRON DEFIC ANEMIA NOS 3/27/2006     JOINT EFFUSION-L/LEG 4/7/2006     Other closed fractures of distal end of radius (alone)      Polymyalgia rheumatica (H)      Thyroid disease        Past Surgical History:    Past Surgical History:   Procedure Laterality Date     COLONOSCOPY  8/25/2008     ESOPHAGOSCOPY, GASTROSCOPY, DUODENOSCOPY (EGD), COMBINED N/A 11/16/2017    Procedure: COMBINED ESOPHAGOSCOPY, GASTROSCOPY, DUODENOSCOPY (EGD);  Gastroscopy;  Surgeon: Angel Guillen MD;  Location: [facility]     HC COLONOSCOPY THRU STOMA, DIAGNOSTIC  2000, 5/06, 8/08    Normal     HYSTERECTOMY, SIS  1970's     PHACOEMULSIFICATION WITH STANDARD INTRAOCULAR LENS IMPLANT Right 1/8/2018    Procedure: PHACOEMULSIFICATION WITH STANDARD INTRAOCULAR LENS IMPLANT;  Right Cataract Removal with Implant;  Surgeon: Sekou Dobbins MD;  Location: [facility] OR     PHACOEMULSIFICATION WITH STANDARD INTRAOCULAR LENS IMPLANT Left 1/29/2018    Procedure: PHACOEMULSIFICATION WITH STANDARD INTRAOCULAR LENS IMPLANT;  Left Cataract Removal with Implant;  Surgeon: Sekou Dobbins MD;  Location: [facility] OR     SURGICAL HISTORY OF -   1948    SIS, for fibroids     SURGICAL HISTORY OF -   12/16/2008    Esophagogastroduodenoscopy with biopsy     TONSILLECTOMY      Tonsilectomy       Family History:    Family History   Problem Relation Age of Onset     Cancer Mother         ovarian CA     Cancer Father         lung CA     Heart Disease Maternal Grandmother      Heart Disease Maternal Grandfather      Depression Sister         sucide     Breast Cancer Sister        Social History:  Marital Status:   [2]  Social History     Tobacco Use     Smoking  status: Never Smoker     Smokeless tobacco: Never Used   Substance Use Topics     Alcohol use: No     Drug use: No        Medications:    ciprofloxacin (CIPRO) 250 MG tablet  amLODIPine (NORVASC) 10 MG tablet  ASPIRIN 325 MG OR TBEC  BENEFIBER OR TABS  calcium-vitamin D (CALTRATE) 600-400 MG-UNIT per tablet  chlorthalidone (HYGROTON) 25 MG tablet  cholecalciferol (D-VI-SOL,VITAMIN D3) 400 units/mL (10 mcg/mL) LIQD liquid  ciprofloxacin (CIPRO) 250 MG tablet  cloNIDine (CATAPRES) 0.1 MG tablet  levothyroxine (SYNTHROID/LEVOTHROID) 50 MCG tablet  levothyroxine (SYNTHROID/LEVOTHROID) 75 MCG tablet  lisinopril (PRINIVIL/ZESTRIL) 10 MG tablet  MULTIVITAMINS OR TABS  omeprazole (PRILOSEC) 20 MG DR capsule  Probiotic Product (PROBIOTIC-10 PO)  rosuvastatin (CRESTOR) 10 MG tablet  scopolamine (TRANSDERM) 72 hr patch  VITAMIN B-6 50 MG OR TABS          Review of Systems   Constitutional: Negative.  Negative for fever.   Gastrointestinal: Negative.    Genitourinary: Positive for dysuria, frequency and urgency.   Musculoskeletal: Negative.    Skin: Negative.    All other systems reviewed and are negative.      Physical Exam   BP: 123/74  Pulse: 82  Temp: 98  F (36.7  C)  Resp: 16  Weight: 69.9 kg (154 lb)      Physical Exam  Constitutional:       General: She is not in acute distress.     Appearance: Normal appearance. She is not ill-appearing, toxic-appearing or diaphoretic.   HENT:      Head: Normocephalic and atraumatic.   Neck:      Musculoskeletal: Neck supple.   Pulmonary:      Effort: Pulmonary effort is normal.   Abdominal:      General: There is no distension.      Palpations: Abdomen is soft.      Tenderness: There is no abdominal tenderness. There is no right CVA tenderness, left CVA tenderness, guarding or rebound.   Skin:     General: Skin is warm and dry.   Neurological:      Mental Status: She is alert.         ED Course        Procedures      Results for orders placed or performed during the hospital encounter  of 03/03/20 (from the past 24 hour(s))   UA reflex to Microscopic   Result Value Ref Range    Color Urine Yellow     Appearance Urine Slightly Cloudy     Glucose Urine Negative NEG^Negative mg/dL    Bilirubin Urine Negative NEG^Negative    Ketones Urine Negative NEG^Negative mg/dL    Specific Gravity Urine 1.018 1.003 - 1.035    Blood Urine Negative NEG^Negative    pH Urine 5.0 5.0 - 7.0 pH    Protein Albumin Urine Negative NEG^Negative mg/dL    Urobilinogen mg/dL 4.0 (H) 0.0 - 2.0 mg/dL    Nitrite Urine Negative NEG^Negative    Leukocyte Esterase Urine Large (A) NEG^Negative    Source Midstream Urine     RBC Urine 11 (H) 0 - 2 /HPF    WBC Urine 83 (H) 0 - 5 /HPF    WBC Clumps Present (A) NEG^Negative /HPF    Bacteria Urine Few (A) NEG^Negative /HPF    Mucous Urine Present (A) NEG^Negative /LPF    Hyaline Casts 17 (H) 0 - 2 /LPF       Medications - No data to display    Assessments & Plan (with Medical Decision Making)     Pt is a 79 year old female who presents with complaints of dysuria and increased urinary urgency and frequency today.  Pt has history of UTI and states these symptoms feel consistent with her past infection.  Pt is afebrile on arrival.  Exam as above.  Urinalysis was positive for infection.  Urine was sent for culture.  Discussed results with patient.  Encouraged symptomatic treatments at home.  Return precautions were reviewed.  Hand-outs were provided.    Patient was sent with Ciprofloxacin and was instructed to follow-up with PCP if no improvement in 3-5 days for continued care and management or sooner if new or worsening symptoms.  She is to return to the ED for persistent and/or worsening symptoms.  Patient expressed understanding of the diagnosis and plan and was discharged home in good condition.    I have reviewed the nursing notes.    I have reviewed the findings, diagnosis, plan and need for follow up with the patient.    New Prescriptions    CIPROFLOXACIN (CIPRO) 250 MG TABLET    Take 1  tablet (250 mg) by mouth 2 times daily for 5 days       Final diagnoses:   Urinary tract infection       3/3/2020   [facility] EMERGENCY DEPARTMENT      Disclaimer:  This note consists of symbols derived from keyboarding, dictation and/or voice recognition software.  As a result, there may be errors in the script that have gone undetected.  Please consider this when interpreting information found in this chart.     Mariah Montiel PA-C  03/03/20 1532

## 2020-03-04 LAB
BACTERIA SPEC CULT: ABNORMAL
Lab: ABNORMAL
SPECIMEN SOURCE: ABNORMAL

## 2020-03-05 NOTE — RESULT ENCOUNTER NOTE
Final Urine Culture Report on 3/4/20  Emergency Dept/Urgent Care discharge antibiotic prescribed: Ciprofloxacin (Cipro) 250 mg tablet, 1 tablet (250 mg) by mouth 2 times daily for 5 days.  #1. Bacteria, >100,000 colonies/mL Escherichia coli, is SUSCEPTIBLE to Antibiotic.    As per [facility] ED Lab Result protocol, no change in antibiotic therapy.

## 2020-03-07 ENCOUNTER — TELEPHONE (OUTPATIENT)
Dept: EMERGENCY MEDICINE | Facility: CLINIC | Age: 80
End: 2020-03-07

## 2020-03-07 NOTE — TELEPHONE ENCOUNTER
[facility] [Positive for uti and bacteria is susceptible to antibiotic]:    Reason for call:   Notify of Final urine culture result, confirm patient is taking antibiotic, assess symptoms, and advise per Emergency Dept/Urgent Care discharge instructions and Emergency Dept urine culture protocol.    Lab Result & Date of Final Report [copied from Result Note]:    Final Urine Culture Report on 3/4/20  Emergency Dept/Urgent Care discharge antibiotic prescribed: Ciprofloxacin (Cipro) 250 mg tablet, 1 tablet (250 mg) by mouth 2 times daily for 5 days.  #1. Bacteria, >100,000 colonies/mL Escherichia coli, is SUSCEPTIBLE to Antibiotic.    As per [facility] ED Lab Result protocol, no change in antibiotic therapy.    Current symptoms (include time patient called):    4:48PM: Spoke with patient. She states she is feeling much better.     Recommendations/Instructions:   Patient notified of lab result and treatment recommendation.   Take antibiotic as directed by the Emergency Dept/Urgent Care Provider.  Advised to follow up with her PCP as directed by the ED provider.  The patient is comfortable with the information given and has no further questions.     Please contact you PCP or return to the Emergency Department if your:    Symptoms worsen or other concerning symptoms    Miranda Mistry RN  [facility] RN  Lung Nodule and ED Lab Result RN  Epic pool (ED late result f/u RN): [id number]  [facility] INCIDENTAL RADIOLOGY F/U NURSES: [id number]  # [phone]

## 2020-03-09 DIAGNOSIS — E03.9 ACQUIRED HYPOTHYROIDISM: Chronic | ICD-10-CM

## 2020-03-09 NOTE — TELEPHONE ENCOUNTER
"Requested Prescriptions   Pending Prescriptions Disp Refills     levothyroxine (SYNTHROID/LEVOTHROID) 50 MCG tablet  Last Written Prescription Date:  6/28/19  Last Fill Quantity: 48,  # refills: 3   Last Office Visit with [facility], [facility] or [facility] prescribing provider:  7/2/19   Future Office Visit:      48 tablet 3     Sig: Take on Tuesdays, Thursdays, Saturdays, and Sundays       Thyroid Protocol Passed - 3/9/2020  5:18 PM        Passed - Patient is 12 years or older        Passed - Recent (12 mo) or future (30 days) visit within the authorizing provider's specialty     Patient has had an office visit with the authorizing provider or a provider within the authorizing providers department within the previous 12 mos or has a future within next 30 days. See \"Patient Info\" tab in inbasket, or \"Choose Columns\" in Meds & Orders section of the refill encounter.              Passed - Medication is active on med list        Passed - Normal TSH on file in past 12 months     Recent Labs   Lab Test 05/21/19  1341   TSH 1.64              Passed - No active pregnancy on record     If patient is pregnant or has had a positive pregnancy test, please check TSH.          Passed - No positive pregnancy test in past 12 months     If patient is pregnant or has had a positive pregnancy test, please check TSH.             levothyroxine (SYNTHROID/LEVOTHROID) 75 MCG tablet  Last Written Prescription Date:  6/28/19  Last Fill Quantity: 36,  # refills: 3   Last Office Visit with [facility], [facility] or [facility] prescribing provider:  7/2/19   Future Office Visit:      36 tablet 3     Sig: On Mondays, Wednesdays, and Fridays       Thyroid Protocol Passed - 3/9/2020  5:18 PM        Passed - Patient is 12 years or older        Passed - Recent (12 mo) or future (30 days) visit within the authorizing provider's specialty     Patient has had an office visit with the authorizing provider or a provider within the authorizing providers department within the " "previous 12 mos or has a future within next 30 days. See \"Patient Info\" tab in inbasket, or \"Choose Columns\" in Meds & Orders section of the refill encounter.              Passed - Medication is active on med list        Passed - Normal TSH on file in past 12 months     Recent Labs   Lab Test 05/21/19  1341   TSH 1.64              Passed - No active pregnancy on record     If patient is pregnant or has had a positive pregnancy test, please check TSH.          Passed - No positive pregnancy test in past 12 months     If patient is pregnant or has had a positive pregnancy test, please check TSH.               "

## 2020-03-13 RX ORDER — LEVOTHYROXINE SODIUM 50 UG/1
TABLET ORAL
Qty: 48 TABLET | Refills: 1 | Status: SHIPPED | OUTPATIENT
Start: 2020-03-13 | End: 2020-12-07

## 2020-03-13 RX ORDER — LEVOTHYROXINE SODIUM 75 UG/1
TABLET ORAL
Qty: 36 TABLET | Refills: 1 | Status: SHIPPED | OUTPATIENT
Start: 2020-03-13 | End: 2020-07-28

## 2020-04-30 ENCOUNTER — VIRTUAL VISIT (OUTPATIENT)
Dept: FAMILY MEDICINE | Facility: CLINIC | Age: 80
End: 2020-04-30
Payer: COMMERCIAL

## 2020-04-30 DIAGNOSIS — N30.00 ACUTE CYSTITIS WITHOUT HEMATURIA: Primary | ICD-10-CM

## 2020-04-30 PROCEDURE — 99213 OFFICE O/P EST LOW 20 MIN: CPT | Mod: 95 | Performed by: NURSE PRACTITIONER

## 2020-04-30 RX ORDER — CIPROFLOXACIN 250 MG/1
250 TABLET, FILM COATED ORAL 2 TIMES DAILY
Qty: 10 TABLET | Refills: 0 | Status: SHIPPED | OUTPATIENT
Start: 2020-04-30 | End: 2020-05-08

## 2020-04-30 ASSESSMENT — PAIN SCALES - GENERAL: PAINLEVEL: MODERATE PAIN (5)

## 2020-04-30 NOTE — PATIENT INSTRUCTIONS
1.  Push fluids.  2.  Start Cipro 250 mg twice daily for 5 days.  3.  If your symptoms are not improving or restart after treatment is completed make appointment with lab and get a urine sample for testing.    Our Clinic hours are:  Mondays    7:20 am - 7 pm  Tues -  Fri  7:20 am - 5 pm    Clinic Phone: [phone]    The clinic lab opens at 7:30 am Mon - Fri and appointments are required.    [facility] [place]  Ph. [phone]  Monday  8 am - 7pm  Tues - Fri 8 am - 5:30 pm

## 2020-04-30 NOTE — PROGRESS NOTES
"  Lupe Lepe is a 80 year old female who is being evaluated via a billable telephone visit.      The patient has been notified of following:     \"This telephone visit will be conducted via a call between you and your physician/provider. We have found that certain health care needs can be provided without the need for a physical exam.  This service lets us provide the care you need with a short phone conversation.  If a prescription is necessary we can send it directly to your pharmacy.  If lab work is needed we can place an order for that and you can then stop by our lab to have the test done at a later time.    Telephone visits are billed at different rates depending on your insurance coverage. During this emergency period, for some insurers they may be billed the same as an in-person visit.  Please reach out to your insurance provider with any questions.    If during the course of the call the physician/provider feels a telephone visit is not appropriate, you will not be charged for this service.\"    Patient has given verbal consent for Telephone visit?  Yes    How would you like to obtain your AVS? Kamill[place]art    Subjective     Lupe Lepe is a 80 year old female who presents to clinic today for the following health issues:    HPI  URINARY TRACT SYMPTOMS  Onset: Yesterday    Description:   Painful urination (Dysuria): YES           Frequency: YES  Blood in urine (Hematuria): no   Delay in urine (Hesitency): no     Intensity: 5/10    Progression of Symptoms:  worsening    Accompanying Signs & Symptoms:  Fever/chills: no   Flank pain YES  Nausea and vomiting: no   Any vaginal symptoms: none  Abdominal/Pelvic Pain: YES    History:   History of frequent UTI's: YES  History of kidney stones: no   Sexually Active: no   Possibility of pregnancy: No    Precipitating factors: None    Therapies Tried and outcome: none    Patient has hx of recurrent UTI.  Cipro is the only medication that has been helpful.  She has " stage 4 kidney disease and on last recorded weight and creatinine her CrCl was 28.36.  She had recent UA/UC that was positive for infection with E Coli.  Cipro treated this until yesterday when she had recurrence of symptoms.    Patient Active Problem List   Diagnosis     Paroxysmal atrial fibrillation (H)     Renovascular hypertension with goal blood pressure less than 130/80     Acquired hypothyroidism     Osteopenia of multiple sites     History of giant cell arteritis     Stricture of artery (H)     Renal artery stenosis (H)     Gastroesophageal reflux disease without esophagitis     Abdominal cramps     Frequent Urinary Tract Infections     Type 2 diabetes mellitus with stage 4 chronic kidney disease, without long-term current use of insulin (H)     Hyperlipidemia LDL goal <100     ACP (advance care planning)     Anemia of renal disease     Iron deficiency anemia, unspecified     Chronic kidney disease, stage IV (severe) (H)     CKD (chronic kidney disease) stage 4, GFR 15-29 ml/min (H)     Subclavian arterial stenosis (H)     Past Surgical History:   Procedure Laterality Date     COLONOSCOPY  8/25/2008     ESOPHAGOSCOPY, GASTROSCOPY, DUODENOSCOPY (EGD), COMBINED N/A 11/16/2017    Procedure: COMBINED ESOPHAGOSCOPY, GASTROSCOPY, DUODENOSCOPY (EGD);  Gastroscopy;  Surgeon: Angel Guillen MD;  Location: [facility]     HC COLONOSCOPY THRU STOMA, DIAGNOSTIC  2000, 5/06, 8/08    Normal     HYSTERECTOMY, SIS  1970's     PHACOEMULSIFICATION WITH STANDARD INTRAOCULAR LENS IMPLANT Right 1/8/2018    Procedure: PHACOEMULSIFICATION WITH STANDARD INTRAOCULAR LENS IMPLANT;  Right Cataract Removal with Implant;  Surgeon: Sekou Dobbins MD;  Location: [facility]     PHACOEMULSIFICATION WITH STANDARD INTRAOCULAR LENS IMPLANT Left 1/29/2018    Procedure: PHACOEMULSIFICATION WITH STANDARD INTRAOCULAR LENS IMPLANT;  Left Cataract Removal with Implant;  Surgeon: Sekou Dobbins MD;  Location: [facility]     SURGICAL HISTORY OF -    1948    [facility], for fibroids     SURGICAL HISTORY OF -   12/16/2008    Esophagogastroduodenoscopy with biopsy     TONSILLECTOMY      Tonsilectomy       Social History     Tobacco Use     Smoking status: Never Smoker     Smokeless tobacco: Never Used   Substance Use Topics     Alcohol use: No     Family History   Problem Relation Age of Onset     Cancer Mother         ovarian CA     Cancer Father         lung CA     Heart Disease Maternal Grandmother      Heart Disease Maternal Grandfather      Depression Sister         sucide     Breast Cancer Sister          Current Outpatient Medications   Medication Sig Dispense Refill     amLODIPine (NORVASC) 10 MG tablet TAKE 1 TABLET (10 MG) BY MOUTH DAILY 90 tablet 3     ASPIRIN 325 MG OR TBEC 1 TABLET BY MOUTH DAILY 100 3     chlorthalidone (HYGROTON) 25 MG tablet Take 1 tablet (25 mg) by mouth daily 90 tablet 3     cholecalciferol (D-VI-SOL,VITAMIN D3) 400 units/mL (10 mcg/mL) LIQD liquid Take 400 Units by mouth daily       ciprofloxacin (CIPRO) 250 MG tablet Take 1 tablet (250 mg) by mouth 2 times daily for 5 days 10 tablet 0     cloNIDine (CATAPRES) 0.1 MG tablet ONE Twice DAILY as needed for SBP>180, diastolic BP>110 10 tablet 3     levothyroxine (SYNTHROID/LEVOTHROID) 50 MCG tablet Take on Tuesdays, Thursdays, Saturdays, and Sundays 48 tablet 1     levothyroxine (SYNTHROID/LEVOTHROID) 75 MCG tablet On Mondays, Wednesdays, and Fridays 36 tablet 1     lisinopril (PRINIVIL/ZESTRIL) 10 MG tablet Take 1 tablet (10 mg) by mouth 2 times daily 180 tablet 3     MULTIVITAMINS OR TABS 1 TABLET BY MOUTH DAILY  0     omeprazole (PRILOSEC) 20 MG DR capsule TAKE 1 CAPSULE BY MOUTH EVERY 3RD DAY AS NEEDED 60 capsule 11     rosuvastatin (CRESTOR) 10 MG tablet Take 1 tablet (10 mg) by mouth daily 90 tablet 3     VITAMIN B-6 50 MG OR TABS 1 daily 1 bottle 1 year     Allergies   Allergen Reactions     Bactrim      Acute renal failure and hyperkalemia     Ace Inhibitors Other (See Comments)      Hyperkalemia       Atorvastatin Calcium Itching     ELEVATED LFTS     Crestor [Rosuvastatin Calcium] Other (See Comments)     Elevated LFTS   In high doses     Penicillins Hives     Sulfamethoxazole-Trimethoprim      Other reaction(s): Other - Describe In Comment Field  Kidney shutdown     Ciprofloxacin Itching and Rash     Has taken oral without problems       Reviewed and updated as needed this visit by Provider         Review of Systems          Objective   Reported vitals:  There were no vitals taken for this visit.   healthy, alert and no distress  PSYCH: Alert and oriented times 3; coherent speech, normal   rate and volume, able to articulate logical thoughts, able   to abstract reason, no tangential thoughts, no hallucinations   or delusions  Her affect is normal  RESP: No cough, no audible wheezing, able to talk in full sentences  Remainder of exam unable to be completed due to telephone visits    Diagnostic Test Results:  Labs reviewed in Epic        Assessment/Plan:  1. Acute cystitis without hematuria  Will treat due to hx with cipro without testing today.  I recommended pushing fluids.  I have also placed order for UA/UC if symptoms are no improving on treatment so she can make a lab appointment if needed.  Recommend follow-up in clinic if any persistent or worsening symptoms after urine is collected.  - ciprofloxacin (CIPRO) 250 MG tablet; Take 1 tablet (250 mg) by mouth 2 times daily for 5 days  Dispense: 10 tablet; Refill: 0  - UA with Microscopic reflex to Culture; Future    Return in about 1 week (around 5/7/2020), or if symptoms worsen or fail to improve.      Phone call duration:  8 minutes    Ada Grant NP

## 2020-05-08 ENCOUNTER — OFFICE VISIT (OUTPATIENT)
Dept: FAMILY MEDICINE | Facility: CLINIC | Age: 80
End: 2020-05-08
Payer: COMMERCIAL

## 2020-05-08 ENCOUNTER — VIRTUAL VISIT (OUTPATIENT)
Dept: FAMILY MEDICINE | Facility: CLINIC | Age: 80
End: 2020-05-08
Payer: COMMERCIAL

## 2020-05-08 VITALS
DIASTOLIC BLOOD PRESSURE: 75 MMHG | OXYGEN SATURATION: 96 % | HEART RATE: 78 BPM | TEMPERATURE: 97.1 F | RESPIRATION RATE: 12 BRPM | BODY MASS INDEX: 25.89 KG/M2 | WEIGHT: 160.4 LBS | SYSTOLIC BLOOD PRESSURE: 150 MMHG

## 2020-05-08 DIAGNOSIS — N30.00 ACUTE CYSTITIS WITHOUT HEMATURIA: Primary | ICD-10-CM

## 2020-05-08 DIAGNOSIS — R30.0 DYSURIA: Primary | ICD-10-CM

## 2020-05-08 DIAGNOSIS — N39.0 RECURRENT UTI: ICD-10-CM

## 2020-05-08 LAB
ALBUMIN UR-MCNC: NEGATIVE MG/DL
ANION GAP SERPL CALCULATED.3IONS-SCNC: 4 MMOL/L (ref 3–14)
APPEARANCE UR: CLEAR
BACTERIA #/AREA URNS HPF: ABNORMAL /HPF
BILIRUB UR QL STRIP: NEGATIVE
BUN SERPL-MCNC: 30 MG/DL (ref 7–30)
CALCIUM SERPL-MCNC: 8.6 MG/DL (ref 8.5–10.1)
CHLORIDE SERPL-SCNC: 110 MMOL/L (ref 94–109)
CO2 SERPL-SCNC: 22 MMOL/L (ref 20–32)
COLOR UR AUTO: YELLOW
CREAT SERPL-MCNC: 1.6 MG/DL (ref 0.52–1.04)
GFR SERPL CREATININE-BSD FRML MDRD: 30 ML/MIN/{1.73_M2}
GLUCOSE SERPL-MCNC: 249 MG/DL (ref 70–99)
GLUCOSE UR STRIP-MCNC: NEGATIVE MG/DL
HGB UR QL STRIP: NEGATIVE
KETONES UR STRIP-MCNC: NEGATIVE MG/DL
LEUKOCYTE ESTERASE UR QL STRIP: ABNORMAL
NITRATE UR QL: NEGATIVE
NON-SQ EPI CELLS #/AREA URNS LPF: ABNORMAL /LPF
PH UR STRIP: 5.5 PH (ref 5–7)
POTASSIUM SERPL-SCNC: 4.6 MMOL/L (ref 3.4–5.3)
RBC #/AREA URNS AUTO: ABNORMAL /HPF
SODIUM SERPL-SCNC: 136 MMOL/L (ref 133–144)
SOURCE: ABNORMAL
SP GR UR STRIP: 1.02 (ref 1–1.03)
UROBILINOGEN UR STRIP-ACNC: 0.2 EU/DL (ref 0.2–1)
WBC #/AREA URNS AUTO: ABNORMAL /HPF

## 2020-05-08 PROCEDURE — 87086 URINE CULTURE/COLONY COUNT: CPT | Performed by: FAMILY MEDICINE

## 2020-05-08 PROCEDURE — 99213 OFFICE O/P EST LOW 20 MIN: CPT | Performed by: FAMILY MEDICINE

## 2020-05-08 PROCEDURE — 36415 COLL VENOUS BLD VENIPUNCTURE: CPT | Performed by: FAMILY MEDICINE

## 2020-05-08 PROCEDURE — 81001 URINALYSIS AUTO W/SCOPE: CPT | Performed by: FAMILY MEDICINE

## 2020-05-08 PROCEDURE — 80048 BASIC METABOLIC PNL TOTAL CA: CPT | Performed by: FAMILY MEDICINE

## 2020-05-08 PROCEDURE — 99207 ZZC NO BILLABLE SERVICE THIS VISIT: CPT | Mod: TEL | Performed by: INTERNAL MEDICINE

## 2020-05-08 RX ORDER — CIPROFLOXACIN 250 MG/1
250 TABLET, FILM COATED ORAL 2 TIMES DAILY
Qty: 14 TABLET | Refills: 0 | Status: SHIPPED | OUTPATIENT
Start: 2020-05-08 | End: 2020-05-15

## 2020-05-08 NOTE — PROGRESS NOTES
"Lupe Lepe is a 80 year old female who is being evaluated via a billable telephone visit.      The patient has been notified of following:     \"This telephone visit will be conducted via a call between you and your physician/provider. We have found that certain health care needs can be provided without the need for a physical exam.  This service lets us provide the care you need with a short phone conversation.  If a prescription is necessary we can send it directly to your pharmacy.  If lab work is needed we can place an order for that and you can then stop by our lab to have the test done at a later time.    Telephone visits are billed at different rates depending on your insurance coverage. During this emergency period, for some insurers they may be billed the same as an in-person visit.  Please reach out to your insurance provider with any questions.    If during the course of the call the physician/provider feels a telephone visit is not appropriate, you will not be charged for this service.\"    Patient has given verbal consent for Telephone visit?  Yes    What phone number would you like to be contacted at? [phone]    How would you like to obtain your AVS? Mail a copy    Subjective     Lupe Lepe is a 80 year old female who presents to clinic today for the following health issues:    HPI  URINARY TRACT SYMPTOMS      Duration: 1 week     Description  dysuria, frequency, urgency, nocturia x 1 and back pain    Intensity:  mild    Accompanying signs and symptoms:  Fever/chills: no   Flank pain YES- mild, improved, left side   Nausea and vomiting: no   Vaginal symptoms: none  Abdominal/Pelvic Pain: YES- pelvic    History  History of frequent UTI's: YES  History of kidney stones: no   Sexually Active: no   Possibility of pregnancy: No    Precipitating or alleviating factors: None    Therapies tried and outcome: course of antibiotics - cipro and increase fluid intake   Outcome: Pain has improved " after antibiotic but still lingers.     Did virtual visit 4/30.  No UA was done.  Given cipro. Symptoms of low back and pelvic pain significantly improved but did not resolve    Reports ongoing dysuria and suprapubic pain.      This would be her 3rd UTI since 3/3.            Current Outpatient Medications   Medication Sig Dispense Refill     amLODIPine (NORVASC) 10 MG tablet TAKE 1 TABLET (10 MG) BY MOUTH DAILY 90 tablet 3     ASPIRIN 325 MG OR TBEC 1 TABLET BY MOUTH DAILY 100 3     chlorthalidone (HYGROTON) 25 MG tablet Take 1 tablet (25 mg) by mouth daily 90 tablet 3     cholecalciferol (D-VI-SOL,VITAMIN D3) 400 units/mL (10 mcg/mL) LIQD liquid Take 400 Units by mouth daily       cloNIDine (CATAPRES) 0.1 MG tablet ONE Twice DAILY as needed for SBP>180, diastolic BP>110 10 tablet 3     levothyroxine (SYNTHROID/LEVOTHROID) 50 MCG tablet Take on Tuesdays, Thursdays, Saturdays, and Sundays 48 tablet 1     levothyroxine (SYNTHROID/LEVOTHROID) 75 MCG tablet On Mondays, Wednesdays, and Fridays 36 tablet 1     lisinopril (PRINIVIL/ZESTRIL) 10 MG tablet Take 1 tablet (10 mg) by mouth 2 times daily 180 tablet 3     MULTIVITAMINS OR TABS 1 TABLET BY MOUTH DAILY  0     omeprazole (PRILOSEC) 20 MG DR capsule TAKE 1 CAPSULE BY MOUTH EVERY 3RD DAY AS NEEDED 60 capsule 11     rosuvastatin (CRESTOR) 10 MG tablet Take 1 tablet (10 mg) by mouth daily 90 tablet 3     VITAMIN B-6 50 MG OR TABS 1 daily 1 bottle 1 year       Reviewed and updated as needed this visit by Provider         Review of Systems   ROS COMP: Constitutional, HEENT, cardiovascular, pulmonary, gi and gu systems are negative, except as otherwise noted.       Objective   Reported vitals:  There were no vitals taken for this visit.   healthy, alert and no distress  PSYCH: Alert and oriented times 3; coherent speech, normal   rate and volume, able to articulate logical thoughts, able   to abstract reason, no tangential thoughts, no hallucinations   or delusions  Her  affect is normal  RESP: No cough, no audible wheezing, able to talk in full sentences  Remainder of exam unable to be completed due to telephone visits            Assessment/Plan:  1. Acute cystitis without hematuria - given 3 UTI in 2 months, and symptoms that did not go away with most recent antibiotic, recommend clinic visit, UA, consider wet prep?  Needs exam.  May need imaging?  Patient scheduled for later today      No follow-ups on file.      Phone call duration:  5 minutes    Li Horton, DO

## 2020-05-08 NOTE — PROGRESS NOTES
Subjective     Lupe Lpee is a 80 year old female who presents to clinic today for the following health issues:    Patient is an 80-year-old female who comes in today for possible UTI symptoms.  She has had this symptoms for about a week she reports some burning, frequency, urgency and nocturia.  She is also had some mild back pain.  She has a past medical history significant for type 2 diabetes, stage IV kidney disease, paroxysmal atrial fibrillation, giant cell arteritis.  This would be her third episode of UTI in the last 2 months.  Her last visit was a virtual visit so there was no urine collected at the time so no culture was done.  She denies any fevers or chills she denies nausea vomiting.    HPI   UTI - Female  Duration of complaint: URINARY TRACT SYMPTOMS- follow up from virtual visit from today, was advised to have an office visit per Dr Horton       Duration: 1 week     Description  dysuria, frequency, urgency, nocturia x 1 and back pain    Intensity:  mild    Accompanying signs and symptoms:  Fever/chills: no   Flank pain YES- mild, improved, left side   Nausea and vomiting: no   Vaginal symptoms: none  Abdominal/Pelvic Pain: YES- pelvic    History  History of frequent UTI's: YES  History of kidney stones: no   Sexually Active: no   Possibility of pregnancy: No    Precipitating or alleviating factors: None    Therapies tried and outcome: course of antibiotics - cipro and increase fluid intake   Outcome: Pain has improved after antibiotic but still lingers.     Did virtual visit 4/30.  No UA was done.  Given cipro. Symptoms of low back and pelvic pain significantly improved but did not resolve    Reports ongoing dysuria and suprapubic pain.    This would be her 3rd UTI since 3/3.     Patient Active Problem List   Diagnosis     Paroxysmal atrial fibrillation (H)     Renovascular hypertension with goal blood pressure less than 130/80     Acquired hypothyroidism     Osteopenia of multiple sites      History of giant cell arteritis     Stricture of artery (H)     Renal artery stenosis (H)     Gastroesophageal reflux disease without esophagitis     Abdominal cramps     Frequent Urinary Tract Infections     Type 2 diabetes mellitus with stage 4 chronic kidney disease, without long-term current use of insulin (H)     Hyperlipidemia LDL goal <100     ACP (advance care planning)     Anemia of renal disease     Iron deficiency anemia, unspecified     Chronic kidney disease, stage IV (severe) (H)     CKD (chronic kidney disease) stage 4, GFR 15-29 ml/min (H)     Subclavian arterial stenosis (H)     Past Surgical History:   Procedure Laterality Date     COLONOSCOPY  8/25/2008     ESOPHAGOSCOPY, GASTROSCOPY, DUODENOSCOPY (EGD), COMBINED N/A 11/16/2017    Procedure: COMBINED ESOPHAGOSCOPY, GASTROSCOPY, DUODENOSCOPY (EGD);  Gastroscopy;  Surgeon: Angel Guillen MD;  Location: [facility]     HC COLONOSCOPY THRU STOMA, DIAGNOSTIC  2000, 5/06, 8/08    Normal     HYSTERECTOMY, SIS  1970's     PHACOEMULSIFICATION WITH STANDARD INTRAOCULAR LENS IMPLANT Right 1/8/2018    Procedure: PHACOEMULSIFICATION WITH STANDARD INTRAOCULAR LENS IMPLANT;  Right Cataract Removal with Implant;  Surgeon: Sekou Dobbins MD;  Location: [facility]     PHACOEMULSIFICATION WITH STANDARD INTRAOCULAR LENS IMPLANT Left 1/29/2018    Procedure: PHACOEMULSIFICATION WITH STANDARD INTRAOCULAR LENS IMPLANT;  Left Cataract Removal with Implant;  Surgeon: Sekou Dobbins MD;  Location: [facility]     SURGICAL HISTORY OF -   1948    [facility], for fibroids     SURGICAL HISTORY OF -   12/16/2008    Esophagogastroduodenoscopy with biopsy     TONSILLECTOMY      Tonsilectomy       Social History     Tobacco Use     Smoking status: Never Smoker     Smokeless tobacco: Never Used   Substance Use Topics     Alcohol use: No     Family History   Problem Relation Age of Onset     Cancer Mother         ovarian CA     Cancer Father         lung CA     Heart Disease Maternal  Grandmother      Heart Disease Maternal Grandfather      Depression Sister         sucide     Breast Cancer Sister          Current Outpatient Medications   Medication Sig Dispense Refill     amLODIPine (NORVASC) 10 MG tablet TAKE 1 TABLET (10 MG) BY MOUTH DAILY 90 tablet 3     ASPIRIN 325 MG OR TBEC 1 TABLET BY MOUTH DAILY 100 3     chlorthalidone (HYGROTON) 25 MG tablet Take 1 tablet (25 mg) by mouth daily 90 tablet 3     cholecalciferol (D-VI-SOL,VITAMIN D3) 400 units/mL (10 mcg/mL) LIQD liquid Take 400 Units by mouth daily       ciprofloxacin (CIPRO) 250 MG tablet Take 1 tablet (250 mg) by mouth 2 times daily for 7 days 14 tablet 0     cloNIDine (CATAPRES) 0.1 MG tablet ONE Twice DAILY as needed for SBP>180, diastolic BP>110 10 tablet 3     levothyroxine (SYNTHROID/LEVOTHROID) 50 MCG tablet Take on Tuesdays, Thursdays, Saturdays, and Sundays 48 tablet 1     levothyroxine (SYNTHROID/LEVOTHROID) 75 MCG tablet On Mondays, Wednesdays, and Fridays 36 tablet 1     lisinopril (PRINIVIL/ZESTRIL) 10 MG tablet Take 1 tablet (10 mg) by mouth 2 times daily 180 tablet 3     MULTIVITAMINS OR TABS 1 TABLET BY MOUTH DAILY  0     omeprazole (PRILOSEC) 20 MG DR capsule TAKE 1 CAPSULE BY MOUTH EVERY 3RD DAY AS NEEDED 60 capsule 11     rosuvastatin (CRESTOR) 10 MG tablet Take 1 tablet (10 mg) by mouth daily 90 tablet 3     VITAMIN B-6 50 MG OR TABS 1 daily 1 bottle 1 year     Allergies   Allergen Reactions     Bactrim      Acute renal failure and hyperkalemia     Ace Inhibitors Other (See Comments)     Hyperkalemia       Atorvastatin Calcium Itching     ELEVATED LFTS     Crestor [Rosuvastatin Calcium] Other (See Comments)     Elevated LFTS   In high doses     Penicillins Hives     Sulfamethoxazole-Trimethoprim      Other reaction(s): Other - Describe In Comment Field  Kidney shutdown     Ciprofloxacin Itching and Rash     Has taken oral without problems     BP Readings from Last 3 Encounters:   05/08/20 (!) 150/75   03/03/20 123/74    12/29/19 (!) 141/69    Wt Readings from Last 3 Encounters:   05/08/20 72.8 kg (160 lb 6.4 oz)   03/03/20 69.9 kg (154 lb)   12/29/19 68.9 kg (152 lb)                      Reviewed and updated as needed this visit by Provider  Tobacco  Allergies  Meds  Problems  Med Hx  Surg Hx  Fam Hx         Review of Systems   CONSTITUTIONAL: NEGATIVE for fever, chills, change in weight  INTEGUMENTARY/SKIN: NEGATIVE for worrisome rashes, moles or lesions  ENT/MOUTH: NEGATIVE for ear, mouth and throat problems  RESP: NEGATIVE for significant cough or SOB  CV: NEGATIVE for chest pain, palpitations or peripheral edema  : positive  for, dysuria, frequency , hesitancy, incontinence and urgency  MUSCULOSKELETAL: NEGATIVE for significant arthralgias or myalgia  NEURO: NEGATIVE for weakness, dizziness or paresthesias  ENDOCRINE: NEGATIVE for temperature intolerance, skin/hair changes  HEME/ALLERGY/IMMUNE: NEGATIVE for bleeding problems  PSYCHIATRIC: NEGATIVE for changes in mood or affect      Objective    BP (!) 150/75   Pulse 78   Temp 97.1  F (36.2  C) (Tympanic)   Resp 12   Wt 72.8 kg (160 lb 6.4 oz)   SpO2 96%   BMI 25.89 kg/m    Body mass index is 25.89 kg/m .  Physical Exam   GENERAL: healthy, alert and no distress  EYES: Eyes grossly normal to inspection, PERRL and conjunctivae and sclerae normal  HENT: ear canals and TM's normal, nose and mouth without ulcers or lesions  NECK: no adenopathy, no asymmetry, masses, or scars and thyroid normal to palpation  RESP: lungs clear to auscultation - no rales, rhonchi or wheezes  CV: regular rate and rhythm, normal S1 S2, no S3 or S4, no murmur, click or rub, no peripheral edema and peripheral pulses strong  ABDOMEN: soft, nontender, no hepatosplenomegaly, no masses and bowel sounds normal  MS: no gross musculoskeletal defects noted, no edema  SKIN: no suspicious lesions or rashes  BACK: no CVA tenderness, no paralumbar tenderness  PSYCH: mentation appears normal, affect  normal/bright    Diagnostic Test Results:  Results for orders placed or performed in visit on 05/08/20   *UA reflex to Microscopic and Culture ([facility] (except [place] and [place])     Status: Abnormal    Specimen: Midstream Urine   Result Value Ref Range    Color Urine Yellow     Appearance Urine Clear     Glucose Urine Negative NEG^Negative mg/dL    Bilirubin Urine Negative NEG^Negative    Ketones Urine Negative NEG^Negative mg/dL    Specific Gravity Urine 1.025 1.003 - 1.035    Blood Urine Negative NEG^Negative    pH Urine 5.5 5.0 - 7.0 pH    Protein Albumin Urine Negative NEG^Negative mg/dL    Urobilinogen Urine 0.2 0.2 - 1.0 EU/dL    Nitrite Urine Negative NEG^Negative    Leukocyte Esterase Urine Trace (A) NEG^Negative    Source Midstream Urine    Urine Microscopic     Status: Abnormal   Result Value Ref Range    WBC Urine 5-10 (A) OTO5^0 - 5 /HPF    RBC Urine O - 2 OTO2^O - 2 /HPF    Squamous Epithelial /LPF Urine Few FEW^Few /LPF    Bacteria Urine Few (A) NEG^Negative /HPF           Assessment & Plan     1. Dysuria  UA suggestive of infection. Culture sent .   - *UA reflex to Microscopic and Culture ([facility] (except [place] and [place])  - Urine Microscopic  - Urine Culture Aerobic Bacterial    2. Recurrent UTI  Antibiotics faxed, will check BMP . May need an ultrasound if worsening creatinine levels.   - Basic metabolic panel  - ciprofloxacin (CIPRO) 250 MG tablet; Take 1 tablet (250 mg) by mouth 2 times daily for 7 days  Dispense: 14 tablet; Refill: 0       FUTURE APPOINTMENTS:       - Follow-up visit in one month.  There are no Patient Instructions on file for this visit.    Return in about 4 weeks (around 6/5/2020) for Phone Visit.    Jassi Schmidt MD  [facility]

## 2020-05-09 LAB
BACTERIA SPEC CULT: NORMAL
Lab: NORMAL
SPECIMEN SOURCE: NORMAL

## 2020-05-22 ENCOUNTER — TRANSFERRED RECORDS (OUTPATIENT)
Dept: HEALTH INFORMATION MANAGEMENT | Facility: CLINIC | Age: 80
End: 2020-05-22

## 2020-05-23 DIAGNOSIS — N18.4 CKD (CHRONIC KIDNEY DISEASE) STAGE 4, GFR 15-29 ML/MIN (H): Chronic | ICD-10-CM

## 2020-05-23 DIAGNOSIS — K21.9 GASTROESOPHAGEAL REFLUX DISEASE WITHOUT ESOPHAGITIS: ICD-10-CM

## 2020-05-23 DIAGNOSIS — I15.0 RENOVASCULAR HYPERTENSION WITH GOAL BLOOD PRESSURE LESS THAN 130/80: Chronic | ICD-10-CM

## 2020-05-27 RX ORDER — CHLORTHALIDONE 25 MG/1
TABLET ORAL
Qty: 90 TABLET | Refills: 3 | Status: SHIPPED | OUTPATIENT
Start: 2020-05-27 | End: 2021-05-06

## 2020-05-27 NOTE — TELEPHONE ENCOUNTER
"Routing CHLORTHALIDONE refill request to provider for review/approval because:  Labs out of range:  Cr elevated.    Omeprazole prescription approved per [facility] Refill Protocol.    Tresa CISNEROS RN BSN      Requested Prescriptions   Pending Prescriptions Disp Refills     omeprazole (PRILOSEC) 20 MG DR capsule [Pharmacy Med Name: OMEPRAZOLE DR 20 MG CAPSULE] 30 capsule 23     Sig: TAKE 1 CAPSULE BY MOUTH EVERY 3RD DAY AS NEEDED       PPI Protocol Passed - 5/23/2020 12:04 PM        Passed - Not on Clopidogrel (unless Pantoprazole ordered)        Passed - No diagnosis of osteoporosis on record        Passed - Recent (12 mo) or future (30 days) visit within the authorizing provider's specialty     Patient has had an office visit with the authorizing provider or a provider within the authorizing providers department within the previous 12 mos or has a future within next 30 days. See \"Patient Info\" tab in inbasket, or \"Choose Columns\" in Meds & Orders section of the refill encounter.              Passed - Medication is active on med list        Passed - Patient is age 18 or older        Passed - No active pregnacy on record        Passed - No positive pregnancy test in past 12 months           chlorthalidone (HYGROTON) 25 MG tablet [Pharmacy Med Name: CHLORTHALIDONE 25 MG TABLET] 90 tablet 3     Sig: TAKE 1 TABLET BY MOUTH EVERY DAY       Diuretics (Including Combos) Protocol Failed - 5/23/2020 12:04 PM        Failed - Blood pressure under 140/90 in past 12 months     BP Readings from Last 3 Encounters:   05/08/20 (!) 150/75   03/03/20 123/74   12/29/19 (!) 141/69                 Failed - Normal serum creatinine on file in past 12 months     Recent Labs   Lab Test 05/08/20  1417   CR 1.60*              Passed - Recent (12 mo) or future (30 days) visit within the authorizing provider's specialty     Patient has had an office visit with the authorizing provider or a provider within the authorizing providers department within the " "previous 12 mos or has a future within next 30 days. See \"Patient Info\" tab in inbasket, or \"Choose Columns\" in Meds & Orders section of the refill encounter.              Passed - Medication is active on med list        Passed - Patient is age 18 or older        Passed - No active pregancy on record        Passed - Normal serum potassium on file in past 12 months     Recent Labs   Lab Test 05/08/20  1417   POTASSIUM 4.6                    Passed - Normal serum sodium on file in past 12 months     Recent Labs   Lab Test 05/08/20  1417                 Passed - No positive pregnancy test in past 12 months             "

## 2020-06-12 DIAGNOSIS — E11.22 TYPE 2 DIABETES MELLITUS WITH STAGE 4 CHRONIC KIDNEY DISEASE, WITHOUT LONG-TERM CURRENT USE OF INSULIN (H): Primary | ICD-10-CM

## 2020-06-12 DIAGNOSIS — E78.5 HYPERLIPIDEMIA LDL GOAL <100: ICD-10-CM

## 2020-06-12 DIAGNOSIS — N18.4 TYPE 2 DIABETES MELLITUS WITH STAGE 4 CHRONIC KIDNEY DISEASE, WITHOUT LONG-TERM CURRENT USE OF INSULIN (H): Primary | ICD-10-CM

## 2020-06-12 RX ORDER — ROSUVASTATIN CALCIUM 10 MG/1
TABLET, COATED ORAL
Qty: 90 TABLET | Refills: 3 | Status: SHIPPED | OUTPATIENT
Start: 2020-06-12 | End: 2021-05-25

## 2020-06-12 NOTE — TELEPHONE ENCOUNTER
Routing refill request to provider for review/approval because:  Labs not current:    LDL Cholesterol Calculated   Date Value Ref Range Status   05/21/2019 67 <100 mg/dL Final     Comment:     Desirable:       <100 mg/dl     Ludivina Birmingham RN

## 2020-06-12 NOTE — LETTER
[facility]  [address] [address]  Phone: [phone]       June 12, 2020         Lupe Lepe  [address] [address]            Dear Lupe:    We are concerned about your health care.  We recently provided you with medication refills.  Many medications require routine follow-up with your doctor and routine labs.     At this time, you are due for fasting labs. Please contact the clinic to schedule a Lab Only appointment.       Your prescription(s) have been refilled for 1 year so you may have time for the above noted follow-up. Please call to schedule soon so we can assure you have an appointment before your next refills are needed.    Thank you,      Li Horton DO / rik

## 2020-06-19 ENCOUNTER — VIRTUAL VISIT (OUTPATIENT)
Dept: FAMILY MEDICINE | Facility: CLINIC | Age: 80
End: 2020-06-19
Payer: COMMERCIAL

## 2020-06-19 ENCOUNTER — TELEPHONE (OUTPATIENT)
Dept: INTERNAL MEDICINE | Facility: CLINIC | Age: 80
End: 2020-06-19

## 2020-06-19 VITALS — BODY MASS INDEX: 24.86 KG/M2 | DIASTOLIC BLOOD PRESSURE: 80 MMHG | SYSTOLIC BLOOD PRESSURE: 143 MMHG | WEIGHT: 154 LBS

## 2020-06-19 DIAGNOSIS — N30.00 ACUTE CYSTITIS WITHOUT HEMATURIA: Primary | ICD-10-CM

## 2020-06-19 PROCEDURE — 99213 OFFICE O/P EST LOW 20 MIN: CPT | Mod: 95 | Performed by: NURSE PRACTITIONER

## 2020-06-19 RX ORDER — CIPROFLOXACIN 250 MG/1
250 TABLET, FILM COATED ORAL 2 TIMES DAILY
Qty: 10 TABLET | Refills: 0 | Status: SHIPPED | OUTPATIENT
Start: 2020-06-19 | End: 2020-06-24

## 2020-06-19 NOTE — TELEPHONE ENCOUNTER
Reason for call:  Patient reporting a symptom    Symptom or request: UTI symptoms would like to be seen today.    Duration (how long have symptoms been present): 1 day    Have you been treated for this before? No      Phone Number patient can be reached at:  Home number on file [phone] (home)    Best Time:  any    Can we leave a detailed message on this number:  YES    Call taken on 6/19/2020 at 1:55 PM by Tri Hong

## 2020-06-19 NOTE — PROGRESS NOTES
"Lupe Lepe is a 80 year old female who is being evaluated via a billable telephone visit.      The patient has been notified of following:     \"This telephone visit will be conducted via a call between you and your physician/provider. We have found that certain health care needs can be provided without the need for a physical exam.  This service lets us provide the care you need with a short phone conversation.  If a prescription is necessary we can send it directly to your pharmacy.  If lab work is needed we can place an order for that and you can then stop by our lab to have the test done at a later time.    Telephone visits are billed at different rates depending on your insurance coverage. During this emergency period, for some insurers they may be billed the same as an in-person visit.  Please reach out to your insurance provider with any questions.    If during the course of the call the physician/provider feels a telephone visit is not appropriate, you will not be charged for this service.\"    Patient has given verbal consent for Telephone visit?  Yes    What phone number would you like to be contacted at? [phone]    How would you like to obtain your AVS? Kamillahart    Subjective     Lupe Lepe is a 80 year old female who presents via phone visit today for the following health issues:    HPI  URINARY TRACT SYMPTOMS      Duration: 2 days    Description  dysuria and urgency  frequency    Intensity:  Mild; worsening symptoms    Accompanying signs and symptoms:  Fever/chills: no   Flank pain no   Nausea and vomiting: no   Vaginal symptoms: none  Abdominal/Pelvic Pain: YES- just a little bit when she urinates    History  History of frequent UTI's: YES- maybe once per year  History of kidney stones: no   Sexually Active: no   Possibility of pregnancy: No    Precipitating or alleviating factors: None/ unknown    Therapies tried and outcome: increase fluid intake                       Reviewed and " updated as needed this visit by Provider  Tobacco  Allergies  Meds  Problems  Med Hx  Surg Hx  Fam Hx         Review of Systems   Constitutional, HEENT, cardiovascular, pulmonary, gi and gu systems are negative, except as otherwise noted.       Objective   Reported vitals:  BP (!) 143/80   Wt 69.9 kg (154 lb)   BMI 24.86 kg/m     PSYCH: Alert and oriented times 3; coherent speech, normal   rate and volume, able to articulate logical thoughts, able   to abstract reason, no tangential thoughts, no hallucinations   or delusions  RESP: No cough, no audible wheezing, able to talk in full sentences  Remainder of exam unable to be completed due to telephone visits            Assessment/Plan:  1. Acute cystitis without hematuria    Patient was scheduled late in the day on this Friday afternoon - she was unable to make it into clinic for a face-to-face appointment or to leave a urine sample prior to clinic closing today. She does not want to come to the ER over the weekend. She is requesting cipro for 5 days over the phone. This is her 4th UTI and 4th round of cipro over the last 4 months. Patient states that she will make an appointment with her PCP, Dr Horton, to discuss this within the next 2 weeks. Advised her to consider Urology consultation - she will discuss with Dr Horton. Discussed with patient that because she is having symptoms so frequently, she should come in for urine sample next time - she is in agreement.    - ciprofloxacin (CIPRO) 250 MG tablet; Take 1 tablet (250 mg) by mouth 2 times daily for 5 days  Dispense: 10 tablet; Refill: 0    Return in about 1 week (around 6/26/2020), or if symptoms worsen or fail to improve.      Phone call duration:  5 minutes    The risks, benefits and treatment options of prescribed medications or other treatments have been discussed with the patient. The patient verbalized their understanding and should call or follow up if no improvement or if they develop further  problems.    SHAWNA Oliva CNP

## 2020-06-19 NOTE — TELEPHONE ENCOUNTER
Patient reports UTI symptoms started 6-17-20, slight.  Yesterday symptoms more prominent - urgency, burning with urination.  Denies abdominal/flank pain, blood in urine, fever or other symptoms.  Had dysuria 5-8-20, UA/UC negative.  She states Cipro x5 days clears symptoms.    Virtual appt scheduled for this afternoon, ok'd per Daphney CISNEROS RN BSN

## 2020-06-22 ENCOUNTER — TELEPHONE (OUTPATIENT)
Dept: INTERNAL MEDICINE | Facility: CLINIC | Age: 80
End: 2020-06-22

## 2020-06-22 NOTE — TELEPHONE ENCOUNTER
Reason for Call: Request for an order or referral:    Order or referral being requested: Patient is calling asking for a lab order for UTI     Date needed: before my next appointment    Has the patient been seen by the PCP for this problem? YES    Phone number Patient can be reached at:  Home number on file [phone] (home)    Best Time:  any    Can we leave a detailed message on this number?  YES    Call taken on 6/22/2020 at 9:13 AM by Tri Hong

## 2020-06-22 NOTE — TELEPHONE ENCOUNTER
She is feeling better and I advised her to get a ua/ uc next time she has symptoms before she goes on antibiotics.Halina Ann RN

## 2020-06-25 DIAGNOSIS — E78.5 HYPERLIPIDEMIA LDL GOAL <100: ICD-10-CM

## 2020-06-25 DIAGNOSIS — N18.4 TYPE 2 DIABETES MELLITUS WITH STAGE 4 CHRONIC KIDNEY DISEASE, WITHOUT LONG-TERM CURRENT USE OF INSULIN (H): ICD-10-CM

## 2020-06-25 DIAGNOSIS — E11.22 TYPE 2 DIABETES MELLITUS WITH STAGE 4 CHRONIC KIDNEY DISEASE, WITHOUT LONG-TERM CURRENT USE OF INSULIN (H): ICD-10-CM

## 2020-06-25 DIAGNOSIS — N30.00 ACUTE CYSTITIS WITHOUT HEMATURIA: ICD-10-CM

## 2020-06-25 LAB
ALBUMIN UR-MCNC: NEGATIVE MG/DL
APPEARANCE UR: CLEAR
BILIRUB UR QL STRIP: NEGATIVE
CHOLEST SERPL-MCNC: 153 MG/DL
COLOR UR AUTO: YELLOW
CREAT UR-MCNC: 182 MG/DL
ERYTHROCYTE [DISTWIDTH] IN BLOOD BY AUTOMATED COUNT: 12.5 % (ref 10–15)
GLUCOSE UR STRIP-MCNC: NEGATIVE MG/DL
HBA1C MFR BLD: 7.2 % (ref 0–5.6)
HCT VFR BLD AUTO: 36.8 % (ref 35–47)
HDLC SERPL-MCNC: 54 MG/DL
HGB BLD-MCNC: 12.4 G/DL (ref 11.7–15.7)
HGB UR QL STRIP: NEGATIVE
KETONES UR STRIP-MCNC: NEGATIVE MG/DL
LDLC SERPL CALC-MCNC: 70 MG/DL
LEUKOCYTE ESTERASE UR QL STRIP: ABNORMAL
MCH RBC QN AUTO: 28.8 PG (ref 26.5–33)
MCHC RBC AUTO-ENTMCNC: 33.7 G/DL (ref 31.5–36.5)
MCV RBC AUTO: 85 FL (ref 78–100)
MICROALBUMIN UR-MCNC: 47 MG/L
MICROALBUMIN/CREAT UR: 25.99 MG/G CR (ref 0–25)
NITRATE UR QL: NEGATIVE
NON-SQ EPI CELLS #/AREA URNS LPF: ABNORMAL /LPF
NONHDLC SERPL-MCNC: 99 MG/DL
PH UR STRIP: 5.5 PH (ref 5–7)
PLATELET # BLD AUTO: 145 10E9/L (ref 150–450)
RBC # BLD AUTO: 4.31 10E12/L (ref 3.8–5.2)
RBC #/AREA URNS AUTO: ABNORMAL /HPF
SOURCE: ABNORMAL
SP GR UR STRIP: 1.02 (ref 1–1.03)
TRIGL SERPL-MCNC: 145 MG/DL
TSH SERPL DL<=0.005 MIU/L-ACNC: 0.9 MU/L (ref 0.4–4)
UROBILINOGEN UR STRIP-ACNC: 0.2 EU/DL (ref 0.2–1)
WBC # BLD AUTO: 5.4 10E9/L (ref 4–11)
WBC #/AREA URNS AUTO: ABNORMAL /HPF

## 2020-06-25 PROCEDURE — 36415 COLL VENOUS BLD VENIPUNCTURE: CPT | Performed by: INTERNAL MEDICINE

## 2020-06-25 PROCEDURE — 83036 HEMOGLOBIN GLYCOSYLATED A1C: CPT | Performed by: INTERNAL MEDICINE

## 2020-06-25 PROCEDURE — 84443 ASSAY THYROID STIM HORMONE: CPT | Performed by: INTERNAL MEDICINE

## 2020-06-25 PROCEDURE — 80061 LIPID PANEL: CPT | Performed by: INTERNAL MEDICINE

## 2020-06-25 PROCEDURE — 82043 UR ALBUMIN QUANTITATIVE: CPT | Performed by: INTERNAL MEDICINE

## 2020-06-25 PROCEDURE — 81001 URINALYSIS AUTO W/SCOPE: CPT | Performed by: NURSE PRACTITIONER

## 2020-06-25 PROCEDURE — 85027 COMPLETE CBC AUTOMATED: CPT | Performed by: INTERNAL MEDICINE

## 2020-07-27 DIAGNOSIS — E03.9 ACQUIRED HYPOTHYROIDISM: Chronic | ICD-10-CM

## 2020-07-28 RX ORDER — LEVOTHYROXINE SODIUM 75 UG/1
TABLET ORAL
Qty: 48 TABLET | Refills: 3 | Status: SHIPPED | OUTPATIENT
Start: 2020-07-28 | End: 2021-06-22

## 2020-09-10 NOTE — PROGRESS NOTES
"Subjective     Lupe Lepe is a 80 year old female who presents to clinic today for the following health issues:    HPI         Chief Complaint   Patient presents with     Patient Request     pt. is here today with concerns about stomach issues pt. states she has a hx of this and has questions for Dr. Horton.      --last visit for this 11/2017  --reports severe cramping about 2 hours after eating.  Occasional has nausea and vomiting.  --\"Episodic severe cramping associated with emesis since 2007. GI evaluation 10/08. [facility] evaluation 12/08. 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 Rx jeanne-pac for H. Pylori with resolution of symptoms 1/09. Recurrent 2012, shorter symptoms. \"  --GI diagnosis was hiatal hernia and possibly IBS.   It was recommended she be evaluated during the attack to r/o small bowel obstruction.  She had evaluation for mesenteric stenosis, vasculitis which was negative.  --was given Levsin and had severe side effects (nausea, bad taste) and it did not help.  --has taken omeprazole in the past.  Kidney doctor recommended against this because of her kidney disease.  --has vomiting episodes a 0-2 times/week. Sometimes can go many weeks w/out episode.  --no recent GERD symptoms.  Is mainly gluten free    --Normal EGD 11/2017; normal colonoscopy 2008ibs  --normal CT abdomen 8/2016  --son w/hx of crohn's disease,m she wonders if that is what is going on w/her        Review of Systems   Constitutional, HEENT, cardiovascular, pulmonary, gi and gu systems are negative, except as otherwise noted.      Objective    /60 (BP Location: Right leg, Patient Position: Sitting, Cuff Size: Adult Large)   Pulse 78   Temp 97.5  F (36.4  C) (Tympanic)   Resp 16   Ht 1.676 m (5' 6\")   Wt 69.3 kg (152 lb 12.8 oz)   SpO2 97%   BMI 24.66 kg/m    Body mass index is 24.66 kg/m .  Physical Exam   GENERAL APPEARANCE: healthy, alert and no distress  ABDOMEN: soft, nontender, without " hepatosplenomegaly or masses and bowel sounds normal          Assessment & Plan     Nausea and vomiting, intractability of vomiting not specified, unspecified vomiting type - Longstanding history, since at least 2008.  Is not progressive, so likely benign etiology.  May have transient small bowel obstruction? Has been evaluated for mesenteric vasculitis and that was ruled out.  Use the Zofran as needed for the nausea.  Be seen during the episode of vomiting so we can do a CT scan to rule out other causes  - ondansetron (ZOFRAN-ODT) 4 MG ODT tab; Take 1 tablet (4 mg) by mouth every 8 hours as needed for nausea    Abdominal cramps - Levsin caused side effects    Giant cell arteritis (H)-no recurrence.    Type 2 diabetes mellitus with stage 4 chronic kidney disease, without long-term current use of insulin (H) -chronic and stable.  Due for follow-up in December    CKD (chronic kidney disease) stage 4, GFR 15-29 ml/min (H) -chronic and stable.  Follows with nephrology    Renal artery stenosis (H) 0 follows with nephrology    Paroxysmal atrial fibrillation (H) -single episode in 2002.  Not on anticoagulation         Patient Instructions   1. Recommend ER evaluation during next episode - may have self resolving episodes of small bowel obstruction?  2. zofran (ondansetron) given to use during episodes of nausea      No follow-ups on file.    Li Horton,   [facility]

## 2020-09-15 ENCOUNTER — OFFICE VISIT (OUTPATIENT)
Dept: FAMILY MEDICINE | Facility: CLINIC | Age: 80
End: 2020-09-15
Payer: COMMERCIAL

## 2020-09-15 VITALS
HEART RATE: 78 BPM | OXYGEN SATURATION: 97 % | SYSTOLIC BLOOD PRESSURE: 114 MMHG | RESPIRATION RATE: 16 BRPM | TEMPERATURE: 97.5 F | DIASTOLIC BLOOD PRESSURE: 60 MMHG | BODY MASS INDEX: 24.56 KG/M2 | WEIGHT: 152.8 LBS | HEIGHT: 66 IN

## 2020-09-15 DIAGNOSIS — R11.2 NAUSEA AND VOMITING, INTRACTABILITY OF VOMITING NOT SPECIFIED, UNSPECIFIED VOMITING TYPE: Primary | ICD-10-CM

## 2020-09-15 DIAGNOSIS — R10.9 ABDOMINAL CRAMPS: ICD-10-CM

## 2020-09-15 DIAGNOSIS — M31.6 GIANT CELL ARTERITIS (H): ICD-10-CM

## 2020-09-15 DIAGNOSIS — N18.4 TYPE 2 DIABETES MELLITUS WITH STAGE 4 CHRONIC KIDNEY DISEASE, WITHOUT LONG-TERM CURRENT USE OF INSULIN (H): ICD-10-CM

## 2020-09-15 DIAGNOSIS — E11.22 TYPE 2 DIABETES MELLITUS WITH STAGE 4 CHRONIC KIDNEY DISEASE, WITHOUT LONG-TERM CURRENT USE OF INSULIN (H): ICD-10-CM

## 2020-09-15 DIAGNOSIS — N18.4 CKD (CHRONIC KIDNEY DISEASE) STAGE 4, GFR 15-29 ML/MIN (H): ICD-10-CM

## 2020-09-15 DIAGNOSIS — I48.0 PAROXYSMAL ATRIAL FIBRILLATION (H): ICD-10-CM

## 2020-09-15 DIAGNOSIS — I70.1 RENAL ARTERY STENOSIS (H): ICD-10-CM

## 2020-09-15 PROCEDURE — 99214 OFFICE O/P EST MOD 30 MIN: CPT | Performed by: INTERNAL MEDICINE

## 2020-09-15 RX ORDER — ONDANSETRON 4 MG/1
4 TABLET, ORALLY DISINTEGRATING ORAL EVERY 8 HOURS PRN
Qty: 30 TABLET | Refills: 11 | Status: SHIPPED | OUTPATIENT
Start: 2020-09-15 | End: 2020-10-01

## 2020-09-15 ASSESSMENT — MIFFLIN-ST. JEOR: SCORE: 1169.85

## 2020-09-15 NOTE — PATIENT INSTRUCTIONS
1. Recommend ER evaluation during next episode - may have self resolving episodes of small bowel obstruction?  2. zofran (ondansetron) given to use during episodes of nausea

## 2020-09-25 ENCOUNTER — OFFICE VISIT (OUTPATIENT)
Dept: FAMILY MEDICINE | Facility: CLINIC | Age: 80
End: 2020-09-25
Payer: COMMERCIAL

## 2020-09-25 ENCOUNTER — ALLIED HEALTH/NURSE VISIT (OUTPATIENT)
Dept: FAMILY MEDICINE | Facility: CLINIC | Age: 80
End: 2020-09-25
Payer: COMMERCIAL

## 2020-09-25 VITALS
HEART RATE: 83 BPM | RESPIRATION RATE: 18 BRPM | OXYGEN SATURATION: 96 % | BODY MASS INDEX: 24.49 KG/M2 | TEMPERATURE: 97.5 F | WEIGHT: 152.4 LBS | DIASTOLIC BLOOD PRESSURE: 82 MMHG | HEIGHT: 66 IN | SYSTOLIC BLOOD PRESSURE: 132 MMHG

## 2020-09-25 DIAGNOSIS — Z23 NEED FOR PROPHYLACTIC VACCINATION AND INOCULATION AGAINST INFLUENZA: Primary | ICD-10-CM

## 2020-09-25 DIAGNOSIS — R30.0 DYSURIA: ICD-10-CM

## 2020-09-25 DIAGNOSIS — N30.00 ACUTE CYSTITIS WITHOUT HEMATURIA: Primary | ICD-10-CM

## 2020-09-25 LAB
ALBUMIN UR-MCNC: 30 MG/DL
APPEARANCE UR: ABNORMAL
BILIRUB UR QL STRIP: NEGATIVE
COLOR UR AUTO: YELLOW
GLUCOSE UR STRIP-MCNC: NEGATIVE MG/DL
HGB UR QL STRIP: ABNORMAL
KETONES UR STRIP-MCNC: NEGATIVE MG/DL
LEUKOCYTE ESTERASE UR QL STRIP: ABNORMAL
NITRATE UR QL: NEGATIVE
PH UR STRIP: 5 PH (ref 5–7)
RBC #/AREA URNS AUTO: ABNORMAL /HPF
SOURCE: ABNORMAL
SP GR UR STRIP: 1.02 (ref 1–1.03)
UROBILINOGEN UR STRIP-ACNC: 1 EU/DL (ref 0.2–1)
WBC #/AREA URNS AUTO: ABNORMAL /HPF
WBC CLUMPS #/AREA URNS HPF: PRESENT /HPF

## 2020-09-25 PROCEDURE — 99213 OFFICE O/P EST LOW 20 MIN: CPT | Performed by: INTERNAL MEDICINE

## 2020-09-25 PROCEDURE — 90662 IIV NO PRSV INCREASED AG IM: CPT

## 2020-09-25 PROCEDURE — 81001 URINALYSIS AUTO W/SCOPE: CPT | Performed by: INTERNAL MEDICINE

## 2020-09-25 PROCEDURE — 87086 URINE CULTURE/COLONY COUNT: CPT | Performed by: INTERNAL MEDICINE

## 2020-09-25 PROCEDURE — 87186 SC STD MICRODIL/AGAR DIL: CPT | Performed by: INTERNAL MEDICINE

## 2020-09-25 PROCEDURE — G0008 ADMIN INFLUENZA VIRUS VAC: HCPCS

## 2020-09-25 PROCEDURE — 87088 URINE BACTERIA CULTURE: CPT | Performed by: INTERNAL MEDICINE

## 2020-09-25 RX ORDER — CIPROFLOXACIN 250 MG/1
250 TABLET, FILM COATED ORAL 2 TIMES DAILY
Qty: 14 TABLET | Refills: 0 | Status: SHIPPED | OUTPATIENT
Start: 2020-09-25 | End: 2020-10-01 | Stop reason: ALTCHOICE

## 2020-09-25 ASSESSMENT — MIFFLIN-ST. JEOR: SCORE: 1168.03

## 2020-09-25 NOTE — PROGRESS NOTES
"Subjective     Lupe Lepe is a 80 year old female who presents to clinic today for the following health issues:    HPI       Genitourinary - Female  Onset/Duration: for about 4 days   Description:   Painful urination (Dysuria): YES           Frequency: YES and urgency  Blood in urine (Hematuria): no  Delay in urine (Hesitency): YES  Intensity: mild- moderate   Progression of Symptoms:  worsening  Accompanying Signs & Symptoms:  Fever/chills: no  Flank pain: no  Nausea and vomiting: YES Wednesday was feeling nauseous   Vaginal symptoms: none  Abdominal/Pelvic Pain: YES  History:   History of frequent UTI s: YES  History of kidney stones: no  Sexually Active: no  Possibility of pregnancy: No  Precipitating or alleviating factors: None  Therapies tried and outcome: Increase fluid intake      Review of Systems   Constitutional,  systems are negative, except as otherwise noted.      Objective    /82   Pulse 83   Temp 97.5  F (36.4  C) (Tympanic)   Resp 18   Ht 1.676 m (5' 6\")   Wt 69.1 kg (152 lb 6.4 oz)   SpO2 96%   BMI 24.60 kg/m    Body mass index is 24.6 kg/m .  Physical Exam     GENERAL: healthy, alert and no distress  RESP: lungs clear to auscultation - no rales, rhonchi or wheezes  CV: regular rate and rhythm, normal S1 S2, no S3 or S4, no murmur, click or rub  ABDOMEN: soft, non-tender  BACK: no CVA tenderness     Results for orders placed or performed in visit on 09/25/20 (from the past 24 hour(s))   **UA reflex to Microscopic FUTURE anytime   Result Value Ref Range    Color Urine Yellow     Appearance Urine Cloudy     Glucose Urine Negative NEG^Negative mg/dL    Bilirubin Urine Negative NEG^Negative    Ketones Urine Negative NEG^Negative mg/dL    Specific Gravity Urine 1.025 1.003 - 1.035    Blood Urine Trace (A) NEG^Negative    pH Urine 5.0 5.0 - 7.0 pH    Protein Albumin Urine 30 (A) NEG^Negative mg/dL    Urobilinogen Urine 1.0 0.2 - 1.0 EU/dL    Nitrite Urine Negative NEG^Negative    " Leukocyte Esterase Urine Moderate (A) NEG^Negative    Source Midstream Urine    Urine Microscopic   Result Value Ref Range    WBC Urine 25-50 (A) OTO5^0 - 5 /HPF    RBC Urine O - 2 OTO2^O - 2 /HPF    WBC Clumps Present (A) NEG^Negative /HPF           Assessment & Plan     Acute cystitis without hematuria    Symptoms and U/A consistent with UTI.  She states that Cipro is the only antibiotic that she typically tolerates and that she usually needs 7 days to clear infection- prescription sent.  Will follow-up on culture.      - ciprofloxacin (CIPRO) 250 MG tablet; Take 1 tablet (250 mg) by mouth 2 times daily for 7 days  - Urine Culture Aerobic Bacterial    Dysuria    - **UA reflex to Microscopic FUTURE anytime  - Urine Microscopic     Follow-up as needed if not improving in a week or new/worsening symptoms develop.       Blair Oh MD  [facility]

## 2020-09-26 LAB
BACTERIA SPEC CULT: ABNORMAL
Lab: ABNORMAL
SPECIMEN SOURCE: ABNORMAL

## 2020-10-01 ENCOUNTER — TRANSFERRED RECORDS (OUTPATIENT)
Dept: HEALTH INFORMATION MANAGEMENT | Facility: CLINIC | Age: 80
End: 2020-10-01

## 2020-10-01 ENCOUNTER — OFFICE VISIT (OUTPATIENT)
Dept: FAMILY MEDICINE | Facility: CLINIC | Age: 80
End: 2020-10-01
Payer: COMMERCIAL

## 2020-10-01 VITALS
BODY MASS INDEX: 24.53 KG/M2 | WEIGHT: 152.6 LBS | HEART RATE: 66 BPM | SYSTOLIC BLOOD PRESSURE: 132 MMHG | RESPIRATION RATE: 16 BRPM | TEMPERATURE: 96.4 F | OXYGEN SATURATION: 98 % | HEIGHT: 66 IN | DIASTOLIC BLOOD PRESSURE: 76 MMHG

## 2020-10-01 DIAGNOSIS — N18.4 CKD (CHRONIC KIDNEY DISEASE) STAGE 4, GFR 15-29 ML/MIN (H): Chronic | ICD-10-CM

## 2020-10-01 DIAGNOSIS — N39.0 RECURRENT UTI (URINARY TRACT INFECTION): Primary | ICD-10-CM

## 2020-10-01 LAB
ALBUMIN UR-MCNC: 30 MG/DL
ANION GAP SERPL CALCULATED.3IONS-SCNC: 6 MMOL/L (ref 3–14)
APPEARANCE UR: ABNORMAL
BACTERIA #/AREA URNS HPF: ABNORMAL /HPF
BILIRUB UR QL STRIP: NEGATIVE
BUN SERPL-MCNC: 42 MG/DL (ref 7–30)
CALCIUM SERPL-MCNC: 9.2 MG/DL (ref 8.5–10.1)
CHLORIDE SERPL-SCNC: 111 MMOL/L (ref 94–109)
CO2 SERPL-SCNC: 22 MMOL/L (ref 20–32)
COLOR UR AUTO: YELLOW
CREAT SERPL-MCNC: 2.52 MG/DL (ref 0.52–1.04)
GFR SERPL CREATININE-BSD FRML MDRD: 17 ML/MIN/{1.73_M2}
GLUCOSE SERPL-MCNC: 121 MG/DL (ref 70–99)
GLUCOSE UR STRIP-MCNC: NEGATIVE MG/DL
HGB UR QL STRIP: ABNORMAL
KETONES UR STRIP-MCNC: NEGATIVE MG/DL
LEUKOCYTE ESTERASE UR QL STRIP: ABNORMAL
NITRATE UR QL: NEGATIVE
NON-SQ EPI CELLS #/AREA URNS LPF: ABNORMAL /LPF
PH UR STRIP: 5 PH (ref 5–7)
POTASSIUM SERPL-SCNC: 4.6 MMOL/L (ref 3.4–5.3)
RBC #/AREA URNS AUTO: ABNORMAL /HPF
RETINOPATHY: NEGATIVE
SODIUM SERPL-SCNC: 139 MMOL/L (ref 133–144)
SOURCE: ABNORMAL
SP GR UR STRIP: 1.02 (ref 1–1.03)
UROBILINOGEN UR STRIP-ACNC: 0.2 EU/DL (ref 0.2–1)
WBC #/AREA URNS AUTO: >100 /HPF

## 2020-10-01 PROCEDURE — 36415 COLL VENOUS BLD VENIPUNCTURE: CPT | Performed by: FAMILY MEDICINE

## 2020-10-01 PROCEDURE — 99214 OFFICE O/P EST MOD 30 MIN: CPT | Performed by: FAMILY MEDICINE

## 2020-10-01 PROCEDURE — 80048 BASIC METABOLIC PNL TOTAL CA: CPT | Performed by: FAMILY MEDICINE

## 2020-10-01 PROCEDURE — 87086 URINE CULTURE/COLONY COUNT: CPT | Performed by: FAMILY MEDICINE

## 2020-10-01 PROCEDURE — 81001 URINALYSIS AUTO W/SCOPE: CPT | Performed by: FAMILY MEDICINE

## 2020-10-01 PROCEDURE — 87088 URINE BACTERIA CULTURE: CPT | Performed by: FAMILY MEDICINE

## 2020-10-01 PROCEDURE — 87186 SC STD MICRODIL/AGAR DIL: CPT | Performed by: FAMILY MEDICINE

## 2020-10-01 RX ORDER — CEFDINIR 300 MG/1
300 CAPSULE ORAL 2 TIMES DAILY
Qty: 14 CAPSULE | Refills: 0 | Status: ON HOLD | OUTPATIENT
Start: 2020-10-01 | End: 2020-10-06

## 2020-10-01 ASSESSMENT — MIFFLIN-ST. JEOR: SCORE: 1168.94

## 2020-10-01 ASSESSMENT — PAIN SCALES - GENERAL: PAINLEVEL: SEVERE PAIN (6)

## 2020-10-01 NOTE — PATIENT INSTRUCTIONS
Discontinue the Ciprofloxacin    Urine culture should be available this weekend if you are not feeling an improvement in symptoms, otherwise I'll call you Monday with results.    Cefdinir 300 mg twice daily x 7 days - I'll call you tomorrow if we need to adjust the dose for your kidney function.    Joyce Schwab M.D.      Our Clinic hours are:  Mondays    7:20 am - 7 pm  Tues -  Fri  7:20 am - 5 pm    Clinic Phone: [phone]    The clinic lab opens at 7:30 am Mon - Fri and appointments are required.    [facility]. [phone]  Monday  8 am - 7pm  Tues - Fri 8 am - 5:30 pm

## 2020-10-01 NOTE — PROGRESS NOTES
"Subjective     Lupe Lepe is a 80 year old female who presents to clinic today for the following health issues:    HPI          Chief Complaint   Patient presents with     Urinary Problem       Genitourinary - Female  Onset/Duration: 1.5 weeks  Description:   Painful urination (Dysuria): YES           Frequency: YES  Blood in urine (Hematuria): no  Delay in urine (Hesitency): no  Intensity: 6/10  Progression of Symptoms:  worsening  Accompanying Signs & Symptoms:  Fever/chills: no  Flank pain: YES- stage 4 kidney disease  Nausea and vomiting: no  Vaginal symptoms: none  Abdominal/Pelvic Pain: no  History:   History of frequent UTI s: YES  History of kidney stones: no  Sexually Active: no  Possibility of pregnancy: No  Precipitating or alleviating factors: None  Therapies tried and outcome:  cipro 250mg bid on 7th day     Patient has been on cipro for a UTI x 7 days.  She has one dose left.  It usually works for her recurrent infections.  Recent urine culture did show sensitivity to the cipro but it was resistant to levaquin.     She feels that symptoms have just progressively gotten worse.    No fever/chills. No nausea/vomiting.  No back pain.    She does have stage IV CKD with eGFR from 28-30 or so.        Review of Systems   Constitutional, HEENT, cardiovascular, pulmonary, gi and gu systems are negative, except as otherwise noted.      Objective    /76   Pulse 66   Temp 96.4  F (35.8  C) (Tympanic)   Resp 16   Ht 1.676 m (5' 6\")   Wt 69.2 kg (152 lb 9.6 oz)   SpO2 98%   Breastfeeding No   BMI 24.63 kg/m    Body mass index is 24.63 kg/m .  Physical Exam   GENERAL APPEARANCE: healthy, alert and no distress  ABDOMEN: soft, nontender, without hepatosplenomegaly or masses and no CVAT    Results for orders placed or performed in visit on 10/01/20 (from the past 24 hour(s))   UA with Microscopic reflex to Culture    Specimen: Midstream Urine   Result Value Ref Range    Color Urine Yellow     " Appearance Urine Slightly Cloudy     Glucose Urine Negative NEG^Negative mg/dL    Bilirubin Urine Negative NEG^Negative    Ketones Urine Negative NEG^Negative mg/dL    Specific Gravity Urine 1.020 1.003 - 1.035    pH Urine 5.0 5.0 - 7.0 pH    Protein Albumin Urine 30 (A) NEG^Negative mg/dL    Urobilinogen Urine 0.2 0.2 - 1.0 EU/dL    Nitrite Urine Negative NEG^Negative    Blood Urine Small (A) NEG^Negative    Leukocyte Esterase Urine Large (A) NEG^Negative    Source Midstream Urine     WBC Urine >100 (A) OTO5^0 - 5 /HPF    RBC Urine O - 2 OTO2^O - 2 /HPF    Squamous Epithelial /LPF Urine Few FEW^Few /LPF    Bacteria Urine Many (A) NEG^Negative /HPF           ASSESSMENT/PLAN:      ICD-10-CM    1. Recurrent UTI (urinary tract infection)  N39.0 cefdinir (OMNICEF) 300 MG capsule     Basic metabolic panel   2. CKD (chronic kidney disease) stage 4, GFR 15-29 ml/min (H)  N18.4      omnicef will need to be renally dosed if eGFR is <30.  In May it was exactly 30 (at which point no reduction is necessary).  Will notify patient tomorrow if dose needs to change.    Patient Instructions   Discontinue the Ciprofloxacin    Urine culture should be available this weekend if you are not feeling an improvement in symptoms, otherwise I'll call you Monday with results.    Cefdinir 300 mg twice daily x 7 days - I'll call you tomorrow if we need to adjust the dose for your kidney function.    Joyce Schwab M.D.      Our Clinic hours are:  Mondays    7:20 am - 7 pm  Tues -  Fri  7:20 am - 5 pm    Clinic Phone: [phone]    The clinic lab opens at 7:30 am Mon - Fri and appointments are required.    [facility]. [phone]  Monday  8 am - 7pm  Tues - Fri 8 am - 5:30 pm

## 2020-10-02 NOTE — RESULT ENCOUNTER NOTE
Worsening renal function over the past 4 months.   Has a nephrologist :  Tomasz Nunez MD - [place]  She will contact his office today, last seen there in June but he was working off of the May values at that time.    Recent cipro use for UTI that wasn't improving.   Changed to Omnicef - due to decreased GFR should only take 1 pill (300 mg) daily and patient was clear on that plan.    FYI to PCP as well.  Joyce Schwab M.D.

## 2020-10-03 LAB
BACTERIA SPEC CULT: ABNORMAL
Lab: ABNORMAL
SPECIMEN SOURCE: ABNORMAL

## 2020-10-05 ENCOUNTER — HOSPITAL ENCOUNTER (OUTPATIENT)
Facility: CLINIC | Age: 80
Setting detail: OBSERVATION
Discharge: HOME | End: 2020-10-06
Attending: PHYSICIAN ASSISTANT | Admitting: INTERNAL MEDICINE
Payer: COMMERCIAL

## 2020-10-05 ENCOUNTER — TELEPHONE (OUTPATIENT)
Dept: FAMILY MEDICINE | Facility: CLINIC | Age: 80
End: 2020-10-05

## 2020-10-05 DIAGNOSIS — Z20.828 EXPOSURE TO SARS-ASSOCIATED CORONAVIRUS: ICD-10-CM

## 2020-10-05 DIAGNOSIS — E11.22 TYPE 2 DIABETES MELLITUS WITH STAGE 4 CHRONIC KIDNEY DISEASE, WITHOUT LONG-TERM CURRENT USE OF INSULIN (H): ICD-10-CM

## 2020-10-05 DIAGNOSIS — N39.0 RECURRENT UTI: ICD-10-CM

## 2020-10-05 DIAGNOSIS — N18.4 CKD (CHRONIC KIDNEY DISEASE) STAGE 4, GFR 15-29 ML/MIN (H): ICD-10-CM

## 2020-10-05 DIAGNOSIS — N18.4 TYPE 2 DIABETES MELLITUS WITH STAGE 4 CHRONIC KIDNEY DISEASE, WITHOUT LONG-TERM CURRENT USE OF INSULIN (H): ICD-10-CM

## 2020-10-05 LAB
ALBUMIN SERPL-MCNC: 4.4 G/DL (ref 3.4–5)
ALBUMIN UR-MCNC: NEGATIVE MG/DL
ALP SERPL-CCNC: 88 U/L (ref 40–150)
ALT SERPL W P-5'-P-CCNC: 29 U/L (ref 0–50)
ANION GAP SERPL CALCULATED.3IONS-SCNC: 9 MMOL/L (ref 3–14)
APPEARANCE UR: ABNORMAL
AST SERPL W P-5'-P-CCNC: 16 U/L (ref 0–45)
BACTERIA #/AREA URNS HPF: ABNORMAL /HPF
BASE DEFICIT BLDV-SCNC: 7.7 MMOL/L
BASOPHILS # BLD AUTO: 0 10E9/L (ref 0–0.2)
BASOPHILS NFR BLD AUTO: 0.5 %
BILIRUB SERPL-MCNC: 0.6 MG/DL (ref 0.2–1.3)
BILIRUB UR QL STRIP: NEGATIVE
BUN SERPL-MCNC: 40 MG/DL (ref 7–30)
CALCIUM SERPL-MCNC: 9.4 MG/DL (ref 8.5–10.1)
CHLORIDE SERPL-SCNC: 113 MMOL/L (ref 94–109)
CO2 SERPL-SCNC: 18 MMOL/L (ref 20–32)
COLOR UR AUTO: YELLOW
CREAT SERPL-MCNC: 1.93 MG/DL (ref 0.52–1.04)
DIFFERENTIAL METHOD BLD: NORMAL
EOSINOPHIL # BLD AUTO: 0.2 10E9/L (ref 0–0.7)
EOSINOPHIL NFR BLD AUTO: 3.8 %
ERYTHROCYTE [DISTWIDTH] IN BLOOD BY AUTOMATED COUNT: 12.1 % (ref 10–15)
GFR SERPL CREATININE-BSD FRML MDRD: 24 ML/MIN/{1.73_M2}
GLUCOSE SERPL-MCNC: 107 MG/DL (ref 70–99)
GLUCOSE UR STRIP-MCNC: NEGATIVE MG/DL
HCO3 BLDV-SCNC: 18 MMOL/L (ref 21–28)
HCT VFR BLD AUTO: 37 % (ref 35–47)
HGB BLD-MCNC: 12.4 G/DL (ref 11.7–15.7)
HGB UR QL STRIP: NEGATIVE
IMM GRANULOCYTES # BLD: 0 10E9/L (ref 0–0.4)
IMM GRANULOCYTES NFR BLD: 0.5 %
KETONES UR STRIP-MCNC: NEGATIVE MG/DL
LEUKOCYTE ESTERASE UR QL STRIP: ABNORMAL
LYMPHOCYTES # BLD AUTO: 1.6 10E9/L (ref 0.8–5.3)
LYMPHOCYTES NFR BLD AUTO: 25.7 %
MCH RBC QN AUTO: 28.8 PG (ref 26.5–33)
MCHC RBC AUTO-ENTMCNC: 33.5 G/DL (ref 31.5–36.5)
MCV RBC AUTO: 86 FL (ref 78–100)
MONOCYTES # BLD AUTO: 0.8 10E9/L (ref 0–1.3)
MONOCYTES NFR BLD AUTO: 13.9 %
MUCOUS THREADS #/AREA URNS LPF: PRESENT /LPF
NEUTROPHILS # BLD AUTO: 3.4 10E9/L (ref 1.6–8.3)
NEUTROPHILS NFR BLD AUTO: 55.6 %
NITRATE UR QL: NEGATIVE
NRBC # BLD AUTO: 0 10*3/UL
NRBC BLD AUTO-RTO: 0 /100
O2/TOTAL GAS SETTING VFR VENT: ABNORMAL %
PCO2 BLDV: 38 MM HG (ref 40–50)
PH BLDV: 7.29 PH (ref 7.32–7.43)
PH UR STRIP: 6 PH (ref 5–7)
PLATELET # BLD AUTO: 173 10E9/L (ref 150–450)
PO2 BLDV: 36 MM HG (ref 25–47)
POTASSIUM SERPL-SCNC: 4.9 MMOL/L (ref 3.4–5.3)
PROT SERPL-MCNC: 7.4 G/DL (ref 6.8–8.8)
RBC # BLD AUTO: 4.3 10E12/L (ref 3.8–5.2)
RBC #/AREA URNS AUTO: 1 /HPF (ref 0–2)
SODIUM SERPL-SCNC: 140 MMOL/L (ref 133–144)
SOURCE: ABNORMAL
SP GR UR STRIP: 1 (ref 1–1.03)
UROBILINOGEN UR STRIP-MCNC: 0 MG/DL (ref 0–2)
WBC # BLD AUTO: 6.1 10E9/L (ref 4–11)
WBC #/AREA URNS AUTO: 46 /HPF (ref 0–5)
WBC CLUMPS #/AREA URNS HPF: PRESENT /HPF

## 2020-10-05 PROCEDURE — C9803 HOPD COVID-19 SPEC COLLECT: HCPCS

## 2020-10-05 PROCEDURE — 81001 URINALYSIS AUTO W/SCOPE: CPT | Performed by: PHYSICIAN ASSISTANT

## 2020-10-05 PROCEDURE — 258N000003 HC RX IP 258 OP 636: Performed by: PHYSICIAN ASSISTANT

## 2020-10-05 PROCEDURE — 99284 EMERGENCY DEPT VISIT MOD MDM: CPT | Performed by: FAMILY MEDICINE

## 2020-10-05 PROCEDURE — 96365 THER/PROPH/DIAG IV INF INIT: CPT | Performed by: FAMILY MEDICINE

## 2020-10-05 PROCEDURE — 87088 URINE BACTERIA CULTURE: CPT | Performed by: PHYSICIAN ASSISTANT

## 2020-10-05 PROCEDURE — 82803 BLOOD GASES ANY COMBINATION: CPT | Performed by: PHYSICIAN ASSISTANT

## 2020-10-05 PROCEDURE — 87086 URINE CULTURE/COLONY COUNT: CPT | Performed by: PHYSICIAN ASSISTANT

## 2020-10-05 PROCEDURE — 85025 COMPLETE CBC W/AUTO DIFF WBC: CPT | Performed by: EMERGENCY MEDICINE

## 2020-10-05 PROCEDURE — 80053 COMPREHEN METABOLIC PANEL: CPT | Performed by: EMERGENCY MEDICINE

## 2020-10-05 PROCEDURE — 36415 COLL VENOUS BLD VENIPUNCTURE: CPT | Performed by: PHYSICIAN ASSISTANT

## 2020-10-05 PROCEDURE — 99285 EMERGENCY DEPT VISIT HI MDM: CPT | Mod: 25 | Performed by: FAMILY MEDICINE

## 2020-10-05 PROCEDURE — U0003 INFECTIOUS AGENT DETECTION BY NUCLEIC ACID (DNA OR RNA); SEVERE ACUTE RESPIRATORY SYNDROME CORONAVIRUS 2 (SARS-COV-2) (CORONAVIRUS DISEASE [COVID-19]), AMPLIFIED PROBE TECHNIQUE, MAKING USE OF HIGH THROUGHPUT TECHNOLOGIES AS DESCRIBED BY CMS-2020-01-R: HCPCS | Performed by: PHYSICIAN ASSISTANT

## 2020-10-05 PROCEDURE — G0378 HOSPITAL OBSERVATION PER HR: HCPCS

## 2020-10-05 PROCEDURE — 99220 PR INITIAL OBSERVATION CARE,LEVEL III: CPT | Performed by: PHYSICIAN ASSISTANT

## 2020-10-05 PROCEDURE — 96361 HYDRATE IV INFUSION ADD-ON: CPT | Performed by: FAMILY MEDICINE

## 2020-10-05 PROCEDURE — 250N000011 HC RX IP 250 OP 636: Performed by: PHYSICIAN ASSISTANT

## 2020-10-05 PROCEDURE — 250N000013 HC RX MED GY IP 250 OP 250 PS 637: Performed by: PHYSICIAN ASSISTANT

## 2020-10-05 PROCEDURE — 87186 SC STD MICRODIL/AGAR DIL: CPT | Performed by: PHYSICIAN ASSISTANT

## 2020-10-05 RX ORDER — AMOXICILLIN 250 MG
1 CAPSULE ORAL 2 TIMES DAILY PRN
Status: DISCONTINUED | OUTPATIENT
Start: 2020-10-05 | End: 2020-10-06 | Stop reason: HOSPADM

## 2020-10-05 RX ORDER — ROSUVASTATIN CALCIUM 5 MG/1
10 TABLET, COATED ORAL AT BEDTIME
Status: DISCONTINUED | OUTPATIENT
Start: 2020-10-05 | End: 2020-10-06 | Stop reason: HOSPADM

## 2020-10-05 RX ORDER — AMLODIPINE BESYLATE 10 MG/1
10 TABLET ORAL AT BEDTIME
Status: DISCONTINUED | OUTPATIENT
Start: 2020-10-05 | End: 2020-10-06 | Stop reason: HOSPADM

## 2020-10-05 RX ORDER — CLONIDINE HYDROCHLORIDE 0.1 MG/1
0.1 TABLET ORAL 2 TIMES DAILY PRN
Status: DISCONTINUED | OUTPATIENT
Start: 2020-10-05 | End: 2020-10-06 | Stop reason: HOSPADM

## 2020-10-05 RX ORDER — ACETAMINOPHEN 325 MG/1
650 TABLET ORAL EVERY 4 HOURS PRN
Status: DISCONTINUED | OUTPATIENT
Start: 2020-10-05 | End: 2020-10-05

## 2020-10-05 RX ORDER — PROCHLORPERAZINE 25 MG
12.5 SUPPOSITORY, RECTAL RECTAL EVERY 12 HOURS PRN
Status: DISCONTINUED | OUTPATIENT
Start: 2020-10-05 | End: 2020-10-06 | Stop reason: HOSPADM

## 2020-10-05 RX ORDER — ONDANSETRON 2 MG/ML
4 INJECTION INTRAMUSCULAR; INTRAVENOUS EVERY 6 HOURS PRN
Status: DISCONTINUED | OUTPATIENT
Start: 2020-10-05 | End: 2020-10-05

## 2020-10-05 RX ORDER — LEVOTHYROXINE SODIUM 75 UG/1
75 TABLET ORAL
Status: DISCONTINUED | OUTPATIENT
Start: 2020-10-07 | End: 2020-10-06 | Stop reason: HOSPADM

## 2020-10-05 RX ORDER — CHLORTHALIDONE 25 MG/1
25 TABLET ORAL DAILY
Status: DISCONTINUED | OUTPATIENT
Start: 2020-10-05 | End: 2020-10-06 | Stop reason: HOSPADM

## 2020-10-05 RX ORDER — OXYCODONE HYDROCHLORIDE 5 MG/1
5-10 TABLET ORAL
Status: DISCONTINUED | OUTPATIENT
Start: 2020-10-05 | End: 2020-10-06 | Stop reason: HOSPADM

## 2020-10-05 RX ORDER — LEVOTHYROXINE SODIUM 50 UG/1
50 TABLET ORAL
Status: DISCONTINUED | OUTPATIENT
Start: 2020-10-06 | End: 2020-10-06 | Stop reason: HOSPADM

## 2020-10-05 RX ORDER — PROCHLORPERAZINE MALEATE 5 MG
5 TABLET ORAL EVERY 6 HOURS PRN
Status: DISCONTINUED | OUTPATIENT
Start: 2020-10-05 | End: 2020-10-06 | Stop reason: HOSPADM

## 2020-10-05 RX ORDER — NALOXONE HYDROCHLORIDE 0.4 MG/ML
.1-.4 INJECTION, SOLUTION INTRAMUSCULAR; INTRAVENOUS; SUBCUTANEOUS
Status: DISCONTINUED | OUTPATIENT
Start: 2020-10-05 | End: 2020-10-06 | Stop reason: HOSPADM

## 2020-10-05 RX ORDER — ACETAMINOPHEN 650 MG/1
650 SUPPOSITORY RECTAL EVERY 4 HOURS PRN
Status: DISCONTINUED | OUTPATIENT
Start: 2020-10-05 | End: 2020-10-06 | Stop reason: HOSPADM

## 2020-10-05 RX ORDER — ONDANSETRON 4 MG/1
4 TABLET, ORALLY DISINTEGRATING ORAL EVERY 8 HOURS PRN
COMMUNITY
Start: 2020-09-15 | End: 2021-07-02

## 2020-10-05 RX ORDER — POLYETHYLENE GLYCOL 3350 17 G/17G
17 POWDER, FOR SOLUTION ORAL DAILY PRN
Status: DISCONTINUED | OUTPATIENT
Start: 2020-10-05 | End: 2020-10-06 | Stop reason: HOSPADM

## 2020-10-05 RX ORDER — ONDANSETRON 2 MG/ML
4 INJECTION INTRAMUSCULAR; INTRAVENOUS EVERY 6 HOURS PRN
Status: DISCONTINUED | OUTPATIENT
Start: 2020-10-05 | End: 2020-10-06 | Stop reason: HOSPADM

## 2020-10-05 RX ORDER — ONDANSETRON 4 MG/1
4 TABLET, ORALLY DISINTEGRATING ORAL EVERY 6 HOURS PRN
Status: DISCONTINUED | OUTPATIENT
Start: 2020-10-05 | End: 2020-10-05

## 2020-10-05 RX ORDER — ACETAMINOPHEN 325 MG/1
650 TABLET ORAL EVERY 4 HOURS PRN
Status: DISCONTINUED | OUTPATIENT
Start: 2020-10-05 | End: 2020-10-06 | Stop reason: HOSPADM

## 2020-10-05 RX ORDER — AMOXICILLIN 250 MG
2 CAPSULE ORAL 2 TIMES DAILY PRN
Status: DISCONTINUED | OUTPATIENT
Start: 2020-10-05 | End: 2020-10-06 | Stop reason: HOSPADM

## 2020-10-05 RX ORDER — ONDANSETRON 4 MG/1
4 TABLET, ORALLY DISINTEGRATING ORAL EVERY 6 HOURS PRN
Status: DISCONTINUED | OUTPATIENT
Start: 2020-10-05 | End: 2020-10-06 | Stop reason: HOSPADM

## 2020-10-05 RX ORDER — LISINOPRIL 10 MG/1
10 TABLET ORAL 2 TIMES DAILY
Status: DISCONTINUED | OUTPATIENT
Start: 2020-10-05 | End: 2020-10-06 | Stop reason: HOSPADM

## 2020-10-05 RX ADMIN — AMLODIPINE BESYLATE 10 MG: 10 TABLET ORAL at 21:27

## 2020-10-05 RX ADMIN — SODIUM CHLORIDE 500 ML: 9 INJECTION, SOLUTION INTRAVENOUS at 12:23

## 2020-10-05 RX ADMIN — ROSUVASTATIN CALCIUM 10 MG: 5 TABLET, FILM COATED ORAL at 21:27

## 2020-10-05 RX ADMIN — ERTAPENEM SODIUM 500 MG: 1 INJECTION, POWDER, LYOPHILIZED, FOR SOLUTION INTRAMUSCULAR; INTRAVENOUS at 14:41

## 2020-10-05 RX ADMIN — ASPIRIN 325 MG: 325 TABLET, COATED ORAL at 21:27

## 2020-10-05 ASSESSMENT — ENCOUNTER SYMPTOMS
FLANK PAIN: 0
RESPIRATORY NEGATIVE: 1
CONSTITUTIONAL NEGATIVE: 1
FREQUENCY: 1
EYE REDNESS: 0
BACK PAIN: 0
HEADACHES: 0
HEMATURIA: 0
SHORTNESS OF BREATH: 0
DIFFICULTY URINATING: 0
CONFUSION: 0
ACTIVITY CHANGE: 0
ARTHRALGIAS: 0
FEVER: 0
CARDIOVASCULAR NEGATIVE: 1
NAUSEA: 0
COLOR CHANGE: 0
APPETITE CHANGE: 0
DIARRHEA: 0
VOMITING: 0
NECK STIFFNESS: 0
DIAPHORESIS: 0
DYSURIA: 1
CHILLS: 0
ABDOMINAL PAIN: 0

## 2020-10-05 ASSESSMENT — MIFFLIN-ST. JEOR: SCORE: 1157.15

## 2020-10-05 NOTE — TELEPHONE ENCOUNTER
Talked with patient who called her nephrologist about the ESBL uti and her worsening renal function (labs from 10/1).     He also recommended admission for IV antibiotic and renal monitoring.     Sending patient through ER as with her drop in renal function I cannot guarantee that she will not need further assessment/treatment for her renal function (?dialysis) and would like to get some labs again before admission to the hospital.     Patient agrees with plan and will go to ER.   I did call ER and spoke to charge RN.     Joyce Schwab M.D.

## 2020-10-05 NOTE — ED NOTES
Patient has  [place] to Observation  order. Patient has been given Patient Bill of Rights, Observation brochure and  What does Observation mean to me  forms.  Patient has been given the opportunity to ask questions about observation status and their plan of care.     Kiley Pappas RN

## 2020-10-05 NOTE — H&P
[facility]     History and Physical - Hospitalist Service       Date of Admission:  10/5/2020    Assessment & Plan   Lupe Lepe is a 80 year old female admitted on 10/5/2020. She presented to the emergency department from clinic for evaluation of a persisting / recurrent UTI that has failed outpatient treatment, in addition to acute on chronic kidney failure for which she is being admitted for further treatment and evaluation.    Recurrent UTI, failed outpatient therapy  UTI diagnosed in clinic, urine culture from 10/1 positive for > 100k E.coli. Continuing dysuria despite treatment with ciprofloxacin x 7 days (resistant per susceptibilities), then omnicef x 7 days (likely resistant per susceptibilities). Renal function worsening (see below). Admit UA with moderate leukocyte esterase and pyuria (WBC 46). Afebrile, no leukocytosis on admission. No evidence of sepsis or symptoms suggestive of septic kidney stone. Was started on Ertapenem in the emergency department.  - Continue Ertapenem 500 mg q 24 hours  - Repeat urine culture pending    Acute on chronic renal failure  Admit creatinine 1.93 (baseline 1.5 - 1.9), GFR 24 (baseline 25 - 30) - improving from renal function on 10/1 (creatinine 2.52, GFR 17). Follows with Dr. Landaverde with [facility], who recommended she go to the emergency department for IV antibiotics. As above, no evidence to suggest obstructive uropathy / current stone.  - BMP in am  - Avoid nephrotoxins  - If patient develops pain or evidence of sepsis, consider CT to rule out stone    Metabolic acidosis   Admit bicarb on BMP was low (18), VBG obtained showing metabolic acidosis (pH 7/29 / pCO2 28 / bicarb 18). Unclear cause.  - VBG in am    Renovascular hypertension with goal blood pressure less than 130/80  Renal artery stenosis  Managed prior to admission with amlodipine 10 mg daily, chlorthalidone 25 mg daily, lisinopril 10 mg bid, and prn clonidine  (0.1 mg bid prn for SBP> 180).  - Continue amlodipine  - Continue prn clonidine  - Hold chlorthalidone and lisinopril due to renal function, resume as renal function improves    Type 2 diabetes mellitus with stage 4 chronic kidney disease, without long-term   current use of insulin  Most recent A1c 7.2. Appears to be diet controlled.   - Consistent carbohydrate diet  - No regular glucose checks unless persistently elevated on BMP    Hyperlipidemia LDL goal <100  Managed prior to admission with Crestor 10 mg daily, continue.    Acquired hypothyroidism  Managed prior to admission with levothyroxine 50 vs 75 mcg on alternating days, continue with home regimen.    Subclavian arterial stenosis  Stricture of artery  Chronic and stable. Has poor upper extremity circulation, requires blood pressure reads from legs rather than arms.   - No intervention    History of giant cell arteritis  Not an active problem. No intervention.    COVID status  Tested as asymptomatic COVID screen based on admission protocol. No concern for active COVID infection on admission.  - COVID PCR pending  - No indication for contact / aerosol precautions  - No repeat testing indicated       Diet: Combination Diet Renal Diet; 0665-7996 Calories: Moderate Consistent CHO (4-6 CHO units/meal)    DVT Prophylaxis: Pneumatic Compression Devices  Abarca Catheter: not present  Code Status: No CPR- Do NOT Intubate         Disposition Plan   Expected discharge: 1-2 days, recommended to prior living arrangement once antibiotic plan established and renal function improved.  Entered: Sunni Witt PA-C 10/05/2020, 11:27 PM     The patient's care was discussed with the Attending Physician, Dr. Phu Obando and Patient.    Sunni Witt PA-C  [facility]   Contact information available via [facility] Paging/Directory      ______________________________________________________________________    Chief Complaint  "  Dysuria    History is obtained from the patient, review of EMR, and emergency department sign out from Codie Nugent PA-C.     History of Present Illness   Lupe Lepe is a 80 year old female who presented to the emergency department for evaluation of persisting dysuria despite outpatient therapy.     More than 2 week history of dysuria, was diagnosed with UTI in clinic. Completed 7 day course of ciprofloxacin without improvement. Had second course of Omnicef x 7 days without improvement, in addition to worsening renal function.    Urine culture returned positive for > 100k E.coli, resistant to many antibiotics.     Known chronic kidney disease, renal function worsening on outpatient labs.   Patient discussed with her nephrologist who was concerned and suggested she needed IV antibiotics and advised going to the emergency department.     Patient denies isolated or unilateral flank pain but does have a \"pain around my waist like in a band,\" which has improved in the past few days. No known history of kidney stones.    Patient denies any fever, chills, myalgias. She did have associated nausea and vomiting at the start of the course of illness which has since resolved.     On review of systems she notes occasional palpitations which are not new, no changes from baseline. Denies cough, wheeze, shortness of breath. She currently has a slight headache. The remainder review of systems is negative.    Review of Systems    The 10 point Review of Systems is negative other than noted in the HPI or here.     Past Medical History    I have reviewed this patient's medical history and updated it with pertinent information if needed.   Past Medical History:   Diagnosis Date     Abdominal cramps 10/16/2008    Episodic severe cramping associated with emesis since 2007. GI evaluation 10/08. [facility] evaluation 12/08. 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 Rx jeanne-pac for H. 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 1 year follow-up recommended. CT 5/11-  previously seen cystic structure in pancreas is no longer seen, mass in left kideney is unchanged from prior [facility] study. This suggests a nonaggressive process andargues against even a low grade liposarcoma     Arthritis      Closed fracture of unspecified part of fibula     1997     Heart disease      Hemorrhage of rectum and anus     negative colonoscopy 2000, food poisoning     HERPES ZOSTER NOS 5/23/2007    left  flank 5/07     Hypertension      HYPOSMIA     negative MRI head 1999, now resolved     IRON DEFIC ANEMIA NOS 3/27/2006    Microcytic. B12, folate normal. Retic count 1.8,  %FE 6, Ferritin 163. OBT negativex3. BM biopsy negative 4/06 with no stainable iron stores. EGD 5/06- mild esophagitis. Colonoscopy 5/06- normal. Resolved with iron, treatment of temporal arteritis.     JOINT EFFUSION-L/LEG 4/7/2006    Right knee. Mri 4/06-   Extensive tears of both the medial and lateral menisci.  Moderate chondromalacia patella with mild chondromalacia elsewhere in the knee. Moderate-sized joint effusion. No other abnormal mass is identified.      Other closed fractures of distal end of radius (alone)     1996     Polymyalgia rheumatica (H)     History of steroid reponsive myalgias and moderately elevated ESR 1999, 2000, 2004     Thyroid disease        Past Surgical History   I have reviewed this patient's surgical history and updated it with pertinent information if needed.  Past Surgical History:   Procedure Laterality Date     COLONOSCOPY  8/25/2008     ESOPHAGOSCOPY, GASTROSCOPY, DUODENOSCOPY (EGD), COMBINED N/A 11/16/2017    Procedure: COMBINED ESOPHAGOSCOPY, GASTROSCOPY, DUODENOSCOPY (EGD);  Gastroscopy;  Surgeon: Angel Guillen MD;  Location: [facility]     HC COLONOSCOPY THRU STOMA, DIAGNOSTIC  2000, 5/06, 8/08    Normal     HYSTERECTOMY, SIS  1970's      PHACOEMULSIFICATION WITH STANDARD INTRAOCULAR LENS IMPLANT Right 2018    Procedure: PHACOEMULSIFICATION WITH STANDARD INTRAOCULAR LENS IMPLANT;  Right Cataract Removal with Implant;  Surgeon: Sekou Dobbins MD;  Location: [facility] OR     PHACOEMULSIFICATION WITH STANDARD INTRAOCULAR LENS IMPLANT Left 2018    Procedure: PHACOEMULSIFICATION WITH STANDARD INTRAOCULAR LENS IMPLANT;  Left Cataract Removal with Implant;  Surgeon: Sekou Dobbins MD;  Location: [facility] OR     SURGICAL HISTORY OF -       SIS, for fibroids     SURGICAL HISTORY OF -   2008    Esophagogastroduodenoscopy with biopsy     TONSILLECTOMY      Tonsilectomy       Social History   I have reviewed this patient's social history and updated it with pertinent information if needed.  Social History     Tobacco Use     Smoking status: Never Smoker     Smokeless tobacco: Never Used   Substance Use Topics     Alcohol use: No     Drug use: No       Family History   I have reviewed this patient's family history and updated it with pertinent information if needed.  Family History   Problem Relation Age of Onset     Cancer Mother         ovarian CA     Cancer Father         lung CA     Heart Disease Maternal Grandmother      Heart Disease Maternal Grandfather      Depression Sister         sucide     Breast Cancer Sister        Prior to Admission Medications   Prior to Admission Medications   Prescriptions Last Dose Informant Patient Reported? Taking?   ASPIRIN 325 MG OR TBEC 10/4/2020 at hs Self Yes Yes   Si TABLET BY MOUTH DAILY   MULTIVITAMINS OR TABS 10/5/2020 at am Self Yes Yes   Si TABLET BY MOUTH DAILY   VITAMIN B-6 50 MG OR TABS 10/5/2020 at am Self No Yes   Si daily   Patient taking differently: Take 50 mg by mouth daily    amLODIPine (NORVASC) 10 MG tablet 10/4/2020 at hs Self No Yes   Sig: TAKE 1 TABLET (10 MG) BY MOUTH DAILY   cefdinir (OMNICEF) 300 MG capsule 10/5/2020 at am Self No Yes   Sig: Take 1 capsule  (300 mg) by mouth 2 times daily for 7 days   chlorthalidone (HYGROTON) 25 MG tablet 10/5/2020 at am Self No Yes   Sig: TAKE 1 TABLET BY MOUTH EVERY DAY   cholecalciferol (D-VI-SOL,VITAMIN D3) 400 units/mL (10 mcg/mL) LIQD liquid 10/5/2020 at am Self Yes Yes   Sig: Take 400 Units by mouth daily   cloNIDine (CATAPRES) 0.1 MG tablet More than a month at Unknown time Self No No   Sig: ONE Twice DAILY as needed for SBP>180, diastolic BP>110   levothyroxine (SYNTHROID/LEVOTHROID) 50 MCG tablet 10/4/2020 at am Self No Yes   Sig: Take on Tuesdays, Thursdays, Saturdays, and Sundays   Patient taking differently: Take 50 mcg by mouth Take in the morning on Sundays, Tuesdays, Thursdays, Saturdays   levothyroxine (SYNTHROID/LEVOTHROID) 75 MCG tablet 10/5/2020 at am Self No Yes   Sig: TAKE 1 TABLET BY MOUTH ON SUNDAYS, MONDAYS, WEDNESDAYS AND FRIDAYS   Patient taking differently: Take 75 mcg by mouth TAKE 1 TABLET in the morning ON MONDAYS, WEDNESDAYS AND FRIDAYS   lisinopril (ZESTRIL) 10 MG tablet 10/5/2020 at am Self No Yes   Sig: TAKE 1 TABLET (10 MG) BY MOUTH 2 TIMES DAILY   omeprazole (PRILOSEC) 20 MG DR capsule 10/5/2020 at am Self No Yes   Sig: TAKE 1 CAPSULE BY MOUTH EVERY 3RD DAY AS NEEDED   ondansetron (ZOFRAN-ODT) 4 MG ODT tab  Self Yes No   Sig: Take 4 mg by mouth every 8 hours as needed   rosuvastatin (CRESTOR) 10 MG tablet 10/4/2020 at hs Self No Yes   Sig: TAKE 1 TABLET BY MOUTH EVERY DAY   Patient taking differently: Take 10 mg by mouth At Bedtime       Facility-Administered Medications: None     Allergies   Allergies   Allergen Reactions     Bactrim      Acute renal failure and hyperkalemia     Ace Inhibitors Other (See Comments)     Hyperkalemia       Atorvastatin Calcium Itching     ELEVATED LFTS     Crestor [Rosuvastatin Calcium] Other (See Comments)     Elevated LFTS   In high doses     Penicillins Hives     Sulfamethoxazole-Trimethoprim Other (See Comments)     Kidney shutdown     Ciprofloxacin Itching and  Rash     Allergy to IV form only, Has taken oral without problems       Physical Exam   Vital Signs: Temp: 97.7  F (36.5  C) Temp src: Oral BP: (!) 146/48 Pulse: 69   Resp: 16 SpO2: 96 % O2 Device: None (Room air)    Weight: 150 lbs 0 oz    Constitutional: Alert, oriented, cooperative. No apparent distress, appears nontoxic. Speaking in full sentences.     Eyes: Eyes are clear, pupils are reactive. No scleral icterus.    HEENT: Oropharynx is clear and moist, no lesions. Normocephalic, no evidence of cranial trauma.      Cardiovascular: Regular rhythm and rate, normal S1 and S2. No murmur, rubs, or gallops. Peripheral pulses intact bilaterally in lower extremities (difficult to palpate in upper extremities). No lower extremity edema.    Respiratory: Lung sounds are clear to auscultation bilaterally without wheezes, rhonchi, or crackles.    GI: Soft, non-distended. Non-tender, no rebound or guarding. No hepatosplenomegaly or masses appreciated. Normal bowel sounds. Negative CVA tenderness bilaterally.    Musculoskeletal: Without obvious deformity, normal range of motion. Normal muscle bulk and tone. Distal CMS intact.      Skin: Warm and dry, no rashes or ecchymoses. No mottling of skin.      Neurologic: Patient moves all extremities.  is symmetric. Gross strength and sensation are equal bilaterally.    Genitourinary: Deferred      Data   Data reviewed today: I reviewed all medications, new labs and imaging results over the last 24 hours. I personally reviewed no images or EKG's today.    Recent Labs   Lab 10/05/20  1130 10/01/20  1511   WBC 6.1  --    HGB 12.4  --    MCV 86  --      --     139   POTASSIUM 4.9 4.6   CHLORIDE 113* 111*   CO2 18* 22   BUN 40* 42*   CR 1.93* 2.52*   ANIONGAP 9 6   RAUL 9.4 9.2   * 121*   ALBUMIN 4.4  --    PROTTOTAL 7.4  --    BILITOTAL 0.6  --    ALKPHOS 88  --    ALT 29  --    AST 16  --      No results found for this or any previous visit (from the past 24  hour(s)).

## 2020-10-05 NOTE — ED NOTES
Pt denies current c/o.  Requests water; given.  Pt will use call light when she is able to void for UA.

## 2020-10-05 NOTE — ED NOTES
Pt presents to ER for UTI not improving on OP therapy.    Pt has been treated for ESBL UTI for the last 2 weeks.  She has taken a course of Cipro followed by Omnicef.  She reports worsening s/s.  She was seen in clinic for same 10/1/20.  She had repeat UA and BMP at that time.  She was called by PCP today and told to report to ER for continued ESBL UTI and worsening renal function:  Creat 5/8/20  1.6  Creat 10/1/20  2.52    She is afebrile and otherwise feeling well.  Continued dysuria.

## 2020-10-05 NOTE — PROGRESS NOTES
"[facility] ADMISSION NOTE    Patient admitted to room 2300 at approximately 1630 via cart from emergency room. Patient was accompanied by daughter and transport tech.     Verbal SBAR report received from RN prior to patient arrival.     Patient ambulated to bed with stand-by assist. Patient alert and oriented X 3. The patient is not having any pain.  . Admission vital signs: Blood pressure (!) 155/58, pulse 87, temperature 97.9  F (36.6  C), temperature source Oral, resp. rate 18, height 1.676 m (5' 6\"), weight 68 kg (150 lb), SpO2 96 %, not currently breastfeeding. Patient and daughter were oriented to plan of care, call light, bed controls, tv, telephone, bathroom and visiting hours.     Risk Assessment    The following safety risks were identified during admission: none. Yellow risk band applied: NO.     Skin Initial Assessment    This writer admitted this patient and completed a full skin assessment and Alberto score in the Adult PCS flowsheet. Appropriate interventions initiated as needed.         Education    Patient has a [place] to Observation order: Yes  Observation education completed and documented: Yes      Padmini Lewis RN    "

## 2020-10-05 NOTE — ED PROVIDER NOTES
History     Chief Complaint   Patient presents with     Abnormal Labs     stage 4 kidney disease  Recently treated for UTI.  Now worsening renal function       HPI  Lupe Lepe is a 80 year old female who presents today per recommendations from her primary care provider and nephrologist for further evaluation and treatment of recurrent UTI and poor kidney function.  Patient states she is been dealing with UTI type symptoms for the past 3 weeks and was initially on Cipro and then recently changed to cefdinir once daily which she has taken for the past 5 days.  Patient states that she spoke to her primary care doctor who stated that due to her increased creatinine and decreased GFR as well as her urine culture results from 10-1-20 patient is to come to the emergency department for further evaluation and treatment of her symptoms.  Patient denies any fever, chills, sweats, headache, dizziness, confusion, weakness, shortness of breath, chest pain, heart racing skipping beats, abdominal pain, nausea or vomiting, diarrhea, bloody or black tarry stools, back pain.  Patient states she is still having dysuria and frequency and urgency with urination.    Allergies:  Allergies   Allergen Reactions     Bactrim      Acute renal failure and hyperkalemia     Ace Inhibitors Other (See Comments)     Hyperkalemia       Atorvastatin Calcium Itching     ELEVATED LFTS     Crestor [Rosuvastatin Calcium] Other (See Comments)     Elevated LFTS   In high doses     Penicillins Hives     Sulfamethoxazole-Trimethoprim      Other reaction(s): Other - Describe In Comment Field  Kidney shutdown     Ciprofloxacin Itching and Rash     Has taken oral without problems       Problem List:    Patient Active Problem List    Diagnosis Date Noted     Renal artery stenosis (H) 08/25/2006     Priority: High     Left. Likely due to temporal arteritis. S/p left angioplasty 8/06. Increased BP 10/06. Renal duplex- Increased velocity left RA, ratios  within normal limits. Right RA velocity slightly increased. MRA- normal left renal artery. Moderate right renal artery stenosis. Probable splenic artery stenosis. Angio 10/06 with bilateral renal artery angioplasty. Admit [facility] increased BP 11/06- renal angiogram this time shows no stenosis and no gradient. Renal ultrasound 6/08- normal. Renal ultrasound 6/08- normal with elevated pressures.  MRA 4/10- minimal narrowing main right renal artery.       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 Duplex 7/06- Right SCV, brachial biphasic. Right radial monophasic, right ulnar biphasic-triphasic. Left SCV triphasic. Left brachial biphasic, left radial and ulnar monophasic. Brachial wrist indices  0.57 right/0.58 left. Angio 8/06- c/w arteritis, right RA stenosis. Repeat angiogram 6/06 no intracranial arteritis or stenosis, mild left intracranial artery plaque. Duplex 9/06- Right SCV and axillary triphasic. Right brachial, radial and ulnar monophasic. Left SCV biphasic-triphasic.  Left axillary and brachial monophasic. Left radial and ulnar monophasic-biphasic. Brachial wrist indices 0.63 right/0.60 left.  Duplex 2/08 unchanged. MRA 4/10- right vertebral occluded  Followed by Dr Aly at [place]        History of giant cell arteritis 03/27/2006     Priority: High     Prolonged low grade temps. Normal wbc, lft, UA, BCX. , CRP 92. Normal monospot, lymes screen. CT chest abdomen and pelvis negative 4/06. Bcx negative. BM BX negative. Fungal, AFB culture negative. Temporal artery biopsy positive. Flare with prednisone taper. Cellcept started 11/06, stopped 8/08 due to frequent UTIs. Started MTX 12/08 after [place] eval. Off prednisone 8/09.     Needs blood pressure checked in legs.  Sees DR. Ivory Aly at [facility].       Recurrent UTI 10/05/2020     Priority: Medium     Giant cell arteritis (H) 09/15/2020     Priority: Medium     Subclavian  arterial stenosis (H) 06/29/2018     Priority: Medium     Cannot measure blood pressure in bilateral arms.  Blood pressure readings in leg are 10-20% higher than arm.       CKD (chronic kidney disease) stage 4, GFR 15-29 ml/min (H) 08/31/2016     Priority: Medium     Proteinuria on ACEi, follows with  [facility] Dr. Ceferino Savage [phone]       Iron deficiency anemia, unspecified 08/30/2016     Priority: Medium     Diagnosis updated by automated process. Provider to review and confirm.       Chronic kidney disease, stage IV (severe) (H) 08/30/2016     Priority: Medium     Anemia of renal disease 06/09/2016     Priority: Medium     Type 2 diabetes mellitus with stage 4 chronic kidney disease, without long-term current use of insulin (H) 10/31/2010     Priority: Medium     DX 2006. 2003 2hour-glucose 202. FBS >126x2 1/06. No retinopathy, peripheral neuropathy.  Was on insulin previously, was able to get off this in 2014 or so.       Hyperlipidemia LDL goal <100 10/31/2010     Priority: Medium     Renovascular hypertension with goal blood pressure less than 130/80      Priority: Medium     Hospitalized with malignant HTN [facility] 11/06. Angio- no recurrant renal artery stenosis. Metanephrines normal.  Aldosterone was undetectably low.  Urine catecholamines within normal limits.  Renin activity normal.        ACP (advance care planning) 05/06/2011     Priority: Low     Advance Care Planning 6/9/2016: ACP Review of Chart / Resources Provided:  Reviewed chart for advance care plan.  Lupe LEE Lepe has no plan or code status on file however states presence of ACP document. Copy requested.   Added by Ivelisse Olivares  Pt has completed an Advance/Health Care Directive (HCD), to bring in copy to be scanned into Epic.           Frequent Urinary Tract Infections 01/23/2009     Priority: Low     Cysto 7/09 normal        Abdominal cramps 10/16/2008     Priority: Low     Episodic severe cramping associated with  emesis since 2007. GI evaluation 10/08. [place] evaluation 12/08. 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 Rx jeanne-pac for H. Pylori with resolution of symptoms 1/09. Recurrent 2012, shorter symptoms.   Evaluation 11/2017, 9/15/2020.  Unclear cause.  May be transient small bowel obstruction?  Recommend ER evaluation during episode to get CT scan and labs       Gastroesophageal reflux disease without esophagitis      Priority: Low     Paroxysmal atrial fibrillation (H)      Priority: Low     paroxymal single episode 2002. ECHO, Adenosine cardiolite normal.  Not on anticoagulation        Acquired hypothyroidism      Priority: Low     Osteopenia of multiple sites      Priority: Low     dexa 1999, 2003. Dexa 7/06 -lumbar spine -1.9,  right femur -1.0,  left femur is -1.6. Boniva started 2007.  Dexa 2/08- L spine -2.4, right femoral neck -1.4, left femoral neck -2.0. Dexa 5/11- L1-L4  -1.8 ,  right femoral neck -1.7, left femoral neck -1.9.            Past Medical History:    Past Medical History:   Diagnosis Date     Abdominal cramps 10/16/2008     Abnormal CT scan 1/12/2009     Arthritis      Closed fracture of unspecified part of fibula      Heart disease      Hemorrhage of rectum and anus      HERPES ZOSTER NOS 5/23/2007     Hypertension      HYPOSMIA      IRON DEFIC ANEMIA NOS 3/27/2006     JOINT EFFUSION-L/LEG 4/7/2006     Other closed fractures of distal end of radius (alone)      Polymyalgia rheumatica (H)      Thyroid disease        Past Surgical History:    Past Surgical History:   Procedure Laterality Date     COLONOSCOPY  8/25/2008     ESOPHAGOSCOPY, GASTROSCOPY, DUODENOSCOPY (EGD), COMBINED N/A 11/16/2017    Procedure: COMBINED ESOPHAGOSCOPY, GASTROSCOPY, DUODENOSCOPY (EGD);  Gastroscopy;  Surgeon: Angel Guillen MD;  Location: [facility]     HC COLONOSCOPY THRU STOMA, DIAGNOSTIC  2000, 5/06, 8/08    Normal     HYSTERECTOMY, SIS  1970's     PHACOEMULSIFICATION WITH STANDARD INTRAOCULAR  LENS IMPLANT Right 1/8/2018    Procedure: PHACOEMULSIFICATION WITH STANDARD INTRAOCULAR LENS IMPLANT;  Right Cataract Removal with Implant;  Surgeon: Sekou Dobbins MD;  Location: [facility] OR     PHACOEMULSIFICATION WITH STANDARD INTRAOCULAR LENS IMPLANT Left 1/29/2018    Procedure: PHACOEMULSIFICATION WITH STANDARD INTRAOCULAR LENS IMPLANT;  Left Cataract Removal with Implant;  Surgeon: Sekou Dobbins MD;  Location: [facility] OR     SURGICAL HISTORY OF -   1948    SIS, for fibroids     SURGICAL HISTORY OF -   12/16/2008    Esophagogastroduodenoscopy with biopsy     TONSILLECTOMY      Tonsilectomy       Family History:    Family History   Problem Relation Age of Onset     Cancer Mother         ovarian CA     Cancer Father         lung CA     Heart Disease Maternal Grandmother      Heart Disease Maternal Grandfather      Depression Sister         sucide     Breast Cancer Sister        Social History:  Marital Status:   [2]  Social History     Tobacco Use     Smoking status: Never Smoker     Smokeless tobacco: Never Used   Substance Use Topics     Alcohol use: No     Drug use: No        Medications:         amLODIPine (NORVASC) 10 MG tablet       ASPIRIN 325 MG OR TBEC       cefdinir (OMNICEF) 300 MG capsule       chlorthalidone (HYGROTON) 25 MG tablet       cholecalciferol (D-VI-SOL,VITAMIN D3) 400 units/mL (10 mcg/mL) LIQD liquid       cloNIDine (CATAPRES) 0.1 MG tablet       levothyroxine (SYNTHROID/LEVOTHROID) 50 MCG tablet       levothyroxine (SYNTHROID/LEVOTHROID) 75 MCG tablet       lisinopril (ZESTRIL) 10 MG tablet       MULTIVITAMINS OR TABS       omeprazole (PRILOSEC) 20 MG DR capsule       rosuvastatin (CRESTOR) 10 MG tablet       VITAMIN B-6 50 MG OR TABS          Review of Systems   Constitutional: Negative.  Negative for activity change, appetite change, chills, diaphoresis and fever.   HENT: Negative.  Negative for congestion.    Eyes: Negative for redness.   Respiratory: Negative.   "Negative for shortness of breath.    Cardiovascular: Negative.  Negative for chest pain.   Gastrointestinal: Negative for abdominal pain, diarrhea, nausea and vomiting.   Genitourinary: Positive for dysuria, frequency and urgency. Negative for difficulty urinating, flank pain, hematuria, menstrual problem, vaginal bleeding, vaginal discharge and vaginal pain.   Musculoskeletal: Negative for arthralgias, back pain and neck stiffness.   Skin: Negative.  Negative for color change.   Neurological: Negative for headaches.   Psychiatric/Behavioral: Negative for confusion.   All other systems reviewed and are negative.      Physical Exam   BP: 116/52  Pulse: 77  Temp: 97.2  F (36.2  C)  Resp: 20  Height: 167.6 cm (5' 6\")  Weight: 68 kg (150 lb)  SpO2: 97 %      Physical Exam     BP (!) 157/70   Pulse 79   Temp 97.2  F (36.2  C) (Temporal)   Resp 20   Ht 1.676 m (5' 6\")   Wt 68 kg (150 lb)   SpO2 97%   BMI 24.21 kg/m      GENERAL APPEARANCE: healthy, alert and no distress  RESP: lungs clear to auscultation - no rales, rhonchi or wheezes  CV: regular rates and rhythm, normal S1 S2, no murmur noted  ABDOMEN:  soft, nontender, no HSM or masses and bowel sounds normal  BACK: No CVA tenderness  SKIN: no suspicious lesions or rashes    Results for orders placed or performed during the hospital encounter of 10/05/20 (from the past 24 hour(s))   CBC with platelets differential   Result Value Ref Range    WBC 6.1 4.0 - 11.0 10e9/L    RBC Count 4.30 3.8 - 5.2 10e12/L    Hemoglobin 12.4 11.7 - 15.7 g/dL    Hematocrit 37.0 35.0 - 47.0 %    MCV 86 78 - 100 fl    MCH 28.8 26.5 - 33.0 pg    MCHC 33.5 31.5 - 36.5 g/dL    RDW 12.1 10.0 - 15.0 %    Platelet Count 173 150 - 450 10e9/L    Diff Method Automated Method     % Neutrophils 55.6 %    % Lymphocytes 25.7 %    % Monocytes 13.9 %    % Eosinophils 3.8 %    % Basophils 0.5 %    % Immature Granulocytes 0.5 %    Nucleated RBCs 0 0 /100    Absolute Neutrophil 3.4 1.6 - 8.3 10e9/L    " Absolute Lymphocytes 1.6 0.8 - 5.3 10e9/L    Absolute Monocytes 0.8 0.0 - 1.3 10e9/L    Absolute Eosinophils 0.2 0.0 - 0.7 10e9/L    Absolute Basophils 0.0 0.0 - 0.2 10e9/L    Abs Immature Granulocytes 0.0 0 - 0.4 10e9/L    Absolute Nucleated RBC 0.0    Comprehensive metabolic panel   Result Value Ref Range    Sodium 140 133 - 144 mmol/L    Potassium 4.9 3.4 - 5.3 mmol/L    Chloride 113 (H) 94 - 109 mmol/L    Carbon Dioxide 18 (L) 20 - 32 mmol/L    Anion Gap 9 3 - 14 mmol/L    Glucose 107 (H) 70 - 99 mg/dL    Urea Nitrogen 40 (H) 7 - 30 mg/dL    Creatinine 1.93 (H) 0.52 - 1.04 mg/dL    GFR Estimate 24 (L) >60 mL/min/[1.73_m2]    GFR Estimate If Black 27 (L) >60 mL/min/[1.73_m2]    Calcium 9.4 8.5 - 10.1 mg/dL    Bilirubin Total 0.6 0.2 - 1.3 mg/dL    Albumin 4.4 3.4 - 5.0 g/dL    Protein Total 7.4 6.8 - 8.8 g/dL    Alkaline Phosphatase 88 40 - 150 U/L    ALT 29 0 - 50 U/L    AST 16 0 - 45 U/L   UA with Microscopic   Result Value Ref Range    Color Urine Yellow     Appearance Urine Slightly Cloudy     Glucose Urine Negative NEG^Negative mg/dL    Bilirubin Urine Negative NEG^Negative    Ketones Urine Negative NEG^Negative mg/dL    Specific Gravity Urine 1.004 1.003 - 1.035    Blood Urine Negative NEG^Negative    pH Urine 6.0 5.0 - 7.0 pH    Protein Albumin Urine Negative NEG^Negative mg/dL    Urobilinogen mg/dL 0.0 0.0 - 2.0 mg/dL    Nitrite Urine Negative NEG^Negative    Leukocyte Esterase Urine Moderate (A) NEG^Negative    Source Midstream Urine     WBC Urine 46 (H) 0 - 5 /HPF    RBC Urine 1 0 - 2 /HPF    WBC Clumps Present (A) NEG^Negative /HPF    Bacteria Urine Few (A) NEG^Negative /HPF    Mucous Urine Present (A) NEG^Negative /LPF         ED Course        Procedures              Critical Care time:  none               Results for orders placed or performed during the hospital encounter of 10/05/20 (from the past 24 hour(s))   CBC with platelets differential   Result Value Ref Range    WBC 6.1 4.0 - 11.0 10e9/L     RBC Count 4.30 3.8 - 5.2 10e12/L    Hemoglobin 12.4 11.7 - 15.7 g/dL    Hematocrit 37.0 35.0 - 47.0 %    MCV 86 78 - 100 fl    MCH 28.8 26.5 - 33.0 pg    MCHC 33.5 31.5 - 36.5 g/dL    RDW 12.1 10.0 - 15.0 %    Platelet Count 173 150 - 450 10e9/L    Diff Method Automated Method     % Neutrophils 55.6 %    % Lymphocytes 25.7 %    % Monocytes 13.9 %    % Eosinophils 3.8 %    % Basophils 0.5 %    % Immature Granulocytes 0.5 %    Nucleated RBCs 0 0 /100    Absolute Neutrophil 3.4 1.6 - 8.3 10e9/L    Absolute Lymphocytes 1.6 0.8 - 5.3 10e9/L    Absolute Monocytes 0.8 0.0 - 1.3 10e9/L    Absolute Eosinophils 0.2 0.0 - 0.7 10e9/L    Absolute Basophils 0.0 0.0 - 0.2 10e9/L    Abs Immature Granulocytes 0.0 0 - 0.4 10e9/L    Absolute Nucleated RBC 0.0    Comprehensive metabolic panel   Result Value Ref Range    Sodium 140 133 - 144 mmol/L    Potassium 4.9 3.4 - 5.3 mmol/L    Chloride 113 (H) 94 - 109 mmol/L    Carbon Dioxide 18 (L) 20 - 32 mmol/L    Anion Gap 9 3 - 14 mmol/L    Glucose 107 (H) 70 - 99 mg/dL    Urea Nitrogen 40 (H) 7 - 30 mg/dL    Creatinine 1.93 (H) 0.52 - 1.04 mg/dL    GFR Estimate 24 (L) >60 mL/min/[1.73_m2]    GFR Estimate If Black 27 (L) >60 mL/min/[1.73_m2]    Calcium 9.4 8.5 - 10.1 mg/dL    Bilirubin Total 0.6 0.2 - 1.3 mg/dL    Albumin 4.4 3.4 - 5.0 g/dL    Protein Total 7.4 6.8 - 8.8 g/dL    Alkaline Phosphatase 88 40 - 150 U/L    ALT 29 0 - 50 U/L    AST 16 0 - 45 U/L   UA with Microscopic   Result Value Ref Range    Color Urine Yellow     Appearance Urine Slightly Cloudy     Glucose Urine Negative NEG^Negative mg/dL    Bilirubin Urine Negative NEG^Negative    Ketones Urine Negative NEG^Negative mg/dL    Specific Gravity Urine 1.004 1.003 - 1.035    Blood Urine Negative NEG^Negative    pH Urine 6.0 5.0 - 7.0 pH    Protein Albumin Urine Negative NEG^Negative mg/dL    Urobilinogen mg/dL 0.0 0.0 - 2.0 mg/dL    Nitrite Urine Negative NEG^Negative    Leukocyte Esterase Urine Moderate (A) NEG^Negative     Source Midstream Urine     WBC Urine 46 (H) 0 - 5 /HPF    RBC Urine 1 0 - 2 /HPF    WBC Clumps Present (A) NEG^Negative /HPF    Bacteria Urine Few (A) NEG^Negative /HPF    Mucous Urine Present (A) NEG^Negative /LPF       Medications   0.9% sodium chloride BOLUS (0 mLs Intravenous Stopped 10/5/20 1440)   ertapenem (INVanz) 500 mg in sodium chloride 0.9 % 50 mL intermittent infusion (500 mg Intravenous New Bag 10/5/20 1441)       Assessments & Plan (with Medical Decision Making)     I have reviewed the nursing notes.    I have reviewed the findings, diagnosis, plan and need for follow up with the patient.    Lupe Lepe is a 80 year old female who presents today per recommendations from her primary care provider and nephrologist for further evaluation and treatment of recurrent UTI and poor kidney function.  Patient states she is been dealing with UTI type symptoms for the past 3 weeks and was initially on Cipro and then recently changed to cefdinir once daily which she has taken for the past 5 days.  Patient states that she spoke to her primary care doctor who stated that due to her increased creatinine and decreased GFR as well as her urine culture results from 10-1-20 patient is to come to the emergency department for further evaluation and treatment of her symptoms.  Patient denies any fever, chills, sweats, headache, dizziness, confusion, weakness, shortness of breath, chest pain, heart racing skipping beats, abdominal pain, nausea or vomiting, diarrhea, bloody or black tarry stools, back pain.  Patient states she is still having dysuria and frequency and urgency with urination.    Exam findings above.  UA today is improved from her UA done on 10-1-20 however it still slightly cloudy appearance, 46 WBCs, WBC clumps present few bacteria moderate leuk esterases and mucus present.  CBC within normal limits.  Comprehensive metabolic panel shows improved creatinine and GFR from 10-1-20, but due to her urine  culture results from 10-1-20 along with her allergies patient to be admitted to hospital for IV antibiotics and monitoring of her kidney function.  I discussed patient case with Dr. Hyatt here in the emergency department who agrees with plan and also evaluated patient.  I spoke to the inpatient pharmacist who recommended ertapenem 500 mg IV dosing every 24 hours for patient's UTI with her kidney functions at this time.  Patient given her first dose here in the emergency department.  I spoke to the hospitalist at 3:35pm regarding patient transfer to their care and Louisa Witt agrees with patient transfer to observation. Patient transferred from Emergency Room to floor in stable condition.     New Prescriptions    No medications on file       Final diagnoses:   Recurrent UTI - failed out patient therapy   CKD (chronic kidney disease) stage 4, GFR 15-29 ml/min (H)       10/5/2020   [facility] EMERGENCY DEPT     Codie Nugent PA-C  10/05/20 [id number]

## 2020-10-06 VITALS
RESPIRATION RATE: 16 BRPM | HEART RATE: 77 BPM | BODY MASS INDEX: 24.11 KG/M2 | HEIGHT: 66 IN | WEIGHT: 150 LBS | OXYGEN SATURATION: 94 % | DIASTOLIC BLOOD PRESSURE: 53 MMHG | SYSTOLIC BLOOD PRESSURE: 131 MMHG | TEMPERATURE: 96.9 F

## 2020-10-06 PROBLEM — Z16.12 ESBL (EXTENDED SPECTRUM BETA-LACTAMASE) PRODUCING BACTERIA INFECTION: Status: ACTIVE | Noted: 2020-10-06

## 2020-10-06 PROBLEM — A49.9 ESBL (EXTENDED SPECTRUM BETA-LACTAMASE) PRODUCING BACTERIA INFECTION: Status: ACTIVE | Noted: 2020-10-06

## 2020-10-06 PROBLEM — N12 PYELONEPHRITIS: Status: ACTIVE | Noted: 2020-10-06

## 2020-10-06 LAB
ANION GAP SERPL CALCULATED.3IONS-SCNC: 4 MMOL/L (ref 3–14)
BASE DEFICIT BLDV-SCNC: 7.4 MMOL/L
BASOPHILS # BLD AUTO: 0 10E9/L (ref 0–0.2)
BASOPHILS NFR BLD AUTO: 0.5 %
BUN SERPL-MCNC: 30 MG/DL (ref 7–30)
CALCIUM SERPL-MCNC: 9.2 MG/DL (ref 8.5–10.1)
CHLORIDE SERPL-SCNC: 118 MMOL/L (ref 94–109)
CO2 SERPL-SCNC: 21 MMOL/L (ref 20–32)
CREAT SERPL-MCNC: 1.57 MG/DL (ref 0.52–1.04)
DIFFERENTIAL METHOD BLD: ABNORMAL
EOSINOPHIL # BLD AUTO: 0.2 10E9/L (ref 0–0.7)
EOSINOPHIL NFR BLD AUTO: 4.9 %
ERYTHROCYTE [DISTWIDTH] IN BLOOD BY AUTOMATED COUNT: 11.9 % (ref 10–15)
GFR SERPL CREATININE-BSD FRML MDRD: 30 ML/MIN/{1.73_M2}
GLUCOSE SERPL-MCNC: 118 MG/DL (ref 70–99)
HCO3 BLDV-SCNC: 18 MMOL/L (ref 21–28)
HCT VFR BLD AUTO: 34.3 % (ref 35–47)
HGB BLD-MCNC: 11.6 G/DL (ref 11.7–15.7)
IMM GRANULOCYTES # BLD: 0 10E9/L (ref 0–0.4)
IMM GRANULOCYTES NFR BLD: 0.5 %
LYMPHOCYTES # BLD AUTO: 1.1 10E9/L (ref 0.8–5.3)
LYMPHOCYTES NFR BLD AUTO: 28.6 %
MCH RBC QN AUTO: 29.1 PG (ref 26.5–33)
MCHC RBC AUTO-ENTMCNC: 33.8 G/DL (ref 31.5–36.5)
MCV RBC AUTO: 86 FL (ref 78–100)
MONOCYTES # BLD AUTO: 0.5 10E9/L (ref 0–1.3)
MONOCYTES NFR BLD AUTO: 13 %
NEUTROPHILS # BLD AUTO: 2 10E9/L (ref 1.6–8.3)
NEUTROPHILS NFR BLD AUTO: 52.5 %
NRBC # BLD AUTO: 0 10*3/UL
NRBC BLD AUTO-RTO: 0 /100
O2/TOTAL GAS SETTING VFR VENT: ABNORMAL %
PCO2 BLDV: 34 MM HG (ref 40–50)
PH BLDV: 7.33 PH (ref 7.32–7.43)
PLATELET # BLD AUTO: 139 10E9/L (ref 150–450)
PO2 BLDV: 57 MM HG (ref 25–47)
POTASSIUM SERPL-SCNC: 5.2 MMOL/L (ref 3.4–5.3)
RBC # BLD AUTO: 3.99 10E12/L (ref 3.8–5.2)
SARS-COV-2 RNA SPEC QL NAA+PROBE: NOT DETECTED
SODIUM SERPL-SCNC: 143 MMOL/L (ref 133–144)
SPECIMEN SOURCE: NORMAL
WBC # BLD AUTO: 3.8 10E9/L (ref 4–11)

## 2020-10-06 PROCEDURE — 80048 BASIC METABOLIC PNL TOTAL CA: CPT | Performed by: PHYSICIAN ASSISTANT

## 2020-10-06 PROCEDURE — 82803 BLOOD GASES ANY COMBINATION: CPT | Performed by: PHYSICIAN ASSISTANT

## 2020-10-06 PROCEDURE — 36415 COLL VENOUS BLD VENIPUNCTURE: CPT | Performed by: PHYSICIAN ASSISTANT

## 2020-10-06 PROCEDURE — 99217 PR OBSERVATION CARE DISCHARGE: CPT | Performed by: INTERNAL MEDICINE

## 2020-10-06 PROCEDURE — 96376 TX/PRO/DX INJ SAME DRUG ADON: CPT

## 2020-10-06 PROCEDURE — 250N000013 HC RX MED GY IP 250 OP 250 PS 637: Performed by: PHYSICIAN ASSISTANT

## 2020-10-06 PROCEDURE — 250N000011 HC RX IP 250 OP 636: Performed by: PHYSICIAN ASSISTANT

## 2020-10-06 PROCEDURE — G0378 HOSPITAL OBSERVATION PER HR: HCPCS

## 2020-10-06 PROCEDURE — 85025 COMPLETE CBC W/AUTO DIFF WBC: CPT | Performed by: PHYSICIAN ASSISTANT

## 2020-10-06 PROCEDURE — 258N000003 HC RX IP 258 OP 636: Performed by: PHYSICIAN ASSISTANT

## 2020-10-06 RX ADMIN — LEVOTHYROXINE SODIUM 50 MCG: 50 TABLET ORAL at 09:00

## 2020-10-06 RX ADMIN — ERTAPENEM SODIUM 500 MG: 1 INJECTION, POWDER, LYOPHILIZED, FOR SOLUTION INTRAMUSCULAR; INTRAVENOUS at 15:04

## 2020-10-06 NOTE — PROGRESS NOTES
Patient remains alert and oriented. Appeared to sleep well overnight.  Patient has been indep with ambulation and steady on her feet.

## 2020-10-06 NOTE — DISCHARGE SUMMARY
[facility]   Discharge Summary  Hospital Medicine       Date of Admission:  10/5/2020  Date of Discharge:  10/6/2020  6:50 PM  Discharging Provider: Mark Angeles MD      Identification and Chief Compaint: Lupe Lepe is a 80 year old female who presented on 10/5/2020 with complaint of Ongoing frequency and dysuria despite two courses of outpatient antiobiotics.    Discharge Diagnoses   Acute cystitis due to ESBL E coli  Recent acute on chronic renal failure  Metabolic acidosis, non AG  Renovascular hypertension with goal blood pressure less than 130/80  Renal artery stenosis  Type 2 diabetes mellitus with stage 4 chronic kidney disease     Follow-ups Needed After Discharge   Follow-up Appointments     Follow-up and recommended labs and tests       Follow up with primary care provider, Dr. Landaverde at [facility], as scheduled in 2 weeks.  The following labs/tests are   recommended: BMP when you see Dr. Landaverde             Hospital Course      Recurrent UTI, failed outpatient therapy    UTI diagnosed in clinic, urine culture from 10/1 positive for > 100k E.coli. Continuing dysuria despite treatment with ciprofloxacin x 7 days (resistant per susceptibilities), then omnicef x 7 days (likely resistant per susceptibilities). Renal function worsening (see below). Admit UA with moderate leukocyte esterase and pyuria (WBC 46). Afebrile, no leukocytosis on admission. No evidence of sepsis or symptoms suggestive of septic kidney stone. Was started on Ertapenem in the emergency department.    Dysuria and frequency have resolved. She will continue to receive Ertapenem 500 mg q 24 hours for 5 more days for 7 day total course for acute cystitis. She will come in for daily infusions. I don't feel a PICC line is necessary for this, and she will discharge with a peripheral IV that can be changed in the infusion center when needed.      Recent acute on chronic renal failure     Patient's baseline creatinine 1.5-1.9 and was 2.52 on 10/1/2020.  Follows with Dr. Landaverde with [facility], who recommended she go to the emergency department for IV antibiotics. As above, no evidence to suggest obstructive uropathy / current stone. Creatinine on admission improved to 1.93. On discharge, creatinine improved to her baseline of 1.57.     Metabolic acidosis     Admit bicarb on BMP was low (18), VBG obtained showing metabolic acidosis (pH 7.29 / pCO2 38 / bicarb 18). Unclear cause but this is improved/stable on discharge with VpH 7.33/VpCO2 34/ bicarb 18. Bicarbonate is 21 on the BMP with normal anion gap of 4.      Renovascular hypertension with goal blood pressure less than 130/80  Renal artery stenosis    Managed prior to admission with amlodipine 10 mg daily, chlorthalidone 25 mg daily, lisinopril 10 mg bid, and prn clonidine (0.1 mg bid prn for SBP> 180).    Continue home regimen on discharge.      Type 2 diabetes mellitus with stage 4 chronic kidney disease, without long-term current use of insulin    Most recent A1c 7.2. Appears to be diet controlled.      Hyperlipidemia LDL goal <100    Managed prior to admission with Crestor 10 mg daily, continue.     Acquired hypothyroidism    Managed prior to admission with levothyroxine 50 vs 75 mcg on alternating days, continue with home regimen.     Subclavian arterial stenosis  Stricture of artery    Chronic and stable. Has poor upper extremity circulation, requires blood pressure reads from legs rather than arms.   - No intervention     COVID status - negative 10/5/2020  Tested as asymptomatic COVID screen based on admission protocol. No concern for active COVID infection on admission.  SARS-CoV-2 PCR negative.     Consultations This Hospital Stay   None    Code Status   No CPR- Do NOT Intubate    The discharge plan was discussed with the patient, and she expressed understanding.     Time Spent on this Encounter   Total time on this discharge  was 30 minutes.       Mark Angeles MD  [facility]   ______________________________________________________________________    Physical Exam   Vital Signs: Temp: 96.9  F (36.1  C) Temp src: Oral BP: 131/53 Pulse: 77   Resp: 16 SpO2: 94 % O2 Device: None (Room air)    Weight: 150 lbs 0 oz  Constitutional: alert and oriented, NAD, smiling  CV: Regular  Respiratory: CTA bilaterally  GI: Soft, non-tender   Skin: Warm and dry       Primary Care Physician   Li Horton  [address] [facility] [facility] [address]     Discharge Disposition   Discharged to home  Condition at discharge: Stable    Significant Results and Procedures   Results for orders placed or performed during the hospital encounter of 12/29/19   XR Chest 2 Views    Narrative    CHEST TWO VIEW   12/29/2019 5:55 PM     HISTORY: Cough.    COMPARISON: Chest x-ray on 5/28/2015.      Impression    IMPRESSION: No acute airspace disease.    SHERMAN CHAUHAN MD     Procedures    None    Discharge Orders      Reason for your hospital stay    Bladder infection that is resistant to most oral antibiotics and renal insufficiency that is improving.     Follow-up and recommended labs and tests     Follow up with primary care provider, Dr. Landaverde at [facility], as scheduled in 2 weeks.  The following labs/tests are recommended: BMP when you see Dr. Landaverde     Activity    Your activity upon discharge: activity as tolerated     Diet    Follow this diet upon discharge: Diabetic diet     Discharge Medications   Current Discharge Medication List      START taking these medications    Details   ertapenem 500 mg IV every 24 hours through Oct 11, 2020 in the infusion center.    Associated Diagnoses: Recurrent UTI         CONTINUE these medications which have NOT CHANGED    Details   amLODIPine (NORVASC) 10 MG tablet TAKE 1 TABLET (10 MG) BY MOUTH DAILY  Qty: 90 tablet, Refills: 3    Associated Diagnoses: Renovascular hypertension  with goal blood pressure less than 130/80      ASPIRIN 325 MG OR TBEC 1 TABLET BY MOUTH DAILY  Qty: 100, Refills: 3      chlorthalidone (HYGROTON) 25 MG tablet TAKE 1 TABLET BY MOUTH EVERY DAY  Qty: 90 tablet, Refills: 3    Associated Diagnoses: Renovascular hypertension with goal blood pressure less than 130/80; CKD (chronic kidney disease) stage 4, GFR 15-29 ml/min (H)      cholecalciferol (D-VI-SOL,VITAMIN D3) 400 units/mL (10 mcg/mL) LIQD liquid Take 400 Units by mouth daily      !! levothyroxine (SYNTHROID/LEVOTHROID) 50 MCG tablet Take on Tuesdays, Thursdays, Saturdays, and Sundays  Qty: 48 tablet, Refills: 1    Associated Diagnoses: Acquired hypothyroidism      !! levothyroxine (SYNTHROID/LEVOTHROID) 75 MCG tablet TAKE 1 TABLET BY MOUTH ON SUNDAYS, MONDAYS, WEDNESDAYS AND FRIDAYS  Qty: 48 tablet, Refills: 3    Associated Diagnoses: Acquired hypothyroidism      lisinopril (ZESTRIL) 10 MG tablet TAKE 1 TABLET (10 MG) BY MOUTH 2 TIMES DAILY  Qty: 180 tablet, Refills: 3    Associated Diagnoses: Renovascular hypertension with goal blood pressure less than 130/80      MULTIVITAMINS OR TABS 1 TABLET BY MOUTH DAILY  Refills: 0      omeprazole (PRILOSEC) 20 MG DR capsule TAKE 1 CAPSULE BY MOUTH EVERY 3RD DAY AS NEEDED  Qty: 30 capsule, Refills: 4    Associated Diagnoses: Gastroesophageal reflux disease without esophagitis      rosuvastatin (CRESTOR) 10 MG tablet TAKE 1 TABLET BY MOUTH EVERY DAY  Qty: 90 tablet, Refills: 3    Associated Diagnoses: Hyperlipidemia LDL goal <100; Type 2 diabetes mellitus with stage 4 chronic kidney disease, without long-term current use of insulin (H)      VITAMIN B-6 50 MG OR TABS 1 daily  Qty: 1 bottle, Refills: 1 year    Associated Diagnoses: Disturbance of skin sensation      cloNIDine (CATAPRES) 0.1 MG tablet ONE Twice DAILY as needed for SBP>180, diastolic BP>110  Qty: 10 tablet, Refills: 3    Comments: Patient will call to fill  Associated Diagnoses: Renovascular hypertension with  goal blood pressure less than 130/80      ondansetron (ZOFRAN-ODT) 4 MG ODT tab Take 4 mg by mouth every 8 hours as needed       !! - Potential duplicate medications found. Please discuss with provider.      STOP taking these medications       cefdinir (OMNICEF) 300 MG capsule Comments:   Reason for Stopping:             Allergies   Allergies   Allergen Reactions     Bactrim      Acute renal failure and hyperkalemia     Ace Inhibitors Other (See Comments)     Hyperkalemia       Atorvastatin Calcium Itching     ELEVATED LFTS     Crestor [Rosuvastatin Calcium] Other (See Comments)     Elevated LFTS   In high doses     Penicillins Hives     Sulfamethoxazole-Trimethoprim Other (See Comments)     Kidney shutdown     Ciprofloxacin Itching and Rash     Allergy to IV form only, Has taken oral without problems

## 2020-10-06 NOTE — DISCHARGE INSTRUCTIONS
Please call and schedule infusion appt tomorrow for 3pm here at the [facility]     [phone]

## 2020-10-06 NOTE — PLAN OF CARE
Patient up independently in room. Denies pain. IV infusing without difficulty. States burning with urination is less. Uses call light to notify staff with needs.

## 2020-10-06 NOTE — UTILIZATION REVIEW
Concurrent stay review; Secondary Review Determination     Under the authority of the Utilization Management Committee, the utilization review process indicated a secondary review on the above patient. The review outcome is based on review of the medical records, discussions with staff, and applying clinical experience noted on the date of the review.     (x) Observation Status Appropriate - Concurrent stay review     RATIONALE FOR DETERMINATION:  80 year old female admitted on 10/5/2020. She presented to the emergency department from clinic for evaluation of a persisting / recurrent UTI that has failed outpatient treatment, in addition to acute on chronic kidney failure for which she was admitted for further treatment and evaluation.  The patient was treated with IV antibiotics and IVF.  Creatinine has improved.  Urine cx is growing ecoli with sensitivities pending.  I spoke with Dr. Angeles today and plan is for discharge this afternoon.      Patient is clinically improving and there is no clear indication to change patient's status to inpatient. The severity of illness, intensity of service provided, expected LOS and risk for adverse outcome make the care appropriate for observation.     The information on this document is developed by the utilization review team in order for the business office to ensure compliance. This only denotes the appropriateness of proper admission status and does not reflect the quality of care rendered.   The definitions of Inpatient Status and Observation Status used in making the determination above are those provided in the CMS Coverage Manual, Chapter 1 and Chapter 6, section 70.4.     Sincerely,     Richard Gayle MD  Utilization Review   Physician Advisor   [facility].

## 2020-10-06 NOTE — PROGRESS NOTES
Care transition note:    Spoke with patient, introduced self and role.  Discussed discharge plans for IV ertapenem for next 5 days (per Dr. Angeles).  Patient would like to use outpatient infusion clinic at [place].  Patient feels it will less expensive with insurance coverage.  Offered to pursue in home IV infusion.  Patient declined.  Patient reports her  will provide transportation to and from appointments.  Discussed with nursing and MD.      Cookie PRATHERN RN  Inpatient Care Coordinator  [facility] [phone]  [facility] [phone]

## 2020-10-06 NOTE — PROGRESS NOTES
[facility] DISCHARGE NOTE    Patient discharged to home at 6:57 PM via wheel chair. Accompanied by staff. Discharge instructions reviewed with patient, opportunity offered to ask questions. Prescriptions - None ordered for discharge. All belongings sent with patient.    Per Dr. Angeles order patient is to be discharged with SL in place. Patient is to have next dose of IV antibiotics tomorrow 10/7/2020 at 1500 at infusion therapy. Left voice message with care transitions to please insure this gets followed up on. Patient has infusion therapy department phone number to call in the AM.     Dixie Douglas RN

## 2020-10-07 ENCOUNTER — INFUSION THERAPY VISIT (OUTPATIENT)
Dept: INFUSION THERAPY | Facility: CLINIC | Age: 80
End: 2020-10-07
Attending: INTERNAL MEDICINE
Payer: COMMERCIAL

## 2020-10-07 ENCOUNTER — TELEPHONE (OUTPATIENT)
Dept: INTERNAL MEDICINE | Facility: CLINIC | Age: 80
End: 2020-10-07

## 2020-10-07 DIAGNOSIS — N12 PYELONEPHRITIS: Primary | ICD-10-CM

## 2020-10-07 DIAGNOSIS — A49.9 ESBL (EXTENDED SPECTRUM BETA-LACTAMASE) PRODUCING BACTERIA INFECTION: ICD-10-CM

## 2020-10-07 DIAGNOSIS — Z16.12 ESBL (EXTENDED SPECTRUM BETA-LACTAMASE) PRODUCING BACTERIA INFECTION: ICD-10-CM

## 2020-10-07 LAB
BACTERIA SPEC CULT: ABNORMAL
Lab: ABNORMAL
SPECIMEN SOURCE: ABNORMAL

## 2020-10-07 PROCEDURE — 250N000011 HC RX IP 250 OP 636: Performed by: INTERNAL MEDICINE

## 2020-10-07 PROCEDURE — 258N000003 HC RX IP 258 OP 636: Performed by: INTERNAL MEDICINE

## 2020-10-07 PROCEDURE — 96365 THER/PROPH/DIAG IV INF INIT: CPT

## 2020-10-07 RX ADMIN — ERTAPENEM SODIUM 500 MG: 1 INJECTION, POWDER, LYOPHILIZED, FOR SOLUTION INTRAMUSCULAR; INTRAVENOUS at 15:12

## 2020-10-07 NOTE — TELEPHONE ENCOUNTER
Recurrent UTI  [facility]    ED/UC/IP follow up phone call:    RN please call to follow up.    Number of ED visits in past 12 months = 0    Katlyn LYNN

## 2020-10-07 NOTE — PROGRESS NOTES
Infusion Nursing Note:  Lupe Lepe presents today for Invanz day 1 of 5 outpatient doses.    Patient seen by provider today: No   present during visit today: Not Applicable.    Patient seen by provider today: No   present during visit today: NA  Note: N/A.    Intravenous Access:  Peripheral IV is in place and patent.    Treatment Conditions:  NA.      Post Infusion Assessment:  Patient tolerated infusion without incident.  No evidence of extravasations.  Pt requests that home care be set up because she will not go to the infusion center in a different city that is open on the weekend.  Left a message at [place], where the patient has coverage, for the patient to be set up for home infusion.     Discharge Plan:   Patient discharged in stable condition accompanied by: self.  Departure Mode: Ambulatory. Phone call will be made to patient tomorrow before her scheduled infusion to follow up about home infusion.    Christine Rey RN

## 2020-10-07 NOTE — TELEPHONE ENCOUNTER
"ED/Discharge Protocol    \"Hi, my name is Tresa Francis RN, a registered nurse, and I am calling on behalf of Dr. Horton's office at [place].  I am calling to follow up and see how things are going for you after your recent visit.\"    \"I see that you were in the (ER/UC/IP) on 10-5-20.    How are you doing now that you are home?\" \"im doing good.\"    Is patient experiencing symptoms that may require a hospital visit?  no    Discharge Instructions    \"Let's review your discharge instructions.  What is/are the follow-up recommendations?  Pt. Response: f/u with nephorlogy 10-21-20 after that.    \"Were you instructed to make a follow-up appointment?\"  Pt. Response: Yes.  Has appointment been made?   Yes      \"When you see the provider, I would recommend that you bring your discharge instructions with you.    Medications    \"How many new medications are you on since your hospitalization/ED visit?\"    0-1  \"How many of your current medicines changed (dose, timing, name, etc.) while you were in the hospital/ED visit?\"   0-1  \"Do you have questions about your medications?\"   No  \"Were you newly diagnosed with heart failure, COPD, diabetes or did you have a heart attack?\"   No  For patients on insulin: \"Did you start on insulin in the hospital or did you have your insulin dose changed?\"   No  Post Discharge Medication Reconciliation Status: discharge medications reconciled, continue medications without change.    Was MTM referral placed (*Make sure to put transitions as reason for referral)?   No    Call Summary    \"Do you have any questions or concerns about your condition or care plan at the moment?\"    No  Triage nurse advice given: Call with questions    Patient was in ER 0 in the past year (assess appropriateness of ER visits.)      \"If you have questions or things don't continue to improve, we encourage you contact us through the main clinic number,  [phone].  Even if the clinic is not open, triage nurses are " "available 24/7 to help you.     We would like you to know that our clinic has extended hours (provide information).  We also have urgent care (provide details on closest location and hours/contact info)\"      \"Thank you for your time and take care!\"      Tresa CISNEROS RN BSN    "

## 2020-10-08 ENCOUNTER — INFUSION THERAPY VISIT (OUTPATIENT)
Dept: INFUSION THERAPY | Facility: CLINIC | Age: 80
End: 2020-10-08
Attending: INTERNAL MEDICINE
Payer: COMMERCIAL

## 2020-10-08 VITALS
SYSTOLIC BLOOD PRESSURE: 123 MMHG | TEMPERATURE: 96 F | OXYGEN SATURATION: 96 % | DIASTOLIC BLOOD PRESSURE: 49 MMHG | RESPIRATION RATE: 16 BRPM | HEART RATE: 72 BPM

## 2020-10-08 DIAGNOSIS — A49.9 ESBL (EXTENDED SPECTRUM BETA-LACTAMASE) PRODUCING BACTERIA INFECTION: ICD-10-CM

## 2020-10-08 DIAGNOSIS — Z16.12 ESBL (EXTENDED SPECTRUM BETA-LACTAMASE) PRODUCING BACTERIA INFECTION: ICD-10-CM

## 2020-10-08 DIAGNOSIS — N12 PYELONEPHRITIS: Primary | ICD-10-CM

## 2020-10-08 PROCEDURE — 250N000011 HC RX IP 250 OP 636: Performed by: INTERNAL MEDICINE

## 2020-10-08 PROCEDURE — 258N000003 HC RX IP 258 OP 636: Performed by: INTERNAL MEDICINE

## 2020-10-08 PROCEDURE — 96365 THER/PROPH/DIAG IV INF INIT: CPT

## 2020-10-08 RX ADMIN — ERTAPENEM SODIUM 500 MG: 1 INJECTION, POWDER, LYOPHILIZED, FOR SOLUTION INTRAMUSCULAR; INTRAVENOUS at 14:29

## 2020-10-08 ASSESSMENT — PAIN SCALES - GENERAL: PAINLEVEL: NO PAIN (0)

## 2020-10-08 NOTE — PROGRESS NOTES
Infusion Nursing Note:  Lupe HOWARD Eddiestephy presents today for Invanz.    Patient seen by provider today: No   present during visit today: Not Applicable.    Note: N/A.    Intravenous Access:  Peripheral in place .    Treatment Conditions:  Not Applicable.      Post Infusion Assessment:  Patient tolerated infusion without incident.  Site patent and intact, free from redness, edema or discomfort.  No evidence of extravasations.       Discharge Plan:   Discharge instructions reviewed with: Patient.  Patient and/or family verbalized understanding of discharge instructions and all questions answered.  Copy of AVS reviewed with patient and/or family.  Patient will return 10/9/20 for next appointment.  Patient discharged in stable condition accompanied by: self.  Departure Mode: Ambulatory.    Aparna Valentin RN

## 2020-10-09 ENCOUNTER — INFUSION THERAPY VISIT (OUTPATIENT)
Dept: INFUSION THERAPY | Facility: CLINIC | Age: 80
End: 2020-10-09
Attending: INTERNAL MEDICINE
Payer: COMMERCIAL

## 2020-10-09 VITALS — SYSTOLIC BLOOD PRESSURE: 129 MMHG | TEMPERATURE: 96.9 F | HEART RATE: 78 BPM | DIASTOLIC BLOOD PRESSURE: 57 MMHG

## 2020-10-09 DIAGNOSIS — N12 PYELONEPHRITIS: Primary | ICD-10-CM

## 2020-10-09 DIAGNOSIS — Z16.12 ESBL (EXTENDED SPECTRUM BETA-LACTAMASE) PRODUCING BACTERIA INFECTION: ICD-10-CM

## 2020-10-09 DIAGNOSIS — A49.9 ESBL (EXTENDED SPECTRUM BETA-LACTAMASE) PRODUCING BACTERIA INFECTION: ICD-10-CM

## 2020-10-09 NOTE — PROGRESS NOTES
Infusion Nursing Note:  Lupe Lepe presents today for Invanz.    Patient seen by provider today: No   present during visit today: Not Applicable.    Note: N/A.    Intravenous Access:  Peripheral IV in place.    Treatment Conditions:  Not Applicable.      Post Infusion Assessment:  Patient tolerated infusion without incident.   IV access discontinued.    Discharge Plan:   Patient discharged in stable condition accompanied by: self.  Departure Mode: Ambulatory.  Pt will be scheduled to go to [facility] for the next 2 doses and then her treatment will be completed.     Christine Rey RN

## 2020-10-10 ENCOUNTER — INFUSION THERAPY VISIT (OUTPATIENT)
Dept: ONCOLOGY | Facility: CLINIC | Age: 80
End: 2020-10-10
Attending: INTERNAL MEDICINE
Payer: COMMERCIAL

## 2020-10-10 VITALS
HEART RATE: 81 BPM | SYSTOLIC BLOOD PRESSURE: 144 MMHG | TEMPERATURE: 97.7 F | DIASTOLIC BLOOD PRESSURE: 75 MMHG | RESPIRATION RATE: 14 BRPM | OXYGEN SATURATION: 98 %

## 2020-10-10 DIAGNOSIS — N12 PYELONEPHRITIS: Primary | ICD-10-CM

## 2020-10-10 DIAGNOSIS — A49.9 ESBL (EXTENDED SPECTRUM BETA-LACTAMASE) PRODUCING BACTERIA INFECTION: ICD-10-CM

## 2020-10-10 DIAGNOSIS — Z16.12 ESBL (EXTENDED SPECTRUM BETA-LACTAMASE) PRODUCING BACTERIA INFECTION: ICD-10-CM

## 2020-10-10 PROCEDURE — 250N000009 HC RX 250: Performed by: INTERNAL MEDICINE

## 2020-10-10 PROCEDURE — 99207 PR NO CHARGE LOS: CPT

## 2020-10-10 PROCEDURE — 96374 THER/PROPH/DIAG INJ IV PUSH: CPT

## 2020-10-10 PROCEDURE — 250N000011 HC RX IP 250 OP 636: Performed by: INTERNAL MEDICINE

## 2020-10-10 RX ADMIN — WATER 500 MG: 1 INJECTION INTRAMUSCULAR; INTRAVENOUS; SUBCUTANEOUS at 08:53

## 2020-10-10 ASSESSMENT — PAIN SCALES - GENERAL: PAINLEVEL: NO PAIN (0)

## 2020-10-10 NOTE — PROGRESS NOTES
Infusion Nursing Note:  Lupe Lepe presents today for Invanz.    Patient seen by provider today: No   present during visit today: Not Applicable.    Note: Pt feeling well today, no new issues.    Intravenous Access:  Peripheral IV placed and left intact.    Treatment Conditions:  Not Applicable.      Post Infusion Assessment:  Patient tolerated infusion without incident.  Blood return noted pre and post infusion.  Site patent and intact, free from redness, edema or discomfort.  No evidence of extravasations.       Discharge Plan:   Patient declined prescription refills.  Discharge instructions reviewed with: Patient.  Patient and/or family verbalized understanding of discharge instructions and all questions answered.  AVS to patient via [email or web address].  Patient will return 10/11 for next appointment.   Patient discharged in stable condition accompanied by: self.  Departure Mode: Ambulatory.    Sole Manning RN

## 2020-10-11 ENCOUNTER — INFUSION THERAPY VISIT (OUTPATIENT)
Dept: ONCOLOGY | Facility: CLINIC | Age: 80
End: 2020-10-11
Attending: INTERNAL MEDICINE
Payer: COMMERCIAL

## 2020-10-11 VITALS
RESPIRATION RATE: 16 BRPM | DIASTOLIC BLOOD PRESSURE: 72 MMHG | SYSTOLIC BLOOD PRESSURE: 133 MMHG | TEMPERATURE: 97.5 F | OXYGEN SATURATION: 97 % | HEART RATE: 77 BPM

## 2020-10-11 DIAGNOSIS — N12 PYELONEPHRITIS: Primary | ICD-10-CM

## 2020-10-11 DIAGNOSIS — Z16.12 ESBL (EXTENDED SPECTRUM BETA-LACTAMASE) PRODUCING BACTERIA INFECTION: ICD-10-CM

## 2020-10-11 DIAGNOSIS — A49.9 ESBL (EXTENDED SPECTRUM BETA-LACTAMASE) PRODUCING BACTERIA INFECTION: ICD-10-CM

## 2020-10-11 PROCEDURE — 96374 THER/PROPH/DIAG INJ IV PUSH: CPT

## 2020-10-11 PROCEDURE — 99207 PR NO CHARGE LOS: CPT

## 2020-10-11 PROCEDURE — 250N000009 HC RX 250: Performed by: INTERNAL MEDICINE

## 2020-10-11 PROCEDURE — 250N000011 HC RX IP 250 OP 636: Performed by: INTERNAL MEDICINE

## 2020-10-11 RX ADMIN — ERTAPENEM SODIUM 500 MG: 1 INJECTION, POWDER, LYOPHILIZED, FOR SOLUTION INTRAMUSCULAR; INTRAVENOUS at 08:55

## 2020-10-11 ASSESSMENT — PAIN SCALES - GENERAL: PAINLEVEL: NO PAIN (0)

## 2020-10-11 NOTE — PROGRESS NOTES
Infusion Nursing Note:  Lupe Lepe presents today for Ertapenem.    Patient seen by provider today: No   present during visit today: Not Applicable.    Note: Offers no complaints.  States this is her last dose of Ertapenem.  She has received her initial doses at [facility].   Here this weekend only because they are not open on the weekends due to Covid.    Intravenous Access:  Peripheral IV placed yesterday and remains intact.      Post Infusion Assessment:  Patient tolerated infusion without incident.  Blood return noted pre and post infusion.  Site patent and intact, free from redness, edema or discomfort.  No evidence of extravasations.  Access discontinued per protocol.       Discharge Plan:   Patient declined prescription refills.  AVS to patient via [email or web address].  Patient will return (no plans on return) for next appointment.   Patient discharged in stable condition accompanied by: self.  Departure Mode: Ambulatory.  Face to Face time: 0.    Betzy Thompson RN

## 2020-10-13 NOTE — PROGRESS NOTES
"Lupe Lepe is a 80 year old female who is being evaluated via a billable telephone visit.      The patient has been notified of following:     \"This telephone visit will be conducted via a call between you and your physician/provider. We have found that certain health care needs can be provided without the need for a physical exam.  This service lets us provide the care you need with a short phone conversation.  If a prescription is necessary we can send it directly to your pharmacy.  If lab work is needed we can place an order for that and you can then stop by our lab to have the test done at a later time.    Telephone visits are billed at different rates depending on your insurance coverage. During this emergency period, for some insurers they may be billed the same as an in-person visit.  Please reach out to your insurance provider with any questions.    If during the course of the call the physician/provider feels a telephone visit is not appropriate, you will not be charged for this service.\"    Patient has given verbal consent for Telephone visit?  Yes    What phone number would you like to be contacted at? [phone]    How would you like to obtain your AVS? Lesly Hamilton     Lupe Lepe is a 80 year old female who presents via phone visit today for the following health issues:    HPI     Pt is feeling pretty good, getting back better, no pain during urination, no fevers, no urgency nor frequency.          Hospital Follow-up Visit:    Hospital/Nursing Home/IP Rehab Facility: [facility]  Date of Admission: 10/5/20  Date of Discharge: 10/6/20  Reason(s) for Admission: Recurrent UTI      Was your hospitalization related to COVID-19? No   Problems taking medications regularly:  None  Medication changes since discharge: None  Problems adhering to non-medication therapy:  None    Summary of hospitalization:  [facility] discharge summary reviewed  Diagnostic " Tests/Treatments reviewed.  Follow up needed: nephrology  Other Healthcare Providers Involved in Patient s Care:         None  Update since discharge: improved.       Post Discharge Medication Reconciliation: discharge medications reconciled, continue medications without change.  Plan of care communicated with patient            Pyelo:  --was given 5 additional days of ertapenem.  No PICC was placed, just PIV.  Has finished course of antibiotic.     acute kidney injury on CKD: cr returned to baseline on discharge   --has appointment with renal on 10/22      Review of Systems   Constitutional, HEENT, cardiovascular, pulmonary, GI, , musculoskeletal, neuro, skin, endocrine and psych systems are negative, except as otherwise noted.       Objective          Vitals:  No vitals were obtained today due to virtual visit.    healthy, alert and no distress  PSYCH: Alert and oriented times 3; coherent speech, normal   rate and volume, able to articulate logical thoughts, able   to abstract reason, no tangential thoughts, no hallucinations   or delusions  Her affect is normal  RESP: No cough, no audible wheezing, able to talk in full sentences  Remainder of exam unable to be completed due to telephone visits            Assessment/Plan:    Assessment & Plan     Acute pyelonephritis] - resolved.  Patient asked about repeat UA/UC.  Not indicated due to resolution of symptoms.    CKD (chronic kidney disease) stage 4, GFR 15-29 ml/min (H) - patient would like renal function checked again  - Basic metabolic panel; Future            No follow-ups on file.    Li Horton [facility]    Phone call duration:  9 minutes

## 2020-10-15 ENCOUNTER — OFFICE VISIT (OUTPATIENT)
Dept: FAMILY MEDICINE | Facility: CLINIC | Age: 80
End: 2020-10-15
Payer: COMMERCIAL

## 2020-10-15 DIAGNOSIS — N18.4 CKD (CHRONIC KIDNEY DISEASE) STAGE 4, GFR 15-29 ML/MIN (H): Chronic | ICD-10-CM

## 2020-10-15 DIAGNOSIS — N10 ACUTE PYELONEPHRITIS: Primary | ICD-10-CM

## 2020-10-15 PROCEDURE — 99213 OFFICE O/P EST LOW 20 MIN: CPT | Mod: 95 | Performed by: INTERNAL MEDICINE

## 2020-10-15 NOTE — PATIENT INSTRUCTIONS
1. Order placed for kidney function test  2. No need to repeat the urine test since your symptoms have improved

## 2020-10-16 DIAGNOSIS — N18.4 CKD (CHRONIC KIDNEY DISEASE) STAGE 4, GFR 15-29 ML/MIN (H): Chronic | ICD-10-CM

## 2020-10-16 LAB
ANION GAP SERPL CALCULATED.3IONS-SCNC: 6 MMOL/L (ref 3–14)
BUN SERPL-MCNC: 43 MG/DL (ref 7–30)
CALCIUM SERPL-MCNC: 9 MG/DL (ref 8.5–10.1)
CHLORIDE SERPL-SCNC: 109 MMOL/L (ref 94–109)
CO2 SERPL-SCNC: 21 MMOL/L (ref 20–32)
CREAT SERPL-MCNC: 1.64 MG/DL (ref 0.52–1.04)
GFR SERPL CREATININE-BSD FRML MDRD: 29 ML/MIN/{1.73_M2}
GLUCOSE SERPL-MCNC: 206 MG/DL (ref 70–99)
POTASSIUM SERPL-SCNC: 4.8 MMOL/L (ref 3.4–5.3)
SODIUM SERPL-SCNC: 136 MMOL/L (ref 133–144)

## 2020-10-16 PROCEDURE — 80048 BASIC METABOLIC PNL TOTAL CA: CPT | Performed by: INTERNAL MEDICINE

## 2020-10-16 PROCEDURE — 36415 COLL VENOUS BLD VENIPUNCTURE: CPT | Performed by: INTERNAL MEDICINE

## 2020-10-23 ENCOUNTER — TELEPHONE (OUTPATIENT)
Dept: INTERNAL MEDICINE | Facility: CLINIC | Age: 80
End: 2020-10-23

## 2020-10-23 ENCOUNTER — VIRTUAL VISIT (OUTPATIENT)
Dept: FAMILY MEDICINE | Facility: CLINIC | Age: 80
End: 2020-10-23
Payer: COMMERCIAL

## 2020-10-23 DIAGNOSIS — N39.0 RECURRENT UTI: Primary | ICD-10-CM

## 2020-10-23 PROCEDURE — 99213 OFFICE O/P EST LOW 20 MIN: CPT | Mod: 95 | Performed by: NURSE PRACTITIONER

## 2020-10-23 ASSESSMENT — ENCOUNTER SYMPTOMS
FEVER: 0
FLANK PAIN: 1
HEMATURIA: 0
FREQUENCY: 1
ABDOMINAL PAIN: 1
DYSURIA: 1
CHILLS: 0
NAUSEA: 1

## 2020-10-23 NOTE — PATIENT INSTRUCTIONS
Based on your symptoms and history, it is advised that you go to the ER for further evaluation as they will likely need to treat you with IV antibiotics.

## 2020-10-23 NOTE — TELEPHONE ENCOUNTER
Reason for call:  Patient reporting a symptom    Symptom or request: frequent urination, achy in lower abdominal, lower back ache    Duration (how long have symptoms been present): since 10/22    Have you been treated for this before? Yes    Additional comments: pt was just in the hospital for a UTI and feels this is back and doesn't want to end up in the hospital again    Phone Number patient can be reached at:  Home number on file [phone] (home)    Best Time:  any    Can we leave a detailed message on this number:  YES    Call taken on 10/23/2020 at 7:52 AM by Jessa Francois

## 2020-10-23 NOTE — PROGRESS NOTES
"Lupe Lepe is a 80 year old female who is being evaluated via a billable telephone visit.      The patient has been notified of following:     \"This telephone visit will be conducted via a call between you and your physician/provider. We have found that certain health care needs can be provided without the need for a physical exam.  This service lets us provide the care you need with a short phone conversation.  If a prescription is necessary we can send it directly to your pharmacy.  If lab work is needed we can place an order for that and you can then stop by our lab to have the test done at a later time.    Telephone visits are billed at different rates depending on your insurance coverage. During this emergency period, for some insurers they may be billed the same as an in-person visit.  Please reach out to your insurance provider with any questions.    If during the course of the call the physician/provider feels a telephone visit is not appropriate, you will not be charged for this service.\"    Patient has given verbal consent for Telephone visit?  Yes    What phone number would you like to be contacted at? [phone]    How would you like to obtain your AVS? Lesly    Subjective     Lupe Lepe is a 80 year old female who presents via phone visit today for the following health issues:    HPI     Genitourinary - Female  Onset/Duration: since yesterday symptoms again  Description:   Painful urination (Dysuria): YES           Frequency: YES  Blood in urine (Hematuria): no  Delay in urine (Hesitency): YES  Intensity: moderate  Progression of Symptoms:  worsening  Accompanying Signs & Symptoms:  Fever/chills: no  Flank pain: YES, both sides now was on left side before  Nausea and vomiting: YES- nausea  Vaginal symptoms: none  Abdominal/Pelvic Pain: YES- pelvic pain  History:   History of frequent UTI s: YES- was admitted to hospital 10/5 UTI and kidney disease treated with IV antibiotics and IV " fluids. Treated again on 10/15 Ertapenem IV for  same symptoms  History of kidney stones: no  Sexually Active: no  Possibility of pregnancy: No  Precipitating or alleviating factors: None  Therapies tried and outcome: Increase fluid intake     Additional provider notes: Was admitted at the beginning of October for kidney failure and pyelonephritis. Was treated in patient and then outpatient for IV abx. Finished treatment 1 week ago. Was seen on 10/15 with PCP and kidney function was back to normal and she did not have any symptoms. Yesterday started having urinary symptoms again. Now with bilat flank pain, nausea, abdominal pain, dysuria, frequency.     Review of Systems   Constitutional: Negative for chills and fever.   Gastrointestinal: Positive for abdominal pain and nausea.   Genitourinary: Positive for dysuria, flank pain (bilat), frequency and pelvic pain. Negative for hematuria.             Objective          Vitals:  No vitals were obtained today due to virtual visit.    healthy, alert, mild distress and cooperative  PSYCH: Alert and oriented times 3; coherent speech, normal   rate and volume, able to articulate logical thoughts, able   to abstract reason, no tangential thoughts, no hallucinations   or delusions  Her affect is normal  RESP: No cough, no audible wheezing, able to talk in full sentences  Remainder of exam unable to be completed due to telephone visits            Assessment/Plan:    Assessment & Plan     Recurrent UTI  Recurrent symptoms after recently being hospitalized for ESBL and decreased kidney function. Based on last culture, only oral abx that would cover bacteria are macrobid and Sulfa. She has high sulfa allergy and she states she has had issues with Macrobid in the past. It is Friday afternoon and she is concerned about the weekend. I am concerned about her going through the weekend without adequate treatment given recent need for IV abx for UTI. Recommended she go to ER for further  evaluation as she will likely need IV abx. Patient in agreement and stated she actually was thinking she would need to do that.           Patient Instructions   Based on your symptoms and history, it is advised that you go to the ER for further evaluation as they will likely need to treat you with IV antibiotics.       Return if symptoms worsen or fail to improve.    SHAWNA [facility]    Phone call duration:  9 minutes

## 2020-10-23 NOTE — TELEPHONE ENCOUNTER
Advised needs to be seen TODAY.  Pt prefers virtual visit and then submit urine specimen to lab after provider orders.    Tashia Elizabeth RN

## 2020-10-24 ENCOUNTER — HOSPITAL ENCOUNTER (EMERGENCY)
Facility: CLINIC | Age: 80
Discharge: HOME | End: 2020-10-24
Attending: NURSE PRACTITIONER | Admitting: NURSE PRACTITIONER
Payer: COMMERCIAL

## 2020-10-24 VITALS
OXYGEN SATURATION: 94 % | BODY MASS INDEX: 24.27 KG/M2 | HEIGHT: 66 IN | WEIGHT: 151 LBS | RESPIRATION RATE: 16 BRPM | TEMPERATURE: 97.8 F | SYSTOLIC BLOOD PRESSURE: 178 MMHG | HEART RATE: 77 BPM | DIASTOLIC BLOOD PRESSURE: 65 MMHG

## 2020-10-24 DIAGNOSIS — N12 PYELONEPHRITIS: ICD-10-CM

## 2020-10-24 LAB
ALBUMIN SERPL-MCNC: 4.2 G/DL (ref 3.4–5)
ALBUMIN UR-MCNC: 30 MG/DL
ALP SERPL-CCNC: 89 U/L (ref 40–150)
ALT SERPL W P-5'-P-CCNC: 33 U/L (ref 0–50)
ANION GAP SERPL CALCULATED.3IONS-SCNC: 6 MMOL/L (ref 3–14)
APPEARANCE UR: ABNORMAL
AST SERPL W P-5'-P-CCNC: 19 U/L (ref 0–45)
BACTERIA #/AREA URNS HPF: ABNORMAL /HPF
BASOPHILS # BLD AUTO: 0 10E9/L (ref 0–0.2)
BASOPHILS NFR BLD AUTO: 0.4 %
BILIRUB SERPL-MCNC: 0.6 MG/DL (ref 0.2–1.3)
BILIRUB UR QL STRIP: NEGATIVE
BUN SERPL-MCNC: 37 MG/DL (ref 7–30)
CALCIUM SERPL-MCNC: 9.7 MG/DL (ref 8.5–10.1)
CHLORIDE SERPL-SCNC: 113 MMOL/L (ref 94–109)
CO2 SERPL-SCNC: 21 MMOL/L (ref 20–32)
COLOR UR AUTO: YELLOW
CREAT SERPL-MCNC: 1.86 MG/DL (ref 0.52–1.04)
DIFFERENTIAL METHOD BLD: NORMAL
EOSINOPHIL # BLD AUTO: 0.2 10E9/L (ref 0–0.7)
EOSINOPHIL NFR BLD AUTO: 3.3 %
ERYTHROCYTE [DISTWIDTH] IN BLOOD BY AUTOMATED COUNT: 12.2 % (ref 10–15)
GFR SERPL CREATININE-BSD FRML MDRD: 25 ML/MIN/{1.73_M2}
GLUCOSE SERPL-MCNC: 107 MG/DL (ref 70–99)
GLUCOSE UR STRIP-MCNC: NEGATIVE MG/DL
HCT VFR BLD AUTO: 35 % (ref 35–47)
HGB BLD-MCNC: 11.7 G/DL (ref 11.7–15.7)
HGB UR QL STRIP: ABNORMAL
HYALINE CASTS #/AREA URNS LPF: 23 /LPF (ref 0–2)
IMM GRANULOCYTES # BLD: 0 10E9/L (ref 0–0.4)
IMM GRANULOCYTES NFR BLD: 0.2 %
KETONES UR STRIP-MCNC: NEGATIVE MG/DL
LEUKOCYTE ESTERASE UR QL STRIP: ABNORMAL
LYMPHOCYTES # BLD AUTO: 1.4 10E9/L (ref 0.8–5.3)
LYMPHOCYTES NFR BLD AUTO: 24.6 %
MCH RBC QN AUTO: 29.2 PG (ref 26.5–33)
MCHC RBC AUTO-ENTMCNC: 33.4 G/DL (ref 31.5–36.5)
MCV RBC AUTO: 87 FL (ref 78–100)
MONOCYTES # BLD AUTO: 0.7 10E9/L (ref 0–1.3)
MONOCYTES NFR BLD AUTO: 11.4 %
MUCOUS THREADS #/AREA URNS LPF: PRESENT /LPF
NEUTROPHILS # BLD AUTO: 3.4 10E9/L (ref 1.6–8.3)
NEUTROPHILS NFR BLD AUTO: 60.1 %
NITRATE UR QL: NEGATIVE
NRBC # BLD AUTO: 0 10*3/UL
NRBC BLD AUTO-RTO: 0 /100
PH UR STRIP: 5 PH (ref 5–7)
PLATELET # BLD AUTO: 156 10E9/L (ref 150–450)
POTASSIUM SERPL-SCNC: 4.9 MMOL/L (ref 3.4–5.3)
PROT SERPL-MCNC: 7 G/DL (ref 6.8–8.8)
RBC # BLD AUTO: 4.01 10E12/L (ref 3.8–5.2)
RBC #/AREA URNS AUTO: 15 /HPF (ref 0–2)
SODIUM SERPL-SCNC: 140 MMOL/L (ref 133–144)
SOURCE: ABNORMAL
SP GR UR STRIP: 1.01 (ref 1–1.03)
SQUAMOUS #/AREA URNS AUTO: <1 /HPF (ref 0–1)
TRANS CELLS #/AREA URNS HPF: 1 /HPF (ref 0–1)
UROBILINOGEN UR STRIP-MCNC: 0 MG/DL (ref 0–2)
WBC # BLD AUTO: 5.7 10E9/L (ref 4–11)
WBC #/AREA URNS AUTO: >182 /HPF (ref 0–5)
WBC CLUMPS #/AREA URNS HPF: PRESENT /HPF

## 2020-10-24 PROCEDURE — 99284 EMERGENCY DEPT VISIT MOD MDM: CPT | Mod: 25 | Performed by: NURSE PRACTITIONER

## 2020-10-24 PROCEDURE — 96365 THER/PROPH/DIAG IV INF INIT: CPT | Performed by: NURSE PRACTITIONER

## 2020-10-24 PROCEDURE — 87086 URINE CULTURE/COLONY COUNT: CPT | Performed by: NURSE PRACTITIONER

## 2020-10-24 PROCEDURE — 258N000003 HC RX IP 258 OP 636: Performed by: NURSE PRACTITIONER

## 2020-10-24 PROCEDURE — 85025 COMPLETE CBC W/AUTO DIFF WBC: CPT | Performed by: NURSE PRACTITIONER

## 2020-10-24 PROCEDURE — 250N000011 HC RX IP 250 OP 636: Performed by: NURSE PRACTITIONER

## 2020-10-24 PROCEDURE — 87186 SC STD MICRODIL/AGAR DIL: CPT | Performed by: NURSE PRACTITIONER

## 2020-10-24 PROCEDURE — 99285 EMERGENCY DEPT VISIT HI MDM: CPT | Performed by: NURSE PRACTITIONER

## 2020-10-24 PROCEDURE — 87088 URINE BACTERIA CULTURE: CPT | Performed by: NURSE PRACTITIONER

## 2020-10-24 PROCEDURE — 80053 COMPREHEN METABOLIC PANEL: CPT | Performed by: NURSE PRACTITIONER

## 2020-10-24 PROCEDURE — 81001 URINALYSIS AUTO W/SCOPE: CPT | Performed by: FAMILY MEDICINE

## 2020-10-24 RX ADMIN — ERTAPENEM SODIUM 500 MG: 1 INJECTION, POWDER, LYOPHILIZED, FOR SOLUTION INTRAMUSCULAR; INTRAVENOUS at 17:55

## 2020-10-24 ASSESSMENT — ENCOUNTER SYMPTOMS
CHILLS: 0
MYALGIAS: 0
ABDOMINAL PAIN: 0
JOINT SWELLING: 0
LIGHT-HEADEDNESS: 0
NUMBNESS: 0
HEADACHES: 0
VOMITING: 0
SHORTNESS OF BREATH: 0
DIZZINESS: 0
ARTHRALGIAS: 0
WEAKNESS: 0
FEVER: 0
FREQUENCY: 1
NAUSEA: 0
FATIGUE: 1
COUGH: 0
DYSURIA: 1

## 2020-10-24 ASSESSMENT — MIFFLIN-ST. JEOR: SCORE: 1161.68

## 2020-10-24 NOTE — ED PROVIDER NOTES
History     Chief Complaint   Patient presents with     UTI     Pt just got out of the hospital with a kidney infection, had a follow up with PCP on wednesday and everything was good at that point. Pt reports she developed UTI symptoms again on Thursday, dysuria and frequency      HPI  Lupe Lepe is a 80 year old female with stage 4 CKD, type 2 diabtes, paroxysmal atrical fibrillation and GERD who presents to the emergency department for evaluation due to concern of urinary tract infection. Patient with recent admission (10/5/2020) for pyelonephritis with ESBL treated with 7 days of IV ertapenem as outpatient once discharged after overnight in the hospital. Repeat labs from 10/6 showed improvement in GFR from 30 to from 17.  Creatinine was 1.93 which improved to baseline 1.57. Symptoms began two days ago including dysuria, urinary frequency and urgency, low pelvic pain and fatigue. Patient reports full resolution of symptoms yesterday then return again this afternoon. Denies fever, headache, dizziness, cough, shortness of breath, chest pain, abdominal pain, hematuria, nausea.     Allergies:  Allergies   Allergen Reactions     Bactrim      Acute renal failure and hyperkalemia     Ace Inhibitors Other (See Comments)     Hyperkalemia       Atorvastatin Calcium Itching     ELEVATED LFTS     Crestor [Rosuvastatin Calcium] Other (See Comments)     Elevated LFTS   In high doses     Penicillins Hives     Sulfamethoxazole-Trimethoprim Other (See Comments)     Kidney shutdown     Ciprofloxacin Itching and Rash     Allergy to IV form only, Has taken oral without problems       Problem List:    Patient Active Problem List    Diagnosis Date Noted     Renal artery stenosis (H) 08/25/2006     Priority: High     Left. Likely due to temporal arteritis. S/p left angioplasty 8/06. Increased BP 10/06. Renal duplex- Increased velocity left RA, ratios within normal limits. Right RA velocity slightly increased. MRA- normal left  renal artery. Moderate right renal artery stenosis. Probable splenic artery stenosis. Angio 10/06 with bilateral renal artery angioplasty. Admit [facility] increased BP 11/06- renal angiogram this time shows no stenosis and no gradient. Renal ultrasound 6/08- normal. Renal ultrasound 6/08- normal with elevated pressures.  MRA 4/10- minimal narrowing main right renal artery.       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 Duplex 7/06- Right SCV, brachial biphasic. Right radial monophasic, right ulnar biphasic-triphasic. Left SCV triphasic. Left brachial biphasic, left radial and ulnar monophasic. Brachial wrist indices  0.57 right/0.58 left. Angio 8/06- c/w arteritis, right RA stenosis. Repeat angiogram 6/06 no intracranial arteritis or stenosis, mild left intracranial artery plaque. Duplex 9/06- Right SCV and axillary triphasic. Right brachial, radial and ulnar monophasic. Left SCV biphasic-triphasic.  Left axillary and brachial monophasic. Left radial and ulnar monophasic-biphasic. Brachial wrist indices 0.63 right/0.60 left.  Duplex 2/08 unchanged. MRA 4/10- right vertebral occluded  Followed by Dr Aly at [place]        History of giant cell arteritis 03/27/2006     Priority: High     Prolonged low grade temps. Normal wbc, lft, UA, BCX. , CRP 92. Normal monospot, lymes screen. CT chest abdomen and pelvis negative 4/06. Bcx negative. BM BX negative. Fungal, AFB culture negative. Temporal artery biopsy positive. Flare with prednisone taper. Cellcept started 11/06, stopped 8/08 due to frequent UTIs. Started MTX 12/08 after Cable eval. Off prednisone 8/09.     Needs blood pressure checked in legs.  Sees DR. Ivory Aly at [facility].       Pyelonephritis 10/06/2020     Priority: Medium     ESBL (extended spectrum beta-lactamase) producing bacteria infection 10/06/2020     Priority: Medium     Recurrent UTI 10/05/2020     Priority: Medium      Giant cell arteritis (H) 09/15/2020     Priority: Medium     Subclavian arterial stenosis (H) 06/29/2018     Priority: Medium     Cannot measure blood pressure in bilateral arms.  Blood pressure readings in leg are 10-20% higher than arm.       CKD (chronic kidney disease) stage 4, GFR 15-29 ml/min (H) 08/31/2016     Priority: Medium     Proteinuria on ACEi, follows with  [facility] Dr. Ceferino Savage [phone]       Iron deficiency anemia, unspecified 08/30/2016     Priority: Medium     Diagnosis updated by automated process. Provider to review and confirm.       Chronic kidney disease, stage IV (severe) (H) 08/30/2016     Priority: Medium     Anemia of renal disease 06/09/2016     Priority: Medium     Type 2 diabetes mellitus with stage 4 chronic kidney disease, without long-term current use of insulin (H) 10/31/2010     Priority: Medium     DX 2006. 2003 2hour-glucose 202. FBS >126x2 1/06. No retinopathy, peripheral neuropathy.  Was on insulin previously, was able to get off this in 2014 or so.       Hyperlipidemia LDL goal <100 10/31/2010     Priority: Medium     Renovascular hypertension with goal blood pressure less than 130/80      Priority: Medium     Hospitalized with malignant HTN [facility] 11/06. Angio- no recurrant renal artery stenosis. Metanephrines normal.  Aldosterone was undetectably low.  Urine catecholamines within normal limits.  Renin activity normal.        ACP (advance care planning) 05/06/2011     Priority: Low     Advance Care Planning 6/9/2016: ACP Review of Chart / Resources Provided:  Reviewed chart for advance care plan.  Lupe Lepe has no plan or code status on file however states presence of ACP document. Copy requested.   Added by Ivelisse Olivares  Pt has completed an Advance/Health Care Directive (HCD), to bring in copy to be scanned into Epic.           Frequent Urinary Tract Infections 01/23/2009     Priority: Low     Cysto 7/09 normal        Abdominal cramps  10/16/2008     Priority: Low     Episodic severe cramping associated with emesis since 2007. GI evaluation 10/08. [place] evaluation 12/08. 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 Rx jeanne-pac for H. Pylori with resolution of symptoms 1/09. Recurrent 2012, shorter symptoms.   Evaluation 11/2017, 9/15/2020.  Unclear cause.  May be transient small bowel obstruction?  Recommend ER evaluation during episode to get CT scan and labs       Gastroesophageal reflux disease without esophagitis      Priority: Low     Paroxysmal atrial fibrillation (H)      Priority: Low     paroxymal single episode 2002. ECHO, Adenosine cardiolite normal.  Not on anticoagulation        Acquired hypothyroidism      Priority: Low     Osteopenia of multiple sites      Priority: Low     dexa 1999, 2003. Dexa 7/06 -lumbar spine -1.9,  right femur -1.0,  left femur is -1.6. Boniva started 2007.  Dexa 2/08- L spine -2.4, right femoral neck -1.4, left femoral neck -2.0. Dexa 5/11- L1-L4  -1.8 ,  right femoral neck -1.7, left femoral neck -1.9.            Past Medical History:    Past Medical History:   Diagnosis Date     Abdominal cramps 10/16/2008     Abnormal CT scan 1/12/2009     Arthritis      Closed fracture of unspecified part of fibula      Heart disease      Hemorrhage of rectum and anus      HERPES ZOSTER NOS 5/23/2007     Hypertension      HYPOSMIA      IRON DEFIC ANEMIA NOS 3/27/2006     JOINT EFFUSION-L/LEG 4/7/2006     Other closed fractures of distal end of radius (alone)      Polymyalgia rheumatica (H)      Pyelonephritis 10/6/2020     Thyroid disease        Past Surgical History:    Past Surgical History:   Procedure Laterality Date     COLONOSCOPY  8/25/2008     ESOPHAGOSCOPY, GASTROSCOPY, DUODENOSCOPY (EGD), COMBINED N/A 11/16/2017    Procedure: COMBINED ESOPHAGOSCOPY, GASTROSCOPY, DUODENOSCOPY (EGD);  Gastroscopy;  Surgeon: Angel Guillen MD;  Location: [facility] COLONOSCOPY THRU STOMA, DIAGNOSTIC  2000,  5/06, 8/08    Normal     HYSTERECTOMY, SIS  1970's     PHACOEMULSIFICATION WITH STANDARD INTRAOCULAR LENS IMPLANT Right 1/8/2018    Procedure: PHACOEMULSIFICATION WITH STANDARD INTRAOCULAR LENS IMPLANT;  Right Cataract Removal with Implant;  Surgeon: Sekou Dobbins MD;  Location: [facility] OR     PHACOEMULSIFICATION WITH STANDARD INTRAOCULAR LENS IMPLANT Left 1/29/2018    Procedure: PHACOEMULSIFICATION WITH STANDARD INTRAOCULAR LENS IMPLANT;  Left Cataract Removal with Implant;  Surgeon: Sekou Dobbins MD;  Location: [facility] OR     SURGICAL HISTORY OF -   1948    SIS, for fibroids     SURGICAL HISTORY OF -   12/16/2008    Esophagogastroduodenoscopy with biopsy     TONSILLECTOMY      Tonsilectomy       Family History:    Family History   Problem Relation Age of Onset     Cancer Mother         ovarian CA     Cancer Father         lung CA     Heart Disease Maternal Grandmother      Heart Disease Maternal Grandfather      Depression Sister         sucide     Breast Cancer Sister        Social History:  Marital Status:   [2]  Social History     Tobacco Use     Smoking status: Never Smoker     Smokeless tobacco: Never Used   Substance Use Topics     Alcohol use: No     Drug use: No        Medications:         amLODIPine (NORVASC) 10 MG tablet       ASPIRIN 325 MG OR TBEC       chlorthalidone (HYGROTON) 25 MG tablet       cholecalciferol (D-VI-SOL,VITAMIN D3) 400 units/mL (10 mcg/mL) LIQD liquid       cloNIDine (CATAPRES) 0.1 MG tablet       levothyroxine (SYNTHROID/LEVOTHROID) 50 MCG tablet       levothyroxine (SYNTHROID/LEVOTHROID) 75 MCG tablet       lisinopril (ZESTRIL) 10 MG tablet       MULTIVITAMINS OR TABS       omeprazole (PRILOSEC) 20 MG DR capsule       ondansetron (ZOFRAN-ODT) 4 MG ODT tab       rosuvastatin (CRESTOR) 10 MG tablet       VITAMIN B-6 50 MG OR TABS      Review of Systems   Constitutional: Positive for fatigue. Negative for chills and fever.   Respiratory: Negative for cough and  "shortness of breath.    Cardiovascular: Negative for chest pain.   Gastrointestinal: Negative for abdominal pain, nausea and vomiting.   Endocrine: Negative for polyuria.   Genitourinary: Positive for dysuria, frequency, pelvic pain and urgency.   Musculoskeletal: Negative for arthralgias, joint swelling and myalgias.   Skin: Negative for rash.   Neurological: Negative for dizziness, weakness, light-headedness, numbness and headaches.   All other systems reviewed and are negative.    Physical Exam   BP: 136/75  Pulse: 78  Temp: 97.8  F (36.6  C)  Resp: 18  Height: 167.6 cm (5' 6\")  Weight: 68.5 kg (151 lb)  SpO2: 97 %    Physical Exam  Constitutional:       General: She is not in acute distress.     Appearance: She is well-developed. She is not diaphoretic.   HENT:      Head: Normocephalic.   Eyes:      Conjunctiva/sclera: Conjunctivae normal.      Pupils: Pupils are equal, round, and reactive to light.   Neck:      Musculoskeletal: Normal range of motion and neck supple.   Cardiovascular:      Rate and Rhythm: Normal rate and regular rhythm.      Pulses: Normal pulses.   Pulmonary:      Effort: Pulmonary effort is normal. No respiratory distress.      Breath sounds: Normal breath sounds and air entry. No decreased air movement. No decreased breath sounds, wheezing or rhonchi.   Abdominal:      General: There is no distension.      Palpations: Abdomen is soft. There is no mass.      Tenderness: There is no abdominal tenderness. There is no guarding or rebound.      Hernia: No hernia is present.   Musculoskeletal: Normal range of motion.   Skin:     General: Skin is warm.      Capillary Refill: Capillary refill takes less than 2 seconds.   Neurological:      General: No focal deficit present.      Mental Status: She is alert and oriented to person, place, and time.   Psychiatric:         Mood and Affect: Mood normal.       ED Course        Procedures    Results for orders placed or performed during the hospital " encounter of 10/24/20 (from the past 24 hour(s))   UA reflex to Microscopic   Result Value Ref Range    Color Urine Yellow     Appearance Urine Cloudy     Glucose Urine Negative NEG^Negative mg/dL    Bilirubin Urine Negative NEG^Negative    Ketones Urine Negative NEG^Negative mg/dL    Specific Gravity Urine 1.012 1.003 - 1.035    Blood Urine Moderate (A) NEG^Negative    pH Urine 5.0 5.0 - 7.0 pH    Protein Albumin Urine 30 (A) NEG^Negative mg/dL    Urobilinogen mg/dL 0.0 0.0 - 2.0 mg/dL    Nitrite Urine Negative NEG^Negative    Leukocyte Esterase Urine Large (A) NEG^Negative    Source Midstream Urine     RBC Urine 15 (H) 0 - 2 /HPF    WBC Urine >182 (H) 0 - 5 /HPF    WBC Clumps Present (A) NEG^Negative /HPF    Bacteria Urine Few (A) NEG^Negative /HPF    Squamous Epithelial /HPF Urine <1 0 - 1 /HPF    Transitional Epi 1 0 - 1 /HPF    Mucous Urine Present (A) NEG^Negative /LPF    Hyaline Casts 23 (H) 0 - 2 /LPF   Urine Culture    Specimen: Midstream Urine   Result Value Ref Range    Specimen Description Midstream Urine     Special Requests Specimen received in preservative     Culture Micro PENDING    CBC with platelets, differential   Result Value Ref Range    WBC 5.7 4.0 - 11.0 10e9/L    RBC Count 4.01 3.8 - 5.2 10e12/L    Hemoglobin 11.7 11.7 - 15.7 g/dL    Hematocrit 35.0 35.0 - 47.0 %    MCV 87 78 - 100 fl    MCH 29.2 26.5 - 33.0 pg    MCHC 33.4 31.5 - 36.5 g/dL    RDW 12.2 10.0 - 15.0 %    Platelet Count 156 150 - 450 10e9/L    Diff Method Automated Method     % Neutrophils 60.1 %    % Lymphocytes 24.6 %    % Monocytes 11.4 %    % Eosinophils 3.3 %    % Basophils 0.4 %    % Immature Granulocytes 0.2 %    Nucleated RBCs 0 0 /100    Absolute Neutrophil 3.4 1.6 - 8.3 10e9/L    Absolute Lymphocytes 1.4 0.8 - 5.3 10e9/L    Absolute Monocytes 0.7 0.0 - 1.3 10e9/L    Absolute Eosinophils 0.2 0.0 - 0.7 10e9/L    Absolute Basophils 0.0 0.0 - 0.2 10e9/L    Abs Immature Granulocytes 0.0 0 - 0.4 10e9/L    Absolute Nucleated  RBC 0.0    Comprehensive metabolic panel   Result Value Ref Range    Sodium 140 133 - 144 mmol/L    Potassium 4.9 3.4 - 5.3 mmol/L    Chloride 113 (H) 94 - 109 mmol/L    Carbon Dioxide 21 20 - 32 mmol/L    Anion Gap 6 3 - 14 mmol/L    Glucose 107 (H) 70 - 99 mg/dL    Urea Nitrogen 37 (H) 7 - 30 mg/dL    Creatinine 1.86 (H) 0.52 - 1.04 mg/dL    GFR Estimate 25 (L) >60 mL/min/[1.73_m2]    GFR Estimate If Black 29 (L) >60 mL/min/[1.73_m2]    Calcium 9.7 8.5 - 10.1 mg/dL    Bilirubin Total 0.6 0.2 - 1.3 mg/dL    Albumin 4.2 3.4 - 5.0 g/dL    Protein Total 7.0 6.8 - 8.8 g/dL    Alkaline Phosphatase 89 40 - 150 U/L    ALT 33 0 - 50 U/L    AST 19 0 - 45 U/L     Medications   ertapenem (INVanz) 500 mg in sodium chloride 0.9 % 50 mL intermittent infusion (0 mg Intravenous Stopped 10/24/20 1831)     Assessments & Plan (with Medical Decision Making)   Lupe Lepe is a 80 year old female with stage 4 CKD, type 2 diabtes, paroxysmal atrical fibrillation and GERD who presents to the emergency department for evaluation due to concern of urinary tract infection. Patient with recent admission (10/5/2020) for pyelonephritis with ESBL treated with 7 days of IV ertapenem as outpatient once discharged after overnight in the hospital. Repeat labs from 10/6 showed improvement in GFR from 30 to from 17. Symptoms began two days ago including dysuria, urinary frequency and urgency, low pelvic pain and fatigue. Patient reports full resolution of symptoms yesterday then return again this afternoon.     Exam as above. BP elevated at 174/75, pulse 68, 95% on RA, 97.8 F and resp 16. Patient is energetic and well appearing, no worrisome findings on physical exam, no acute abdomen, no current pelvic/flank pain to palpation. Urinalysis significant for moderate blood, large leukocyte esterase, 15 RBC, >182 WBC with clumps, few bacteria, mucous present and 23 hyaline casts. Urine culture pending. CBC normal. CMP without significant change,  creatinine 1.86, GFR 25. Low suspicion for urosepsis or stone. Discussed case with Dr. Hyatt. Plan to repeat 7 days of ertapenem via IV (500 mg IV every day x7 days). First dose provided during visit. Patient will return to the ED tomorrow for second dose and can complete remaining doses at infusion center. Patient is agreeable to plan of care, all questions answered. Discharged with her  and in no acute distress.     I have reviewed the nursing notes.    I have reviewed the findings, diagnosis, plan and need for follow up with the patient.  Discharge Medication List as of 10/24/2020  6:32 PM        Final diagnoses:   Pyelonephritis     10/24/2020   [facility] EMERGENCY DEPT     Anjelica Beauchamp, APRN CNP  10/25/20 0817

## 2020-10-24 NOTE — ED AVS SNAPSHOT
[facility]  [address] [address]  Phone: [phone]  Fax: [phone]                                    Lupe Lepe   MRN: [id number]    Department: [facility]   Date of Visit: 10/24/2020           After Visit Summary Signature Page    I have received my discharge instructions, and my questions have been answered. I have discussed any challenges I see with this plan with the nurse or doctor.    ..........................................................................................................................................  Patient/Patient Representative Signature      ..........................................................................................................................................  Patient Representative Print Name and Relationship to Patient    ..................................................               ................................................  Date                                   Time    ..........................................................................................................................................  Reviewed by Signature/Title    ...................................................              ..............................................  Date                                               Time          22EPIC Rev 08/18

## 2020-10-25 ENCOUNTER — HOSPITAL ENCOUNTER (EMERGENCY)
Facility: CLINIC | Age: 80
Discharge: HOME | End: 2020-10-25
Attending: NURSE PRACTITIONER | Admitting: NURSE PRACTITIONER
Payer: COMMERCIAL

## 2020-10-25 VITALS
SYSTOLIC BLOOD PRESSURE: 125 MMHG | OXYGEN SATURATION: 97 % | RESPIRATION RATE: 18 BRPM | HEART RATE: 80 BPM | TEMPERATURE: 97 F | BODY MASS INDEX: 24.37 KG/M2 | DIASTOLIC BLOOD PRESSURE: 75 MMHG | WEIGHT: 151 LBS

## 2020-10-25 DIAGNOSIS — N18.4 CHRONIC KIDNEY DISEASE, STAGE IV (SEVERE) (H): ICD-10-CM

## 2020-10-25 DIAGNOSIS — N12 PYELONEPHRITIS: ICD-10-CM

## 2020-10-25 PROCEDURE — 999N000104 HC STATISTIC NO CHARGE: Performed by: PHYSICIAN ASSISTANT

## 2020-10-25 PROCEDURE — 96374 THER/PROPH/DIAG INJ IV PUSH: CPT | Performed by: PHYSICIAN ASSISTANT

## 2020-10-25 PROCEDURE — 999N000064 HC STATISTIC EXAM NO CHARGES: Performed by: NURSE PRACTITIONER

## 2020-10-25 PROCEDURE — 250N000011 HC RX IP 250 OP 636: Performed by: NURSE PRACTITIONER

## 2020-10-25 PROCEDURE — 258N000003 HC RX IP 258 OP 636: Performed by: NURSE PRACTITIONER

## 2020-10-25 RX ADMIN — ERTAPENEM SODIUM 500 MG: 1 INJECTION, POWDER, LYOPHILIZED, FOR SOLUTION INTRAMUSCULAR; INTRAVENOUS at 15:37

## 2020-10-25 NOTE — RESULT ENCOUNTER NOTE
"Emergency Dept/Urgent Care discharge antibiotic (if prescribed): Per ED Provider comment, \"...Plan to repeat 7 days of ertapenem via IV (500 mg IV every day x7 days). First dose provided during visit. Patient will return to the ED tomorrow for second dose and can complete remaining doses at infusion center...\"  No changes in treatment per Urine culture protocol.  "

## 2020-10-26 ENCOUNTER — INFUSION THERAPY VISIT (OUTPATIENT)
Dept: INFUSION THERAPY | Facility: CLINIC | Age: 80
End: 2020-10-26
Attending: INTERNAL MEDICINE
Payer: COMMERCIAL

## 2020-10-26 VITALS — SYSTOLIC BLOOD PRESSURE: 156 MMHG | DIASTOLIC BLOOD PRESSURE: 60 MMHG | TEMPERATURE: 95.7 F | HEART RATE: 72 BPM

## 2020-10-26 DIAGNOSIS — N18.4 CHRONIC KIDNEY DISEASE, STAGE IV (SEVERE) (H): Primary | ICD-10-CM

## 2020-10-26 DIAGNOSIS — N12 PYELONEPHRITIS: ICD-10-CM

## 2020-10-26 PROCEDURE — 258N000003 HC RX IP 258 OP 636: Performed by: INTERNAL MEDICINE

## 2020-10-26 PROCEDURE — 250N000011 HC RX IP 250 OP 636: Performed by: INTERNAL MEDICINE

## 2020-10-26 PROCEDURE — 96365 THER/PROPH/DIAG IV INF INIT: CPT

## 2020-10-26 RX ADMIN — ERTAPENEM SODIUM 500 MG: 1 INJECTION, POWDER, LYOPHILIZED, FOR SOLUTION INTRAMUSCULAR; INTRAVENOUS at 13:57

## 2020-10-26 NOTE — PROGRESS NOTES
Infusion Nursing Note:  Lupe Lepe presents today for Invanz.    Patient seen by provider today: No   present during visit today: Not Applicable.    Note: N/A.    Intravenous Access:  Peripheral IV placed.    Treatment Conditions:  Not Applicable.      Post Infusion Assessment:  Patient tolerated infusion without incident.  Blood return noted pre and post infusion.  Site patent and intact, free from redness, edema or discomfort.  No evidence of extravasations.       Discharge Plan:   Patient discharged in stable condition accompanied by: self.    Li Hope RN

## 2020-10-27 ENCOUNTER — INFUSION THERAPY VISIT (OUTPATIENT)
Dept: INFUSION THERAPY | Facility: CLINIC | Age: 80
End: 2020-10-27
Attending: INTERNAL MEDICINE
Payer: COMMERCIAL

## 2020-10-27 VITALS
SYSTOLIC BLOOD PRESSURE: 150 MMHG | TEMPERATURE: 96.3 F | HEART RATE: 65 BPM | OXYGEN SATURATION: 96 % | DIASTOLIC BLOOD PRESSURE: 50 MMHG

## 2020-10-27 DIAGNOSIS — N18.4 CHRONIC KIDNEY DISEASE, STAGE IV (SEVERE) (H): Primary | ICD-10-CM

## 2020-10-27 DIAGNOSIS — N12 PYELONEPHRITIS: ICD-10-CM

## 2020-10-27 LAB
BACTERIA SPEC CULT: ABNORMAL
Lab: ABNORMAL
SPECIMEN SOURCE: ABNORMAL

## 2020-10-27 PROCEDURE — 96365 THER/PROPH/DIAG IV INF INIT: CPT

## 2020-10-27 PROCEDURE — 250N000011 HC RX IP 250 OP 636: Performed by: INTERNAL MEDICINE

## 2020-10-27 PROCEDURE — 258N000003 HC RX IP 258 OP 636: Performed by: INTERNAL MEDICINE

## 2020-10-27 RX ADMIN — ERTAPENEM SODIUM 500 MG: 1 INJECTION, POWDER, LYOPHILIZED, FOR SOLUTION INTRAMUSCULAR; INTRAVENOUS at 14:13

## 2020-10-27 NOTE — PROGRESS NOTES
Infusion Nursing Note:  Lupe LEE Lepe presents today for Invanz.    Patient seen by provider today: No   present during visit today: Not Applicable.    Note: N/A.    Intravenous Access:  Peripheral IV in place.    Treatment Conditions:  Not Applicable.      Post Infusion Assessment:  Patient tolerated infusion without incident.  Site patent and intact, free from redness, edema or discomfort.  No evidence of extravasations.  IV flushed per protocol.       Discharge Plan:   Patient discharged in stable condition accompanied by: self.  Departure Mode: Ambulatory.    Maggie Pickard RN

## 2020-10-28 ENCOUNTER — INFUSION THERAPY VISIT (OUTPATIENT)
Dept: INFUSION THERAPY | Facility: CLINIC | Age: 80
End: 2020-10-28
Attending: INTERNAL MEDICINE
Payer: COMMERCIAL

## 2020-10-28 VITALS — DIASTOLIC BLOOD PRESSURE: 62 MMHG | SYSTOLIC BLOOD PRESSURE: 131 MMHG | HEART RATE: 71 BPM

## 2020-10-28 DIAGNOSIS — N18.4 CHRONIC KIDNEY DISEASE, STAGE IV (SEVERE) (H): Primary | ICD-10-CM

## 2020-10-28 DIAGNOSIS — N12 PYELONEPHRITIS: ICD-10-CM

## 2020-10-28 PROCEDURE — 96365 THER/PROPH/DIAG IV INF INIT: CPT

## 2020-10-28 PROCEDURE — 250N000011 HC RX IP 250 OP 636: Performed by: NURSE PRACTITIONER

## 2020-10-28 PROCEDURE — 258N000003 HC RX IP 258 OP 636: Performed by: NURSE PRACTITIONER

## 2020-10-28 PROCEDURE — 99207 PR NO CHARGE LOS: CPT

## 2020-10-28 RX ADMIN — ERTAPENEM SODIUM 500 MG: 1 INJECTION, POWDER, LYOPHILIZED, FOR SOLUTION INTRAMUSCULAR; INTRAVENOUS at 13:33

## 2020-10-28 NOTE — PROGRESS NOTES
Gwen Jaeger RN [facility] [phone] Spoke to Jean Marie CONNOLLY and he said that pt had a bowel movement and they are going to put the hospice on hold at this time to see if pt has some improvement once they restart medications.  Spoke to Kiki and the hospital bed was delivered last night.  Will continue to follow for d/c planning.  Thank you   Infusion Nursing Note:  Lupe Lepe presents today for Invanz.    Patient seen by provider today: No   present during visit today: Not Applicable.    Note: N/A.    Intravenous Access:  Peripheral IV in  place.    Treatment Conditions:  Not Applicable.      Post Infusion Assessment:  Patient tolerated infusion without incident.       Discharge Plan:   Patient discharged in stable condition accompanied by: self.  Departure Mode: Ambulatory.  Pt will return tomorrow for next dose.    Christine Rey RN

## 2020-10-29 ENCOUNTER — INFUSION THERAPY VISIT (OUTPATIENT)
Dept: INFUSION THERAPY | Facility: CLINIC | Age: 80
End: 2020-10-29
Attending: INTERNAL MEDICINE
Payer: COMMERCIAL

## 2020-10-29 VITALS
DIASTOLIC BLOOD PRESSURE: 54 MMHG | RESPIRATION RATE: 18 BRPM | HEART RATE: 62 BPM | TEMPERATURE: 96.7 F | SYSTOLIC BLOOD PRESSURE: 140 MMHG

## 2020-10-29 DIAGNOSIS — N18.4 CHRONIC KIDNEY DISEASE, STAGE IV (SEVERE) (H): Primary | ICD-10-CM

## 2020-10-29 DIAGNOSIS — N12 PYELONEPHRITIS: ICD-10-CM

## 2020-10-29 PROCEDURE — 258N000003 HC RX IP 258 OP 636: Performed by: NURSE PRACTITIONER

## 2020-10-29 PROCEDURE — 96365 THER/PROPH/DIAG IV INF INIT: CPT

## 2020-10-29 PROCEDURE — 250N000011 HC RX IP 250 OP 636: Performed by: NURSE PRACTITIONER

## 2020-10-29 RX ADMIN — ERTAPENEM SODIUM 500 MG: 1 INJECTION, POWDER, LYOPHILIZED, FOR SOLUTION INTRAMUSCULAR; INTRAVENOUS at 13:01

## 2020-10-29 NOTE — PROGRESS NOTES
Infusion Nursing Note:  Lupe HOWARD Eddiestephy presents today for Invanz.    Patient seen by provider today: No   present during visit today: Not Applicable.    Note: denies any new medical concerns.    Intravenous Access:  Peripheral IV in place from 10/24/2020,.    Treatment Conditions:  Not Applicable.    Post Infusion Assessment:  Patient tolerated infusion without incident.  Site patent and intact, free from redness, edema or discomfort.  No evidence of extravasations.   PIV left in place for tomorrow's infusion.    Discharge Plan:   Discharge instructions reviewed with: Patient.  Patient and/or family verbalized understanding of discharge instructions and all questions answered.  AVS to patient via [employer].  Patient will return 10/30/2020 for next appointment.   Patient discharged in stable condition accompanied by: self.  Departure Mode: Ambulatory.    Lilliam Carlson RN

## 2020-10-30 ENCOUNTER — INFUSION THERAPY VISIT (OUTPATIENT)
Dept: INFUSION THERAPY | Facility: CLINIC | Age: 80
End: 2020-10-30
Attending: INTERNAL MEDICINE
Payer: COMMERCIAL

## 2020-10-30 VITALS
TEMPERATURE: 95.3 F | DIASTOLIC BLOOD PRESSURE: 59 MMHG | SYSTOLIC BLOOD PRESSURE: 139 MMHG | RESPIRATION RATE: 18 BRPM | HEART RATE: 80 BPM

## 2020-10-30 DIAGNOSIS — N18.4 CHRONIC KIDNEY DISEASE, STAGE IV (SEVERE) (H): Primary | ICD-10-CM

## 2020-10-30 DIAGNOSIS — N12 PYELONEPHRITIS: ICD-10-CM

## 2020-10-30 PROCEDURE — 250N000011 HC RX IP 250 OP 636: Performed by: NURSE PRACTITIONER

## 2020-10-30 PROCEDURE — 258N000003 HC RX IP 258 OP 636: Performed by: NURSE PRACTITIONER

## 2020-10-30 PROCEDURE — 96365 THER/PROPH/DIAG IV INF INIT: CPT

## 2020-10-30 RX ADMIN — ERTAPENEM SODIUM 500 MG: 1 INJECTION, POWDER, LYOPHILIZED, FOR SOLUTION INTRAMUSCULAR; INTRAVENOUS at 13:05

## 2020-10-30 NOTE — PROGRESS NOTES
Infusion Nursing Note:  Lupe HOWARD Eddiestephy presents today for [place].    Patient seen by provider today: No   present during visit today: Not Applicable.    Note: N/A.    Intravenous Access:  Peripheral IV in place from 10/24/2020.    Treatment Conditions:  Not Applicable.    Post Infusion Assessment:  Patient tolerated infusion without incident.  Site patent and intact, free from redness, edema or discomfort.  No evidence of extravasations.  Access discontinued per protocol.     Discharge Plan:   Discharge instructions reviewed with: Patient.  Patient and/or family verbalized understanding of discharge instructions and all questions answered.  AVS to patient via [employer].  Patient will return as directed by MD for next appointment.   Patient discharged in stable condition accompanied by: self.  Departure Mode: Ambulatory.    Lilliam Carlson RN

## 2020-11-03 ENCOUNTER — OFFICE VISIT (OUTPATIENT)
Dept: FAMILY MEDICINE | Facility: CLINIC | Age: 80
End: 2020-11-03
Payer: COMMERCIAL

## 2020-11-03 ENCOUNTER — TELEPHONE (OUTPATIENT)
Dept: INFECTIOUS DISEASES | Facility: CLINIC | Age: 80
End: 2020-11-03

## 2020-11-03 VITALS
OXYGEN SATURATION: 98 % | HEART RATE: 67 BPM | BODY MASS INDEX: 24.86 KG/M2 | RESPIRATION RATE: 16 BRPM | WEIGHT: 154 LBS | SYSTOLIC BLOOD PRESSURE: 116 MMHG | TEMPERATURE: 96.1 F | DIASTOLIC BLOOD PRESSURE: 72 MMHG

## 2020-11-03 DIAGNOSIS — Z16.12 ESBL (EXTENDED SPECTRUM BETA-LACTAMASE) PRODUCING BACTERIA INFECTION: ICD-10-CM

## 2020-11-03 DIAGNOSIS — N39.0 RECURRENT UTI: Primary | ICD-10-CM

## 2020-11-03 DIAGNOSIS — A49.9 ESBL (EXTENDED SPECTRUM BETA-LACTAMASE) PRODUCING BACTERIA INFECTION: ICD-10-CM

## 2020-11-03 PROBLEM — N12 PYELONEPHRITIS: Status: RESOLVED | Noted: 2020-10-06 | Resolved: 2020-11-03

## 2020-11-03 LAB
ALBUMIN UR-MCNC: NEGATIVE MG/DL
ANION GAP SERPL CALCULATED.3IONS-SCNC: 10 MMOL/L (ref 3–14)
APPEARANCE UR: CLEAR
BILIRUB UR QL STRIP: NEGATIVE
BUN SERPL-MCNC: 43 MG/DL (ref 7–30)
CALCIUM SERPL-MCNC: 9.3 MG/DL (ref 8.5–10.1)
CHLORIDE SERPL-SCNC: 111 MMOL/L (ref 94–109)
CO2 SERPL-SCNC: 19 MMOL/L (ref 20–32)
COLOR UR AUTO: YELLOW
CREAT SERPL-MCNC: 1.81 MG/DL (ref 0.52–1.04)
GFR SERPL CREATININE-BSD FRML MDRD: 25 ML/MIN/{1.73_M2}
GLUCOSE SERPL-MCNC: 100 MG/DL (ref 70–99)
GLUCOSE UR STRIP-MCNC: NEGATIVE MG/DL
HGB UR QL STRIP: NEGATIVE
KETONES UR STRIP-MCNC: NEGATIVE MG/DL
LEUKOCYTE ESTERASE UR QL STRIP: NEGATIVE
NITRATE UR QL: NEGATIVE
PH UR STRIP: 5 PH (ref 5–7)
POTASSIUM SERPL-SCNC: 4.6 MMOL/L (ref 3.4–5.3)
SODIUM SERPL-SCNC: 140 MMOL/L (ref 133–144)
SOURCE: NORMAL
SP GR UR STRIP: 1.02 (ref 1–1.03)
UROBILINOGEN UR STRIP-ACNC: 0.2 EU/DL (ref 0.2–1)

## 2020-11-03 PROCEDURE — 80048 BASIC METABOLIC PNL TOTAL CA: CPT | Performed by: INTERNAL MEDICINE

## 2020-11-03 PROCEDURE — 36415 COLL VENOUS BLD VENIPUNCTURE: CPT | Performed by: INTERNAL MEDICINE

## 2020-11-03 PROCEDURE — 99214 OFFICE O/P EST MOD 30 MIN: CPT | Performed by: INTERNAL MEDICINE

## 2020-11-03 PROCEDURE — 81003 URINALYSIS AUTO W/O SCOPE: CPT | Performed by: INTERNAL MEDICINE

## 2020-11-03 NOTE — TELEPHONE ENCOUNTER
[facility] Call Center    Phone Message    May a detailed message be left on voicemail: no     Reason for Call: Other: .  N39.0 (ICD-10-CM) - Recurrent UTI  A49.9, Z16.12 (ICD-10-CM) - ESBL (extended spectrum beta-lactamase) producing bacteria infection, KAT LEVIN, per pt      Per scheduling protocol all new patients must have telephone encounter sent to clinic for review.    Action Taken: Message routed to:  [facility] ([facility]): ID    Travel Screening: Not Applicable

## 2020-11-03 NOTE — PROGRESS NOTES
Subjective     Lupe Lepe is a 80 year old female who presents to clinic today for the following health issues:    HPI       Medication Followup of Chronic kidney disease, stage iV (severe)    Taking Medication as prescribed: yes    Side Effects:  None    Medication Helping Symptoms:  yes        Pt verbalize to feeling better no pain           Hospital Follow-up Visit:    Hospital/Nursing Home/IP Rehab Facility: [facility]  Date of Admission: 10/5/20  Date of Discharge: 10/6/20  Reason(s) for Admission: Recurrent UTI           --she was hospitalized as above for UTI, I did virtual visit w/her on 10/15 and symptoms resolved.  We discussed reasoning for not repeating UA/UC when symptoms resolved.  She disagreed  --did a virtual visit on 10/23 with another provider for symptoms that started 1022 - dysuria, pelvic pain, frequency.  Sent to ER because it was Friday PM.  Was started on Ertapenem x 7 days.  Last dose 10/30.  Symptoms have totally resolved since day 3 of antibiotic course.  --she follows with Nephrology Dr. Ash.  He wants to have another UA/UC and BMP      Review of Systems   Constitutional, HEENT, cardiovascular, pulmonary, GI, , musculoskeletal, neuro, skin, endocrine and psych systems are negative, except as otherwise noted.      Objective    /72   Pulse 67   Temp 96.1  F (35.6  C) (Tympanic)   Resp 16   Wt 69.9 kg (154 lb)   SpO2 98%   Breastfeeding No   BMI 24.86 kg/m    Body mass index is 24.86 kg/m .  Physical Exam   GENERAL APPEARANCE: healthy, alert and no distress    Results for orders placed or performed in visit on 11/03/20 (from the past 24 hour(s))   *UA reflex to Microscopic and Culture ([place] [facility] (except [place] and [place])    Specimen: Midstream Urine   Result Value Ref Range    Color Urine Yellow     Appearance Urine Clear     Glucose Urine Negative NEG^Negative mg/dL    Bilirubin Urine Negative NEG^Negative    Ketones Urine  Negative NEG^Negative mg/dL    Specific Gravity Urine 1.025 1.003 - 1.035    Blood Urine Negative NEG^Negative    pH Urine 5.0 5.0 - 7.0 pH    Protein Albumin Urine Negative NEG^Negative mg/dL    Urobilinogen Urine 0.2 0.2 - 1.0 EU/dL    Nitrite Urine Negative NEG^Negative    Leukocyte Esterase Urine Negative NEG^Negative    Source Midstream Urine            Assessment & Plan     Lupe was seen today for hospital f/u.    Diagnoses and all orders for this visit:    Recurrent UTI  -     UROLOGY ADULT REFERRAL; Future  -     INFECTIOUS DISEASE REFERRAL  -     *UA reflex to Microscopic and Culture ([facility] (except [place] and [place])  -     Basic metabolic panel    ESBL (extended spectrum beta-lactamase) producing bacteria infection  -     INFECTIOUS DISEASE REFERRAL  -     *UA reflex to Microscopic and Culture ([facility] (except [place] and [place])  -     Basic metabolic panel            Patient Instructions   1. We will check urine and blood test  2. Referral to ID and Urology.      No follow-ups on file.    Li Horton DO  [facility]

## 2020-11-05 NOTE — TELEPHONE ENCOUNTER
Sahara Haque  You Just now (2:37 PM)     Kelsey Oliver,   I spoke with the patient and she didn't know why she was being referred to us. She asked where we are located, she refused to come to [place]. I offered virtual/telephone but she didn't think that would be beneficial. I told her to speak with her referring provider to ask why she was referred and to also maybe ask to be referred somewhere that is a more convenient location for her. She will call us back is she decides she wants to schedule with us.   Sahara Miles

## 2020-11-09 ENCOUNTER — HOSPITAL ENCOUNTER (EMERGENCY)
Facility: CLINIC | Age: 80
Discharge: HOME | End: 2020-11-09
Attending: FAMILY MEDICINE | Admitting: FAMILY MEDICINE
Payer: COMMERCIAL

## 2020-11-09 ENCOUNTER — ANCILLARY PROCEDURE (OUTPATIENT)
Dept: ULTRASOUND IMAGING | Facility: CLINIC | Age: 80
End: 2020-11-09
Attending: FAMILY MEDICINE
Payer: COMMERCIAL

## 2020-11-09 VITALS
HEIGHT: 66 IN | BODY MASS INDEX: 24.43 KG/M2 | DIASTOLIC BLOOD PRESSURE: 85 MMHG | HEART RATE: 80 BPM | SYSTOLIC BLOOD PRESSURE: 113 MMHG | OXYGEN SATURATION: 95 % | TEMPERATURE: 97.8 F | RESPIRATION RATE: 18 BRPM | WEIGHT: 152 LBS

## 2020-11-09 DIAGNOSIS — Z16.12 ESBL (EXTENDED SPECTRUM BETA-LACTAMASE) PRODUCING BACTERIA INFECTION: ICD-10-CM

## 2020-11-09 DIAGNOSIS — N39.0 URINARY TRACT INFECTION WITHOUT HEMATURIA, SITE UNSPECIFIED: ICD-10-CM

## 2020-11-09 DIAGNOSIS — N39.0 RECURRENT UTI: ICD-10-CM

## 2020-11-09 DIAGNOSIS — A49.9 ESBL (EXTENDED SPECTRUM BETA-LACTAMASE) PRODUCING BACTERIA INFECTION: ICD-10-CM

## 2020-11-09 LAB
ALBUMIN SERPL-MCNC: 4.4 G/DL (ref 3.4–5)
ALBUMIN UR-MCNC: 30 MG/DL
ALP SERPL-CCNC: 82 U/L (ref 40–150)
ALT SERPL W P-5'-P-CCNC: 35 U/L (ref 0–50)
ANION GAP SERPL CALCULATED.3IONS-SCNC: 4 MMOL/L (ref 3–14)
APPEARANCE UR: ABNORMAL
AST SERPL W P-5'-P-CCNC: 27 U/L (ref 0–45)
BASOPHILS # BLD AUTO: 0 10E9/L (ref 0–0.2)
BASOPHILS NFR BLD AUTO: 0.4 %
BILIRUB SERPL-MCNC: 0.5 MG/DL (ref 0.2–1.3)
BILIRUB UR QL STRIP: NEGATIVE
BUN SERPL-MCNC: 38 MG/DL (ref 7–30)
CALCIUM SERPL-MCNC: 9 MG/DL (ref 8.5–10.1)
CHLORIDE SERPL-SCNC: 117 MMOL/L (ref 94–109)
CO2 SERPL-SCNC: 21 MMOL/L (ref 20–32)
COLOR UR AUTO: YELLOW
CREAT SERPL-MCNC: 1.92 MG/DL (ref 0.52–1.04)
DIFFERENTIAL METHOD BLD: ABNORMAL
EOSINOPHIL # BLD AUTO: 0.2 10E9/L (ref 0–0.7)
EOSINOPHIL NFR BLD AUTO: 4.4 %
ERYTHROCYTE [DISTWIDTH] IN BLOOD BY AUTOMATED COUNT: 12.2 % (ref 10–15)
GFR SERPL CREATININE-BSD FRML MDRD: 24 ML/MIN/{1.73_M2}
GLUCOSE SERPL-MCNC: 136 MG/DL (ref 70–99)
GLUCOSE UR STRIP-MCNC: NEGATIVE MG/DL
HCT VFR BLD AUTO: 35.9 % (ref 35–47)
HGB BLD-MCNC: 11.9 G/DL (ref 11.7–15.7)
HGB UR QL STRIP: ABNORMAL
IMM GRANULOCYTES # BLD: 0 10E9/L (ref 0–0.4)
IMM GRANULOCYTES NFR BLD: 0.2 %
KETONES UR STRIP-MCNC: NEGATIVE MG/DL
LEUKOCYTE ESTERASE UR QL STRIP: ABNORMAL
LYMPHOCYTES # BLD AUTO: 1.6 10E9/L (ref 0.8–5.3)
LYMPHOCYTES NFR BLD AUTO: 31.5 %
MCH RBC QN AUTO: 28.9 PG (ref 26.5–33)
MCHC RBC AUTO-ENTMCNC: 33.1 G/DL (ref 31.5–36.5)
MCV RBC AUTO: 87 FL (ref 78–100)
MONOCYTES # BLD AUTO: 0.6 10E9/L (ref 0–1.3)
MONOCYTES NFR BLD AUTO: 11.4 %
NEUTROPHILS # BLD AUTO: 2.6 10E9/L (ref 1.6–8.3)
NEUTROPHILS NFR BLD AUTO: 52.1 %
NITRATE UR QL: NEGATIVE
NRBC # BLD AUTO: 0 10*3/UL
NRBC BLD AUTO-RTO: 0 /100
PH UR STRIP: 5 PH (ref 5–7)
PLATELET # BLD AUTO: 143 10E9/L (ref 150–450)
POTASSIUM SERPL-SCNC: 5 MMOL/L (ref 3.4–5.3)
PROT SERPL-MCNC: 7.1 G/DL (ref 6.8–8.8)
RBC # BLD AUTO: 4.12 10E12/L (ref 3.8–5.2)
RBC #/AREA URNS AUTO: 106 /HPF (ref 0–2)
SODIUM SERPL-SCNC: 142 MMOL/L (ref 133–144)
SOURCE: ABNORMAL
SP GR UR STRIP: 1.02 (ref 1–1.03)
SQUAMOUS #/AREA URNS AUTO: <1 /HPF (ref 0–1)
UROBILINOGEN UR STRIP-MCNC: 2 MG/DL (ref 0–2)
WBC # BLD AUTO: 5 10E9/L (ref 4–11)
WBC #/AREA URNS AUTO: >182 /HPF (ref 0–5)
WBC CLUMPS #/AREA URNS HPF: PRESENT /HPF

## 2020-11-09 PROCEDURE — 96365 THER/PROPH/DIAG IV INF INIT: CPT | Performed by: FAMILY MEDICINE

## 2020-11-09 PROCEDURE — 99285 EMERGENCY DEPT VISIT HI MDM: CPT | Mod: 25 | Performed by: FAMILY MEDICINE

## 2020-11-09 PROCEDURE — 76775 US EXAM ABDO BACK WALL LIM: CPT | Performed by: FAMILY MEDICINE

## 2020-11-09 PROCEDURE — 87088 URINE BACTERIA CULTURE: CPT | Performed by: FAMILY MEDICINE

## 2020-11-09 PROCEDURE — 258N000003 HC RX IP 258 OP 636: Performed by: FAMILY MEDICINE

## 2020-11-09 PROCEDURE — 99284 EMERGENCY DEPT VISIT MOD MDM: CPT | Mod: 25 | Performed by: FAMILY MEDICINE

## 2020-11-09 PROCEDURE — 85025 COMPLETE CBC W/AUTO DIFF WBC: CPT | Performed by: FAMILY MEDICINE

## 2020-11-09 PROCEDURE — 87086 URINE CULTURE/COLONY COUNT: CPT | Performed by: FAMILY MEDICINE

## 2020-11-09 PROCEDURE — 81001 URINALYSIS AUTO W/SCOPE: CPT | Performed by: FAMILY MEDICINE

## 2020-11-09 PROCEDURE — 250N000011 HC RX IP 250 OP 636: Performed by: FAMILY MEDICINE

## 2020-11-09 PROCEDURE — 87186 SC STD MICRODIL/AGAR DIL: CPT | Performed by: FAMILY MEDICINE

## 2020-11-09 PROCEDURE — 76775 US EXAM ABDO BACK WALL LIM: CPT | Mod: 26 | Performed by: FAMILY MEDICINE

## 2020-11-09 PROCEDURE — 80053 COMPREHEN METABOLIC PANEL: CPT | Performed by: FAMILY MEDICINE

## 2020-11-09 RX ORDER — SODIUM CHLORIDE 9 MG/ML
1000 INJECTION, SOLUTION INTRAVENOUS CONTINUOUS
Status: DISCONTINUED | OUTPATIENT
Start: 2020-11-09 | End: 2020-11-10 | Stop reason: HOSPADM

## 2020-11-09 RX ADMIN — ERTAPENEM SODIUM 500 MG: 1 INJECTION, POWDER, LYOPHILIZED, FOR SOLUTION INTRAMUSCULAR; INTRAVENOUS at 21:27

## 2020-11-09 RX ADMIN — SODIUM CHLORIDE 500 ML: 9 INJECTION, SOLUTION INTRAVENOUS at 21:03

## 2020-11-09 ASSESSMENT — ENCOUNTER SYMPTOMS
CONSTIPATION: 0
ABDOMINAL PAIN: 1
HEADACHES: 0
HEMATURIA: 0
PALPITATIONS: 0
SINUS PRESSURE: 0
SORE THROAT: 0
FREQUENCY: 1
DIAPHORESIS: 0
FEVER: 0
DIFFICULTY URINATING: 1
SHORTNESS OF BREATH: 0
DIARRHEA: 0
COUGH: 0
CHILLS: 0
NAUSEA: 0
VOMITING: 0
WHEEZING: 0
DYSURIA: 1
BLOOD IN STOOL: 0

## 2020-11-09 ASSESSMENT — MIFFLIN-ST. JEOR: SCORE: 1166.22

## 2020-11-09 NOTE — TELEPHONE ENCOUNTER
RECORDS RECEIVED FROM: Internal   DATE RECEIVED: 11.11.2020   NOTES (Gather within 2 years) STATUS DETAILS   OFFICE NOTE from referring provider   Internal 11.03.2020 Li Horton DO   OFFICE NOTE from other specialist Internal 10.23.2020 Bekah aPn APRN CNP    10.01.2020 Joyce Schwab MD    05.08.2020 Jassi Schmidt MD   DISCHARGE SUMMARY from hospital Internal 10.05.2020 Mark Angeles MD   DISCHARGE REPORT from the ER Internal 03.03.2020 Mariah Montiel PA-C   LABS (any labs) Internal    MEDICATION LIST Internal    IMAGING  (NEED IMAGES AND REPORTS)     Osteomyelitis: Foot imaging  N/A    Liver Abscess: Abdominal imaging N/A    Other (anything related to diagnoses N/A

## 2020-11-09 NOTE — ED AVS SNAPSHOT
[facility] Emergency Dept  [address] [address]  Phone: [phone]  Fax: [phone]                                    Lupe Lepe   MRN: [id number]    Department: [facility] Emergency Dept   Date of Visit: 11/9/2020           After Visit Summary Signature Page    I have received my discharge instructions, and my questions have been answered. I have discussed any challenges I see with this plan with the nurse or doctor.    ..........................................................................................................................................  Patient/Patient Representative Signature      ..........................................................................................................................................  Patient Representative Print Name and Relationship to Patient    ..................................................               ................................................  Date                                   Time    ..........................................................................................................................................  Reviewed by Signature/Title    ...................................................              ..............................................  Date                                               Time          22EPIC Rev 08/18

## 2020-11-10 ENCOUNTER — MYC MEDICAL ADVICE (OUTPATIENT)
Dept: INTERNAL MEDICINE | Facility: CLINIC | Age: 80
End: 2020-11-10

## 2020-11-10 ENCOUNTER — INFUSION THERAPY VISIT (OUTPATIENT)
Dept: INFUSION THERAPY | Facility: CLINIC | Age: 80
End: 2020-11-10
Attending: FAMILY MEDICINE
Payer: COMMERCIAL

## 2020-11-10 VITALS
HEART RATE: 85 BPM | DIASTOLIC BLOOD PRESSURE: 57 MMHG | TEMPERATURE: 96.6 F | SYSTOLIC BLOOD PRESSURE: 140 MMHG | RESPIRATION RATE: 18 BRPM

## 2020-11-10 DIAGNOSIS — N39.0 RECURRENT UTI: ICD-10-CM

## 2020-11-10 DIAGNOSIS — N18.9 ANEMIA OF RENAL DISEASE: ICD-10-CM

## 2020-11-10 DIAGNOSIS — A49.9 ESBL (EXTENDED SPECTRUM BETA-LACTAMASE) PRODUCING BACTERIA INFECTION: Primary | ICD-10-CM

## 2020-11-10 DIAGNOSIS — D50.9 IRON DEFICIENCY ANEMIA, UNSPECIFIED IRON DEFICIENCY ANEMIA TYPE: ICD-10-CM

## 2020-11-10 DIAGNOSIS — D63.1 ANEMIA OF RENAL DISEASE: ICD-10-CM

## 2020-11-10 DIAGNOSIS — Z16.12 ESBL (EXTENDED SPECTRUM BETA-LACTAMASE) PRODUCING BACTERIA INFECTION: Primary | ICD-10-CM

## 2020-11-10 DIAGNOSIS — N18.4 CHRONIC KIDNEY DISEASE, STAGE IV (SEVERE) (H): ICD-10-CM

## 2020-11-10 PROCEDURE — 250N000011 HC RX IP 250 OP 636: Performed by: FAMILY MEDICINE

## 2020-11-10 PROCEDURE — 96365 THER/PROPH/DIAG IV INF INIT: CPT

## 2020-11-10 PROCEDURE — 258N000003 HC RX IP 258 OP 636: Performed by: FAMILY MEDICINE

## 2020-11-10 RX ADMIN — ERTAPENEM SODIUM 500 MG: 1 INJECTION, POWDER, LYOPHILIZED, FOR SOLUTION INTRAMUSCULAR; INTRAVENOUS at 14:42

## 2020-11-10 NOTE — DISCHARGE INSTRUCTIONS
ICD-10-CM    1. ESBL (extended spectrum beta-lactamase) producing bacteria infection  A49.9 UROLOGY ADULT REFERRAL    Z16.12 MRI Abdomen w/o contrast     INFECTIOUS DISEASE REFERRAL   2. Urinary tract infection without hematuria, site unspecified  N39.0 UROLOGY ADULT REFERRAL     MRI Abdomen w/o contrast     INFECTIOUS DISEASE REFERRAL    you were given ertapenem here.  continue on therapy plan daily in infusion clinic for the next 10 days.  return for worsening.   3. Recurrent UTI  N39.0 UROLOGY ADULT REFERRAL     MRI Abdomen w/o contrast     INFECTIOUS DISEASE REFERRAL

## 2020-11-10 NOTE — PROGRESS NOTES
Infusion Nursing Note:  Lupe J Eddiestephy presents today for Invanz.    Patient seen by provider today: No   present during visit today: Not Applicable.    Note: N/A.    Intravenous Access:  Peripheral IV placed.    Treatment Conditions:  Not Applicable.    Post Infusion Assessment:  Patient tolerated infusion without incident.  Site patent and intact, free from redness, edema or discomfort.  No evidence of extravasations.  Access discontinued per protocol.   PIV left in place for tomorrow's infusion.     Discharge Plan:   Discharge instructions reviewed with: Patient.  Patient and/or family verbalized understanding of discharge instructions and all questions answered.  AVS to patient via [employer].  Patient will return 11/11/2020 for next appointment.   Patient discharged in stable condition accompanied by: self.  Departure Mode: Ambulatory.    Lilliam Carlson RN

## 2020-11-10 NOTE — ED PROVIDER NOTES
History     Chief Complaint   Patient presents with     Rule out Urinary Tract Infection     burning pain  with urination   was hospitalized 10 /5/20   for phyo     HPI  Lupe Lepe is a 80 year old female who presents with a history of chronic kidney disease renal vascular disease type 2 diabetes, ESBL urinary tract infection and frequent urinary tract infections.  Was hospitalized October 5 for pyelonephritis.  She completed outpatient IV ertapenem for ESBL.  Her initial GFR had dropped prior to that hospitalization.  It improved with hydration is greatly improved since that time.  \\Seen again on October 24    When seen here she had presented with 2 days of dysuria urinary frequency urgency low pelvic pain and fatigue.   Her urine demonstrated greater than 182 white cells with clumps.  Finding was not consistent with urosepsis.  She was restarted on ertapenem after cultures.    Today at around 5:00 she had sudden onset of abdominal pain lower abdomen without radiation.  No CVA pain.  Sense of incomplete emptying, hesitancy, dysuria urgency frequency.  No hematuria.  She has no associated fever.  No other abdominal pain.  Tolerating food and fluids today.    FirstAllergies:  Allergies   Allergen Reactions     Bactrim      Acute renal failure and hyperkalemia     Ace Inhibitors Other (See Comments)     Hyperkalemia       Atorvastatin Calcium Itching     ELEVATED LFTS     Crestor [Rosuvastatin Calcium] Other (See Comments)     Elevated LFTS   In high doses     Penicillins Hives     Sulfamethoxazole-Trimethoprim Other (See Comments)     Kidney shutdown     Ciprofloxacin Itching and Rash     Allergy to IV form only, Has taken oral without problems       Problem List:    Patient Active Problem List    Diagnosis Date Noted     Renal artery stenosis (H) 08/25/2006     Priority: High     Left. Likely due to temporal arteritis. S/p left angioplasty 8/06. Increased BP 10/06. Renal duplex- Increased velocity left RA,  ratios within normal limits. Right RA velocity slightly increased. MRA- normal left renal artery. Moderate right renal artery stenosis. Probable splenic artery stenosis. Angio 10/06 with bilateral renal artery angioplasty. Admit [facility] increased BP 11/06- renal angiogram this time shows no stenosis and no gradient. Renal ultrasound 6/08- normal. Renal ultrasound 6/08- normal with elevated pressures.  MRA 4/10- minimal narrowing main right renal artery.       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 Duplex 7/06- Right SCV, brachial biphasic. Right radial monophasic, right ulnar biphasic-triphasic. Left SCV triphasic. Left brachial biphasic, left radial and ulnar monophasic. Brachial wrist indices  0.57 right/0.58 left. Angio 8/06- c/w arteritis, right RA stenosis. Repeat angiogram 6/06 no intracranial arteritis or stenosis, mild left intracranial artery plaque. Duplex 9/06- Right SCV and axillary triphasic. Right brachial, radial and ulnar monophasic. Left SCV biphasic-triphasic.  Left axillary and brachial monophasic. Left radial and ulnar monophasic-biphasic. Brachial wrist indices 0.63 right/0.60 left.  Duplex 2/08 unchanged. MRA 4/10- right vertebral occluded  Followed by Dr Aly at [place]        History of giant cell arteritis 03/27/2006     Priority: High     Prolonged low grade temps. Normal wbc, lft, UA, BCX. , CRP 92. Normal monospot, lymes screen. CT chest abdomen and pelvis negative 4/06. Bcx negative. BM BX negative. Fungal, AFB culture negative. Temporal artery biopsy positive. Flare with prednisone taper. Cellcept started 11/06, stopped 8/08 due to frequent UTIs. Started MTX 12/08 after [facility] eval. Off prednisone 8/09.     Needs blood pressure checked in legs.  Sees DR. Ivory Aly at [facility].       ESBL (extended spectrum beta-lactamase) producing bacteria infection 10/06/2020     Priority: Medium     Recurrent UTI  10/05/2020     Priority: Medium     Giant cell arteritis (H) 09/15/2020     Priority: Medium     Subclavian arterial stenosis (H) 06/29/2018     Priority: Medium     Cannot measure blood pressure in bilateral arms.  Blood pressure readings in leg are 10-20% higher than arm.       CKD (chronic kidney disease) stage 4, GFR 15-29 ml/min (H) 08/31/2016     Priority: Medium     Proteinuria on ACEi, follows with  [facility] Dr. Ceferino Savage [phone]       Iron deficiency anemia, unspecified 08/30/2016     Priority: Medium     Diagnosis updated by automated process. Provider to review and confirm.       Chronic kidney disease, stage IV (severe) (H) 08/30/2016     Priority: Medium     Anemia of renal disease 06/09/2016     Priority: Medium     Type 2 diabetes mellitus with stage 4 chronic kidney disease, without long-term current use of insulin (H) 10/31/2010     Priority: Medium     DX 2006. 2003 2hour-glucose 202. FBS >126x2 1/06. No retinopathy, peripheral neuropathy.  Was on insulin previously, was able to get off this in 2014 or so.       Hyperlipidemia LDL goal <100 10/31/2010     Priority: Medium     Renovascular hypertension with goal blood pressure less than 130/80      Priority: Medium     Hospitalized with malignant HTN [facility] 11/06. Angio- no recurrant renal artery stenosis. Metanephrines normal.  Aldosterone was undetectably low.  Urine catecholamines within normal limits.  Renin activity normal.        ACP (advance care planning) 05/06/2011     Priority: Low     Advance Care Planning 6/9/2016: ACP Review of Chart / Resources Provided:  Reviewed chart for advance care plan.  Lupe Lepe has no plan or code status on file however states presence of ACP document. Copy requested.   Added by Ivelisse Olivares  Pt has completed an Advance/Health Care Directive (HCD), to bring in copy to be scanned into Epic.           Frequent Urinary Tract Infections 01/23/2009     Priority: Low     Cysto 7/09  normal        Abdominal cramps 10/16/2008     Priority: Low     Episodic severe cramping associated with emesis since 2007. GI evaluation 10/08. [place] evaluation 12/08. 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 Rx jeanne-pac for H. Pylori with resolution of symptoms 1/09. Recurrent 2012, shorter symptoms.   Evaluation 11/2017, 9/15/2020.  Unclear cause.  May be transient small bowel obstruction?  Recommend ER evaluation during episode to get CT scan and labs       Gastroesophageal reflux disease without esophagitis      Priority: Low     Paroxysmal atrial fibrillation (H)      Priority: Low     paroxymal single episode 2002. ECHO, Adenosine cardiolite normal.  Not on anticoagulation        Acquired hypothyroidism      Priority: Low     Osteopenia of multiple sites      Priority: Low     dexa 1999, 2003. Dexa 7/06 -lumbar spine -1.9,  right femur -1.0,  left femur is -1.6. Boniva started 2007.  Dexa 2/08- L spine -2.4, right femoral neck -1.4, left femoral neck -2.0. Dexa 5/11- L1-L4  -1.8 ,  right femoral neck -1.7, left femoral neck -1.9.            Past Medical History:    Past Medical History:   Diagnosis Date     Abdominal cramps 10/16/2008     Abnormal CT scan 1/12/2009     Arthritis      Closed fracture of unspecified part of fibula      Heart disease      Hemorrhage of rectum and anus      HERPES ZOSTER NOS 5/23/2007     Hypertension      HYPOSMIA      IRON DEFIC ANEMIA NOS 3/27/2006     JOINT EFFUSION-L/LEG 4/7/2006     Other closed fractures of distal end of radius (alone)      Polymyalgia rheumatica (H)      Pyelonephritis 10/6/2020     Thyroid disease        Past Surgical History:    Past Surgical History:   Procedure Laterality Date     COLONOSCOPY  8/25/2008     ESOPHAGOSCOPY, GASTROSCOPY, DUODENOSCOPY (EGD), COMBINED N/A 11/16/2017    Procedure: COMBINED ESOPHAGOSCOPY, GASTROSCOPY, DUODENOSCOPY (EGD);  Gastroscopy;  Surgeon: Angel Guillen MD;  Location: [facility] COLONOSCOPY  THRU STOMA, DIAGNOSTIC  2000, 5/06, 8/08    Normal     HYSTERECTOMY, SIS  1970's     PHACOEMULSIFICATION WITH STANDARD INTRAOCULAR LENS IMPLANT Right 1/8/2018    Procedure: PHACOEMULSIFICATION WITH STANDARD INTRAOCULAR LENS IMPLANT;  Right Cataract Removal with Implant;  Surgeon: Sekou Dobbins MD;  Location: [facility] OR     PHACOEMULSIFICATION WITH STANDARD INTRAOCULAR LENS IMPLANT Left 1/29/2018    Procedure: PHACOEMULSIFICATION WITH STANDARD INTRAOCULAR LENS IMPLANT;  Left Cataract Removal with Implant;  Surgeon: Sekou Dobbins MD;  Location: [facility] OR     SURGICAL HISTORY OF -   1948    SIS, for fibroids     SURGICAL HISTORY OF -   12/16/2008    Esophagogastroduodenoscopy with biopsy     TONSILLECTOMY      Tonsilectomy       Family History:    Family History   Problem Relation Age of Onset     Cancer Mother         ovarian CA     Cancer Father         lung CA     Heart Disease Maternal Grandmother      Heart Disease Maternal Grandfather      Depression Sister         sucide     Breast Cancer Sister        Social History:  Marital Status:   [2]  Social History     Tobacco Use     Smoking status: Never Smoker     Smokeless tobacco: Never Used   Substance Use Topics     Alcohol use: No     Drug use: No        Medications:         amLODIPine (NORVASC) 10 MG tablet       ASPIRIN 325 MG OR TBEC       chlorthalidone (HYGROTON) 25 MG tablet       cholecalciferol (D-VI-SOL,VITAMIN D3) 400 units/mL (10 mcg/mL) LIQD liquid       cloNIDine (CATAPRES) 0.1 MG tablet       levothyroxine (SYNTHROID/LEVOTHROID) 50 MCG tablet       levothyroxine (SYNTHROID/LEVOTHROID) 75 MCG tablet       lisinopril (ZESTRIL) 10 MG tablet       MULTIVITAMINS OR TABS       omeprazole (PRILOSEC) 20 MG DR capsule       ondansetron (ZOFRAN-ODT) 4 MG ODT tab       rosuvastatin (CRESTOR) 10 MG tablet       VITAMIN B-6 50 MG OR TABS          Review of Systems   Constitutional: Negative for chills, diaphoresis and fever.   HENT: Negative  "for ear pain, sinus pressure and sore throat.    Eyes: Negative for visual disturbance.   Respiratory: Negative for cough, shortness of breath and wheezing.    Cardiovascular: Negative for chest pain and palpitations.   Gastrointestinal: Positive for abdominal pain. Negative for blood in stool, constipation, diarrhea, nausea and vomiting.   Genitourinary: Positive for decreased urine volume, difficulty urinating, dysuria and frequency. Negative for hematuria and urgency.   Skin: Negative for rash.   Neurological: Negative for headaches.   All other systems reviewed and are negative.      Physical Exam   BP: (!) 167/65  Pulse: 80  Temp: 97.8  F (36.6  C)  Resp: 18  Height: 167.6 cm (5' 6\")  Weight: 68.9 kg (152 lb)  SpO2: 96 %      Physical Exam  Constitutional:       General: She is in acute distress.      Appearance: She is not diaphoretic.   Neck:      Musculoskeletal: Neck supple.   Cardiovascular:      Rate and Rhythm: Normal rate and regular rhythm.      Pulses: Normal pulses.      Heart sounds: Normal heart sounds.   Pulmonary:      Effort: No respiratory distress.      Breath sounds: No stridor. No wheezing.   Abdominal:      General: Abdomen is flat. Bowel sounds are normal. There is no distension.      Palpations: There is no mass.      Tenderness: There is abdominal tenderness (mild suprapubic). There is no right CVA tenderness or left CVA tenderness.      Hernia: No hernia is present.   Skin:     Coloration: Skin is not pale.      Findings: No rash.   Neurological:      General: No focal deficit present.      Mental Status: She is alert.         ED Course        Procedures               Critical Care time:  none               Results for orders placed or performed during the hospital encounter of 11/09/20 (from the past 24 hour(s))   UA reflex to Microscopic and Culture    Specimen: Midstream Urine   Result Value Ref Range    Color Urine Yellow     Appearance Urine Cloudy     Glucose Urine Negative " NEG^Negative mg/dL    Bilirubin Urine Negative NEG^Negative    Ketones Urine Negative NEG^Negative mg/dL    Specific Gravity Urine 1.018 1.003 - 1.035    Blood Urine Small (A) NEG^Negative    pH Urine 5.0 5.0 - 7.0 pH    Protein Albumin Urine 30 (A) NEG^Negative mg/dL    Urobilinogen mg/dL 2.0 0.0 - 2.0 mg/dL    Nitrite Urine Negative NEG^Negative    Leukocyte Esterase Urine Large (A) NEG^Negative    Source Midstream Urine     RBC Urine 106 (H) 0 - 2 /HPF    WBC Urine >182 (H) 0 - 5 /HPF    WBC Clumps Present (A) NEG^Negative /HPF    Squamous Epithelial /HPF Urine <1 0 - 1 /HPF   CBC with platelets differential   Result Value Ref Range    WBC 5.0 4.0 - 11.0 10e9/L    RBC Count 4.12 3.8 - 5.2 10e12/L    Hemoglobin 11.9 11.7 - 15.7 g/dL    Hematocrit 35.9 35.0 - 47.0 %    MCV 87 78 - 100 fl    MCH 28.9 26.5 - 33.0 pg    MCHC 33.1 31.5 - 36.5 g/dL    RDW 12.2 10.0 - 15.0 %    Platelet Count 143 (L) 150 - 450 10e9/L    Diff Method Automated Method     % Neutrophils 52.1 %    % Lymphocytes 31.5 %    % Monocytes 11.4 %    % Eosinophils 4.4 %    % Basophils 0.4 %    % Immature Granulocytes 0.2 %    Nucleated RBCs 0 0 /100    Absolute Neutrophil 2.6 1.6 - 8.3 10e9/L    Absolute Lymphocytes 1.6 0.8 - 5.3 10e9/L    Absolute Monocytes 0.6 0.0 - 1.3 10e9/L    Absolute Eosinophils 0.2 0.0 - 0.7 10e9/L    Absolute Basophils 0.0 0.0 - 0.2 10e9/L    Abs Immature Granulocytes 0.0 0 - 0.4 10e9/L    Absolute Nucleated RBC 0.0    Comprehensive metabolic panel   Result Value Ref Range    Sodium 142 133 - 144 mmol/L    Potassium 5.0 3.4 - 5.3 mmol/L    Chloride 117 (H) 94 - 109 mmol/L    Carbon Dioxide 21 20 - 32 mmol/L    Anion Gap 4 3 - 14 mmol/L    Glucose 136 (H) 70 - 99 mg/dL    Urea Nitrogen 38 (H) 7 - 30 mg/dL    Creatinine 1.92 (H) 0.52 - 1.04 mg/dL    GFR Estimate 24 (L) >60 mL/min/[1.73_m2]    GFR Estimate If Black 27 (L) >60 mL/min/[1.73_m2]    Calcium 9.0 8.5 - 10.1 mg/dL    Bilirubin Total 0.5 0.2 - 1.3 mg/dL    Albumin 4.4  3.4 - 5.0 g/dL    Protein Total 7.1 6.8 - 8.8 g/dL    Alkaline Phosphatase 82 40 - 150 U/L    ALT 35 0 - 50 U/L    AST 27 0 - 45 U/L   POC US ABDOMEN LIMITED    Impression    [facility] Procedure Note      Limited Bedside ED Ultrasound of the Urinary Bladder:    PERFORMED BY: Dr. Richard Cornejo MD  INDICATIONS:  Suprapubic pain  PROBE: Low frequency convex probe  BODY LOCATION:  Abdomen  FINDINGS:  Visualization of the bladder in longitudinal and transverse views demonstrated a mildly distended bladder state.   The evaluation was normal without evidence of obstruction or mass.  No abnormal  bladder distention noted.  IMAGE DOCUMENTATION: Images were archived to PACs system.           Medications   0.9% sodium chloride BOLUS (0 mLs Intravenous Stopped 11/9/20 2221)   ertapenem    Lab Results   Component Value Date    CR 1.92 11/09/2020    CR 1.81 11/03/2020         Assessments & Plan (with Medical Decision Making)     MDM: Lupe Lepe is a 80 year old female who presents for recurrent urinary tract infection with ESBL organisms and has been on multiple courses of ertapenem to date.  These cleared her symptoms but then they recur.  This is her third course of ertapenem in the last month.  She remains afebrile and her symptoms on onset tonight.  Findings are most consistent with UTI based on urinalysis.  Ultrasound demonstrates no significant urinary retention.  She has no CVA pain.  I discussed her findings with urology [facility] who agreed that she needs additional work-up and consultation outpatient.  We discussed whether to perform CT of the abdomen pelvis noncontrast as patient has significant chronic kidney disease.  Ultrasound renal was previously done with the last evaluation and they would instead recommend MR urogram as the patient cannot undergo CT urogram.  They recommend noncontrast for the MR.  I could not identify the order for this but placed an order for noncontrast  abdominal MRI with comments to perform MR urogram without contrast.  We are looking for renal abscess.  I let urology know that we would not build to get the testing done tonight but it would be done outpatient and they note this is reasonable.  We will have her following up with close interval follow-up in clinic.  Urology consult and infectious disease consult are placed.  I have placed a therapy plan for continued ertapenem and first dose was done in the emergency department tonight.  I have given precautions for return.    I have reviewed the nursing notes.    I have reviewed the findings, diagnosis, plan and need for follow up with the patient.       New Prescriptions    No medications on file       Final diagnoses:   ESBL (extended spectrum beta-lactamase) producing bacteria infection   Urinary tract infection without hematuria, site unspecified - you were given ertapenem here.  continue on therapy plan daily in infusion clinic for the next 10 days.  return for worsening.   Recurrent UTI - per urology obtain MR urogram without contrast, and follow-up urology, infectious disease. follow-up in clinic next few days for recheck and to discuss results of MR.   infusion clinic for ertapenem       11/9/2020   [facility] EMERGENCY DEPT     Richard Cornejo MD  11/10/20 0931       Richard Cornejo MD  11/10/20 0931

## 2020-11-10 NOTE — ED TRIAGE NOTES
Admitted to hospital 10/5 for recurrent UTI's. Seen in ED again 10/24 and 10/25 for same complaint. Patient said she felt better for a few days. Sx returned today. This morning she started to experience lower abdominal pain, progressively got worse ~ 1700 this evening. Urinary Frequency, urgency, trickling urine output. Denies fever chills. Hx of ESBL.

## 2020-11-11 ENCOUNTER — PRE VISIT (OUTPATIENT)
Dept: INFECTIOUS DISEASES | Facility: CLINIC | Age: 80
End: 2020-11-11

## 2020-11-11 ENCOUNTER — VIRTUAL VISIT (OUTPATIENT)
Dept: INFECTIOUS DISEASES | Facility: CLINIC | Age: 80
End: 2020-11-11
Attending: INTERNAL MEDICINE
Payer: COMMERCIAL

## 2020-11-11 ENCOUNTER — INFUSION THERAPY VISIT (OUTPATIENT)
Dept: INFUSION THERAPY | Facility: CLINIC | Age: 80
End: 2020-11-11
Attending: FAMILY MEDICINE
Payer: COMMERCIAL

## 2020-11-11 DIAGNOSIS — D63.1 ANEMIA OF RENAL DISEASE: ICD-10-CM

## 2020-11-11 DIAGNOSIS — N39.0 RECURRENT UTI: ICD-10-CM

## 2020-11-11 DIAGNOSIS — A49.9 ESBL (EXTENDED SPECTRUM BETA-LACTAMASE) PRODUCING BACTERIA INFECTION: Primary | ICD-10-CM

## 2020-11-11 DIAGNOSIS — D50.9 IRON DEFICIENCY ANEMIA, UNSPECIFIED IRON DEFICIENCY ANEMIA TYPE: ICD-10-CM

## 2020-11-11 DIAGNOSIS — N18.9 ANEMIA OF RENAL DISEASE: ICD-10-CM

## 2020-11-11 DIAGNOSIS — N39.0 UTI DUE TO EXTENDED-SPECTRUM BETA LACTAMASE (ESBL) PRODUCING ESCHERICHIA COLI: ICD-10-CM

## 2020-11-11 DIAGNOSIS — E11.29 TYPE 2 DIABETES MELLITUS WITH OTHER DIABETIC KIDNEY COMPLICATION, WITHOUT LONG-TERM CURRENT USE OF INSULIN (H): ICD-10-CM

## 2020-11-11 DIAGNOSIS — N18.4 CHRONIC KIDNEY DISEASE, STAGE IV (SEVERE) (H): ICD-10-CM

## 2020-11-11 DIAGNOSIS — B96.29 UTI DUE TO EXTENDED-SPECTRUM BETA LACTAMASE (ESBL) PRODUCING ESCHERICHIA COLI: ICD-10-CM

## 2020-11-11 DIAGNOSIS — N39.0 RECURRENT UTI: Primary | ICD-10-CM

## 2020-11-11 DIAGNOSIS — Z16.12 ESBL (EXTENDED SPECTRUM BETA-LACTAMASE) PRODUCING BACTERIA INFECTION: Primary | ICD-10-CM

## 2020-11-11 DIAGNOSIS — Z16.12 UTI DUE TO EXTENDED-SPECTRUM BETA LACTAMASE (ESBL) PRODUCING ESCHERICHIA COLI: ICD-10-CM

## 2020-11-11 PROCEDURE — 96365 THER/PROPH/DIAG IV INF INIT: CPT

## 2020-11-11 PROCEDURE — 250N000011 HC RX IP 250 OP 636: Mod: JW | Performed by: FAMILY MEDICINE

## 2020-11-11 PROCEDURE — 258N000003 HC RX IP 258 OP 636: Performed by: FAMILY MEDICINE

## 2020-11-11 PROCEDURE — 99204 OFFICE O/P NEW MOD 45 MIN: CPT | Mod: 95 | Performed by: INTERNAL MEDICINE

## 2020-11-11 RX ADMIN — ERTAPENEM SODIUM 500 MG: 1 INJECTION, POWDER, LYOPHILIZED, FOR SOLUTION INTRAMUSCULAR; INTRAVENOUS at 15:16

## 2020-11-11 ASSESSMENT — PAIN SCALES - GENERAL: PAINLEVEL: NO PAIN (0)

## 2020-11-11 NOTE — PROGRESS NOTES
Infusion Nursing Note:  Pt received a dose of Invanz as ordered in Therapy Plan.  Pt offers no complaints today.  Next dose is scheduled for tomorrow  Christine Rey RN

## 2020-11-11 NOTE — PROGRESS NOTES
"Lupe Lepe is a 80 year old female who is being evaluated via a billable video visit.      The patient has been notified of following:     \"This video visit will be conducted via a call between you and your physician/provider. We have found that certain health care needs can be provided without the need for an in-person physical exam.  This service lets us provide the care you need with a video conversation.  If a prescription is necessary we can send it directly to your pharmacy.  If lab work is needed we can place an order for that and you can then stop by our lab to have the test done at a later time.    Video visits are billed at different rates depending on your insurance coverage.  Please reach out to your insurance provider with any questions.    If during the course of the call the physician/provider feels a video visit is not appropriate, you will not be charged for this service.\"    Patient has given verbal consent for Video visit? Yes  How would you like to obtain your AVS? MyChart    Will anyone else be joining your video visit? No        Video-Visit Details    Type of service:  Video Visit    Video Start Time: 9.24 am   Video End Time: 10.02 am     Originating Location (pt. Location): Home    Distant Location (provider location):  [facility] INFECTI[facility] DISE[facility]IN[facility] [place]     Platform used for Video Visit: [facility]    INFECTIOUS DISEASES CONSULTATION  NOTE    Consult requested by: Dr Richard Cornejo   Reason for Consult : ESBL infection   Date of Consult: 11/11/20     HISTORY OF PRESENT ILLNESS:                                                    Lupe Lepe is a 80 year old female with history of post menopausal vaginitis, hypertension, hyperlipidemia, giant cell arteritis, renal artery stenosis, chronic kidney disease, GERD, diabetes mellitus not on medications with HbA1c 7.2 on 6/25/20 , hypothyroidism, recurrent UTIs mostly with E.coli since 2006 , now with ESBL E.coli isolated  " "10/1/2020, 10/5 and 10/24/20. She was treated with antibiotics and is currently on 3rd round of Ertapenem for ESBL, via peripheral IV (3/10 days). Her symptoms involved bilateral flank pain, worse on the left side, dysuria and little \"shocks\" after urination. no fever, chills, nausea, vomiting, diarrhea. Pain resolved with Ertapenem and recently completed 7 days of treatment. 3 days later, she had dysuria and another \"blast\" and retreated with Ertapenem x 7 days. 3 days later, she had similar symptoms and on 3rd round of Ertapenem and will be be treated for 10 days. She goes to [facility] center in [place] daily.      She was seen by urology and the last time was in 2010. She had normal cystocopy at that time and was on vaginal estrogens with periodic bactrim.  She had stopped vaginal estrogens. she does not wear diaper and adheres to clean hygiene.      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 It measures 4.0 x 2.4 x 3.0 cm. Comparable measurements  on the prior study are 3.8 x 2.2 x 3.2 cm. Therefore, there has been no significant change. There is no urolithiasis or hydronephrosis. The  right kidney measures approximately 8.5 cm in length. The left kidney measures approximately 9 cm in length. There is stable mild scarring  in each kidney. A hiatal hernia is again seen.   Pelvis : No significant abnormality is demonstrated.                                                                   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    Renal US doppler 8/24/2016  IMPRESSION: Echogenic kidneys with increased resistance indices would be most consistent with chronic intrinsic medical renal disease. Per  sonographic velocity criteria, no evidence for significant renal artery stenosis.     ROS:  CONSTITUTIONAL:NEGATIVE for fever, chills, change in weight  INTEGUMENTARY/SKIN: NEGATIVE for worrisome " rashes, moles or lesions  EYES: NEGATIVE for vision changes or irritation  ENT/MOUTH: NEGATIVE for ear, mouth and throat problems  RESP:NEGATIVE for significant cough or SOB  CV: NEGATIVE for chest pain, palpitations or peripheral edema  GI: NEGATIVE for nausea, abdominal pain, heartburn, or change in bowel habits  : see HPI   MUSCULOSKELETAL: NEGATIVE for significant arthralgias or myalgia  NEURO: NEGATIVE for weakness, dizziness or paresthesias  ENDOCRINE: NEGATIVE for temperature intolerance, skin/hair changes  HEME/ALLERGY/IMMUNE: NEGATIVE for bleeding problems  PSYCHIATRIC: NEGATIVE for changes in mood or affect    Problem list and histories reviewed & adjusted, as indicated.  Additional history: as documented    PAST MEDICAL HISTORY:  Patient  has a past medical history of Abdominal cramps (10/16/2008), Abnormal CT scan (1/12/2009), Arthritis, Closed fracture of unspecified part of fibula, Heart disease, Hemorrhage of rectum and anus, HERPES ZOSTER NOS (5/23/2007), Hypertension, HYPOSMIA, IRON DEFIC ANEMIA NOS (3/27/2006), JOINT EFFUSION-L/LEG (4/7/2006), Other closed fractures of distal end of radius (alone), Polymyalgia rheumatica (H), Pyelonephritis (10/6/2020), and Thyroid disease. She also has no past medical history of Cancer (H), Complication of anesthesia, Difficult intubation, History of blood transfusion, Malignant hyperthermia, or Uncomplicated asthma.    PAST SURGICAL HISTORY:  Patient  has a past surgical history that includes tonsillectomy; surgical history of -  (1948); COLONOSCOPY THRU STOMA, DIAGNOSTIC (2000, 5/06, 8/08); colonoscopy (8/25/2008); hysterectomy, honorio (1970's); surgical history of -  (12/16/2008); Esophagoscopy, gastroscopy, duodenoscopy (EGD), combined (N/A, 11/16/2017); Phacoemulsification with standard intraocular lens implant (Right, 1/8/2018); and Phacoemulsification with standard intraocular lens implant (Left, 1/29/2018).    CURRENT MEDICATIONS:  Current Outpatient  Medications   Medication Sig Dispense Refill     amLODIPine (NORVASC) 10 MG tablet TAKE 1 TABLET (10 MG) BY MOUTH DAILY 90 tablet 3     ASPIRIN 325 MG OR TBEC 1 TABLET BY MOUTH DAILY 100 3     chlorthalidone (HYGROTON) 25 MG tablet TAKE 1 TABLET BY MOUTH EVERY DAY 90 tablet 3     cholecalciferol (D-VI-SOL,VITAMIN D3) 400 units/mL (10 mcg/mL) LIQD liquid Take 400 Units by mouth daily       cloNIDine (CATAPRES) 0.1 MG tablet ONE Twice DAILY as needed for SBP>180, diastolic BP>110 10 tablet 3     levothyroxine (SYNTHROID/LEVOTHROID) 50 MCG tablet Take on Tuesdays, Thursdays, Saturdays, and Sundays (Patient taking differently: Take 50 mcg by mouth Take in the morning on Sundays, Tuesdays, Thursdays, Saturdays) 48 tablet 1     levothyroxine (SYNTHROID/LEVOTHROID) 75 MCG tablet TAKE 1 TABLET BY MOUTH ON SUNDAYS, MONDAYS, WEDNESDAYS AND FRIDAYS (Patient taking differently: Take 75 mcg by mouth TAKE 1 TABLET in the morning ON MONDAYS, WEDNESDAYS AND FRIDAYS) 48 tablet 3     lisinopril (ZESTRIL) 10 MG tablet TAKE 1 TABLET (10 MG) BY MOUTH 2 TIMES DAILY 180 tablet 3     MULTIVITAMINS OR TABS 1 TABLET BY MOUTH DAILY  0     omeprazole (PRILOSEC) 20 MG DR capsule TAKE 1 CAPSULE BY MOUTH EVERY 3RD DAY AS NEEDED 30 capsule 4     ondansetron (ZOFRAN-ODT) 4 MG ODT tab Take 4 mg by mouth every 8 hours as needed       rosuvastatin (CRESTOR) 10 MG tablet TAKE 1 TABLET BY MOUTH EVERY DAY (Patient taking differently: Take 10 mg by mouth At Bedtime ) 90 tablet 3     VITAMIN B-6 50 MG OR TABS 1 daily (Patient taking differently: Take 50 mg by mouth daily ) 1 bottle 1 year     ALLERGY:  Allergies   Allergen Reactions     Bactrim      Acute renal failure and hyperkalemia     Ace Inhibitors Other (See Comments)     Hyperkalemia       Atorvastatin Calcium Itching     ELEVATED LFTS     Crestor [Rosuvastatin Calcium] Other (See Comments)     Elevated LFTS   In high doses     Penicillins Hives     Sulfamethoxazole-Trimethoprim Other (See  Comments)     Kidney shutdown     Ciprofloxacin Itching and Rash     Allergy to IV form only, Has taken oral without problems     IMMUNIZATION HISTORY:  Immunization History   Administered Date(s) Administered     FLU 6-35 months 09/17/2010, 09/12/2011     Influenza (H1N1) 12/21/2009     Influenza (High Dose) 3 valent vaccine 10/07/2013, 09/26/2014, 10/06/2015, 10/13/2016, 09/28/2017, 09/26/2018, 09/11/2019     Influenza (IIV3) PF 11/01/2003, 10/12/2004, 10/24/2005, 11/06/2006, 10/17/2007, 09/28/2008, 09/23/2009, 09/17/2010, 09/12/2011     Influenza, Quad, High Dose, Pf, 65yr + 09/25/2020     Pneumo Conj 13-V (2010&after) 10/06/2015     Pneumococcal 23 valent 12/19/2003, 03/09/2009     TD (ADULT, 7+) 05/04/1998, 09/29/2008     Zoster vaccine recombinant adjuvanted (SHINGRIX) 07/25/2019, 10/25/2019     Zoster vaccine, live 03/19/2012     SOCIAL HISTORY:  Patient  reports that she has never smoked. She has never used smokeless tobacco. She reports that she does not drink alcohol or use drugs.  , lives with her , has 3 children    FAMILY HISTORY:  Patient's family history includes Breast Cancer in her sister; Cancer in her father and mother; Depression in her sister; Heart Disease in her maternal grandfather and maternal grandmother.    Labs reviewed in EPIC    OBJECTIVE:                                                    GENERAL: healthy, alert and no distress  EYES: Eyes grossly normal to inspection, and conjunctivae and sclerae normal  NECK: supple  RESP: normal breathing. no shortness of breath , no cough  SKIN: no suspicious lesions or rashes  NEURO: mentation intact and speech normal  PSYCH: mentation appears normal, affect normal       ASSESSMENT AND PLAN:                                                      1. Recurrent UTIs since 2006, mainly E.coli and now with ESBL isolated on 10/1/2020, 10/5 and 10/24/20  - symptoms: pain in bilateral flank but mostly lower abdominal pain, dysuria (cystitis  +/- pyelonephritis)   - ddx - post menopausal vaginitis and ? candidiasis   - currently on 3rd round of Ertapenem ( completed 7 days of Ertapenem and restarted 3 days later for another 7 days and restarted about 3 days later for another 10 days) . symptoms resolved while on Ertapenem   - no history of renal stones   - previous cystocopy was normal per Urologist ( last seen in 2010)   - resistance developed due to frequent use of anotibiotics     2. Post menopausal vaginitis and weak bladder   - was on vaginal estrogen but stopped taking it many years ago     3. Diabetes mellitus with HbA1c 7.2 on 6/25/20  - not on medications     4. Chronic kidney disease stage 4    Plan/Recommendations:  - Unclear why she keeps getting UTI, mainly with E.coli and has since developed ESBL ( but the last time - E.coli is pansenstive)   - will need to find out if there are renal stones/ cysts, anatomic abnormality,   - she is scheduled to get MRI with Urogram 11/27/20  - will obtain renal US to determine length of antibiotic treatment   - recheck HbA1c and treat if indicated   - consider vaginal estrogen   - monitor for perivaginal candidiasis   - discussed potential side effects of antibiotics - especially C.difficile colitis, developement of resistance   - probiotic   - referral to Urology   - called lab to determine susceptibility of E.coli to fosfomycin  - check HbA1c       F/U next week 11/18/20     Thank You for allowing me to participate in the care of this patient    Blayne Mclean MD, M.Med.Sc  Division of Infectious Diseases and International Medicine  [facility]

## 2020-11-11 NOTE — LETTER
"11/11/2020       RE: Lupe Lepe  [address] [address] [id number]     Dear Colleague,    Thank you for referring your patient, Lupe Lepe, to the [facility] DISEASE [facility] [place] at [facility]. Please see a copy of my visit note below.    Lupe Lepe is a 80 year old female who is being evaluated via a billable video visit.      The patient has been notified of following:     \"This video visit will be conducted via a call between you and your physician/provider. We have found that certain health care needs can be provided without the need for an in-person physical exam.  This service lets us provide the care you need with a video conversation.  If a prescription is necessary we can send it directly to your pharmacy.  If lab work is needed we can place an order for that and you can then stop by our lab to have the test done at a later time.    Video visits are billed at different rates depending on your insurance coverage.  Please reach out to your insurance provider with any questions.    If during the course of the call the physician/provider feels a video visit is not appropriate, you will not be charged for this service.\"    Patient has given verbal consent for Video visit? Yes  How would you like to obtain your AVS? MyChart    Will anyone else be joining your video visit? No        Video-Visit Details    Type of service:  Video Visit    Video Start Time: 9.24 am   Video End Time: 10.02 am     Originating Location (pt. Location): Home    Distant Location (provider location):  [facility] INF[facility] DISEASE CLINIC [place]     Platform used for Video Visit: [employer]    INFECTIOUS DISEASES CONSULTATION  NOTE    Consult requested by: Dr Richard Cornejo   Reason for Consult : ESBL infection   Date of Consult: 11/11/20     HISTORY OF PRESENT ILLNESS:                                                    Lupe Lepe is a " "80 year old female with history of post menopausal vaginitis, hypertension, hyperlipidemia, giant cell arteritis, renal artery stenosis, chronic kidney disease, GERD, diabetes mellitus not on medications with HbA1c 7.2 on 6/25/20 , hypothyroidism, recurrent UTIs mostly with E.coli since 2006 , now with ESBL E.coli isolated  10/1/2020, 10/5 and 10/24/20. She was treated with antibiotics and is currently on 3rd round of Ertapenem for ESBL, via peripheral IV (3/10 days). Her symptoms involved bilateral flank pain, worse on the left side, dysuria and little \"shocks\" after urination. no fever, chills, nausea, vomiting, diarrhea. Pain resolved with Ertapenem and recently completed 7 days of treatment. 3 days later, she had dysuria and another \"blast\" and retreated with Ertapenem x 7 days. 3 days later, she had similar symptoms and on 3rd round of Ertapenem and will be be treated for 10 days. She goes to [facility] in [place] daily.      She was seen by urology and the last time was in 2010. She had normal cystocopy at that time and was on vaginal estrogens with periodic bactrim.  She had stopped vaginal estrogens. she does not wear diaper and adheres to clean hygiene.      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 It measures 4.0 x 2.4 x 3.0 cm. Comparable measurements  on the prior study are 3.8 x 2.2 x 3.2 cm. Therefore, there has been no significant change. There is no urolithiasis or hydronephrosis. The  right kidney measures approximately 8.5 cm in length. The left kidney measures approximately 9 cm in length. There is stable mild scarring  in each kidney. A hiatal hernia is again seen.   Pelvis : No significant abnormality is demonstrated.                                                                   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    Renal US doppler 8/24/2016  IMPRESSION: " Echogenic kidneys with increased resistance indices would be most consistent with chronic intrinsic medical renal disease. Per  sonographic velocity criteria, no evidence for significant renal artery stenosis.     ROS:  CONSTITUTIONAL:NEGATIVE for fever, chills, change in weight  INTEGUMENTARY/SKIN: NEGATIVE for worrisome rashes, moles or lesions  EYES: NEGATIVE for vision changes or irritation  ENT/MOUTH: NEGATIVE for ear, mouth and throat problems  RESP:NEGATIVE for significant cough or SOB  CV: NEGATIVE for chest pain, palpitations or peripheral edema  GI: NEGATIVE for nausea, abdominal pain, heartburn, or change in bowel habits  : see HPI   MUSCULOSKELETAL: NEGATIVE for significant arthralgias or myalgia  NEURO: NEGATIVE for weakness, dizziness or paresthesias  ENDOCRINE: NEGATIVE for temperature intolerance, skin/hair changes  HEME/ALLERGY/IMMUNE: NEGATIVE for bleeding problems  PSYCHIATRIC: NEGATIVE for changes in mood or affect    Problem list and histories reviewed & adjusted, as indicated.  Additional history: as documented    PAST MEDICAL HISTORY:  Patient  has a past medical history of Abdominal cramps (10/16/2008), Abnormal CT scan (1/12/2009), Arthritis, Closed fracture of unspecified part of fibula, Heart disease, Hemorrhage of rectum and anus, HERPES ZOSTER NOS (5/23/2007), Hypertension, HYPOSMIA, IRON DEFIC ANEMIA NOS (3/27/2006), JOINT EFFUSION-L/LEG (4/7/2006), Other closed fractures of distal end of radius (alone), Polymyalgia rheumatica (H), Pyelonephritis (10/6/2020), and Thyroid disease. She also has no past medical history of Cancer (H), Complication of anesthesia, Difficult intubation, History of blood transfusion, Malignant hyperthermia, or Uncomplicated asthma.    PAST SURGICAL HISTORY:  Patient  has a past surgical history that includes tonsillectomy; surgical history of -  (1948); COLONOSCOPY THRU STOMA, DIAGNOSTIC (2000, 5/06, 8/08); colonoscopy (8/25/2008); hysterectomy, honorio (1970's);  surgical history of -  (12/16/2008); Esophagoscopy, gastroscopy, duodenoscopy (EGD), combined (N/A, 11/16/2017); Phacoemulsification with standard intraocular lens implant (Right, 1/8/2018); and Phacoemulsification with standard intraocular lens implant (Left, 1/29/2018).    CURRENT MEDICATIONS:  Current Outpatient Medications   Medication Sig Dispense Refill     amLODIPine (NORVASC) 10 MG tablet TAKE 1 TABLET (10 MG) BY MOUTH DAILY 90 tablet 3     ASPIRIN 325 MG OR TBEC 1 TABLET BY MOUTH DAILY 100 3     chlorthalidone (HYGROTON) 25 MG tablet TAKE 1 TABLET BY MOUTH EVERY DAY 90 tablet 3     cholecalciferol (D-VI-SOL,VITAMIN D3) 400 units/mL (10 mcg/mL) LIQD liquid Take 400 Units by mouth daily       cloNIDine (CATAPRES) 0.1 MG tablet ONE Twice DAILY as needed for SBP>180, diastolic BP>110 10 tablet 3     levothyroxine (SYNTHROID/LEVOTHROID) 50 MCG tablet Take on Tuesdays, Thursdays, Saturdays, and Sundays (Patient taking differently: Take 50 mcg by mouth Take in the morning on Sundays, Tuesdays, Thursdays, Saturdays) 48 tablet 1     levothyroxine (SYNTHROID/LEVOTHROID) 75 MCG tablet TAKE 1 TABLET BY MOUTH ON SUNDAYS, MONDAYS, WEDNESDAYS AND FRIDAYS (Patient taking differently: Take 75 mcg by mouth TAKE 1 TABLET in the morning ON MONDAYS, WEDNESDAYS AND FRIDAYS) 48 tablet 3     lisinopril (ZESTRIL) 10 MG tablet TAKE 1 TABLET (10 MG) BY MOUTH 2 TIMES DAILY 180 tablet 3     MULTIVITAMINS OR TABS 1 TABLET BY MOUTH DAILY  0     omeprazole (PRILOSEC) 20 MG DR capsule TAKE 1 CAPSULE BY MOUTH EVERY 3RD DAY AS NEEDED 30 capsule 4     ondansetron (ZOFRAN-ODT) 4 MG ODT tab Take 4 mg by mouth every 8 hours as needed       rosuvastatin (CRESTOR) 10 MG tablet TAKE 1 TABLET BY MOUTH EVERY DAY (Patient taking differently: Take 10 mg by mouth At Bedtime ) 90 tablet 3     VITAMIN B-6 50 MG OR TABS 1 daily (Patient taking differently: Take 50 mg by mouth daily ) 1 bottle 1 year     ALLERGY:  Allergies   Allergen Reactions     Bactrim       Acute renal failure and hyperkalemia     Ace Inhibitors Other (See Comments)     Hyperkalemia       Atorvastatin Calcium Itching     ELEVATED LFTS     Crestor [Rosuvastatin Calcium] Other (See Comments)     Elevated LFTS   In high doses     Penicillins Hives     Sulfamethoxazole-Trimethoprim Other (See Comments)     Kidney shutdown     Ciprofloxacin Itching and Rash     Allergy to IV form only, Has taken oral without problems     IMMUNIZATION HISTORY:  Immunization History   Administered Date(s) Administered     FLU 6-35 months 09/17/2010, 09/12/2011     Influenza (H1N1) 12/21/2009     Influenza (High Dose) 3 valent vaccine 10/07/2013, 09/26/2014, 10/06/2015, 10/13/2016, 09/28/2017, 09/26/2018, 09/11/2019     Influenza (IIV3) PF 11/01/2003, 10/12/2004, 10/24/2005, 11/06/2006, 10/17/2007, 09/28/2008, 09/23/2009, 09/17/2010, 09/12/2011     Influenza, Quad, High Dose, Pf, 65yr + 09/25/2020     Pneumo Conj 13-V (2010&after) 10/06/2015     Pneumococcal 23 valent 12/19/2003, 03/09/2009     TD (ADULT, 7+) 05/04/1998, 09/29/2008     Zoster vaccine recombinant adjuvanted (SHINGRIX) 07/25/2019, 10/25/2019     Zoster vaccine, live 03/19/2012     SOCIAL HISTORY:  Patient  reports that she has never smoked. She has never used smokeless tobacco. She reports that she does not drink alcohol or use drugs.  , lives with her , has 3 children    FAMILY HISTORY:  Patient's family history includes Breast Cancer in her sister; Cancer in her father and mother; Depression in her sister; Heart Disease in her maternal grandfather and maternal grandmother.    Labs reviewed in EPIC    OBJECTIVE:                                                    GENERAL: healthy, alert and no distress  EYES: Eyes grossly normal to inspection, and conjunctivae and sclerae normal  NECK: supple  RESP: normal breathing. no shortness of breath , no cough  SKIN: no suspicious lesions or rashes  NEURO: mentation intact and speech normal  PSYCH:  mentation appears normal, affect normal       ASSESSMENT AND PLAN:                                                      1. Recurrent UTIs since 2006, mainly E.coli and now with ESBL isolated on 10/1/2020, 10/5 and 10/24/20  - symptoms: pain in bilateral flank but mostly lower abdominal pain, dysuria (cystitis +/- pyelonephritis)   - ddx - post menopausal vaginitis and ? candidiasis   - currently on 3rd round of Ertapenem ( completed 7 days of Ertapenem and restarted 3 days later for another 7 days and restarted about 3 days later for another 10 days) . symptoms resolved while on Ertapenem   - no history of renal stones   - previous cystocopy was normal per Urologist ( last seen in 2010)   - resistance developed due to frequent use of anotibiotics     2. Post menopausal vaginitis and weak bladder   - was on vaginal estrogen but stopped taking it many years ago     3. Diabetes mellitus with HbA1c 7.2 on 6/25/20  - not on medications     4. Chronic kidney disease stage 4    Plan/Recommendations:  - Unclear why she keeps getting UTI, mainly with E.coli and has since developed ESBL ( but the last time - E.coli is pansenstive)   - will need to find out if there are renal stones/ cysts, anatomic abnormality,   - she is scheduled to get MRI with Urogram 11/27/20  - will obtain renal US to determine length of antibiotic treatment   - recheck HbA1c and treat if indicated   - consider vaginal estrogen   - monitor for perivaginal candidiasis   - discussed potential side effects of antibiotics - especially C.difficile colitis, developement of resistance   - probiotic   - referral to Urology   - called lab to determine susceptibility of E.coli to fosfomycin  - check HbA1c       F/U next week 11/18/20     Thank You for allowing me to participate in the care of this patient    Blayne Mclean MD, M.Med.Sc  Division of Infectious Diseases and International Medicine  [facility]

## 2020-11-12 ENCOUNTER — INFUSION THERAPY VISIT (OUTPATIENT)
Dept: INFUSION THERAPY | Facility: CLINIC | Age: 80
End: 2020-11-12
Attending: FAMILY MEDICINE
Payer: COMMERCIAL

## 2020-11-12 ENCOUNTER — MYC MEDICAL ADVICE (OUTPATIENT)
Dept: INFECTIOUS DISEASES | Facility: CLINIC | Age: 80
End: 2020-11-12

## 2020-11-12 VITALS
RESPIRATION RATE: 20 BRPM | DIASTOLIC BLOOD PRESSURE: 54 MMHG | OXYGEN SATURATION: 98 % | SYSTOLIC BLOOD PRESSURE: 149 MMHG | HEART RATE: 69 BPM | TEMPERATURE: 96.2 F

## 2020-11-12 DIAGNOSIS — N39.0 RECURRENT UTI: ICD-10-CM

## 2020-11-12 DIAGNOSIS — N18.4 CHRONIC KIDNEY DISEASE, STAGE IV (SEVERE) (H): ICD-10-CM

## 2020-11-12 DIAGNOSIS — D50.9 IRON DEFICIENCY ANEMIA, UNSPECIFIED IRON DEFICIENCY ANEMIA TYPE: ICD-10-CM

## 2020-11-12 DIAGNOSIS — N18.9 ANEMIA OF RENAL DISEASE: ICD-10-CM

## 2020-11-12 DIAGNOSIS — D63.1 ANEMIA OF RENAL DISEASE: ICD-10-CM

## 2020-11-12 DIAGNOSIS — A49.9 ESBL (EXTENDED SPECTRUM BETA-LACTAMASE) PRODUCING BACTERIA INFECTION: Primary | ICD-10-CM

## 2020-11-12 DIAGNOSIS — Z16.12 ESBL (EXTENDED SPECTRUM BETA-LACTAMASE) PRODUCING BACTERIA INFECTION: Primary | ICD-10-CM

## 2020-11-12 LAB
BACTERIA SPEC CULT: ABNORMAL
BACTERIA SPEC CULT: ABNORMAL
SPECIMEN SOURCE: ABNORMAL

## 2020-11-12 PROCEDURE — 258N000003 HC RX IP 258 OP 636: Performed by: FAMILY MEDICINE

## 2020-11-12 PROCEDURE — 96374 THER/PROPH/DIAG INJ IV PUSH: CPT

## 2020-11-12 PROCEDURE — 250N000011 HC RX IP 250 OP 636: Performed by: FAMILY MEDICINE

## 2020-11-12 RX ADMIN — ERTAPENEM SODIUM 500 MG: 1 INJECTION, POWDER, LYOPHILIZED, FOR SOLUTION INTRAMUSCULAR; INTRAVENOUS at 14:09

## 2020-11-12 NOTE — PROGRESS NOTES
Infusion Nursing Note:  Lupe HOWARD Eddiestephy presents today for Invanz.    Patient seen by provider today: No   present during visit today: Not Applicable.    Note: N/A.    Intravenous Access:  Peripheral in place from prior insertion.     Treatment Conditions:  Not Applicable.      Post Infusion Assessment:  Patient tolerated infusion without incident.  Blood return noted pre and post infusion.  Site patent and intact, free from redness, edema or discomfort.  No evidence of extravasations.  Access discontinued per protocol.       Discharge Plan:   Discharge instructions reviewed with: Patient.  Patient and/or family verbalized understanding of discharge instructions and all questions answered.  Copy of AVS reviewed with patient and/or family.  Patient will return 11/14/20 for next appointment.  Patient discharged in stable condition accompanied by: self.  Departure Mode: Ambulatory.    Aparna Valentin RN

## 2020-11-13 ENCOUNTER — INFUSION THERAPY VISIT (OUTPATIENT)
Dept: INFUSION THERAPY | Facility: CLINIC | Age: 80
End: 2020-11-13
Attending: FAMILY MEDICINE
Payer: COMMERCIAL

## 2020-11-13 ENCOUNTER — TELEPHONE (OUTPATIENT)
Dept: INTERNAL MEDICINE | Facility: CLINIC | Age: 80
End: 2020-11-13

## 2020-11-13 VITALS
RESPIRATION RATE: 16 BRPM | DIASTOLIC BLOOD PRESSURE: 56 MMHG | SYSTOLIC BLOOD PRESSURE: 140 MMHG | TEMPERATURE: 95.5 F | HEART RATE: 67 BPM

## 2020-11-13 DIAGNOSIS — A49.9 ESBL (EXTENDED SPECTRUM BETA-LACTAMASE) PRODUCING BACTERIA INFECTION: Primary | ICD-10-CM

## 2020-11-13 DIAGNOSIS — D50.9 IRON DEFICIENCY ANEMIA, UNSPECIFIED IRON DEFICIENCY ANEMIA TYPE: ICD-10-CM

## 2020-11-13 DIAGNOSIS — N39.0 RECURRENT UTI: ICD-10-CM

## 2020-11-13 DIAGNOSIS — Z16.12 ESBL (EXTENDED SPECTRUM BETA-LACTAMASE) PRODUCING BACTERIA INFECTION: Primary | ICD-10-CM

## 2020-11-13 DIAGNOSIS — D63.1 ANEMIA OF RENAL DISEASE: ICD-10-CM

## 2020-11-13 DIAGNOSIS — N18.4 CHRONIC KIDNEY DISEASE, STAGE IV (SEVERE) (H): ICD-10-CM

## 2020-11-13 DIAGNOSIS — N18.9 ANEMIA OF RENAL DISEASE: ICD-10-CM

## 2020-11-13 PROCEDURE — 258N000003 HC RX IP 258 OP 636: Performed by: FAMILY MEDICINE

## 2020-11-13 PROCEDURE — 96365 THER/PROPH/DIAG IV INF INIT: CPT

## 2020-11-13 PROCEDURE — 250N000011 HC RX IP 250 OP 636: Performed by: FAMILY MEDICINE

## 2020-11-13 RX ADMIN — ERTAPENEM SODIUM 500 MG: 1 INJECTION, POWDER, LYOPHILIZED, FOR SOLUTION INTRAMUSCULAR; INTRAVENOUS at 14:09

## 2020-11-13 RX ADMIN — ERTAPENEM SODIUM 500 MG: 1 INJECTION, POWDER, LYOPHILIZED, FOR SOLUTION INTRAMUSCULAR; INTRAVENOUS at 14:36

## 2020-11-13 NOTE — TELEPHONE ENCOUNTER
[facility] Call Center    Phone Message    May a detailed message be left on voicemail: yes     Reason for Call: Other: Pt called stating she went to [facility] to get her MRI per Dr. Chawla request but [facility] stated they did not have an order. Pt does have an order in the chart for an MRI but its ordered from a different MD. Please review and follow up with pt     Action Taken: Message routed to:  [facility] ([facility]): ID    Travel Screening: Not Applicable

## 2020-11-13 NOTE — PROGRESS NOTES
Infusion Nursing Note:  Lupe Lepe presents today for INVanz.    Patient seen by provider today: No   present during visit today: Not Applicable.    Note: N/A.    Intravenous Access:  Peripheral IV placed 11/10.  Pt. Requesting to leave in place. Discussed risks/benefits.     Treatment Conditions:  Not Applicable.      Post Infusion Assessment:  Patient tolerated infusion without incident.  Site patent and intact, free from redness, edema or discomfort.  No evidence of extravasations.  PIV SL'd & secured for tomorrow's infusion.       Discharge Plan:   Patient discharged in stable condition accompanied by: self.  Departure Mode: Ambulatory.    Ronan Hui RN

## 2020-11-14 ENCOUNTER — INFUSION THERAPY VISIT (OUTPATIENT)
Dept: ONCOLOGY | Facility: CLINIC | Age: 80
End: 2020-11-14
Attending: FAMILY MEDICINE
Payer: COMMERCIAL

## 2020-11-14 VITALS
TEMPERATURE: 97.8 F | RESPIRATION RATE: 16 BRPM | OXYGEN SATURATION: 97 % | DIASTOLIC BLOOD PRESSURE: 85 MMHG | SYSTOLIC BLOOD PRESSURE: 147 MMHG | HEART RATE: 84 BPM

## 2020-11-14 DIAGNOSIS — A49.9 ESBL (EXTENDED SPECTRUM BETA-LACTAMASE) PRODUCING BACTERIA INFECTION: Primary | ICD-10-CM

## 2020-11-14 DIAGNOSIS — N39.0 RECURRENT UTI: ICD-10-CM

## 2020-11-14 DIAGNOSIS — D50.9 IRON DEFICIENCY ANEMIA, UNSPECIFIED IRON DEFICIENCY ANEMIA TYPE: ICD-10-CM

## 2020-11-14 DIAGNOSIS — N18.9 ANEMIA OF RENAL DISEASE: ICD-10-CM

## 2020-11-14 DIAGNOSIS — Z16.12 ESBL (EXTENDED SPECTRUM BETA-LACTAMASE) PRODUCING BACTERIA INFECTION: Primary | ICD-10-CM

## 2020-11-14 DIAGNOSIS — N18.4 CHRONIC KIDNEY DISEASE, STAGE IV (SEVERE) (H): ICD-10-CM

## 2020-11-14 DIAGNOSIS — D63.1 ANEMIA OF RENAL DISEASE: ICD-10-CM

## 2020-11-14 PROCEDURE — 96365 THER/PROPH/DIAG IV INF INIT: CPT

## 2020-11-14 PROCEDURE — 258N000003 HC RX IP 258 OP 636: Performed by: FAMILY MEDICINE

## 2020-11-14 PROCEDURE — 250N000011 HC RX IP 250 OP 636: Performed by: FAMILY MEDICINE

## 2020-11-14 RX ADMIN — ERTAPENEM SODIUM 500 MG: 1 INJECTION, POWDER, LYOPHILIZED, FOR SOLUTION INTRAMUSCULAR; INTRAVENOUS at 11:24

## 2020-11-14 ASSESSMENT — PAIN SCALES - GENERAL: PAINLEVEL: NO PAIN (0)

## 2020-11-14 NOTE — PROGRESS NOTES
"Infusion Nursing Note:  Lupe Lepe presents today for IV Ertapenem.    Patient seen by provider today: No   present during visit today: Not Applicable.    Note: Patient states she has been \"feeling ok.\"  No changes from yesterday.  Offers no new concerns at this time.    Intravenous Access:  Peripheral IV placed.    Treatment Conditions:  Not Applicable.    Post Infusion Assessment:  Patient tolerated infusion without incident.  Blood return noted pre and post infusion.  Site patent and intact, free from redness, edema or discomfort.  No evidence of extravasations.  PIV saline locked and left intact for infusion tomorrow.    Discharge Plan:   Patient declined prescription refills.  Discharge instructions reviewed with: Patient.  Patient and/or family verbalized understanding of discharge instructions and all questions answered.  AVS to patient via [employer].  Patient will return tomorrow for next appointment.   Patient discharged in stable condition accompanied by: self.  Departure Mode: Ambulatory.  Face to Face time: 0.    PATRICIA NAQVI RN                      "

## 2020-11-15 ENCOUNTER — INFUSION THERAPY VISIT (OUTPATIENT)
Dept: ONCOLOGY | Facility: CLINIC | Age: 80
End: 2020-11-15
Attending: FAMILY MEDICINE
Payer: COMMERCIAL

## 2020-11-15 VITALS
OXYGEN SATURATION: 96 % | HEART RATE: 74 BPM | DIASTOLIC BLOOD PRESSURE: 67 MMHG | TEMPERATURE: 97.8 F | SYSTOLIC BLOOD PRESSURE: 148 MMHG | RESPIRATION RATE: 16 BRPM

## 2020-11-15 DIAGNOSIS — D63.1 ANEMIA OF RENAL DISEASE: ICD-10-CM

## 2020-11-15 DIAGNOSIS — N39.0 RECURRENT UTI: ICD-10-CM

## 2020-11-15 DIAGNOSIS — D50.9 IRON DEFICIENCY ANEMIA, UNSPECIFIED IRON DEFICIENCY ANEMIA TYPE: ICD-10-CM

## 2020-11-15 DIAGNOSIS — A49.9 ESBL (EXTENDED SPECTRUM BETA-LACTAMASE) PRODUCING BACTERIA INFECTION: Primary | ICD-10-CM

## 2020-11-15 DIAGNOSIS — Z16.12 ESBL (EXTENDED SPECTRUM BETA-LACTAMASE) PRODUCING BACTERIA INFECTION: Primary | ICD-10-CM

## 2020-11-15 DIAGNOSIS — N18.4 CHRONIC KIDNEY DISEASE, STAGE IV (SEVERE) (H): ICD-10-CM

## 2020-11-15 DIAGNOSIS — N18.9 ANEMIA OF RENAL DISEASE: ICD-10-CM

## 2020-11-15 PROCEDURE — 258N000003 HC RX IP 258 OP 636: Performed by: FAMILY MEDICINE

## 2020-11-15 PROCEDURE — 96365 THER/PROPH/DIAG IV INF INIT: CPT

## 2020-11-15 PROCEDURE — 250N000011 HC RX IP 250 OP 636: Mod: JW | Performed by: FAMILY MEDICINE

## 2020-11-15 RX ADMIN — ERTAPENEM SODIUM 500 MG: 1 INJECTION, POWDER, LYOPHILIZED, FOR SOLUTION INTRAMUSCULAR; INTRAVENOUS at 10:28

## 2020-11-15 ASSESSMENT — PAIN SCALES - GENERAL: PAINLEVEL: NO PAIN (0)

## 2020-11-15 NOTE — PROGRESS NOTES
"Infusion Nursing Note:  Lupe Lepe presents today for IV Ertapenem.    Patient seen by provider today: No   present during visit today: Not Applicable.    Note: Patient states she \"feels good\" today.  She reports no changes from yesterday.    Intravenous Access:  Peripheral IV placed.    Treatment Conditions:  Not Applicable.      Post Infusion Assessment:  Patient tolerated infusion without incident.  Blood return noted pre and post infusion.  Site patent and intact, free from redness, edema or discomfort.  No evidence of extravasations.  PIV saline locked and left intact for infusion tomorrow.     Discharge Plan:   Patient declined prescription refills.  Discharge instructions reviewed with: Patient.  Patient and/or family verbalized understanding of discharge instructions and all questions answered.  AVS to patient via [employer].  Patient will go to [facility] tomorrow for her next infusion.  Patient discharged in stable condition accompanied by: self.  Departure Mode: Ambulatory.  Face to Face time: 0.    PATRICIA NAQVI RN                      "

## 2020-11-16 ENCOUNTER — INFUSION THERAPY VISIT (OUTPATIENT)
Dept: INFUSION THERAPY | Facility: CLINIC | Age: 80
End: 2020-11-16
Attending: FAMILY MEDICINE
Payer: COMMERCIAL

## 2020-11-16 ENCOUNTER — APPOINTMENT (OUTPATIENT)
Dept: LAB | Facility: CLINIC | Age: 80
End: 2020-11-16
Payer: COMMERCIAL

## 2020-11-16 ENCOUNTER — DOCUMENTATION ONLY (OUTPATIENT)
Dept: INTERNAL MEDICINE | Facility: CLINIC | Age: 80
End: 2020-11-16

## 2020-11-16 VITALS — DIASTOLIC BLOOD PRESSURE: 42 MMHG | SYSTOLIC BLOOD PRESSURE: 124 MMHG | HEART RATE: 75 BPM | TEMPERATURE: 95.4 F

## 2020-11-16 DIAGNOSIS — R73.9 HYPERGLYCEMIA: Primary | ICD-10-CM

## 2020-11-16 DIAGNOSIS — N18.4 CHRONIC KIDNEY DISEASE, STAGE IV (SEVERE) (H): ICD-10-CM

## 2020-11-16 DIAGNOSIS — D50.9 IRON DEFICIENCY ANEMIA, UNSPECIFIED IRON DEFICIENCY ANEMIA TYPE: ICD-10-CM

## 2020-11-16 DIAGNOSIS — N39.0 RECURRENT UTI: ICD-10-CM

## 2020-11-16 DIAGNOSIS — Z16.12 ESBL (EXTENDED SPECTRUM BETA-LACTAMASE) PRODUCING BACTERIA INFECTION: Primary | ICD-10-CM

## 2020-11-16 DIAGNOSIS — N18.9 ANEMIA OF RENAL DISEASE: ICD-10-CM

## 2020-11-16 DIAGNOSIS — D63.1 ANEMIA OF RENAL DISEASE: ICD-10-CM

## 2020-11-16 DIAGNOSIS — A49.9 ESBL (EXTENDED SPECTRUM BETA-LACTAMASE) PRODUCING BACTERIA INFECTION: Primary | ICD-10-CM

## 2020-11-16 PROCEDURE — 96365 THER/PROPH/DIAG IV INF INIT: CPT

## 2020-11-16 PROCEDURE — 258N000003 HC RX IP 258 OP 636: Performed by: FAMILY MEDICINE

## 2020-11-16 PROCEDURE — 250N000011 HC RX IP 250 OP 636: Performed by: FAMILY MEDICINE

## 2020-11-16 RX ADMIN — SODIUM CHLORIDE 250 ML: 9 INJECTION, SOLUTION INTRAVENOUS at 13:29

## 2020-11-16 RX ADMIN — ERTAPENEM SODIUM 500 MG: 1 INJECTION, POWDER, LYOPHILIZED, FOR SOLUTION INTRAMUSCULAR; INTRAVENOUS at 13:29

## 2020-11-16 NOTE — TELEPHONE ENCOUNTER
Sahara Haque 1 hour ago (11:06 AM)     Patient is now scheduled on 11/17 for the  at [place].   ThanksSahara

## 2020-11-16 NOTE — PROGRESS NOTES
Patient came in for a lab draw and does not have any lab orders. She thinks it is for an A1C. Please place orders as needed. Thank you!    HPI      ROS      Physical Exam

## 2020-11-16 NOTE — PROGRESS NOTES
Infusion Nursing Note:  Lupe HOWARD Sherley presents today for Invanz   Patient seen by provider today: No   present during visit today: Not Applicable.    Note: N/A.    Intravenous Access:  Peripheral IV placed.    Treatment Conditions:  Not Applicable.      Post Infusion Assessment:  Patient tolerated infusion without incident.  Blood return noted pre and post infusion.  Site patent and intact, free from redness, edema or discomfort.  No evidence of extravasations.   PIV saline locked and left in place for tomorrow.      Discharge Plan:   Patient discharged in stable condition accompanied by: self.  Departure Mode: Ambulatory.  Pt to return tomorrow at 1:30 pm for next dose of Invanz.      Sara Rene RN

## 2020-11-17 ENCOUNTER — INFUSION THERAPY VISIT (OUTPATIENT)
Dept: INFUSION THERAPY | Facility: CLINIC | Age: 80
End: 2020-11-17
Attending: FAMILY MEDICINE
Payer: COMMERCIAL

## 2020-11-17 ENCOUNTER — HOSPITAL ENCOUNTER (OUTPATIENT)
Dept: ULTRASOUND IMAGING | Facility: CLINIC | Age: 80
Discharge: HOME | End: 2020-11-17
Attending: INTERNAL MEDICINE | Admitting: INTERNAL MEDICINE
Payer: COMMERCIAL

## 2020-11-17 DIAGNOSIS — N18.9 ANEMIA OF RENAL DISEASE: ICD-10-CM

## 2020-11-17 DIAGNOSIS — N39.0 RECURRENT UTI: ICD-10-CM

## 2020-11-17 DIAGNOSIS — R73.9 HYPERGLYCEMIA: ICD-10-CM

## 2020-11-17 DIAGNOSIS — A49.9 ESBL (EXTENDED SPECTRUM BETA-LACTAMASE) PRODUCING BACTERIA INFECTION: Primary | ICD-10-CM

## 2020-11-17 DIAGNOSIS — D50.9 IRON DEFICIENCY ANEMIA, UNSPECIFIED IRON DEFICIENCY ANEMIA TYPE: ICD-10-CM

## 2020-11-17 DIAGNOSIS — N18.4 CHRONIC KIDNEY DISEASE, STAGE IV (SEVERE) (H): ICD-10-CM

## 2020-11-17 DIAGNOSIS — Z16.12 ESBL (EXTENDED SPECTRUM BETA-LACTAMASE) PRODUCING BACTERIA INFECTION: Primary | ICD-10-CM

## 2020-11-17 DIAGNOSIS — D63.1 ANEMIA OF RENAL DISEASE: ICD-10-CM

## 2020-11-17 LAB — HBA1C MFR BLD: 6.6 % (ref 0–5.6)

## 2020-11-17 PROCEDURE — 258N000003 HC RX IP 258 OP 636

## 2020-11-17 PROCEDURE — 96374 THER/PROPH/DIAG INJ IV PUSH: CPT

## 2020-11-17 PROCEDURE — 76770 US EXAM ABDO BACK WALL COMP: CPT

## 2020-11-17 PROCEDURE — 36415 COLL VENOUS BLD VENIPUNCTURE: CPT | Performed by: INTERNAL MEDICINE

## 2020-11-17 PROCEDURE — 250N000011 HC RX IP 250 OP 636

## 2020-11-17 PROCEDURE — 83036 HEMOGLOBIN GLYCOSYLATED A1C: CPT | Performed by: INTERNAL MEDICINE

## 2020-11-17 NOTE — PROGRESS NOTES
Invanz administered via peripheral IV without incident.  Pt tolerated does without difficulty.  Return tomorrow for next dose.   Christine Rey RN

## 2020-11-18 ENCOUNTER — INFUSION THERAPY VISIT (OUTPATIENT)
Dept: INFUSION THERAPY | Facility: CLINIC | Age: 80
End: 2020-11-18
Attending: FAMILY MEDICINE
Payer: COMMERCIAL

## 2020-11-18 ENCOUNTER — VIRTUAL VISIT (OUTPATIENT)
Dept: INFECTIOUS DISEASES | Facility: CLINIC | Age: 80
End: 2020-11-18
Attending: INTERNAL MEDICINE
Payer: COMMERCIAL

## 2020-11-18 ENCOUNTER — TELEPHONE (OUTPATIENT)
Dept: INFECTIOUS DISEASES | Facility: CLINIC | Age: 80
End: 2020-11-18

## 2020-11-18 VITALS
DIASTOLIC BLOOD PRESSURE: 47 MMHG | TEMPERATURE: 95.4 F | RESPIRATION RATE: 16 BRPM | HEART RATE: 82 BPM | SYSTOLIC BLOOD PRESSURE: 116 MMHG | OXYGEN SATURATION: 98 %

## 2020-11-18 VITALS — BODY MASS INDEX: 24.43 KG/M2 | WEIGHT: 152 LBS | HEIGHT: 66 IN

## 2020-11-18 DIAGNOSIS — A49.9 ESBL (EXTENDED SPECTRUM BETA-LACTAMASE) PRODUCING BACTERIA INFECTION: Primary | ICD-10-CM

## 2020-11-18 DIAGNOSIS — N39.0 RECURRENT UTI: ICD-10-CM

## 2020-11-18 DIAGNOSIS — D50.9 IRON DEFICIENCY ANEMIA, UNSPECIFIED IRON DEFICIENCY ANEMIA TYPE: ICD-10-CM

## 2020-11-18 DIAGNOSIS — D63.1 ANEMIA OF RENAL DISEASE: ICD-10-CM

## 2020-11-18 DIAGNOSIS — N39.0 RECURRENT UTI: Primary | ICD-10-CM

## 2020-11-18 DIAGNOSIS — N18.9 ANEMIA OF RENAL DISEASE: ICD-10-CM

## 2020-11-18 DIAGNOSIS — N95.2 POSTMENOPAUSAL ATROPHIC VAGINITIS: ICD-10-CM

## 2020-11-18 DIAGNOSIS — N18.4 CHRONIC KIDNEY DISEASE, STAGE IV (SEVERE) (H): ICD-10-CM

## 2020-11-18 DIAGNOSIS — N95.2 VAGINAL ATROPHY: ICD-10-CM

## 2020-11-18 DIAGNOSIS — Z16.12 ESBL (EXTENDED SPECTRUM BETA-LACTAMASE) PRODUCING BACTERIA INFECTION: Primary | ICD-10-CM

## 2020-11-18 PROCEDURE — 999N001193 HC VIDEO/TELEPHONE VISIT; NO CHARGE

## 2020-11-18 PROCEDURE — 258N000003 HC RX IP 258 OP 636: Performed by: INTERNAL MEDICINE

## 2020-11-18 PROCEDURE — 250N000011 HC RX IP 250 OP 636: Performed by: INTERNAL MEDICINE

## 2020-11-18 PROCEDURE — 96365 THER/PROPH/DIAG IV INF INIT: CPT

## 2020-11-18 PROCEDURE — 99214 OFFICE O/P EST MOD 30 MIN: CPT | Mod: 95 | Performed by: INTERNAL MEDICINE

## 2020-11-18 RX ORDER — ESTRADIOL 0.1 MG/G
2 CREAM VAGINAL
Qty: 20 G | Refills: 0 | Status: SHIPPED | OUTPATIENT
Start: 2020-11-18 | End: 2020-12-10

## 2020-11-18 RX ADMIN — ERTAPENEM SODIUM 500 MG: 1 INJECTION, POWDER, LYOPHILIZED, FOR SOLUTION INTRAMUSCULAR; INTRAVENOUS at 14:37

## 2020-11-18 ASSESSMENT — MIFFLIN-ST. JEOR: SCORE: 1166.22

## 2020-11-18 ASSESSMENT — PAIN SCALES - GENERAL: PAINLEVEL: NO PAIN (0)

## 2020-11-18 NOTE — LETTER
"11/18/2020       RE: Lupe Lepe  [address] [facility] [id number]     Dear Colleague,    Thank you for referring your patient, Lupe Lepe, to the [facility] INFECTIOUS DISEASE CLINIC [place] at [facility]. Please see a copy of my visit note below.    Chief Complaint   Patient presents with     Follow Up     Height 1.676 m (5' 6\"), weight 68.9 kg (152 lb), not currently breastfeeding.    Lupe Lepe is a 80 year old female who is being evaluated via a billable video visit.      The patient has been notified of following:     \"This video visit will be conducted via a call between you and your physician/provider. We have found that certain health care needs can be provided without the need for an in-person physical exam.  This service lets us provide the care you need with a video conversation.  If a prescription is necessary we can send it directly to your pharmacy.  If lab work is needed we can place an order for that and you can then stop by our lab to have the test done at a later time.    Video visits are billed at different rates depending on your insurance coverage.  Please reach out to your insurance provider with any questions.    If during the course of the call the physician/provider feels a video visit is not appropriate, you will not be charged for this service.\"    Patient has given verbal consent for Video visit? YES  How would you like to obtain your AVS? MYCHART  If you are dropped from the video visit, the video invite should be resent to:  USE MYCHART-PATIENT IN THE WAITING ROOM    Will anyone else be joining your video visit? NO      Video-Visit Details    Type of service:  Video Visit    Video Time: 14 min    Originating Location (pt. Location): Home    Distant Location (provider location):  [facility] INFECTIOUS DISE[facility]     Platform used for Video Visit: [facility]    INFECTIOUS DISEASES " "CONSULTATION  NOTE    Consult requested by: Dr Richard Cornejo   Reason for Consult : ESBL infection   Date of Consult: 11/11/20     HISTORY OF PRESENT ILLNESS:                                                    Lupe Lepe is a 80 year old female with history of post menopausal vaginitis, hypertension, hyperlipidemia, giant cell arteritis, renal artery stenosis, chronic kidney disease, GERD, diabetes mellitus not on medications with HbA1c 7.2 on 6/25/20 , hypothyroidism, recurrent UTIs mostly with E.coli since 2006 , now with ESBL E.coli isolated  10/1/2020, 10/5 and 10/24/20. She was treated with antibiotics and is currently on 3rd round of Ertapenem for ESBL, via peripheral IV (3/10 days). Her symptoms involved bilateral flank pain, worse on the left side, dysuria and little \"shocks\" after urination. no fever, chills, nausea, vomiting, diarrhea. Pain resolved with Ertapenem and recently completed 7 days of treatment. 3 days later, she had dysuria and another \"blast\" and retreated with Ertapenem x 7 days. 3 days later, she had similar symptoms and on 3rd round of Ertapenem and will be be treated for 10 days. She goes to Infusion center in [place] daily.      She was seen by urology and the last time was in 2010. She had normal cystocopy at that time and was on vaginal estrogens with periodic bactrim.  She had stopped vaginal estrogens. she does not wear diaper and adheres to clean hygiene.      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 It measures 4.0 x 2.4 x 3.0 cm. Comparable measurements  on the prior study are 3.8 x 2.2 x 3.2 cm. Therefore, there has been no significant change. There is no urolithiasis or hydronephrosis. The  right kidney measures approximately 8.5 cm in length. The left kidney measures approximately 9 cm in length. There is stable mild scarring  in each kidney. A hiatal hernia is again seen.   Pelvis " ": No significant abnormality is demonstrated.                                                                   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    Renal US doppler 8/24/2016  IMPRESSION: Echogenic kidneys with increased resistance indices would be most consistent with chronic intrinsic medical renal disease. Per  sonographic velocity criteria, no evidence for significant renal artery stenosis.     Video follow-up on 11/18/20  feels better. on day 10 of Ertapenem. recently had \"some signal\" or pain below. no diarrhea. no back pain. no nausea/vomiting/fever,chills. flank pain.  tolerates Ertapenem. PIV in place. discussed lab results and Urine cx, this time E.coli is pan sensitive     PAST MEDICAL HISTORY:  Patient  has a past medical history of Abdominal cramps (10/16/2008), Abnormal CT scan (1/12/2009), Arthritis, Closed fracture of unspecified part of fibula, Heart disease, Hemorrhage of rectum and anus, HERPES ZOSTER NOS (5/23/2007), Hypertension, HYPOSMIA, IRON DEFIC ANEMIA NOS (3/27/2006), JOINT EFFUSION-L/LEG (4/7/2006), Other closed fractures of distal end of radius (alone), Polymyalgia rheumatica (H), Pyelonephritis (10/6/2020), and Thyroid disease. She also has no past medical history of Cancer (H), Complication of anesthesia, Difficult intubation, History of blood transfusion, Malignant hyperthermia, or Uncomplicated asthma.    PAST SURGICAL HISTORY:  Patient  has a past surgical history that includes tonsillectomy; surgical history of -  (1950); COLONOSCOPY THRU STOMA, DIAGNOSTIC (2000, 5/06, 8/08); colonoscopy (8/25/2008); hysterectomy, honorio (1970's); surgical history of -  (12/16/2008); Esophagoscopy, gastroscopy, duodenoscopy (EGD), combined (N/A, 11/16/2017); Phacoemulsification with standard intraocular lens implant (Right, 1/8/2018); and Phacoemulsification with standard intraocular lens implant (Left, 1/29/2018).    CURRENT MEDICATIONS:  Current Outpatient " Medications   Medication Sig Dispense Refill     amLODIPine (NORVASC) 10 MG tablet TAKE 1 TABLET (10 MG) BY MOUTH DAILY 90 tablet 3     ASPIRIN 325 MG OR TBEC 1 TABLET BY MOUTH DAILY 100 3     chlorthalidone (HYGROTON) 25 MG tablet TAKE 1 TABLET BY MOUTH EVERY DAY 90 tablet 3     cholecalciferol (D-VI-SOL,VITAMIN D3) 400 units/mL (10 mcg/mL) LIQD liquid Take 400 Units by mouth daily       cloNIDine (CATAPRES) 0.1 MG tablet ONE Twice DAILY as needed for SBP>180, diastolic BP>110 10 tablet 3     levothyroxine (SYNTHROID/LEVOTHROID) 50 MCG tablet Take on Tuesdays, Thursdays, Saturdays, and Sundays (Patient taking differently: Take 50 mcg by mouth Take in the morning on Sundays, Tuesdays, Thursdays, Saturdays) 48 tablet 1     levothyroxine (SYNTHROID/LEVOTHROID) 75 MCG tablet TAKE 1 TABLET BY MOUTH ON SUNDAYS, MONDAYS, WEDNESDAYS AND FRIDAYS (Patient taking differently: Take 75 mcg by mouth TAKE 1 TABLET in the morning ON MONDAYS, WEDNESDAYS AND FRIDAYS) 48 tablet 3     lisinopril (ZESTRIL) 10 MG tablet TAKE 1 TABLET (10 MG) BY MOUTH 2 TIMES DAILY 180 tablet 3     MULTIVITAMINS OR TABS 1 TABLET BY MOUTH DAILY  0     omeprazole (PRILOSEC) 20 MG DR capsule TAKE 1 CAPSULE BY MOUTH EVERY 3RD DAY AS NEEDED 30 capsule 4     ondansetron (ZOFRAN-ODT) 4 MG ODT tab Take 4 mg by mouth every 8 hours as needed       rosuvastatin (CRESTOR) 10 MG tablet TAKE 1 TABLET BY MOUTH EVERY DAY (Patient taking differently: Take 10 mg by mouth At Bedtime ) 90 tablet 3     VITAMIN B-6 50 MG OR TABS 1 daily (Patient taking differently: Take 50 mg by mouth daily ) 1 bottle 1 year     ALLERGY:  Allergies   Allergen Reactions     Bactrim      Acute renal failure and hyperkalemia     Ace Inhibitors Other (See Comments)     Hyperkalemia       Atorvastatin Calcium Itching     ELEVATED LFTS     Crestor [Rosuvastatin Calcium] Other (See Comments)     Elevated LFTS   In high doses     Penicillins Hives     Sulfamethoxazole-Trimethoprim Other (See  Comments)     Kidney shutdown     Ciprofloxacin Itching and Rash     Allergy to IV form only, Has taken oral without problems     IMMUNIZATION HISTORY:  Immunization History   Administered Date(s) Administered     FLU 6-35 months 09/17/2010, 09/12/2011     Influenza (H1N1) 12/21/2009     Influenza (High Dose) 3 valent vaccine 10/07/2013, 09/26/2014, 10/06/2015, 10/13/2016, 09/28/2017, 09/26/2018, 09/11/2019     Influenza (IIV3) PF 11/01/2003, 10/12/2004, 10/24/2005, 11/06/2006, 10/17/2007, 09/28/2008, 09/23/2009, 09/17/2010, 09/12/2011     Influenza, Quad, High Dose, Pf, 65yr + 09/25/2020     Pneumo Conj 13-V (2010&after) 10/06/2015     Pneumococcal 23 valent 12/19/2003, 03/09/2009     TD (ADULT, 7+) 05/04/1998, 09/29/2008     Zoster vaccine recombinant adjuvanted (SHINGRIX) 07/25/2019, 10/25/2019     Zoster vaccine, live 03/19/2012     SOCIAL HISTORY:  Patient  reports that she has never smoked. She has never used smokeless tobacco. She reports that she does not drink alcohol or use drugs.  , lives with her , has 3 children    FAMILY HISTORY:  Patient's family history includes Breast Cancer in her sister; Cancer in her father and mother; Depression in her sister; Heart Disease in her maternal grandfather and maternal grandmother.    Labs reviewed in EPIC    OBJECTIVE:                                                    GENERAL: healthy, alert and no distress  EYES: Eyes grossly normal to inspection, and conjunctivae and sclerae normal  NECK: supple  RESP: normal breathing. no shortness of breath , no cough  SKIN: no suspicious lesions or rashes  NEURO: mentation intact and speech normal  PSYCH: mentation appears normal, affect normal       ASSESSMENT AND PLAN:                                                      1. Recurrent UTIs since 2006, mainly E.coli and now with ESBL isolated on 10/1/2020, 10/5 and 10/24/20  - symptoms: pain in bilateral flank but mostly lower abdominal pain, dysuria (cystitis  +/- pyelonephritis)   - ddx - post menopausal vaginitis and ? candidiasis   - currently on 3rd round of Ertapenem ( completed 7 days of Ertapenem and restarted 3 days later for another 7 days and restarted about 3 days later for another 10 days) . symptoms resolved while on Ertapenem   - no history of renal stones   - previous cystocopy was normal per Urologist ( last seen in 2010)   - resistance developed due to frequent use of anotibiotics   - renal us- normal (11/17/20)    2. Post menopausal vaginitis and weak bladder   - was on vaginal estrogen but stopped taking it many years ago     3. Diabetes mellitus with HbA1c 7.2 on 6/25/20   - repeat HbA1c 6.6 on 11/17/20  - not on medications     4. Chronic kidney disease stage 4    Plan/Recommendations:  - Unclear why she keeps getting UTI, mainly with E.coli and has since developed ESBL ( but the last time 11/9/20 - E.coli is pansenstive)   - she is scheduled to get MRI with Urogram 11/27/20  - try vaginal estrogen   - monitor for perivaginal candidiasis   - discussed potential side effects of antibiotics - especially C.difficile colitis, developement of resistance   - probiotic   - referral to Urology - pending   - control hyperglycemia     video F/U w me on Dec 11 at 8 am     Thank You for allowing me to participate in the care of this patient    Blayne Mclean MD, M.Med.Sc  Division of Infectious Diseases and International Medicine  [facility]

## 2020-11-18 NOTE — PROGRESS NOTES
Nursing Note:  Lupe Lepe presents today for Invanz.    Patient seen by provider today: No   present during visit today: Not Applicable.    Note: N/A.    Intravenous Access:  Peripheral IV placed.    Discharge Plan:   Patient has completed her antibx and will follow up with prescribing md Aparna Valentin RN

## 2020-11-18 NOTE — PROGRESS NOTES
"Chief Complaint   Patient presents with     Follow Up     Height 1.676 m (5' 6\"), weight 68.9 kg (152 lb), not currently breastfeeding.    Lupe Lepe is a 80 year old female who is being evaluated via a billable video visit.      The patient has been notified of following:     \"This video visit will be conducted via a call between you and your physician/provider. We have found that certain health care needs can be provided without the need for an in-person physical exam.  This service lets us provide the care you need with a video conversation.  If a prescription is necessary we can send it directly to your pharmacy.  If lab work is needed we can place an order for that and you can then stop by our lab to have the test done at a later time.    Video visits are billed at different rates depending on your insurance coverage.  Please reach out to your insurance provider with any questions.    If during the course of the call the physician/provider feels a video visit is not appropriate, you will not be charged for this service.\"    Patient has given verbal consent for Video visit? YES  How would you like to obtain your AVS? MYCHART  If you are dropped from the video visit, the video invite should be resent to:  USE MYCHART-PATIENT IN THE WAITING ROOM    Will anyone else be joining your video visit? NO      Video-Visit Details    Type of service:  Video Visit    Video Time: 14 min    Originating Location (pt. Location): Home    Distant Location (provider location):  [facility] [place]     Platform used for Video Visit: [facility]    INFECTIOUS DISEASES CONSULTATION  NOTE    Consult requested by: Dr Richard Cornejo   Reason for Consult : ESBL infection   Date of Consult: 11/11/20     HISTORY OF PRESENT ILLNESS:                                                    Lupe Lepe is a 80 year old female with history of post menopausal vaginitis, hypertension, hyperlipidemia, giant " "cell arteritis, renal artery stenosis, chronic kidney disease, GERD, diabetes mellitus not on medications with HbA1c 7.2 on 6/25/20 , hypothyroidism, recurrent UTIs mostly with E.coli since 2006 , now with ESBL E.coli isolated  10/1/2020, 10/5 and 10/24/20. She was treated with antibiotics and is currently on 3rd round of Ertapenem for ESBL, via peripheral IV (3/10 days). Her symptoms involved bilateral flank pain, worse on the left side, dysuria and little \"shocks\" after urination. no fever, chills, nausea, vomiting, diarrhea. Pain resolved with Ertapenem and recently completed 7 days of treatment. 3 days later, she had dysuria and another \"blast\" and retreated with Ertapenem x 7 days. 3 days later, she had similar symptoms and on 3rd round of Ertapenem and will be be treated for 10 days. She goes to Infusion center in [place] daily.      She was seen by urology and the last time was in 2010. She had normal cystocopy at that time and was on vaginal estrogens with periodic bactrim.  She had stopped vaginal estrogens. she does not wear diaper and adheres to clean hygiene.      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 It measures 4.0 x 2.4 x 3.0 cm. Comparable measurements  on the prior study are 3.8 x 2.2 x 3.2 cm. Therefore, there has been no significant change. There is no urolithiasis or hydronephrosis. The  right kidney measures approximately 8.5 cm in length. The left kidney measures approximately 9 cm in length. There is stable mild scarring  in each kidney. A hiatal hernia is again seen.   Pelvis : No significant abnormality is demonstrated.                                                                   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    Renal US doppler 8/24/2016  IMPRESSION: Echogenic kidneys with increased resistance indices would be most consistent with chronic " "intrinsic medical renal disease. Per  sonographic velocity criteria, no evidence for significant renal artery stenosis.     Video follow-up on 11/18/20  feels better. on day 10 of Ertapenem. recently had \"some signal\" or pain below. no diarrhea. no back pain. no nausea/vomiting/fever,chills. flank pain.  tolerates Ertapenem. PIV in place. discussed lab results and Urine cx, this time E.coli is pan sensitive     PAST MEDICAL HISTORY:  Patient  has a past medical history of Abdominal cramps (10/16/2008), Abnormal CT scan (1/12/2009), Arthritis, Closed fracture of unspecified part of fibula, Heart disease, Hemorrhage of rectum and anus, HERPES ZOSTER NOS (5/23/2007), Hypertension, HYPOSMIA, IRON DEFIC ANEMIA NOS (3/27/2006), JOINT EFFUSION-L/LEG (4/7/2006), Other closed fractures of distal end of radius (alone), Polymyalgia rheumatica (H), Pyelonephritis (10/6/2020), and Thyroid disease. She also has no past medical history of Cancer (H), Complication of anesthesia, Difficult intubation, History of blood transfusion, Malignant hyperthermia, or Uncomplicated asthma.    PAST SURGICAL HISTORY:  Patient  has a past surgical history that includes tonsillectomy; surgical history of -  (1948); COLONOSCOPY THRU STOMA, DIAGNOSTIC (2000, 5/06, 8/08); colonoscopy (8/25/2008); hysterectomy, honorio (1970's); surgical history of -  (12/16/2008); Esophagoscopy, gastroscopy, duodenoscopy (EGD), combined (N/A, 11/16/2017); Phacoemulsification with standard intraocular lens implant (Right, 1/8/2018); and Phacoemulsification with standard intraocular lens implant (Left, 1/29/2018).    CURRENT MEDICATIONS:  Current Outpatient Medications   Medication Sig Dispense Refill     amLODIPine (NORVASC) 10 MG tablet TAKE 1 TABLET (10 MG) BY MOUTH DAILY 90 tablet 3     ASPIRIN 325 MG OR TBEC 1 TABLET BY MOUTH DAILY 100 3     chlorthalidone (HYGROTON) 25 MG tablet TAKE 1 TABLET BY MOUTH EVERY DAY 90 tablet 3     cholecalciferol (D-VI-SOL,VITAMIN D3) " 400 units/mL (10 mcg/mL) LIQD liquid Take 400 Units by mouth daily       cloNIDine (CATAPRES) 0.1 MG tablet ONE Twice DAILY as needed for SBP>180, diastolic BP>110 10 tablet 3     levothyroxine (SYNTHROID/LEVOTHROID) 50 MCG tablet Take on Tuesdays, Thursdays, Saturdays, and Sundays (Patient taking differently: Take 50 mcg by mouth Take in the morning on Sundays, Tuesdays, Thursdays, Saturdays) 48 tablet 1     levothyroxine (SYNTHROID/LEVOTHROID) 75 MCG tablet TAKE 1 TABLET BY MOUTH ON SUNDAYS, MONDAYS, WEDNESDAYS AND FRIDAYS (Patient taking differently: Take 75 mcg by mouth TAKE 1 TABLET in the morning ON MONDAYS, WEDNESDAYS AND FRIDAYS) 48 tablet 3     lisinopril (ZESTRIL) 10 MG tablet TAKE 1 TABLET (10 MG) BY MOUTH 2 TIMES DAILY 180 tablet 3     MULTIVITAMINS OR TABS 1 TABLET BY MOUTH DAILY  0     omeprazole (PRILOSEC) 20 MG DR capsule TAKE 1 CAPSULE BY MOUTH EVERY 3RD DAY AS NEEDED 30 capsule 4     ondansetron (ZOFRAN-ODT) 4 MG ODT tab Take 4 mg by mouth every 8 hours as needed       rosuvastatin (CRESTOR) 10 MG tablet TAKE 1 TABLET BY MOUTH EVERY DAY (Patient taking differently: Take 10 mg by mouth At Bedtime ) 90 tablet 3     VITAMIN B-6 50 MG OR TABS 1 daily (Patient taking differently: Take 50 mg by mouth daily ) 1 bottle 1 year     ALLERGY:  Allergies   Allergen Reactions     Bactrim      Acute renal failure and hyperkalemia     Ace Inhibitors Other (See Comments)     Hyperkalemia       Atorvastatin Calcium Itching     ELEVATED LFTS     Crestor [Rosuvastatin Calcium] Other (See Comments)     Elevated LFTS   In high doses     Penicillins Hives     Sulfamethoxazole-Trimethoprim Other (See Comments)     Kidney shutdown     Ciprofloxacin Itching and Rash     Allergy to IV form only, Has taken oral without problems     IMMUNIZATION HISTORY:  Immunization History   Administered Date(s) Administered     FLU 6-35 months 09/17/2010, 09/12/2011     Influenza (H1N1) 12/21/2009     Influenza (High Dose) 3 valent vaccine  10/07/2013, 09/26/2014, 10/06/2015, 10/13/2016, 09/28/2017, 09/26/2018, 09/11/2019     Influenza (IIV3) PF 11/01/2003, 10/12/2004, 10/24/2005, 11/06/2006, 10/17/2007, 09/28/2008, 09/23/2009, 09/17/2010, 09/12/2011     Influenza, Quad, High Dose, Pf, 65yr + 09/25/2020     Pneumo Conj 13-V (2010&after) 10/06/2015     Pneumococcal 23 valent 12/19/2003, 03/09/2009     TD (ADULT, 7+) 05/04/1998, 09/29/2008     Zoster vaccine recombinant adjuvanted (SHINGRIX) 07/25/2019, 10/25/2019     Zoster vaccine, live 03/19/2012     SOCIAL HISTORY:  Patient  reports that she has never smoked. She has never used smokeless tobacco. She reports that she does not drink alcohol or use drugs.  , lives with her , has 3 children    FAMILY HISTORY:  Patient's family history includes Breast Cancer in her sister; Cancer in her father and mother; Depression in her sister; Heart Disease in her maternal grandfather and maternal grandmother.    Labs reviewed in EPIC    OBJECTIVE:                                                    GENERAL: healthy, alert and no distress  EYES: Eyes grossly normal to inspection, and conjunctivae and sclerae normal  NECK: supple  RESP: normal breathing. no shortness of breath , no cough  SKIN: no suspicious lesions or rashes  NEURO: mentation intact and speech normal  PSYCH: mentation appears normal, affect normal       ASSESSMENT AND PLAN:                                                      1. Recurrent UTIs since 2006, mainly E.coli and now with ESBL isolated on 10/1/2020, 10/5 and 10/24/20  - symptoms: pain in bilateral flank but mostly lower abdominal pain, dysuria (cystitis +/- pyelonephritis)   - ddx - post menopausal vaginitis and ? candidiasis   - currently on 3rd round of Ertapenem ( completed 7 days of Ertapenem and restarted 3 days later for another 7 days and restarted about 3 days later for another 10 days) . symptoms resolved while on Ertapenem   - no history of renal stones   - previous  cystocopy was normal per Urologist ( last seen in 2010)   - resistance developed due to frequent use of anotibiotics   - renal us- normal (11/17/20)    2. Post menopausal vaginitis and weak bladder   - was on vaginal estrogen but stopped taking it many years ago     3. Diabetes mellitus with HbA1c 7.2 on 6/25/20   - repeat HbA1c 6.6 on 11/17/20  - not on medications     4. Chronic kidney disease stage 4    Plan/Recommendations:  - Unclear why she keeps getting UTI, mainly with E.coli and has since developed ESBL ( but the last time 11/9/20 - E.coli is pansenstive)   - she is scheduled to get MRI with Urogram 11/27/20  - try vaginal estrogen   - monitor for perivaginal candidiasis   - discussed potential side effects of antibiotics - especially C.difficile colitis, developement of resistance   - probiotic   - referral to Urology - pending   - control hyperglycemia     video F/U w me on Dec 11 at 8 am     Thank You for allowing me to participate in the care of this patient    Blayne Mclean MD, M.Med.Sc  Division of Infectious Diseases and International Medicine  [facility]

## 2020-11-18 NOTE — TELEPHONE ENCOUNTER
----- Message from Blayne Mclean MD sent at 11/18/2020  1:24 PM CST -----  Regarding: ertapenem - 10days  Melody,     Please call [facility] that we can stop Ertapenem today (10 days) and remove peripheral IV . she will be going there soon.     Thanks   Cantu

## 2020-11-27 ENCOUNTER — HOSPITAL ENCOUNTER (OUTPATIENT)
Dept: MRI IMAGING | Facility: CLINIC | Age: 80
Discharge: HOME | End: 2020-11-27
Attending: FAMILY MEDICINE | Admitting: FAMILY MEDICINE
Payer: COMMERCIAL

## 2020-11-27 DIAGNOSIS — Z16.12 ESBL (EXTENDED SPECTRUM BETA-LACTAMASE) PRODUCING BACTERIA INFECTION: ICD-10-CM

## 2020-11-27 DIAGNOSIS — N39.0 URINARY TRACT INFECTION WITHOUT HEMATURIA, SITE UNSPECIFIED: ICD-10-CM

## 2020-11-27 DIAGNOSIS — N39.0 RECURRENT UTI: ICD-10-CM

## 2020-11-27 DIAGNOSIS — A49.9 ESBL (EXTENDED SPECTRUM BETA-LACTAMASE) PRODUCING BACTERIA INFECTION: ICD-10-CM

## 2020-11-27 PROCEDURE — 74181 MRI ABDOMEN W/O CONTRAST: CPT | Mod: 26 | Performed by: RADIOLOGY

## 2020-11-27 PROCEDURE — 74181 MRI ABDOMEN W/O CONTRAST: CPT

## 2020-12-06 DIAGNOSIS — E03.9 ACQUIRED HYPOTHYROIDISM: Chronic | ICD-10-CM

## 2020-12-07 RX ORDER — LEVOTHYROXINE SODIUM 50 UG/1
TABLET ORAL
Qty: 48 TABLET | Refills: 1 | Status: SHIPPED | OUTPATIENT
Start: 2020-12-07 | End: 2021-05-24

## 2020-12-07 NOTE — TELEPHONE ENCOUNTER
"Requested Prescriptions   Pending Prescriptions Disp Refills     levothyroxine (SYNTHROID/LEVOTHROID) 50 MCG tablet [Pharmacy Med Name: LEVOTHYROXINE 50 MCG TABLET] 48 tablet 1     Sig: TAKE ON TUESDAYS, THURSDAYS, SATURDAYS, AND SUNDAYS       Thyroid Protocol Passed - 12/6/2020  7:32 AM        Passed - Patient is 12 years or older        Passed - Recent (12 mo) or future (30 days) visit within the authorizing provider's specialty     Patient has had an office visit with the authorizing provider or a provider within the authorizing providers department within the previous 12 mos or has a future within next 30 days. See \"Patient Info\" tab in inbasket, or \"Choose Columns\" in Meds & Orders section of the refill encounter.              Passed - Medication is active on med list        Passed - Normal TSH on file in past 12 months     Recent Labs   Lab Test 06/25/20  0722   TSH 0.90              Passed - No active pregnancy on record     If patient is pregnant or has had a positive pregnancy test, please check TSH.          Passed - No positive pregnancy test in past 12 months     If patient is pregnant or has had a positive pregnancy test, please check TSH.               "

## 2020-12-09 ENCOUNTER — OFFICE VISIT (OUTPATIENT)
Dept: UROLOGY | Facility: CLINIC | Age: 80
End: 2020-12-09
Attending: INTERNAL MEDICINE
Payer: COMMERCIAL

## 2020-12-09 DIAGNOSIS — N39.0 RECURRENT UTI: ICD-10-CM

## 2020-12-09 LAB
ALBUMIN UR-MCNC: NEGATIVE MG/DL
APPEARANCE UR: CLEAR
BILIRUB UR QL STRIP: NEGATIVE
COLOR UR AUTO: YELLOW
GLUCOSE UR STRIP-MCNC: NEGATIVE MG/DL
HGB UR QL STRIP: NEGATIVE
KETONES UR STRIP-MCNC: NEGATIVE MG/DL
LEUKOCYTE ESTERASE UR QL STRIP: NEGATIVE
NITRATE UR QL: NEGATIVE
PH UR STRIP: 5.5 PH (ref 5–7)
SOURCE: NORMAL
SP GR UR STRIP: 1.01 (ref 1–1.03)
UROBILINOGEN UR STRIP-ACNC: 0.2 EU/DL (ref 0.2–1)

## 2020-12-09 PROCEDURE — 81003 URINALYSIS AUTO W/O SCOPE: CPT | Performed by: UROLOGY

## 2020-12-09 PROCEDURE — 99205 OFFICE O/P NEW HI 60 MIN: CPT | Mod: 25 | Performed by: UROLOGY

## 2020-12-09 PROCEDURE — 52000 CYSTOURETHROSCOPY: CPT | Performed by: UROLOGY

## 2020-12-09 NOTE — PROGRESS NOTES
"Lupe Lepe is a 80 year old female seen in consultation for UTI. Consult from Li Horton.      (See below for urines)    Pt states that UTI's date back \"a really long time.\" Became much more of an issue about 15 yrs ago after she underwent renal artery angioplasty. Was seen by  at that point; negative eval including imaging and cysto, no rx recommended.    Now with significant UTI's this year.    Today denies dysuria, gross hematuria, frequency, significant incontinence, use of pads. Voids about q 2 hrs during the day and nocturia x 1.     Hx 3 vag deliveries. Denies constipation.    On local HRT in the remote past; none x 15 years. Prescribed Estrace a week ago but hasn't filled the script.     Drinks 6 [place] per day, 1 coffee. Retired teacher.        Past Medical History:   Diagnosis Date     Abdominal cramps 10/16/2008    Episodic severe cramping associated with emesis since 2007. GI evaluation 10/08. [facility] evaluation 12/08. 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 Rx jeanne-pac for H. 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 1 year follow-up recommended. CT 5/11-  previously seen cystic structure in pancreas is no longer seen, mass in left kideney is unchanged from prior [facility] study. This suggests a nonaggressive process andargues against even a low grade liposarcoma     Arthritis      Closed fracture of unspecified part of fibula     1997     Heart disease      Hemorrhage of rectum and anus     negative colonoscopy 2000, food poisoning     HERPES ZOSTER NOS 5/23/2007    left  flank 5/07     Hypertension      HYPOSMIA     negative MRI head 1999, now resolved     IRON DEFIC ANEMIA NOS 3/27/2006    Microcytic. B12, folate normal. Retic count 1.8,  %FE 6, Ferritin 163. OBT negativex3. BM biopsy negative 4/06 with no stainable iron stores. EGD 5/06- mild esophagitis. " Colonoscopy 5/06- normal. Resolved with iron, treatment of temporal arteritis.     JOINT EFFUSION-L/LEG 4/7/2006    Right knee. Mri 4/06-   Extensive tears of both the medial and lateral menisci.  Moderate chondromalacia patella with mild chondromalacia elsewhere in the knee. Moderate-sized joint effusion. No other abnormal mass is identified.      Other closed fractures of distal end of radius (alone)     1996     Polymyalgia rheumatica (H)     History of steroid reponsive myalgias and moderately elevated ESR 1999, 2000, 2004     Pyelonephritis 10/6/2020     Thyroid disease        Past Surgical History:   Procedure Laterality Date     COLONOSCOPY  8/25/2008     ESOPHAGOSCOPY, GASTROSCOPY, DUODENOSCOPY (EGD), COMBINED N/A 11/16/2017    Procedure: COMBINED ESOPHAGOSCOPY, GASTROSCOPY, DUODENOSCOPY (EGD);  Gastroscopy;  Surgeon: Angel Guillen MD;  Location: [facility]     HC COLONOSCOPY THRU STOMA, DIAGNOSTIC  2000, 5/06, 8/08    Normal     HYSTERECTOMY, SIS  1970's     PHACOEMULSIFICATION WITH STANDARD INTRAOCULAR LENS IMPLANT Right 1/8/2018    Procedure: PHACOEMULSIFICATION WITH STANDARD INTRAOCULAR LENS IMPLANT;  Right Cataract Removal with Implant;  Surgeon: Sekou Dobbins MD;  Location: [facility]     PHACOEMULSIFICATION WITH STANDARD INTRAOCULAR LENS IMPLANT Left 1/29/2018    Procedure: PHACOEMULSIFICATION WITH STANDARD INTRAOCULAR LENS IMPLANT;  Left Cataract Removal with Implant;  Surgeon: Sekou Dobbins MD;  Location: [facility]     SURGICAL HISTORY OF -   1948    SIS, for fibroids     SURGICAL HISTORY OF -   12/16/2008    Esophagogastroduodenoscopy with biopsy     TONSILLECTOMY      Tonsilectomy       Social History     Socioeconomic History     Marital status:      Spouse name: Not on file     Number of children: Not on file     Years of education: Not on file     Highest education level: Not on file   Occupational History     Not on file   Social Needs     Financial resource strain: Not on file      Food insecurity     Worry: Not on file     Inability: Not on file     Transportation needs     Medical: Not on file     Non-medical: Not on file   Tobacco Use     Smoking status: Never Smoker     Smokeless tobacco: Never Used   Substance and Sexual Activity     Alcohol use: No     Drug use: No     Sexual activity: Yes     Partners: Male   Lifestyle     Physical activity     Days per week: Not on file     Minutes per session: Not on file     Stress: Not on file   Relationships     Social connections     Talks on phone: Not on file     Gets together: Not on file     Attends [employer] service: Not on file     Active member of club or organization: Not on file     Attends meetings of clubs or organizations: Not on file     Relationship status: Not on file     Intimate partner violence     Fear of current or ex partner: Not on file     Emotionally abused: Not on file     Physically abused: Not on file     Forced sexual activity: Not on file   Other Topics Concern     Parent/sibling w/ CABG, MI or angioplasty before 65F 55M? No   Social History Narrative     Not on file       Current Outpatient Medications   Medication Sig Dispense Refill     amLODIPine (NORVASC) 10 MG tablet TAKE 1 TABLET (10 MG) BY MOUTH DAILY 90 tablet 3     ASPIRIN 325 MG OR TBEC 1 TABLET BY MOUTH DAILY 100 3     chlorthalidone (HYGROTON) 25 MG tablet TAKE 1 TABLET BY MOUTH EVERY DAY 90 tablet 3     cholecalciferol (D-VI-SOL,VITAMIN D3) 400 units/mL (10 mcg/mL) LIQD liquid Take 400 Units by mouth daily       cloNIDine (CATAPRES) 0.1 MG tablet ONE Twice DAILY as needed for SBP>180, diastolic BP>110 10 tablet 3     estradiol (ESTRACE) 0.1 MG/GM vaginal cream Place 2 g vaginally three times a week for 10 doses 20 g 0     levothyroxine (SYNTHROID/LEVOTHROID) 50 MCG tablet TAKE ON TUESDAYS, THURSDAYS, SATURDAYS, AND SUNDAYS 48 tablet 1     levothyroxine (SYNTHROID/LEVOTHROID) 75 MCG tablet TAKE 1 TABLET BY MOUTH ON SUNDAYS, MONDAYS, WEDNESDAYS AND  FRIDAYS (Patient taking differently: Take 75 mcg by mouth TAKE 1 TABLET in the morning ON MONDAYS, WEDNESDAYS AND FRIDAYS) 48 tablet 3     lisinopril (ZESTRIL) 10 MG tablet TAKE 1 TABLET (10 MG) BY MOUTH 2 TIMES DAILY 180 tablet 3     MULTIVITAMINS OR TABS 1 TABLET BY MOUTH DAILY  0     omeprazole (PRILOSEC) 20 MG DR capsule TAKE 1 CAPSULE BY MOUTH EVERY 3RD DAY AS NEEDED 30 capsule 4     ondansetron (ZOFRAN-ODT) 4 MG ODT tab Take 4 mg by mouth every 8 hours as needed       rosuvastatin (CRESTOR) 10 MG tablet TAKE 1 TABLET BY MOUTH EVERY DAY (Patient taking differently: Take 10 mg by mouth At Bedtime ) 90 tablet 3     VITAMIN B-6 50 MG OR TABS 1 daily (Patient taking differently: Take 50 mg by mouth daily ) 1 bottle 1 year       Physical Exam:    GENL: NAD.    ABD: Soft, non-tender, no masses.    EG: Very poorly-estrogenized, no masses.    VAGINA: Very poorly-estrogenized, no masses.    BN HYPERMOBILITY: None.    CYSTOCELE: None.    APICAL PROLAPSE: None.    RECTOCELE: None.    BIMANUAL: No mass or tenderness.    Cysto:    (Informed consent obtained. Pause for cause performed)   Sterile prep.    17 Fr scope inserted through urethra. Systematic examination w 70 degree lens.   PVR: 20 cc   MUCOSA: Normal without lesion; mild trabeculation   ORIFICES: Normal location and morphology   CAPACITY: 450 cc; no pain with filling   Scope withdrawn without untoward effect.    (Pt tolerated procedure without difficulty).        3/3/20     UC: E coli  5/8/20     UC: negative  6/25/20 UA: negative   9/25/20    UC: E coli  10/1/20    UC: E coli  10/5/20    UC: E coli  10/24/20    UC: E coli  11/3/20 UA: negative  11/9/20    UC: E coli  BUN/creat: 38/1.92  11/17/20 A1c: 6.6  11/17/20 Renal RASHID: benign    Today:  Results for orders placed or performed in visit on 12/09/20   UA reflex to Microscopic     Status: None   Result Value Ref Range    Color Urine Yellow     Appearance Urine Clear     Glucose Urine Negative NEG^Negative mg/dL     Bilirubin Urine Negative NEG^Negative    Ketones Urine Negative NEG^Negative mg/dL    Specific Gravity Urine 1.015 1.003 - 1.035    Blood Urine Negative NEG^Negative    pH Urine 5.5 5.0 - 7.0 pH    Protein Albumin Urine Negative NEG^Negative mg/dL    Urobilinogen Urine 0.2 0.2 - 1.0 EU/dL    Nitrite Urine Negative NEG^Negative    Leukocyte Esterase Urine Negative NEG^Negative    Source Midstream Urine          IMP:  1. Recurrent UTI, clear today  2. Atrophic vulvovaginitis, severe  3. Maturity      PLAN:  1. Discussed situation with patient in detail.  2. Consider topical HRT; discussed in detail rationale, mech of action, application, expectations, risk etc; pt expresses understanding, elects to proceed.  3. Set realistic expectations  4. RTC only PRN  5. 60 minutes spent with patient, more than 50% in counseling and coordination of care for UTI, which did not include time spent for the procedure.

## 2020-12-11 ENCOUNTER — VIRTUAL VISIT (OUTPATIENT)
Dept: INFECTIOUS DISEASES | Facility: CLINIC | Age: 80
End: 2020-12-11
Attending: INTERNAL MEDICINE
Payer: COMMERCIAL

## 2020-12-11 DIAGNOSIS — N95.2 VAGINAL ATROPHY: ICD-10-CM

## 2020-12-11 DIAGNOSIS — N95.2 POSTMENOPAUSAL ATROPHIC VAGINITIS: ICD-10-CM

## 2020-12-11 DIAGNOSIS — N39.0 RECURRENT UTI: Primary | ICD-10-CM

## 2020-12-11 PROCEDURE — 99213 OFFICE O/P EST LOW 20 MIN: CPT | Mod: 95 | Performed by: INTERNAL MEDICINE

## 2020-12-11 ASSESSMENT — PAIN SCALES - GENERAL: PAINLEVEL: NO PAIN (0)

## 2020-12-11 NOTE — LETTER
"12/11/2020       RE: Lupe Lepe  [address] [facility] [id number]     Dear Colleague,    Thank you for referring your patient, Lupe Lepe, to the [facility] DISE[facility] [place] [facility]. Please see a copy of my visit note below.    Lupe Lepe is a 80 year old female who is being evaluated via a billable video visit.      The patient has been notified of following:     \"This video visit will be conducted via a call between you and your physician/provider. We have found that certain health care needs can be provided without the need for an in-person physical exam.  This service lets us provide the care you need with a video conversation.  If a prescription is necessary we can send it directly to your pharmacy.  If lab work is needed we can place an order for that and you can then stop by our lab to have the test done at a later time.    Video visits are billed at different rates depending on your insurance coverage.  Please reach out to your insurance provider with any questions.    If during the course of the call the physician/provider feels a video visit is not appropriate, you will not be charged for this service.\"    Patient has given verbal consent for Video visit? Yes  How would you like to obtain your AVS? MyChart  If you are dropped from the video visit, the video invite should be resent to: Send to e-mail at: [email or web address]  Will anyone else be joining your video visit? No        Video-Visit Details    Type of service:  Video Visit    Video Time: 12 min     Originating Location (pt. Location): Home    Distant Location (provider location):  [facility] DIS[facility] CLIN[facility] [place]     Platform used for Video Visit: [employer]    INFECTIOUS DISEASES PROGRESS NOTE    Consult requested by: Dr Richard Cornejo   Reason for Consult : ESBL infection   Date of Consult: 11/11/20     HISTORY OF PRESENT " "ILLNESS:                                                    Lupe Lepe is a 80 year old female with history of post menopausal vaginitis, hypertension, hyperlipidemia, giant cell arteritis, renal artery stenosis, chronic kidney disease, GERD, diabetes mellitus not on medications with HbA1c 7.2 on 6/25/20 , hypothyroidism, recurrent UTIs mostly with E.coli since 2006 , now with ESBL E.coli isolated  10/1/2020, 10/5 and 10/24/20. She was treated with antibiotics and is currently on 3rd round of Ertapenem for ESBL, via peripheral IV (3/10 days). Her symptoms involved bilateral flank pain, worse on the left side, dysuria and little \"shocks\" after urination. no fever, chills, nausea, vomiting, diarrhea. Pain resolved with Ertapenem and recently completed 7 days of treatment. 3 days later, she had dysuria and another \"blast\" and retreated with Ertapenem x 7 days. 3 days later, she had similar symptoms and on 3rd round of Ertapenem ( 10 days) .     She was seen by urology and the last time was in 2010. She had normal cystocopy at that time and was on vaginal estrogens with periodic bactrim.  She had stopped vaginal estrogens. she does not wear diaper and adheres to clean hygiene.      Video follow-up on 11/18/20  feels better. on day 10 of Ertapenem. recently had \"some signal\" or pain below. no diarrhea. no back pain. no nausea/vomiting/fever,chills. flank pain.  tolerates Ertapenem. PIV in place. discussed lab results and Urine cx, this time E.coli is pan sensitive     video f/u 12/11/2020  completed Ertapenem on 11/18/20. she feels well no urinary symptoms. saw Urologist on 12/9/20 and repeat UA was negative. She had not started topical vaginal estrase cream I prescribed on 11/18/20 . She has severe atrophic vulvovaginitis and has since started using the estrace cream. No diarrhea. she is doing well.     Imaging:  Ct abdomen pelvis wo contrast 8/15/2016  Abdomen: A fatty mass in the renal sinus of the left kidney " is again demonstrated. It measures 4.0 x 2.4 x 3.0 cm. Comparable measurements  on the prior study are 3.8 x 2.2 x 3.2 cm. Therefore, there has been no significant change. There is no urolithiasis or hydronephrosis. The  right kidney measures approximately 8.5 cm in length. The left kidney measures approximately 9 cm in length. There is stable mild scarring  in each kidney. A hiatal hernia is again seen.   Pelvis : No significant abnormality is demonstrated.                                                                   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    Renal US doppler 8/24/2016  IMPRESSION: Echogenic kidneys with increased resistance indices would be most consistent with chronic intrinsic medical renal disease. Per  sonographic velocity criteria, no evidence for significant renal artery stenosis.     MRI urogram 11/27/20  IMPRESSION:   1. Essentially normal noncontrast MR urogram without definite filling defects, however the distal ureters are not well visualized.  2. 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 No evidence of renal mass.  3. Innumerable pancreatic cystic foci likely represent dilated side branch ducts secondary to chronic pancreatitis given the pancreatic  calcification seen on prior CT    PAST MEDICAL HISTORY:  Patient  has a past medical history of Abdominal cramps (10/16/2008), Abnormal CT scan (1/12/2009), Arthritis, Closed fracture of unspecified part of fibula, Heart disease, Hemorrhage of rectum and anus, HERPES ZOSTER NOS (5/23/2007), Hypertension, HYPOSMIA, IRON DEFIC ANEMIA NOS (3/27/2006), JOINT EFFUSION-L/LEG (4/7/2006), Other closed fractures of distal end of radius (alone), Polymyalgia rheumatica (H), Pyelonephritis (10/6/2020), and Thyroid disease. She also has no past medical history of Cancer (H), Complication of anesthesia, Difficult intubation, History of blood transfusion, Malignant hyperthermia, or Uncomplicated  asthma.    PAST SURGICAL HISTORY:  Patient  has a past surgical history that includes tonsillectomy; surgical history of -  (1948); COLONOSCOPY THRU STOMA, DIAGNOSTIC (2000, 5/06, 8/08); colonoscopy (8/25/2008); hysterectomy, honorio (1970's); surgical history of -  (12/16/2008); Esophagoscopy, gastroscopy, duodenoscopy (EGD), combined (N/A, 11/16/2017); Phacoemulsification with standard intraocular lens implant (Right, 1/8/2018); and Phacoemulsification with standard intraocular lens implant (Left, 1/29/2018).    CURRENT MEDICATIONS:  Current Outpatient Medications   Medication Sig Dispense Refill     amLODIPine (NORVASC) 10 MG tablet TAKE 1 TABLET (10 MG) BY MOUTH DAILY 90 tablet 3     ASPIRIN 325 MG OR TBEC 1 TABLET BY MOUTH DAILY 100 3     chlorthalidone (HYGROTON) 25 MG tablet TAKE 1 TABLET BY MOUTH EVERY DAY 90 tablet 3     cholecalciferol (D-VI-SOL,VITAMIN D3) 400 units/mL (10 mcg/mL) LIQD liquid Take 400 Units by mouth daily       cloNIDine (CATAPRES) 0.1 MG tablet ONE Twice DAILY as needed for SBP>180, diastolic BP>110 10 tablet 3     levothyroxine (SYNTHROID/LEVOTHROID) 50 MCG tablet TAKE ON TUESDAYS, THURSDAYS, SATURDAYS, AND SUNDAYS 48 tablet 1     levothyroxine (SYNTHROID/LEVOTHROID) 75 MCG tablet TAKE 1 TABLET BY MOUTH ON SUNDAYS, MONDAYS, WEDNESDAYS AND FRIDAYS (Patient taking differently: Take 75 mcg by mouth TAKE 1 TABLET in the morning ON MONDAYS, WEDNESDAYS AND FRIDAYS) 48 tablet 3     lisinopril (ZESTRIL) 10 MG tablet TAKE 1 TABLET (10 MG) BY MOUTH 2 TIMES DAILY 180 tablet 3     MULTIVITAMINS OR TABS 1 TABLET BY MOUTH DAILY  0     omeprazole (PRILOSEC) 20 MG DR capsule TAKE 1 CAPSULE BY MOUTH EVERY 3RD DAY AS NEEDED 30 capsule 4     ondansetron (ZOFRAN-ODT) 4 MG ODT tab Take 4 mg by mouth every 8 hours as needed       rosuvastatin (CRESTOR) 10 MG tablet TAKE 1 TABLET BY MOUTH EVERY DAY (Patient taking differently: Take 10 mg by mouth At Bedtime ) 90 tablet 3     VITAMIN B-6 50 MG OR TABS 1 daily  (Patient taking differently: Take 50 mg by mouth daily ) 1 bottle 1 year     ALLERGY:  Allergies   Allergen Reactions     Bactrim      Acute renal failure and hyperkalemia     Ace Inhibitors Other (See Comments)     Hyperkalemia       Atorvastatin Calcium Itching     ELEVATED LFTS     Crestor [Rosuvastatin Calcium] Other (See Comments)     Elevated LFTS   In high doses     Penicillins Hives     Sulfamethoxazole-Trimethoprim Other (See Comments)     Kidney shutdown     Ciprofloxacin Itching and Rash     Allergy to IV form only, Has taken oral without problems     IMMUNIZATION HISTORY:  Immunization History   Administered Date(s) Administered     FLU 6-35 months 09/17/2010, 09/12/2011     Influenza (H1N1) 12/21/2009     Influenza (High Dose) 3 valent vaccine 10/07/2013, 09/26/2014, 10/06/2015, 10/13/2016, 09/28/2017, 09/26/2018, 09/11/2019     Influenza (IIV3) PF 11/01/2003, 10/12/2004, 10/24/2005, 11/06/2006, 10/17/2007, 09/28/2008, 09/23/2009, 09/17/2010, 09/12/2011     Influenza, Quad, High Dose, Pf, 65yr + 09/25/2020     Pneumo Conj 13-V (2010&after) 10/06/2015     Pneumococcal 23 valent 12/19/2003, 03/09/2009     TD (ADULT, 7+) 05/04/1998, 09/29/2008     Zoster vaccine recombinant adjuvanted (SHINGRIX) 07/25/2019, 10/25/2019     Zoster vaccine, live 03/19/2012     SOCIAL HISTORY:  Patient  reports that she has never smoked. She has never used smokeless tobacco. She reports that she does not drink alcohol or use drugs.  , lives with her , has 3 children    FAMILY HISTORY:  Patient's family history includes Breast Cancer in her sister; Cancer in her father and mother; Depression in her sister; Heart Disease in her maternal grandfather and maternal grandmother.    Labs reviewed in EPIC    OBJECTIVE:                                                    GENERAL: healthy, alert and no distress  EYES: Eyes grossly normal to inspection, and conjunctivae and sclerae normal  NECK: supple  RESP: normal  breathing. no shortness of breath , no cough  NEURO: mentation intact and speech normal  PSYCH: mentation appears normal, affect normal       ASSESSMENT AND PLAN:                                                      1. Recurrent UTIs since 2006, mainly E.coli and now with ESBL isolated on 10/1/2020, 10/5 and 10/24/20  - symptoms: pain in bilateral flank but mostly lower abdominal pain, dysuria (cystitis +/- pyelonephritis)   - ddx - post menopausal atrophic vaginitis (severe)    - currently on 3rd round of Ertapenem ( completed 7 days of Ertapenem and restarted 3 days later for another 7 days and restarted about 3 days later for another 10 days) . symptoms resolved while on Ertapenem   - no history of renal stones   - previous cystocopy was normal per Urologist ( last seen in 2010)   - resistance developed due to frequent use of anotibiotics   - renal us- normal (11/17/20)    2. Post menopausal atrophic vaginitis and weak bladder   - was on vaginal estrogen but stopped taking it many years ago and restarted on 12/9/20.   - can use daily x 1 week then MWF     3. Diabetes mellitus with HbA1c 7.2 on 6/25/20   - repeat HbA1c 6.6 on 11/17/20  - not on medications     4. Chronic kidney disease stage 4    Plan/Recommendations:  - daily vaginal estrogen x week then MWF ( PCP may refill medication)   - monitor for perivaginal candidiasis   - discussed potential side effects of antibiotics - especially C.difficile colitis, developement of resistance   - probiotic   - control hyperglycemia     Follow-up PRN     Thank You for allowing me to participate in the care of this patient    Blayne Mclean MD, M.Med.Sc  Division of Infectious Diseases and International Medicine  [facility]

## 2020-12-11 NOTE — PROGRESS NOTES
"Lupe Lepe is a 80 year old female who is being evaluated via a billable video visit.      The patient has been notified of following:     \"This video visit will be conducted via a call between you and your physician/provider. We have found that certain health care needs can be provided without the need for an in-person physical exam.  This service lets us provide the care you need with a video conversation.  If a prescription is necessary we can send it directly to your pharmacy.  If lab work is needed we can place an order for that and you can then stop by our lab to have the test done at a later time.    Video visits are billed at different rates depending on your insurance coverage.  Please reach out to your insurance provider with any questions.    If during the course of the call the physician/provider feels a video visit is not appropriate, you will not be charged for this service.\"    Patient has given verbal consent for Video visit? Yes  How would you like to obtain your AVS? MyChart  If you are dropped from the video visit, the video invite should be resent to: Send to e-mail at: [email or web address]  Will anyone else be joining your video visit? No        Video-Visit Details    Type of service:  Video Visit    Video Time: 12 min     Originating Location (pt. Location): Home    Distant Location (provider location):  [facility] DISE[facility] [place]     Platform used for Video Visit: [employer]    INFECTIOUS DISEASES PROGRESS NOTE    Consult requested by: Dr Richard Cornejo   Reason for Consult : ESBL infection   Date of Consult: 11/11/20     HISTORY OF PRESENT ILLNESS:                                                    Lupe Lepe is a 80 year old female with history of post menopausal vaginitis, hypertension, hyperlipidemia, giant cell arteritis, renal artery stenosis, chronic kidney disease, GERD, diabetes mellitus not on medications with HbA1c 7.2 on 6/25/20 , hypothyroidism, " "recurrent UTIs mostly with E.coli since 2006 , now with ESBL E.coli isolated  10/1/2020, 10/5 and 10/24/20. She was treated with antibiotics and is currently on 3rd round of Ertapenem for ESBL, via peripheral IV (3/10 days). Her symptoms involved bilateral flank pain, worse on the left side, dysuria and little \"shocks\" after urination. no fever, chills, nausea, vomiting, diarrhea. Pain resolved with Ertapenem and recently completed 7 days of treatment. 3 days later, she had dysuria and another \"blast\" and retreated with Ertapenem x 7 days. 3 days later, she had similar symptoms and on 3rd round of Ertapenem ( 10 days) .     She was seen by urology and the last time was in 2010. She had normal cystocopy at that time and was on vaginal estrogens with periodic bactrim.  She had stopped vaginal estrogens. she does not wear diaper and adheres to clean hygiene.      Video follow-up on 11/18/20  feels better. on day 10 of Ertapenem. recently had \"some signal\" or pain below. no diarrhea. no back pain. no nausea/vomiting/fever,chills. flank pain.  tolerates Ertapenem. PIV in place. discussed lab results and Urine cx, this time E.coli is pan sensitive     video f/u 12/11/2020  completed Ertapenem on 11/18/20. she feels well no urinary symptoms. saw Urologist on 12/9/20 and repeat UA was negative. She had not started topical vaginal estrase cream I prescribed on 11/18/20 . She has severe atrophic vulvovaginitis and has since started using the estrace cream. No diarrhea. she is doing well.     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 It measures 4.0 x 2.4 x 3.0 cm. Comparable measurements  on the prior study are 3.8 x 2.2 x 3.2 cm. Therefore, there has been no significant change. There is no urolithiasis or hydronephrosis. The  right kidney measures approximately 8.5 cm in length. The left kidney measures approximately 9 cm in length. " There is stable mild scarring  in each kidney. A hiatal hernia is again seen.   Pelvis : No significant abnormality is demonstrated.                                                                   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    Renal US doppler 8/24/2016  IMPRESSION: Echogenic kidneys with increased resistance indices would be most consistent with chronic intrinsic medical renal disease. Per  sonographic velocity criteria, no evidence for significant renal artery stenosis.     MRI urogram 11/27/20  IMPRESSION:   1. Essentially normal noncontrast MR urogram without definite filling defects, however the distal ureters are not well visualized.  2. 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 No evidence of renal mass.  3. Innumerable pancreatic cystic foci likely represent dilated side branch ducts secondary to chronic pancreatitis given the pancreatic  calcification seen on prior CT    PAST MEDICAL HISTORY:  Patient  has a past medical history of Abdominal cramps (10/16/2008), Abnormal CT scan (1/12/2009), Arthritis, Closed fracture of unspecified part of fibula, Heart disease, Hemorrhage of rectum and anus, HERPES ZOSTER NOS (5/23/2007), Hypertension, HYPOSMIA, IRON DEFIC ANEMIA NOS (3/27/2006), JOINT EFFUSION-L/LEG (4/7/2006), Other closed fractures of distal end of radius (alone), Polymyalgia rheumatica (H), Pyelonephritis (10/6/2020), and Thyroid disease. She also has no past medical history of Cancer (H), Complication of anesthesia, Difficult intubation, History of blood transfusion, Malignant hyperthermia, or Uncomplicated asthma.    PAST SURGICAL HISTORY:  Patient  has a past surgical history that includes tonsillectomy; surgical history of -  (1948); COLONOSCOPY THRU STOMA, DIAGNOSTIC (2000, 5/06, 8/08); colonoscopy (8/25/2008); hysterectomy, honorio (1970's); surgical history of -  (12/16/2008); Esophagoscopy, gastroscopy, duodenoscopy (EGD), combined  (N/A, 11/16/2017); Phacoemulsification with standard intraocular lens implant (Right, 1/8/2018); and Phacoemulsification with standard intraocular lens implant (Left, 1/29/2018).    CURRENT MEDICATIONS:  Current Outpatient Medications   Medication Sig Dispense Refill     amLODIPine (NORVASC) 10 MG tablet TAKE 1 TABLET (10 MG) BY MOUTH DAILY 90 tablet 3     ASPIRIN 325 MG OR TBEC 1 TABLET BY MOUTH DAILY 100 3     chlorthalidone (HYGROTON) 25 MG tablet TAKE 1 TABLET BY MOUTH EVERY DAY 90 tablet 3     cholecalciferol (D-VI-SOL,VITAMIN D3) 400 units/mL (10 mcg/mL) LIQD liquid Take 400 Units by mouth daily       cloNIDine (CATAPRES) 0.1 MG tablet ONE Twice DAILY as needed for SBP>180, diastolic BP>110 10 tablet 3     levothyroxine (SYNTHROID/LEVOTHROID) 50 MCG tablet TAKE ON TUESDAYS, THURSDAYS, SATURDAYS, AND SUNDAYS 48 tablet 1     levothyroxine (SYNTHROID/LEVOTHROID) 75 MCG tablet TAKE 1 TABLET BY MOUTH ON SUNDAYS, MONDAYS, WEDNESDAYS AND FRIDAYS (Patient taking differently: Take 75 mcg by mouth TAKE 1 TABLET in the morning ON MONDAYS, WEDNESDAYS AND FRIDAYS) 48 tablet 3     lisinopril (ZESTRIL) 10 MG tablet TAKE 1 TABLET (10 MG) BY MOUTH 2 TIMES DAILY 180 tablet 3     MULTIVITAMINS OR TABS 1 TABLET BY MOUTH DAILY  0     omeprazole (PRILOSEC) 20 MG DR capsule TAKE 1 CAPSULE BY MOUTH EVERY 3RD DAY AS NEEDED 30 capsule 4     ondansetron (ZOFRAN-ODT) 4 MG ODT tab Take 4 mg by mouth every 8 hours as needed       rosuvastatin (CRESTOR) 10 MG tablet TAKE 1 TABLET BY MOUTH EVERY DAY (Patient taking differently: Take 10 mg by mouth At Bedtime ) 90 tablet 3     VITAMIN B-6 50 MG OR TABS 1 daily (Patient taking differently: Take 50 mg by mouth daily ) 1 bottle 1 year     ALLERGY:  Allergies   Allergen Reactions     Bactrim      Acute renal failure and hyperkalemia     Ace Inhibitors Other (See Comments)     Hyperkalemia       Atorvastatin Calcium Itching     ELEVATED LFTS     Crestor [Rosuvastatin Calcium] Other (See Comments)      Elevated LFTS   In high doses     Penicillins Hives     Sulfamethoxazole-Trimethoprim Other (See Comments)     Kidney shutdown     Ciprofloxacin Itching and Rash     Allergy to IV form only, Has taken oral without problems     IMMUNIZATION HISTORY:  Immunization History   Administered Date(s) Administered     FLU 6-35 months 09/17/2010, 09/12/2011     Influenza (H1N1) 12/21/2009     Influenza (High Dose) 3 valent vaccine 10/07/2013, 09/26/2014, 10/06/2015, 10/13/2016, 09/28/2017, 09/26/2018, 09/11/2019     Influenza (IIV3) PF 11/01/2003, 10/12/2004, 10/24/2005, 11/06/2006, 10/17/2007, 09/28/2008, 09/23/2009, 09/17/2010, 09/12/2011     Influenza, Quad, High Dose, Pf, 65yr + 09/25/2020     Pneumo Conj 13-V (2010&after) 10/06/2015     Pneumococcal 23 valent 12/19/2003, 03/09/2009     TD (ADULT, 7+) 05/04/1998, 09/29/2008     Zoster vaccine recombinant adjuvanted (SHINGRIX) 07/25/2019, 10/25/2019     Zoster vaccine, live 03/19/2012     SOCIAL HISTORY:  Patient  reports that she has never smoked. She has never used smokeless tobacco. She reports that she does not drink alcohol or use drugs.  , lives with her , has 3 children    FAMILY HISTORY:  Patient's family history includes Breast Cancer in her sister; Cancer in her father and mother; Depression in her sister; Heart Disease in her maternal grandfather and maternal grandmother.    Labs reviewed in EPIC    OBJECTIVE:                                                    GENERAL: healthy, alert and no distress  EYES: Eyes grossly normal to inspection, and conjunctivae and sclerae normal  NECK: supple  RESP: normal breathing. no shortness of breath , no cough  NEURO: mentation intact and speech normal  PSYCH: mentation appears normal, affect normal       ASSESSMENT AND PLAN:                                                      1. Recurrent UTIs since 2006, mainly E.coli and now with ESBL isolated on 10/1/2020, 10/5 and 10/24/20  - symptoms: pain in  bilateral flank but mostly lower abdominal pain, dysuria (cystitis +/- pyelonephritis)   - ddx - post menopausal atrophic vaginitis (severe)    - currently on 3rd round of Ertapenem ( completed 7 days of Ertapenem and restarted 3 days later for another 7 days and restarted about 3 days later for another 10 days) . symptoms resolved while on Ertapenem   - no history of renal stones   - previous cystocopy was normal per Urologist ( last seen in 2010)   - resistance developed due to frequent use of anotibiotics   - renal us- normal (11/17/20)    2. Post menopausal atrophic vaginitis and weak bladder   - was on vaginal estrogen but stopped taking it many years ago and restarted on 12/9/20.   - can use daily x 1 week then MWF     3. Diabetes mellitus with HbA1c 7.2 on 6/25/20   - repeat HbA1c 6.6 on 11/17/20  - not on medications     4. Chronic kidney disease stage 4    Plan/Recommendations:  - daily vaginal estrogen x week then MWF ( PCP may refill medication)   - monitor for perivaginal candidiasis   - discussed potential side effects of antibiotics - especially C.difficile colitis, developement of resistance   - probiotic   - control hyperglycemia     Follow-up PRN     Thank You for allowing me to participate in the care of this patient    Blayne Mclean MD, M.Med.Sc  Division of Infectious Diseases and International Medicine  [facility]

## 2020-12-31 ENCOUNTER — TELEPHONE (OUTPATIENT)
Dept: INTERNAL MEDICINE | Facility: CLINIC | Age: 80
End: 2020-12-31

## 2020-12-31 ENCOUNTER — E-VISIT (OUTPATIENT)
Dept: FAMILY MEDICINE | Facility: CLINIC | Age: 80
End: 2020-12-31
Payer: COMMERCIAL

## 2020-12-31 ENCOUNTER — ALLIED HEALTH/NURSE VISIT (OUTPATIENT)
Dept: FAMILY MEDICINE | Facility: CLINIC | Age: 80
End: 2020-12-31
Payer: COMMERCIAL

## 2020-12-31 DIAGNOSIS — R50.9 FEVER AND CHILLS: ICD-10-CM

## 2020-12-31 DIAGNOSIS — K86.1 IDIOPATHIC CHRONIC PANCREATITIS (H): Primary | ICD-10-CM

## 2020-12-31 DIAGNOSIS — R82.90 NONSPECIFIC FINDING ON EXAMINATION OF URINE: Primary | ICD-10-CM

## 2020-12-31 DIAGNOSIS — R50.9 FEVER AND CHILLS: Primary | ICD-10-CM

## 2020-12-31 DIAGNOSIS — N30.00 ACUTE CYSTITIS WITHOUT HEMATURIA: ICD-10-CM

## 2020-12-31 LAB
ALBUMIN UR-MCNC: NEGATIVE MG/DL
APPEARANCE UR: ABNORMAL
BACTERIA #/AREA URNS HPF: ABNORMAL /HPF
BILIRUB UR QL STRIP: NEGATIVE
COLOR UR AUTO: YELLOW
GLUCOSE UR STRIP-MCNC: NEGATIVE MG/DL
HGB UR QL STRIP: NEGATIVE
KETONES UR STRIP-MCNC: NEGATIVE MG/DL
LEUKOCYTE ESTERASE UR QL STRIP: ABNORMAL
NITRATE UR QL: POSITIVE
NON-SQ EPI CELLS #/AREA URNS LPF: ABNORMAL /LPF
PH UR STRIP: 5 PH (ref 5–7)
RBC #/AREA URNS AUTO: ABNORMAL /HPF
SOURCE: ABNORMAL
SP GR UR STRIP: 1.02 (ref 1–1.03)
UROBILINOGEN UR STRIP-ACNC: 0.2 EU/DL (ref 0.2–1)
WBC #/AREA URNS AUTO: ABNORMAL /HPF

## 2020-12-31 PROCEDURE — 81001 URINALYSIS AUTO W/SCOPE: CPT | Performed by: INTERNAL MEDICINE

## 2020-12-31 PROCEDURE — U0003 INFECTIOUS AGENT DETECTION BY NUCLEIC ACID (DNA OR RNA); SEVERE ACUTE RESPIRATORY SYNDROME CORONAVIRUS 2 (SARS-COV-2) (CORONAVIRUS DISEASE [COVID-19]), AMPLIFIED PROBE TECHNIQUE, MAKING USE OF HIGH THROUGHPUT TECHNOLOGIES AS DESCRIBED BY CMS-2020-01-R: HCPCS | Performed by: INTERNAL MEDICINE

## 2020-12-31 PROCEDURE — 99207 PR NO CHARGE NURSE ONLY: CPT

## 2020-12-31 PROCEDURE — 87186 SC STD MICRODIL/AGAR DIL: CPT | Performed by: INTERNAL MEDICINE

## 2020-12-31 PROCEDURE — 87088 URINE BACTERIA CULTURE: CPT | Performed by: INTERNAL MEDICINE

## 2020-12-31 PROCEDURE — 87086 URINE CULTURE/COLONY COUNT: CPT | Performed by: INTERNAL MEDICINE

## 2020-12-31 PROCEDURE — 99422 OL DIG E/M SVC 11-20 MIN: CPT | Performed by: INTERNAL MEDICINE

## 2020-12-31 RX ORDER — GRANULES FOR ORAL 3 G/1
3 POWDER ORAL ONCE
Qty: 1 PACKET | Refills: 0 | Status: SHIPPED | OUTPATIENT
Start: 2020-12-31 | End: 2020-12-31

## 2020-12-31 NOTE — RESULT ENCOUNTER NOTE
"Discussed with patient    Here is what we talked about \"Advised patient to withhold her estrogen cream for the next week and monitor symptoms.  Do not start antibiotic at this time.  If her symptoms continue to improve then it is likely related to the estrogen and she should follow back up with urology.  If the symptoms do not improve, then I would recommend treating with antibiotic.  If the symptoms worsen, start the antibiotic. \""

## 2020-12-31 NOTE — TELEPHONE ENCOUNTER
Discussed UA with patient.  Suspicious for UTI. Urology saw her 12/9.  Urology started Estrace cream.  It causes pain with urination (throbbing) and increased frequency.    Using estrace 2 x week.  The dysuria and frequency are worst the day of administration, then improve, until she is due for next dose,t hen worsen again.     Has grown ESBL - needed multiple back to back courses of Ertapenem  Plan: Advised patient to withhold her estrogen cream for the next week and monitor symptoms.  Do not start antibiotic at this time.  If her symptoms continue to improve then it is likely related to the estrogen and she should follow back up with urology.  If the symptoms do not improve, then I would recommend treating with antibiotic.  If the symptoms worsen, start the antibiotic.    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  May have chronic pancreatitis.  Wants to see GI. Referral placed

## 2020-12-31 NOTE — PROGRESS NOTES
Chief Complaint   Patient presents with     Urine Collection     Patient did an E-visit, was needing to collect a UA.  Also had testing done for possible Covid.  She was walked to the bathroom upon arrival and collected the UA and then left.  Bathroom was cleaned by Housekeeping.

## 2021-01-01 ENCOUNTER — TELEPHONE (OUTPATIENT)
Dept: FAMILY MEDICINE | Facility: CLINIC | Age: 81
End: 2021-01-01

## 2021-01-01 ENCOUNTER — HEALTH MAINTENANCE LETTER (OUTPATIENT)
Age: 81
End: 2021-01-01

## 2021-01-01 ENCOUNTER — TELEPHONE (OUTPATIENT)
Dept: EMERGENCY MEDICINE | Facility: CLINIC | Age: 81
End: 2021-01-01

## 2021-01-01 ENCOUNTER — TELEPHONE (OUTPATIENT)
Dept: FAMILY MEDICINE | Facility: CLINIC | Age: 81
End: 2021-01-01
Payer: COMMERCIAL

## 2021-01-01 ENCOUNTER — LAB (OUTPATIENT)
Dept: LAB | Facility: CLINIC | Age: 81
End: 2021-01-01
Payer: COMMERCIAL

## 2021-01-01 ENCOUNTER — MEDICAL CORRESPONDENCE (OUTPATIENT)
Dept: HEALTH INFORMATION MANAGEMENT | Facility: CLINIC | Age: 81
End: 2021-01-01
Payer: COMMERCIAL

## 2021-01-01 ENCOUNTER — TRANSFERRED RECORDS (OUTPATIENT)
Dept: HEALTH INFORMATION MANAGEMENT | Facility: CLINIC | Age: 81
End: 2021-01-01

## 2021-01-01 ENCOUNTER — MYC MEDICAL ADVICE (OUTPATIENT)
Dept: EDUCATION SERVICES | Facility: CLINIC | Age: 81
End: 2021-01-01
Payer: COMMERCIAL

## 2021-01-01 ENCOUNTER — OFFICE VISIT (OUTPATIENT)
Dept: FAMILY MEDICINE | Facility: CLINIC | Age: 81
End: 2021-01-01
Payer: COMMERCIAL

## 2021-01-01 ENCOUNTER — TELEPHONE (OUTPATIENT)
Dept: EDUCATION SERVICES | Facility: CLINIC | Age: 81
End: 2021-01-01
Payer: COMMERCIAL

## 2021-01-01 ENCOUNTER — PATIENT OUTREACH (OUTPATIENT)
Dept: GASTROENTEROLOGY | Facility: CLINIC | Age: 81
End: 2021-01-01

## 2021-01-01 ENCOUNTER — HOSPITAL ENCOUNTER (EMERGENCY)
Facility: CLINIC | Age: 81
Discharge: HOME | End: 2021-09-22
Attending: EMERGENCY MEDICINE | Admitting: EMERGENCY MEDICINE
Payer: COMMERCIAL

## 2021-01-01 ENCOUNTER — IMMUNIZATION (OUTPATIENT)
Dept: FAMILY MEDICINE | Facility: CLINIC | Age: 81
End: 2021-01-01
Payer: COMMERCIAL

## 2021-01-01 ENCOUNTER — HOSPITAL ENCOUNTER (OUTPATIENT)
Dept: MRI IMAGING | Facility: CLINIC | Age: 81
Discharge: HOME | End: 2021-11-22
Admitting: INTERNAL MEDICINE
Payer: COMMERCIAL

## 2021-01-01 ENCOUNTER — VIRTUAL VISIT (OUTPATIENT)
Dept: EDUCATION SERVICES | Facility: CLINIC | Age: 81
End: 2021-01-01
Payer: COMMERCIAL

## 2021-01-01 ENCOUNTER — VIRTUAL VISIT (OUTPATIENT)
Dept: GASTROENTEROLOGY | Facility: CLINIC | Age: 81
End: 2021-01-01
Payer: COMMERCIAL

## 2021-01-01 ENCOUNTER — VIRTUAL VISIT (OUTPATIENT)
Dept: EDUCATION SERVICES | Facility: CLINIC | Age: 81
End: 2021-01-01
Attending: INTERNAL MEDICINE
Payer: COMMERCIAL

## 2021-01-01 ENCOUNTER — MYC MEDICAL ADVICE (OUTPATIENT)
Dept: FAMILY MEDICINE | Facility: CLINIC | Age: 81
End: 2021-01-01

## 2021-01-01 VITALS
TEMPERATURE: 98.2 F | RESPIRATION RATE: 16 BRPM | BODY MASS INDEX: 24.99 KG/M2 | SYSTOLIC BLOOD PRESSURE: 148 MMHG | DIASTOLIC BLOOD PRESSURE: 72 MMHG | WEIGHT: 150 LBS | OXYGEN SATURATION: 97 % | HEART RATE: 76 BPM | HEIGHT: 65 IN

## 2021-01-01 VITALS
DIASTOLIC BLOOD PRESSURE: 76 MMHG | TEMPERATURE: 97.4 F | SYSTOLIC BLOOD PRESSURE: 132 MMHG | HEART RATE: 84 BPM | WEIGHT: 149 LBS | BODY MASS INDEX: 24.79 KG/M2 | OXYGEN SATURATION: 97 % | RESPIRATION RATE: 14 BRPM

## 2021-01-01 DIAGNOSIS — K86.1 IDIOPATHIC CHRONIC PANCREATITIS (H): Primary | ICD-10-CM

## 2021-01-01 DIAGNOSIS — E11.22 TYPE 2 DIABETES MELLITUS WITH STAGE 4 CHRONIC KIDNEY DISEASE, WITHOUT LONG-TERM CURRENT USE OF INSULIN (H): Primary | ICD-10-CM

## 2021-01-01 DIAGNOSIS — N18.4 TYPE 2 DIABETES MELLITUS WITH STAGE 4 CHRONIC KIDNEY DISEASE, WITHOUT LONG-TERM CURRENT USE OF INSULIN (H): Primary | ICD-10-CM

## 2021-01-01 DIAGNOSIS — K86.1 IDIOPATHIC CHRONIC PANCREATITIS (H): ICD-10-CM

## 2021-01-01 DIAGNOSIS — Z23 HIGH PRIORITY FOR 2019-NCOV VACCINE: ICD-10-CM

## 2021-01-01 DIAGNOSIS — N18.4 TYPE 2 DIABETES MELLITUS WITH STAGE 4 CHRONIC KIDNEY DISEASE, WITHOUT LONG-TERM CURRENT USE OF INSULIN (H): ICD-10-CM

## 2021-01-01 DIAGNOSIS — K21.9 GASTROESOPHAGEAL REFLUX DISEASE WITHOUT ESOPHAGITIS: ICD-10-CM

## 2021-01-01 DIAGNOSIS — E11.22 TYPE 2 DIABETES MELLITUS WITH STAGE 4 CHRONIC KIDNEY DISEASE, WITHOUT LONG-TERM CURRENT USE OF INSULIN (H): ICD-10-CM

## 2021-01-01 DIAGNOSIS — N39.0 URINARY TRACT INFECTION WITHOUT HEMATURIA, SITE UNSPECIFIED: ICD-10-CM

## 2021-01-01 LAB
ALBUMIN UR-MCNC: NEGATIVE MG/DL
APPEARANCE UR: ABNORMAL
BACTERIA #/AREA URNS HPF: ABNORMAL /HPF
BACTERIA UR CULT: ABNORMAL
BILIRUB UR QL STRIP: NEGATIVE
COLOR UR AUTO: YELLOW
CREATININE (EXTERNAL): 1.99 MG/DL (ref 0.6–0.88)
GFR ESTIMATED (EXTERNAL): 23 ML/MIN/1.73M2
GFR ESTIMATED (IF AFRICAN AMERICAN) (EXTERNAL): 26 ML/MIN/1.73M2
GLUCOSE (EXTERNAL): 313 MG/DL (ref 65–99)
GLUCOSE UR STRIP-MCNC: 50 MG/DL
HBA1C MFR BLD: 11.1 % (ref 0–5.6)
HGB UR QL STRIP: ABNORMAL
HYALINE CASTS: 1 /LPF
KETONES UR STRIP-MCNC: NEGATIVE MG/DL
LEUKOCYTE ESTERASE UR QL STRIP: ABNORMAL
MUCOUS THREADS #/AREA URNS LPF: PRESENT /LPF
NITRATE UR QL: NEGATIVE
PH UR STRIP: 5 [PH] (ref 5–7)
POTASSIUM (EXTERNAL): 5.4 MMOL/L (ref 3.5–5.3)
RBC URINE: 6 /HPF
SARS-COV-2 RNA SPEC QL NAA+PROBE: NOT DETECTED
SP GR UR STRIP: 1.01 (ref 1–1.03)
SPECIMEN SOURCE: NORMAL
SQUAMOUS EPITHELIAL: <1 /HPF
UROBILINOGEN UR STRIP-MCNC: NORMAL MG/DL
WBC CLUMPS #/AREA URNS HPF: PRESENT /HPF
WBC URINE: 167 /HPF

## 2021-01-01 PROCEDURE — 87086 URINE CULTURE/COLONY COUNT: CPT | Performed by: EMERGENCY MEDICINE

## 2021-01-01 PROCEDURE — 99283 EMERGENCY DEPT VISIT LOW MDM: CPT | Performed by: EMERGENCY MEDICINE

## 2021-01-01 PROCEDURE — 99284 EMERGENCY DEPT VISIT MOD MDM: CPT | Performed by: EMERGENCY MEDICINE

## 2021-01-01 PROCEDURE — 98967 PH1 ASSMT&MGMT NQHP 11-20: CPT | Mod: 95 | Performed by: DIETITIAN, REGISTERED

## 2021-01-01 PROCEDURE — A9585 GADOBUTROL INJECTION: HCPCS

## 2021-01-01 PROCEDURE — 81001 URINALYSIS AUTO W/SCOPE: CPT | Performed by: EMERGENCY MEDICINE

## 2021-01-01 PROCEDURE — 255N000002 HC RX 255 OP 636

## 2021-01-01 PROCEDURE — G0008 ADMIN INFLUENZA VIRUS VAC: HCPCS

## 2021-01-01 PROCEDURE — 99214 OFFICE O/P EST MOD 30 MIN: CPT | Performed by: INTERNAL MEDICINE

## 2021-01-01 PROCEDURE — G0108 DIAB MANAGE TRN  PER INDIV: HCPCS | Mod: 95 | Performed by: DIETITIAN, REGISTERED

## 2021-01-01 PROCEDURE — 91300 COVID-19,PF,PFIZER (12+ YRS): CPT | Performed by: INTERNAL MEDICINE

## 2021-01-01 PROCEDURE — 83036 HEMOGLOBIN GLYCOSYLATED A1C: CPT

## 2021-01-01 PROCEDURE — 74181 MRI ABDOMEN W/O CONTRAST: CPT

## 2021-01-01 PROCEDURE — 99215 OFFICE O/P EST HI 40 MIN: CPT | Mod: 95 | Performed by: INTERNAL MEDICINE

## 2021-01-01 PROCEDURE — 0003A COVID-19,PF,PFIZER (12+ YRS): CPT | Performed by: INTERNAL MEDICINE

## 2021-01-01 PROCEDURE — 90662 IIV NO PRSV INCREASED AG IM: CPT

## 2021-01-01 PROCEDURE — 36415 COLL VENOUS BLD VENIPUNCTURE: CPT

## 2021-01-01 RX ORDER — GLUCOSAMINE HCL/CHONDROITIN SU 500-400 MG
CAPSULE ORAL
Qty: 100 EACH | Refills: 3 | Status: SHIPPED | OUTPATIENT
Start: 2021-01-01

## 2021-01-01 RX ORDER — FLASH GLUCOSE SENSOR
1 KIT MISCELLANEOUS
Qty: 6 EACH | Refills: 3 | Status: SHIPPED | OUTPATIENT
Start: 2021-01-01 | End: 2021-01-01

## 2021-01-01 RX ORDER — GRANULES FOR ORAL 3 G/1
3 POWDER ORAL ONCE
Qty: 1 PACKET | Refills: 0 | Status: SHIPPED | OUTPATIENT
Start: 2021-01-01 | End: 2021-01-01

## 2021-01-01 RX ORDER — GLIPIZIDE 2.5 MG/1
2.5 TABLET, EXTENDED RELEASE ORAL DAILY
Qty: 90 TABLET | Refills: 3 | Status: SHIPPED | OUTPATIENT
Start: 2021-01-01 | End: 2021-01-01

## 2021-01-01 RX ORDER — FLASH GLUCOSE SCANNING READER
1 EACH MISCELLANEOUS
Qty: 1 EACH | Refills: 11 | Status: SHIPPED | OUTPATIENT
Start: 2021-01-01 | End: 2021-01-01

## 2021-01-01 RX ORDER — INSULIN LISPRO 100 [IU]/ML
INJECTION, SOLUTION INTRAVENOUS; SUBCUTANEOUS
Qty: 15 ML | Refills: 3 | Status: SHIPPED | OUTPATIENT
Start: 2021-01-01

## 2021-01-01 RX ORDER — FLASH GLUCOSE SCANNING READER
1 EACH MISCELLANEOUS
Qty: 1 EACH | Refills: 11 | Status: SHIPPED | OUTPATIENT
Start: 2021-01-01

## 2021-01-01 RX ORDER — PEN NEEDLE, DIABETIC 32GX 5/32"
NEEDLE, DISPOSABLE MISCELLANEOUS
Qty: 100 EACH | Refills: 3 | Status: SHIPPED | OUTPATIENT
Start: 2021-01-01

## 2021-01-01 RX ORDER — FLASH GLUCOSE SENSOR
1 KIT MISCELLANEOUS
Qty: 6 EACH | Refills: 3 | Status: SHIPPED | OUTPATIENT
Start: 2021-01-01

## 2021-01-01 RX ORDER — GADOBUTROL 604.72 MG/ML
6 INJECTION INTRAVENOUS ONCE
Status: DISCONTINUED | OUTPATIENT
Start: 2021-01-01 | End: 2021-01-01 | Stop reason: HOSPADM

## 2021-01-01 ASSESSMENT — MIFFLIN-ST. JEOR: SCORE: 1136.28

## 2021-01-03 LAB
BACTERIA SPEC CULT: ABNORMAL
BACTERIA SPEC CULT: ABNORMAL
Lab: ABNORMAL
SPECIMEN SOURCE: ABNORMAL

## 2021-01-04 ENCOUNTER — TELEPHONE (OUTPATIENT)
Dept: GASTROENTEROLOGY | Facility: CLINIC | Age: 81
End: 2021-01-04

## 2021-01-04 NOTE — TELEPHONE ENCOUNTER
[facility]     Referring provider:   Li Horton DO in [facility]    Referred to: [facility]     Provider Requested:  NA     Referral Received: 1/4/21     Records received: in Epic     Images received: in Epic    Evaluation for:  IPMN seen on MRI.  May have chronic pancreatitis.       Clinical History (per RN review):     Incidental finding of IPMN and evidence of chronic panc. Recent work up r/t resistant UTI    HISTORY: Renal mass, renal insufficiency; ESBL (extended spectrum  beta-lactamase) producing bacteria infection; ESBL (extended spectrum  beta-lactamase) producing bacteria infection; Urinary tract infection  without hematuria, site unspecified; Recurrent UTI    MR Abdomen November 2020    IMPRESSION:   1. Essentially normal noncontrast MR urogram without definite filling  defects, however the distal ureters are not well visualized.  2. 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 No evidence  of renal mass.  3. Innumerable pancreatic cystic foci likely represent dilated side  branch ducts secondary to chronic pancreatitis given the pancreatic  calcification seen on prior CT.     MD review date: 1/4/21    MD Decision for clinic consultation/Orders:            Referral updates/Patient contacted:

## 2021-01-05 NOTE — TELEPHONE ENCOUNTER
Per Dr. Liang    Will do a routine clinic visit (do not overbook)     Pt does report history of abd pain and loose bowels, period of vomiting. Has followed with GI before. Does not use ETOH.    Clinic scheduled 3/4/21 at 8am via video visit.    JUAN PABLO

## 2021-02-10 ENCOUNTER — IMMUNIZATION (OUTPATIENT)
Dept: FAMILY MEDICINE | Facility: CLINIC | Age: 81
End: 2021-02-10
Payer: COMMERCIAL

## 2021-02-10 PROCEDURE — 91300 PR COVID VAC PFIZER DIL RECON 30 MCG/0.3 ML IM: CPT

## 2021-02-10 PROCEDURE — 0001A PR COVID VAC PFIZER DIL RECON 30 MCG/0.3 ML IM: CPT

## 2021-02-17 ENCOUNTER — VIRTUAL VISIT (OUTPATIENT)
Dept: INFECTIOUS DISEASES | Facility: CLINIC | Age: 81
End: 2021-02-17
Attending: INTERNAL MEDICINE
Payer: COMMERCIAL

## 2021-02-17 VITALS — WEIGHT: 152 LBS | HEIGHT: 66 IN | BODY MASS INDEX: 24.43 KG/M2

## 2021-02-17 DIAGNOSIS — N39.0 RECURRENT UTI: Primary | ICD-10-CM

## 2021-02-17 DIAGNOSIS — N95.2 POSTMENOPAUSAL ATROPHIC VAGINITIS: ICD-10-CM

## 2021-02-17 DIAGNOSIS — N95.2 VAGINAL ATROPHY: ICD-10-CM

## 2021-02-17 DIAGNOSIS — E11.29 TYPE 2 DIABETES MELLITUS WITH OTHER DIABETIC KIDNEY COMPLICATION, WITHOUT LONG-TERM CURRENT USE OF INSULIN (H): ICD-10-CM

## 2021-02-17 PROCEDURE — 99214 OFFICE O/P EST MOD 30 MIN: CPT | Mod: 95 | Performed by: INTERNAL MEDICINE

## 2021-02-17 ASSESSMENT — MIFFLIN-ST. JEOR: SCORE: 1166.22

## 2021-02-17 ASSESSMENT — PAIN SCALES - GENERAL: PAINLEVEL: NO PAIN (0)

## 2021-02-17 NOTE — PROGRESS NOTES
"Chief Complaint   Patient presents with     RECHECK     FOLLOW UP     Height 1.676 m (5' 6\"), weight 68.9 kg (152 lb), not currently breastfeeding.    Karlee is a 80 year old who is being evaluated via a billable video visit.      How would you like to obtain your AVS? MyChart  If the video visit is dropped, the invitation should be resent by: Other e-mail: USE [employer]- PATIENT IN THE WAITING ROOM  Will anyone else be joining your video visit? No      Video-Visit Details    Type of service:  Video Visit  Video Start Time: 10.34 am   Video End Time: 11.09 am   Originating Location (pt. Location): Home    Distant Location (provider location):  [facility] [place]     Platform used for Video Visit: [employer]    INFECTIOUS DISEASES PROGRESS NOTE    Consult requested by: Dr Richard Cornejo   Reason for Consult : ESBL infection   Date of Consult: 11/11/20     HISTORY OF PRESENT ILLNESS:                                                    Lupe Lepe is a 80 year old female with history of post menopausal vaginitis, hypertension, hyperlipidemia, giant cell arteritis, renal artery stenosis, chronic kidney disease, GERD, diabetes mellitus not on medications with HbA1c 7.2 on 6/25/20 , hypothyroidism, recurrent UTIs mostly with E.coli since 2006 , now with ESBL E.coli isolated  10/1/2020, 10/5 and 10/24/20. She was treated with antibiotics and is currently on 3rd round of Ertapenem for ESBL, via peripheral IV (3/10 days). Her symptoms involved bilateral flank pain, worse on the left side, dysuria and little \"shocks\" after urination. no fever, chills, nausea, vomiting, diarrhea. Pain resolved with Ertapenem and recently completed 7 days of treatment. 3 days later, she had dysuria and another \"blast\" and retreated with Ertapenem x 7 days. 3 days later, she had similar symptoms and on 3rd round of Ertapenem ( 10 days) .     She was seen by urology and the last time was in 2010. She had normal " "cystocopy at that time and was on vaginal estrogens with periodic bactrim.  She had stopped vaginal estrogens. Recently, she saw urology on 12/9/20 and repeat UA was negative. She was found to have severe atrophic vulvovaginitis and started taking estrace cream MWF on 12/9/20. on 12/31/20, she started to have urinary frequency and \"heart beat\" discomfort then rigor with fever up to 101F. she denies any dysuria. She called her PCP and had UA and UC done. UC grew 2 strains of E.coli ( not ESBL)  fosfomycin was ordered and she had to wait till 1/4/21 to get the medication. symptoms resolved with 1 dose of fosfomycin. She stopped taking estrace on 12/30/20 and wonder if she should restart taking it. . She has been feeling well since the last UTI.        She is also concerned about her recent MRI that showed chronic pancreatitis. She complained of history of intermittent diarrhea, abdominal pain radiating to the back for many years . I reviewed the CT, MRIs, labs and discussed the findings with her. she has an appointment with GI in the near future.     Imaging:  CT abdomen/pelvis 5/12/2011  The original report on this patient was dictated by Dr. Fernandez. A prior   CT from the [facility] dated 12/16/08 has been made available for   comparison to the current study. The previously seen 1.5 cm cystic   structure in the pancreatic head is no longer readily apparent on   today's study. There is some minimal low density in this region   measuring 0.6 cm. Also, the mass in the left hilum on the current   study which is predominantly fatty, is entirely unchanged from this   prior [facility] study. This suggests a nonaggressive process and   argues against even a low grade liposarcoma. Follow-up CT in one year   is recommended in reassessment.     IMPRESSION:   1. The pancreas shows no significant focal abnormality and has a   similar appearance to the 2006 study.   2. There is a predominantly fatty mass in the left renal sinus. " This   was not seen in 2006. One can have renal sinus lipomatosis but it   usually is not this focal or oval in configuration. Liposarcoma while   rare, cannot be completely excluded here. It would be helpful to have   the more recent outside [facility] CT study to compare with.  On this   more recent comparison study we could see if the left renal sinus   finding was present and also compare our pancreatic findings with what   was seen on this prior study.    Ct abdomen pelvis wo contrast 8/15/2016  Abdomen: A fatty mass in the renal sinus of the left kidney is again demonstrated. It measures 4.0 x 2.4 x 3.0 cm. Comparable measurements  on the prior study are 3.8 x 2.2 x 3.2 cm. Therefore, there has been no significant change. There is no urolithiasis or hydronephrosis. The  right kidney measures approximately 8.5 cm in length. The left kidney measures approximately 9 cm in length. There is stable mild scarring  in each kidney. A hiatal hernia is again seen.   Pelvis : No significant abnormality is demonstrated.                                                                   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    Renal US doppler 8/24/2016  IMPRESSION: Echogenic kidneys with increased resistance indices would be most consistent with chronic intrinsic medical renal disease. Per  sonographic velocity criteria, no evidence for significant renal artery stenosis.     MRI urogram 11/27/20  IMPRESSION:   1. Essentially normal noncontrast MR urogram without definite filling defects, however the distal ureters are not well visualized.  2. 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 No evidence of renal mass.  3. Innumerable pancreatic cystic foci likely represent dilated side branch ducts secondary to chronic pancreatitis given the pancreatic  calcification seen on prior CT    PAST MEDICAL HISTORY:  Patient  has a past medical history of Abdominal cramps (10/16/2008),  Abnormal CT scan (1/12/2009), Arthritis, Closed fracture of unspecified part of fibula, Heart disease, Hemorrhage of rectum and anus, HERPES ZOSTER NOS (5/23/2007), Hypertension, HYPOSMIA, IRON DEFIC ANEMIA NOS (3/27/2006), JOINT EFFUSION-L/LEG (4/7/2006), Other closed fractures of distal end of radius (alone), Polymyalgia rheumatica (H), Pyelonephritis (10/6/2020), and Thyroid disease. She also has no past medical history of Cancer (H), Complication of anesthesia, Difficult intubation, History of blood transfusion, Malignant hyperthermia, or Uncomplicated asthma.    PAST SURGICAL HISTORY:  Patient  has a past surgical history that includes tonsillectomy; surgical history of -  (1950); COLONOSCOPY THRU STOMA, DIAGNOSTIC (2000, 5/06, 8/08); colonoscopy (8/25/2008); hysterectomy, honorio (1970's); surgical history of -  (12/16/2008); Esophagoscopy, gastroscopy, duodenoscopy (EGD), combined (N/A, 11/16/2017); Phacoemulsification with standard intraocular lens implant (Right, 1/8/2018); and Phacoemulsification with standard intraocular lens implant (Left, 1/29/2018).    CURRENT MEDICATIONS:  Current Outpatient Medications   Medication Sig Dispense Refill     cloNIDine (CATAPRES) 0.1 MG tablet ONE Twice DAILY as needed for SBP>180, diastolic BP>110 10 tablet 3     amLODIPine (NORVASC) 10 MG tablet TAKE 1 TABLET (10 MG) BY MOUTH DAILY 90 tablet 3     ASPIRIN 325 MG OR TBEC 1 TABLET BY MOUTH DAILY 100 3     chlorthalidone (HYGROTON) 25 MG tablet TAKE 1 TABLET BY MOUTH EVERY DAY 90 tablet 3     cholecalciferol (D-VI-SOL,VITAMIN D3) 400 units/mL (10 mcg/mL) LIQD liquid Take 400 Units by mouth daily       levothyroxine (SYNTHROID/LEVOTHROID) 50 MCG tablet TAKE ON TUESDAYS, THURSDAYS, SATURDAYS, AND SUNDAYS 48 tablet 1     levothyroxine (SYNTHROID/LEVOTHROID) 75 MCG tablet TAKE 1 TABLET BY MOUTH ON SUNDAYS, MONDAYS, WEDNESDAYS AND FRIDAYS (Patient taking differently: Take 75 mcg by mouth TAKE 1 TABLET in the morning ON MONDAYS,  WEDNESDAYS AND FRIDAYS) 48 tablet 3     lisinopril (ZESTRIL) 10 MG tablet TAKE 1 TABLET (10 MG) BY MOUTH 2 TIMES DAILY 180 tablet 3     MULTIVITAMINS OR TABS 1 TABLET BY MOUTH DAILY  0     omeprazole (PRILOSEC) 20 MG DR capsule TAKE 1 CAPSULE BY MOUTH EVERY 3RD DAY AS NEEDED 30 capsule 4     ondansetron (ZOFRAN-ODT) 4 MG ODT tab Take 4 mg by mouth every 8 hours as needed       rosuvastatin (CRESTOR) 10 MG tablet TAKE 1 TABLET BY MOUTH EVERY DAY (Patient taking differently: Take 10 mg by mouth At Bedtime ) 90 tablet 3     VITAMIN B-6 50 MG OR TABS 1 daily (Patient taking differently: Take 50 mg by mouth daily ) 1 bottle 1 year     ALLERGY:  Allergies   Allergen Reactions     Bactrim      Acute renal failure and hyperkalemia     Ace Inhibitors Other (See Comments)     Hyperkalemia       Atorvastatin Calcium Itching     ELEVATED LFTS     Crestor [Rosuvastatin Calcium] Other (See Comments)     Elevated LFTS   In high doses     Penicillins Hives     Sulfamethoxazole-Trimethoprim Other (See Comments)     Kidney shutdown     Ciprofloxacin Itching and Rash     Allergy to IV form only, Has taken oral without problems     IMMUNIZATION HISTORY:  Immunization History   Administered Date(s) Administered     COVID-19,PF,Pfizer 02/10/2021     FLU 6-35 months 09/17/2010, 09/12/2011     Influenza (H1N1) 12/21/2009     Influenza (High Dose) 3 valent vaccine 10/07/2013, 09/26/2014, 10/06/2015, 10/13/2016, 09/28/2017, 09/26/2018, 09/11/2019     Influenza (IIV3) PF 11/01/2003, 10/12/2004, 10/24/2005, 11/06/2006, 10/17/2007, 09/28/2008, 09/23/2009, 09/17/2010, 09/12/2011     Influenza, Quad, High Dose, Pf, 65yr + 09/25/2020     Pneumo Conj 13-V (2010&after) 10/06/2015     Pneumococcal 23 valent 12/19/2003, 03/09/2009     TD (ADULT, 7+) 05/04/1998, 09/29/2008     Zoster vaccine recombinant adjuvanted (SHINGRIX) 07/25/2019, 10/25/2019     Zoster vaccine, live 03/19/2012     SOCIAL HISTORY:  Patient  reports that she has never smoked. She  has never used smokeless tobacco. She reports that she does not drink alcohol or use drugs.  , lives with her , has 3 children    FAMILY HISTORY:  Patient's family history includes Breast Cancer in her sister; Cancer in her father and mother; Depression in her sister; Heart Disease in her maternal grandfather and maternal grandmother.    Labs reviewed in EPIC    OBJECTIVE:                                                    GENERAL: healthy, alert and no distress  EYES: Eyes grossly normal to inspection, and conjunctivae and sclerae normal  NECK: supple  RESP: normal breathing. no shortness of breath , no cough  NEURO: mentation intact and speech normal  PSYCH: mentation appears normal, affect normal       ASSESSMENT AND PLAN:                                                      1. Recurrent UTIs since 2006, mainly E.coli and now with ESBL isolated on 10/1/2020, 10/5 and 10/24/20, E.coli non ESBL on 11/9/20 and 12/31/20  - symptoms: pain in bilateral flank but mostly lower abdominal pain, dysuria (cystitis +/- pyelonephritis)   - post menopausal atrophic vaginitis (severe)    - no history of renal stones   - previous cystocopy was normal per Urologist ( last seen in 2010)   - resistance developed due to frequent use of anotibiotics   - renal us- normal (11/17/20)    2. Post menopausal atrophic vaginitis and weak bladder ( 12/9/20)    3. Diabetes mellitus with HbA1c 7.2 on 6/25/20   - repeat HbA1c 6.6 on 11/17/20  - not on medications     4. Chronic kidney disease stage 4    Plan/Recommendations:  - continue estrace MWF    - discussed potential side effects of antibiotics - especially C.difficile colitis, developement of resistance   - probiotic   - control hyperglycemia     Follow-up PRN     Thank You for allowing me to participate in the care of this patient    Blayne Mclean MD, M.Med.Sc  Division of Infectious Diseases and International Medicine  [facility]      Time : video  35 min. Notes review in EPIC, labs, cultures, CTs abdomen/pelvis and MRI abdomen 10 min, today's note : 10 min

## 2021-02-17 NOTE — LETTER
"2/17/2021       RE: Lupe Lepe  [id number] [facility] [id number]     Dear Colleague,    Thank you for referring your patient, Lupe Lepe, to the [facility] DISE[facility] [place] at [facility]. Please see a copy of my visit note below.    Chief Complaint   Patient presents with     RECHECK     FOLLOW UP     Height 1.676 m (5' 6\"), weight 68.9 kg (152 lb), not currently breastfeeding.    Pat is a 80 year old who is being evaluated via a billable video visit.      How would you like to obtain your AVS? Training Amig[employer]art  If the video visit is dropped, the invitation should be resent by: Other e-mail: USE [email or web address]- PATIENT IN THE WAITING ROOM  Will anyone else be joining your video visit? No      Video-Visit Details    Type of service:  Video Visit  Video Start Time: 10.34 am   Video End Time: 11.09 am   Originating Location (pt. Location): Home    Distant Location (provider location):  [facility] [place]     Platform used for Video Visit: [email or web address]    INFECTIOUS DISEASES PROGRESS NOTE    Consult requested by: Dr Richard Cornejo   Reason for Consult : ESBL infection   Date of Consult: 11/11/20     HISTORY OF PRESENT ILLNESS:                                                    Lupe Lepe is a 80 year old female with history of post menopausal vaginitis, hypertension, hyperlipidemia, giant cell arteritis, renal artery stenosis, chronic kidney disease, GERD, diabetes mellitus not on medications with HbA1c 7.2 on 6/25/20 , hypothyroidism, recurrent UTIs mostly with E.coli since 2006 , now with ESBL E.coli isolated  10/1/2020, 10/5 and 10/24/20. She was treated with antibiotics and is currently on 3rd round of Ertapenem for ESBL, via peripheral IV (3/10 days). Her symptoms involved bilateral flank pain, worse on the left side, dysuria and little \"shocks\" after urination. no fever, chills, nausea, " "vomiting, diarrhea. Pain resolved with Ertapenem and recently completed 7 days of treatment. 3 days later, she had dysuria and another \"blast\" and retreated with Ertapenem x 7 days. 3 days later, she had similar symptoms and on 3rd round of Ertapenem ( 10 days) .     She was seen by urology and the last time was in 2010. She had normal cystocopy at that time and was on vaginal estrogens with periodic bactrim.  She had stopped vaginal estrogens. Recently, she saw urology on 12/9/20 and repeat UA was negative. She was found to have severe atrophic vulvovaginitis and started taking estrace cream MWF on 12/9/20. on 12/31/20, she started to have urinary frequency and \"heart beat\" discomfort then rigor with fever up to 101F. she denies any dysuria. She called her PCP and had UA and UC done. UC grew 2 strains of E.coli ( not ESBL)  fosfomycin was ordered and she had to wait till 1/4/21 to get the medication. symptoms resolved with 1 dose of fosfomycin. She stopped taking estrace on 12/30/20 and wonder if she should restart taking it. . She has been feeling well since the last UTI.        She is also concerned about her recent MRI that showed chronic pancreatitis. She complained of history of intermittent diarrhea, abdominal pain radiating to the back for many years . I reviewed the CT, MRIs, labs and discussed the findings with her. she has an appointment with GI in the near future.     Imaging:  CT abdomen/pelvis 5/12/2011  The original report on this patient was dictated by Dr. Fernandez. A prior   CT from the [facility] dated 12/16/08 has been made available for   comparison to the current study. The previously seen 1.5 cm cystic   structure in the pancreatic head is no longer readily apparent on   today's study. There is some minimal low density in this region   measuring 0.6 cm. Also, the mass in the left hilum on the current   study which is predominantly fatty, is entirely unchanged from this   prior [facility] study. " This suggests a nonaggressive process and   argues against even a low grade liposarcoma. Follow-up CT in one year   is recommended in reassessment.     IMPRESSION:   1. The pancreas shows no significant focal abnormality and has a   similar appearance to the 2006 study.   2. There is a predominantly fatty mass in the left renal sinus. This   was not seen in 2006. One can have renal sinus lipomatosis but it   usually is not this focal or oval in configuration. Liposarcoma while   rare, cannot be completely excluded here. It would be helpful to have   the more recent outside [facility] CT study to compare with.  On this   more recent comparison study we could see if the left renal sinus   finding was present and also compare our pancreatic findings with what   was seen on this prior study.    Ct abdomen pelvis wo contrast 8/15/2016  Abdomen: A fatty mass in the renal sinus of the left kidney is again demonstrated. It measures 4.0 x 2.4 x 3.0 cm. Comparable measurements  on the prior study are 3.8 x 2.2 x 3.2 cm. Therefore, there has been no significant change. There is no urolithiasis or hydronephrosis. The  right kidney measures approximately 8.5 cm in length. The left kidney measures approximately 9 cm in length. There is stable mild scarring  in each kidney. A hiatal hernia is again seen.   Pelvis : No significant abnormality is demonstrated.                                                                   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    Renal US doppler 8/24/2016  IMPRESSION: Echogenic kidneys with increased resistance indices would be most consistent with chronic intrinsic medical renal disease. Per  sonographic velocity criteria, no evidence for significant renal artery stenosis.     MRI urogram 11/27/20  IMPRESSION:   1. Essentially normal noncontrast MR urogram without definite filling defects, however the distal ureters are not well visualized.  2. Left renal  sinus lipomatosis, unchanged from 2015 CT. No evidence of renal mass.  3. Innumerable pancreatic cystic foci likely represent dilated side branch ducts secondary to chronic pancreatitis given the pancreatic  calcification seen on prior CT    PAST MEDICAL HISTORY:  Patient  has a past medical history of Abdominal cramps (10/16/2008), Abnormal CT scan (1/12/2009), Arthritis, Closed fracture of unspecified part of fibula, Heart disease, Hemorrhage of rectum and anus, HERPES ZOSTER NOS (5/23/2007), Hypertension, HYPOSMIA, IRON DEFIC ANEMIA NOS (3/27/2006), JOINT EFFUSION-L/LEG (4/7/2006), Other closed fractures of distal end of radius (alone), Polymyalgia rheumatica (H), Pyelonephritis (10/6/2020), and Thyroid disease. She also has no past medical history of Cancer (H), Complication of anesthesia, Difficult intubation, History of blood transfusion, Malignant hyperthermia, or Uncomplicated asthma.    PAST SURGICAL HISTORY:  Patient  has a past surgical history that includes tonsillectomy; surgical history of -  (1948); COLONOSCOPY THRU STOMA, DIAGNOSTIC (2000, 5/06, 8/08); colonoscopy (8/25/2008); hysterectomy, honorio (1970's); surgical history of -  (12/16/2008); Esophagoscopy, gastroscopy, duodenoscopy (EGD), combined (N/A, 11/16/2017); Phacoemulsification with standard intraocular lens implant (Right, 1/8/2018); and Phacoemulsification with standard intraocular lens implant (Left, 1/29/2018).    CURRENT MEDICATIONS:  Current Outpatient Medications   Medication Sig Dispense Refill     cloNIDine (CATAPRES) 0.1 MG tablet ONE Twice DAILY as needed for SBP>180, diastolic BP>110 10 tablet 3     amLODIPine (NORVASC) 10 MG tablet TAKE 1 TABLET (10 MG) BY MOUTH DAILY 90 tablet 3     ASPIRIN 325 MG OR TBEC 1 TABLET BY MOUTH DAILY 100 3     chlorthalidone (HYGROTON) 25 MG tablet TAKE 1 TABLET BY MOUTH EVERY DAY 90 tablet 3     cholecalciferol (D-VI-SOL,VITAMIN D3) 400 units/mL (10 mcg/mL) LIQD liquid Take 400 Units by mouth daily        levothyroxine (SYNTHROID/LEVOTHROID) 50 MCG tablet TAKE ON TUESDAYS, THURSDAYS, SATURDAYS, AND SUNDAYS 48 tablet 1     levothyroxine (SYNTHROID/LEVOTHROID) 75 MCG tablet TAKE 1 TABLET BY MOUTH ON SUNDAYS, MONDAYS, WEDNESDAYS AND FRIDAYS (Patient taking differently: Take 75 mcg by mouth TAKE 1 TABLET in the morning ON MONDAYS, WEDNESDAYS AND FRIDAYS) 48 tablet 3     lisinopril (ZESTRIL) 10 MG tablet TAKE 1 TABLET (10 MG) BY MOUTH 2 TIMES DAILY 180 tablet 3     MULTIVITAMINS OR TABS 1 TABLET BY MOUTH DAILY  0     omeprazole (PRILOSEC) 20 MG DR capsule TAKE 1 CAPSULE BY MOUTH EVERY 3RD DAY AS NEEDED 30 capsule 4     ondansetron (ZOFRAN-ODT) 4 MG ODT tab Take 4 mg by mouth every 8 hours as needed       rosuvastatin (CRESTOR) 10 MG tablet TAKE 1 TABLET BY MOUTH EVERY DAY (Patient taking differently: Take 10 mg by mouth At Bedtime ) 90 tablet 3     VITAMIN B-6 50 MG OR TABS 1 daily (Patient taking differently: Take 50 mg by mouth daily ) 1 bottle 1 year     ALLERGY:  Allergies   Allergen Reactions     Bactrim      Acute renal failure and hyperkalemia     Ace Inhibitors Other (See Comments)     Hyperkalemia       Atorvastatin Calcium Itching     ELEVATED LFTS     Crestor [Rosuvastatin Calcium] Other (See Comments)     Elevated LFTS   In high doses     Penicillins Hives     Sulfamethoxazole-Trimethoprim Other (See Comments)     Kidney shutdown     Ciprofloxacin Itching and Rash     Allergy to IV form only, Has taken oral without problems     IMMUNIZATION HISTORY:  Immunization History   Administered Date(s) Administered     COVID-19,PF,Pfizer 02/10/2021     FLU 6-35 months 09/17/2010, 09/12/2011     Influenza (H1N1) 12/21/2009     Influenza (High Dose) 3 valent vaccine 10/07/2013, 09/26/2014, 10/06/2015, 10/13/2016, 09/28/2017, 09/26/2018, 09/11/2019     Influenza (IIV3) PF 11/01/2003, 10/12/2004, 10/24/2005, 11/06/2006, 10/17/2007, 09/28/2008, 09/23/2009, 09/17/2010, 09/12/2011     Influenza, Quad, High Dose, Pf, 65yr  + 09/25/2020     Pneumo Conj 13-V (2010&after) 10/06/2015     Pneumococcal 23 valent 12/19/2003, 03/09/2009     TD (ADULT, 7+) 05/04/1998, 09/29/2008     Zoster vaccine recombinant adjuvanted (SHINGRIX) 07/25/2019, 10/25/2019     Zoster vaccine, live 03/19/2012     SOCIAL HISTORY:  Patient  reports that she has never smoked. She has never used smokeless tobacco. She reports that she does not drink alcohol or use drugs.  , lives with her , has 3 children    FAMILY HISTORY:  Patient's family history includes Breast Cancer in her sister; Cancer in her father and mother; Depression in her sister; Heart Disease in her maternal grandfather and maternal grandmother.    Labs reviewed in EPIC    OBJECTIVE:                                                    GENERAL: healthy, alert and no distress  EYES: Eyes grossly normal to inspection, and conjunctivae and sclerae normal  NECK: supple  RESP: normal breathing. no shortness of breath , no cough  NEURO: mentation intact and speech normal  PSYCH: mentation appears normal, affect normal       ASSESSMENT AND PLAN:                                                      1. Recurrent UTIs since 2006, mainly E.coli and now with ESBL isolated on 10/1/2020, 10/5 and 10/24/20, E.coli non ESBL on 11/9/20 and 12/31/20  - symptoms: pain in bilateral flank but mostly lower abdominal pain, dysuria (cystitis +/- pyelonephritis)   - post menopausal atrophic vaginitis (severe)    - no history of renal stones   - previous cystocopy was normal per Urologist ( last seen in 2010)   - resistance developed due to frequent use of anotibiotics   - renal us- normal (11/17/20)    2. Post menopausal atrophic vaginitis and weak bladder ( 12/9/20)    3. Diabetes mellitus with HbA1c 7.2 on 6/25/20   - repeat HbA1c 6.6 on 11/17/20  - not on medications     4. Chronic kidney disease stage 4    Plan/Recommendations:  - continue estrace MW    - discussed potential side effects of antibiotics -  especially C.difficile colitis, developement of resistance   - probiotic   - control hyperglycemia     Follow-up PRN     Thank You for allowing me to participate in the care of this patient    Blayne Mclean MD, M.Med.Sc  Division of Infectious Diseases and International Medicine  [facility]      Time : video 35 min. Notes review in EPIC, labs, cultures, CTs abdomen/pelvis and MRI abdomen 10 min, today's note : 10 min

## 2021-02-23 ENCOUNTER — TELEPHONE (OUTPATIENT)
Dept: INTERNAL MEDICINE | Facility: CLINIC | Age: 81
End: 2021-02-23

## 2021-02-26 ENCOUNTER — HOSPITAL ENCOUNTER (EMERGENCY)
Facility: CLINIC | Age: 81
Discharge: HOME | End: 2021-02-26
Attending: NURSE PRACTITIONER | Admitting: NURSE PRACTITIONER
Payer: COMMERCIAL

## 2021-02-26 ENCOUNTER — TRANSFERRED RECORDS (OUTPATIENT)
Dept: HEALTH INFORMATION MANAGEMENT | Facility: CLINIC | Age: 81
End: 2021-02-26

## 2021-02-26 VITALS
TEMPERATURE: 98.5 F | OXYGEN SATURATION: 97 % | WEIGHT: 152 LBS | SYSTOLIC BLOOD PRESSURE: 159 MMHG | BODY MASS INDEX: 24.53 KG/M2 | HEART RATE: 83 BPM | DIASTOLIC BLOOD PRESSURE: 75 MMHG | RESPIRATION RATE: 18 BRPM

## 2021-02-26 DIAGNOSIS — N39.0 UTI (URINARY TRACT INFECTION) WITH PYURIA: ICD-10-CM

## 2021-02-26 LAB
ALBUMIN UR-MCNC: 30 MG/DL
APPEARANCE UR: ABNORMAL
BACTERIA #/AREA URNS HPF: ABNORMAL /HPF
BILIRUB UR QL STRIP: NEGATIVE
COLOR UR AUTO: YELLOW
GLUCOSE UR STRIP-MCNC: NEGATIVE MG/DL
HGB UR QL STRIP: ABNORMAL
HYALINE CASTS #/AREA URNS LPF: 3 /LPF (ref 0–2)
KETONES UR STRIP-MCNC: NEGATIVE MG/DL
LEUKOCYTE ESTERASE UR QL STRIP: ABNORMAL
MUCOUS THREADS #/AREA URNS LPF: PRESENT /LPF
NITRATE UR QL: POSITIVE
PH UR STRIP: 5 PH (ref 5–7)
RBC #/AREA URNS AUTO: 32 /HPF (ref 0–2)
SOURCE: ABNORMAL
SP GR UR STRIP: 1.01 (ref 1–1.03)
UROBILINOGEN UR STRIP-MCNC: 0 MG/DL (ref 0–2)
WBC #/AREA URNS AUTO: >182 /HPF (ref 0–5)
WBC CLUMPS #/AREA URNS HPF: PRESENT /HPF

## 2021-02-26 PROCEDURE — 99213 OFFICE O/P EST LOW 20 MIN: CPT | Performed by: NURSE PRACTITIONER

## 2021-02-26 PROCEDURE — 87186 SC STD MICRODIL/AGAR DIL: CPT | Performed by: NURSE PRACTITIONER

## 2021-02-26 PROCEDURE — G0463 HOSPITAL OUTPT CLINIC VISIT: HCPCS | Performed by: NURSE PRACTITIONER

## 2021-02-26 PROCEDURE — 87086 URINE CULTURE/COLONY COUNT: CPT | Performed by: NURSE PRACTITIONER

## 2021-02-26 PROCEDURE — 87088 URINE BACTERIA CULTURE: CPT | Performed by: NURSE PRACTITIONER

## 2021-02-26 PROCEDURE — 81001 URINALYSIS AUTO W/SCOPE: CPT | Performed by: NURSE PRACTITIONER

## 2021-02-26 RX ORDER — GRANULES FOR ORAL 3 G/1
3 POWDER ORAL ONCE
Qty: 1 PACKET | Refills: 0 | Status: SHIPPED | OUTPATIENT
Start: 2021-02-26 | End: 2021-02-26

## 2021-02-27 NOTE — DISCHARGE INSTRUCTIONS
Fosfomycin 3g packet: 1 packet by mouth x 1 dose.  Return for fevers, increased back or abdominal pain, vomiting, or worse in any way.

## 2021-02-27 NOTE — ED PROVIDER NOTES
History     Chief Complaint   Patient presents with     Rule out Urinary Tract Infection     pt states that she has been having pain with urination for the last couple days.     HPI  Lupe Lepe is a 80 year old female who presents to the emergency department for evaluation of dysuria, urinary urgency and frequency. Symptoms started 2 days ago. Back pain. No abdominal pain. No nausea or vomiting. No fevers or chills.     Allergies:  Allergies   Allergen Reactions     Bactrim      Acute renal failure and hyperkalemia     Ace Inhibitors Other (See Comments)     Hyperkalemia       Atorvastatin Calcium Itching     ELEVATED LFTS     Crestor [Rosuvastatin Calcium] Other (See Comments)     Elevated LFTS   In high doses     Penicillins Hives     Sulfamethoxazole-Trimethoprim Other (See Comments)     Kidney shutdown     Ciprofloxacin Itching and Rash     Allergy to IV form only, Has taken oral without problems       Problem List:    Patient Active Problem List    Diagnosis Date Noted     Renal artery stenosis (H) 08/25/2006     Priority: High     Left. Likely due to temporal arteritis. S/p left angioplasty 8/06. Increased BP 10/06. Renal duplex- Increased velocity left RA, ratios within normal limits. Right RA velocity slightly increased. MRA- normal left renal artery. Moderate right renal artery stenosis. Probable splenic artery stenosis. Angio 10/06 with bilateral renal artery angioplasty. Admit [facility] increased BP 11/06- renal angiogram this time shows no stenosis and no gradient. Renal ultrasound 6/08- normal. Renal ultrasound 6/08- normal with elevated pressures.  MRA 4/10- minimal narrowing main right renal artery.       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 Duplex 7/06- Right SCV, brachial biphasic. Right radial monophasic, right ulnar biphasic-triphasic. Left SCV triphasic. Left brachial biphasic, left radial and ulnar monophasic. Brachial  wrist indices  0.57 right/0.58 left. Angio 8/06- c/w arteritis, right RA stenosis. Repeat angiogram 6/06 no intracranial arteritis or stenosis, mild left intracranial artery plaque. Duplex 9/06- Right SCV and axillary triphasic. Right brachial, radial and ulnar monophasic. Left SCV biphasic-triphasic.  Left axillary and brachial monophasic. Left radial and ulnar monophasic-biphasic. Brachial wrist indices 0.63 right/0.60 left.  Duplex 2/08 unchanged. MRA 4/10- right vertebral occluded  Followed by Dr Aly at [facility]        History of giant cell arteritis 03/27/2006     Priority: High     Prolonged low grade temps. Normal wbc, lft, UA, BCX. , CRP 92. Normal monospot, lymes screen. CT chest abdomen and pelvis negative 4/06. Bcx negative. BM BX negative. Fungal, AFB culture negative. Temporal artery biopsy positive. Flare with prednisone taper. Cellcept started 11/06, stopped 8/08 due to frequent UTIs. Started MTX 12/08 after [facility] eval. Off prednisone 8/09.     Needs blood pressure checked in legs.  Sees DR. Ivory Aly at [facility].       Urinary tract infection without hematuria, site unspecified 11/09/2020     Priority: Medium     ESBL (extended spectrum beta-lactamase) producing bacteria infection 10/06/2020     Priority: Medium     Recurrent UTI 10/05/2020     Priority: Medium     Giant cell arteritis (H) 09/15/2020     Priority: Medium     Subclavian arterial stenosis (H) 06/29/2018     Priority: Medium     Cannot measure blood pressure in bilateral arms.  Blood pressure readings in leg are 10-20% higher than arm.       CKD (chronic kidney disease) stage 4, GFR 15-29 ml/min (H) 08/31/2016     Priority: Medium     Proteinuria on ACEi, follows with  [facility] Dr. Ceferino Savage [phone]       Iron deficiency anemia, unspecified 08/30/2016     Priority: Medium     Diagnosis updated by automated process. Provider to review and confirm.       Chronic kidney disease, stage IV  (severe) (H) 08/30/2016     Priority: Medium     Anemia of renal disease 06/09/2016     Priority: Medium     Type 2 diabetes mellitus with stage 4 chronic kidney disease, without long-term current use of insulin (H) 10/31/2010     Priority: Medium     DX 2006. 2003 2hour-glucose 202. FBS >126x2 1/06. No retinopathy, peripheral neuropathy.  Was on insulin previously, was able to get off this in 2014 or so.       Hyperlipidemia LDL goal <100 10/31/2010     Priority: Medium     Renovascular hypertension with goal blood pressure less than 130/80      Priority: Medium     Hospitalized with malignant HTN [facility] 11/06. Angio- no recurrant renal artery stenosis. Metanephrines normal.  Aldosterone was undetectably low.  Urine catecholamines within normal limits.  Renin activity normal.        ACP (advance care planning) 05/06/2011     Priority: Low     Advance Care Planning 6/9/2016: ACP Review of Chart / Resources Provided:  Reviewed chart for advance care plan.  Lupe Lepe has no plan or code status on file however states presence of ACP document. Copy requested.   Added by Ivelisse Olivares  Pt has completed an Advance/Health Care Directive (HCD), to bring in copy to be scanned into Epic.           Frequent Urinary Tract Infections 01/23/2009     Priority: Low     Cysto 7/09 normal        Abdominal cramps 10/16/2008     Priority: Low     Episodic severe cramping associated with emesis since 2007. GI evaluation 10/08. [place] evaluation 12/08. 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 Rx jeanne-pac for H. Pylori with resolution of symptoms 1/09. Recurrent 2012, shorter symptoms.   Evaluation 11/2017, 9/15/2020.  Unclear cause.  May be transient small bowel obstruction?  Recommend ER evaluation during episode to get CT scan and labs       Gastroesophageal reflux disease without esophagitis      Priority: Low     Paroxysmal atrial fibrillation (H)      Priority: Low     paroxymal single episode 2002.  ECHO, Adenosine cardiolite normal.  Not on anticoagulation        Acquired hypothyroidism      Priority: Low     Osteopenia of multiple sites      Priority: Low     dexa 1999, 2003. Dexa 7/06 -lumbar spine -1.9,  right femur -1.0,  left femur is -1.6. Boniva started 2007.  Dexa 2/08- L spine -2.4, right femoral neck -1.4, left femoral neck -2.0. Dexa 5/11- L1-L4  -1.8 ,  right femoral neck -1.7, left femoral neck -1.9.            Past Medical History:    Past Medical History:   Diagnosis Date     Abdominal cramps 10/16/2008     Abnormal CT scan 1/12/2009     Arthritis      Closed fracture of unspecified part of fibula      Heart disease      Hemorrhage of rectum and anus      HERPES ZOSTER NOS 5/23/2007     Hypertension      HYPOSMIA      IRON DEFIC ANEMIA NOS 3/27/2006     JOINT EFFUSION-L/LEG 4/7/2006     Other closed fractures of distal end of radius (alone)      Polymyalgia rheumatica (H)      Pyelonephritis 10/6/2020     Thyroid disease        Past Surgical History:    Past Surgical History:   Procedure Laterality Date     COLONOSCOPY  8/25/2008     ESOPHAGOSCOPY, GASTROSCOPY, DUODENOSCOPY (EGD), COMBINED N/A 11/16/2017    Procedure: COMBINED ESOPHAGOSCOPY, GASTROSCOPY, DUODENOSCOPY (EGD);  Gastroscopy;  Surgeon: Angel Guillen MD;  Location: [facility]     HC COLONOSCOPY THRU STOMA, DIAGNOSTIC  2000, 5/06, 8/08    Normal     HYSTERECTOMY, SIS  1970's     PHACOEMULSIFICATION WITH STANDARD INTRAOCULAR LENS IMPLANT Right 1/8/2018    Procedure: PHACOEMULSIFICATION WITH STANDARD INTRAOCULAR LENS IMPLANT;  Right Cataract Removal with Implant;  Surgeon: Sekou Dobbins MD;  Location: [facility] OR     PHACOEMULSIFICATION WITH STANDARD INTRAOCULAR LENS IMPLANT Left 1/29/2018    Procedure: PHACOEMULSIFICATION WITH STANDARD INTRAOCULAR LENS IMPLANT;  Left Cataract Removal with Implant;  Surgeon: Sekou Dobbins MD;  Location: [facility] OR     SURGICAL HISTORY OF -   1948    SIS, for fibroids     SURGICAL HISTORY OF -    12/16/2008    Esophagogastroduodenoscopy with biopsy     TONSILLECTOMY      Tonsilectomy       Family History:    Family History   Problem Relation Age of Onset     Cancer Mother         ovarian CA     Cancer Father         lung CA     Heart Disease Maternal Grandmother      Heart Disease Maternal Grandfather      Depression Sister         sucide     Breast Cancer Sister        Social History:  Marital Status:   [2]  Social History     Tobacco Use     Smoking status: Never Smoker     Smokeless tobacco: Never Used   Substance Use Topics     Alcohol use: No     Drug use: No        Medications:    fosfomycin (MONUROL) 3 g Packet  amLODIPine (NORVASC) 10 MG tablet  ASPIRIN 325 MG OR TBEC  chlorthalidone (HYGROTON) 25 MG tablet  cholecalciferol (D-VI-SOL,VITAMIN D3) 400 units/mL (10 mcg/mL) LIQD liquid  cloNIDine (CATAPRES) 0.1 MG tablet  levothyroxine (SYNTHROID/LEVOTHROID) 50 MCG tablet  levothyroxine (SYNTHROID/LEVOTHROID) 75 MCG tablet  lisinopril (ZESTRIL) 10 MG tablet  MULTIVITAMINS OR TABS  omeprazole (PRILOSEC) 20 MG DR capsule  ondansetron (ZOFRAN-ODT) 4 MG ODT tab  rosuvastatin (CRESTOR) 10 MG tablet  VITAMIN B-6 50 MG OR TABS          Review of Systems  As mentioned above in the history present illness. All other systems were reviewed and are negative.    Physical Exam   BP: (!) 159/75  Pulse: 83  Temp: 98.5  F (36.9  C)  Resp: 18  Weight: 68.9 kg (152 lb)  SpO2: 97 %      Physical Exam    GENERAL APPEARANCE: alert and oriented. NAD.   RESP: lungs clear to auscultation - no rales, rhonchi or wheezes  CV: regular rates and rhythm, normal S1 S2, no murmur noted      ED Course        Procedures         Results for orders placed or performed during the hospital encounter of 02/26/21 (from the past 24 hour(s))   UA with Microscopic   Result Value Ref Range    Color Urine Yellow     Appearance Urine Cloudy     Glucose Urine Negative NEG^Negative mg/dL    Bilirubin Urine Negative NEG^Negative    Ketones Urine  Negative NEG^Negative mg/dL    Specific Gravity Urine 1.015 1.003 - 1.035    Blood Urine Small (A) NEG^Negative    pH Urine 5.0 5.0 - 7.0 pH    Protein Albumin Urine 30 (A) NEG^Negative mg/dL    Urobilinogen mg/dL 0.0 0.0 - 2.0 mg/dL    Nitrite Urine Positive (A) NEG^Negative    Leukocyte Esterase Urine Large (A) NEG^Negative    Source Midstream Urine     WBC Urine >182 (H) 0 - 5 /HPF    RBC Urine 32 (H) 0 - 2 /HPF    WBC Clumps Present (A) NEG^Negative /HPF    Bacteria Urine Few (A) NEG^Negative /HPF    Mucous Urine Present (A) NEG^Negative /LPF    Hyaline Casts 3 (H) 0 - 2 /LPF       Medications - No data to display    Assessments & Plan (with Medical Decision Making)     Urinalysis positive for infection.  No signs or symptoms concerning for pyelonephritis or nephrolithiasis at this time. Patient has many antibiotic allergies and prior UTI treatment failures--- reports last UTI treated by her PCP with Fosfomycin with resolution.  Patient will be discharged home stable on Fosfomycin pending urine culture results from today's visit.  Patient was instructed to follow up with PCP if no improvement in three days.  Worrisome reasons to seek care sooner discussed including abdominal pain, flank pain, vomiting, fevers or any new symptoms of concern.      I have reviewed the nursing notes.    I have reviewed the findings, diagnosis, plan and need for follow up with the patient.      Discharge Medication List as of 2/26/2021  7:46 PM      START taking these medications    Details   fosfomycin (MONUROL) 3 g Packet Take 1 packet (3 g) by mouth once for 1 dose, Disp-1 packet, R-0, E-Prescribe             Final diagnoses:   UTI (urinary tract infection) with pyuria       2/26/2021   [facility] EMERGENCY DEPT     Jesús, SHAWNA Verma CNP  02/26/21 2055

## 2021-02-28 ENCOUNTER — TELEPHONE (OUTPATIENT)
Dept: EMERGENCY MEDICINE | Facility: CLINIC | Age: 81
End: 2021-02-28

## 2021-02-28 LAB
BACTERIA SPEC CULT: ABNORMAL
Lab: ABNORMAL
SPECIMEN SOURCE: ABNORMAL

## 2021-02-28 NOTE — TELEPHONE ENCOUNTER
[facility] [place] [facility] Emergency Department/Urgent Care Lab result notification [Positive for uti and bacteria is susceptible to antibiotic]:    Reason for call:   Notify of Final urine culture result, confirm patient is taking antibiotic, assess symptoms, and advise per Emergency Dept/Urgent Care discharge instructions and Emergency Dept urine culture protocol.    Lab Result & Date of Final Report [copied from Result Note]:    Final Urine Culture Report on 2/28/21  Emergency Dept/Urgent Care discharge antibiotic prescribed: Patient treated in ED 2/26/21   With fosfomycin (MONUROL) 3 g Packet Take 1 packet (3 g) by mouth once for 1 dose     #1. Bacteria, >100,000 colonies/mL   Escherichia coli , is SUSCEPTIBLE to Antibiotic.  (Per ED Lab Result RN protocol)  As per [place] ED Lab Result protocol, no change in antibiotic therapy.    Current symptoms (include time patient called):    1:05 Left voicemail message requesting a call back to [facility] ED Lab Result RN at [phone].  RN is available every day between 10 a.m. and 6:30 p.m..      Soco Ramirez  [facility]  Emergency Dept Lab Result RN  Ph# [phone]

## 2021-02-28 NOTE — TELEPHONE ENCOUNTER
Patient returned call to ED lab results line  She states she is feeling much better, no pain in lower abdomen as before.   Patient states she knows that fosfomycin works well with her.  Did relay final culture showed E. Coli   Patient will follow up with primary as needed  Soco Ramirez  [facility]  Emergency Dept Lab Result RN  Ph# [phone]

## 2021-03-03 ENCOUNTER — IMMUNIZATION (OUTPATIENT)
Dept: FAMILY MEDICINE | Facility: CLINIC | Age: 81
End: 2021-03-03
Attending: FAMILY MEDICINE
Payer: COMMERCIAL

## 2021-03-03 PROCEDURE — 91300 PR COVID VAC PFIZER DIL RECON 30 MCG/0.3 ML IM: CPT

## 2021-03-03 PROCEDURE — 0002A PR COVID VAC PFIZER DIL RECON 30 MCG/0.3 ML IM: CPT

## 2021-03-04 ENCOUNTER — VIRTUAL VISIT (OUTPATIENT)
Dept: GASTROENTEROLOGY | Facility: CLINIC | Age: 81
End: 2021-03-04
Payer: COMMERCIAL

## 2021-03-04 VITALS — BODY MASS INDEX: 24.27 KG/M2 | HEIGHT: 66 IN | WEIGHT: 151 LBS

## 2021-03-04 DIAGNOSIS — K86.1 IDIOPATHIC CHRONIC PANCREATITIS (H): ICD-10-CM

## 2021-03-04 PROCEDURE — 99204 OFFICE O/P NEW MOD 45 MIN: CPT | Mod: 95 | Performed by: INTERNAL MEDICINE

## 2021-03-04 ASSESSMENT — PAIN SCALES - GENERAL: PAINLEVEL: NO PAIN (0)

## 2021-03-04 ASSESSMENT — MIFFLIN-ST. JEOR: SCORE: 1161.68

## 2021-03-04 NOTE — LETTER
"    3/4/2021         RE: Lupe Lepe  [address] [address] [address]        Dear Colleague,    Thank you for referring your patient, Lupe Lepe, to the [facility]. Please see a copy of my visit note below.    Lupe Lepe is a 80 year old female who is being evaluated via a billable video visit.      The patient has been notified of following:     \"This video visit will be conducted via a call between you and your physician/provider. We have found that certain health care needs can be provided without the need for an in-person physical exam.  This service lets us provide the care you need with a video conversation.  If a prescription is necessary we can send it directly to your pharmacy.  If lab work is needed we can place an order for that and you can then stop by our lab to have the test done at a later time.    If during the course of the call the physician/provider feels a video visit is not appropriate, you will not be charged for this service.\"     Patient confirmed that they are in [place] for today's visit     If the video visit is dropped, please send link to:   Text to cell phone: [phone]      Video-Visit Details  Type of service:  Video Visit    Video Start Time:   Video End Time: Originating Location (pt. Location): Home    Distant Location (provider location):  [facility]     Platform used: DigitalChalk              REFERRING PHYSICIAN:  Li Horton DO       CHIEF COMPLAINT:  None.      HISTORY OF PRESENT ILLNESS:  This is an 80-year-old female with past medical history significant for polymyalgia rheumatica, pyelonephritis, hypertension, herpes zoster, iron deficiency anemia, renal insufficiency, possible renal mass, urinary tract infection with ESBL who underwent an MR urogram and was incidentally found to have innumerable pancreatic cystic foci likely to represent " dilated side branch secondary to chronic pancreatitis given the pancreatic calcification seen on the prior CT.  She was referred here for further management.  The patient reports that until September every week she would get crampy abdominal pain accompanied by vomiting lasting about 2 hours.  The pain was described as excruciating and have an abrupt onset, but it was more like a left-sided crampy pain and she used to have several bouts of nausea and vomiting. She was particularly when it happens outside at parking lot or during shopping This had disabled her, especially when she was out of the house.  However, since 09/2020, she has not had any symptoms whatsoever.  She denies any attacks of acute pancreatitis.  Other than a family history of ovarian cancer, there is no history of pancreatic cancer in the family.  She currently denies nausea, vomiting, abdominal pain and is doing very well. Her weight remains stable at 150 lbs She also denies unintentional weight loss and is tolerating a regular diet. She has well formed stools usually twice daily once in the morning and once in the afternoon. She denies steatorrhea, melena and hematochezia     PAST MEDICAL HISTORY:   1.  Iron deficiency anemia.   2.  Hypertension.   3.  Recurrent pyelonephritis.   4.  Thyroid disease.     5.  Herpes zoster.   6.  Polymyalgia rheumatica.         PAST SURGICAL HISTORY:   1.  Tonsillectomy.   2.  Hysterectomy.   3.  Cataract with phacoemulsification.      SOCIAL HISTORY:  No history of smoking.  No history of alcohol.  No history of IV drug abuse.  Lives with her  in [place].      FAMILY HISTORY:  History of ovarian cancer in mother at 42 years of age.      ASSESSMENT AND PLAN:  This is an 80-year-old white female with hypertension, polymyalgia rheumatica who is being evaluated for incidentally identified pancreatic cystic foci on MR urogram along with pancreatic calcifications on the CT.  The following are our  recommendations.   1.  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  However, given how asymptomatic she is, will recommend conservative management without endoscopic interventions.Since she neither has steatorrhea nor abdominal pain will hold off on starting pancreatic enzyme replacement therapy   2.  We will recommend to check a CT scan without IV contrast to evaluate for pancreatic duct stone.  In case she becomes symptomatic in the future, we can have a baseline CT to compare and this will be done at [facility].   3.  The patient will be set to follow up in 4-6 months and has been asked to contact us if she has recurrent symptoms in the interim.      A total of 45 minutes was spent  on the date of the encounter doing chart review, history and , documentation and further activities as noted above    Again, thank you for allowing me to participate in the care of your patient.      Sincerely,    Guru Garth MD

## 2021-03-04 NOTE — PROGRESS NOTES
"Lupe Lepe is a 80 year old female who is being evaluated via a billable video visit.      The patient has been notified of following:     \"This video visit will be conducted via a call between you and your physician/provider. We have found that certain health care needs can be provided without the need for an in-person physical exam.  This service lets us provide the care you need with a video conversation.  If a prescription is necessary we can send it directly to your pharmacy.  If lab work is needed we can place an order for that and you can then stop by our lab to have the test done at a later time.    If during the course of the call the physician/provider feels a video visit is not appropriate, you will not be charged for this service.\"     Patient confirmed that they are in [place] for today's visit     If the video visit is dropped, please send link to:   Text to cell phone: [phone]    Video-Visit Details  Type of service:  Video Visit    Video Start Time:   Video End Time: Originating Location (pt. Location): Home    Distant Location (provider location):  [facility] [place]     Platform used: Steffanie"

## 2021-03-04 NOTE — PATIENT INSTRUCTIONS
Follow up:    Dr. Liang has outlined the following steps after your recent clinic visit:    -Schedule to have a CT scan without contrast    -Follow up with in 4-6 months      Please call with any questions or concerns regarding your clinic visit today.    It is a pleasure being involved in your health care.    Contacts post-consultation depending on your need:    Schedule Clinic Appointments            [phone] # 1   M-F 7:30 - 5 pm    Melody Fisher RN Care Coordinator  [phone]    Claus Bueno LPN    [phone]     OR Procedure Scheduling                             [phone]    My Chart is available 24 hours a day and is a secure way to access your records and communicate with your care team.  I strongly recommend signing up if you haven't already done so, if you are comfortable with computers.  If you would like to inquire about this or are having problems with My Chart access, you may call [phone] or go online at [email or web address].  Please allow at least 24 hours for a response and extra time on weekends and Holidays.

## 2021-03-04 NOTE — NURSING NOTE
"Chief Complaint   Patient presents with     Consult     chronic panc on imaging       Vitals:    03/04/21 0734   Weight: 68.5 kg (151 lb)   Height: 1.676 m (5' 6\")       Body mass index is 24.37 kg/m .    Quinten Woodard, EMT    "

## 2021-03-04 NOTE — PROGRESS NOTES
REFERRING PHYSICIAN:  Li Horton DO       CHIEF COMPLAINT:  None.      HISTORY OF PRESENT ILLNESS:  This is an 80-year-old female with past medical history significant for polymyalgia rheumatica, pyelonephritis, hypertension, herpes zoster, iron deficiency anemia, renal insufficiency, possible renal mass, urinary tract infection with ESBL who underwent an MR urogram and was incidentally found to have innumerable pancreatic cystic foci likely to represent dilated side branch secondary to chronic pancreatitis given the pancreatic calcification seen on the prior CT.  She was referred here for further management.  The patient reports that until September every week she would get crampy abdominal pain accompanied by vomiting lasting about 2 hours.  The pain was described as excruciating and have an abrupt onset, but it was more like a left-sided crampy pain and she used to have several bouts of nausea and vomiting. She was particularly when it happens outside at parking lot or during shopping This had disabled her, especially when she was out of the house.  However, since 09/2020, she has not had any symptoms whatsoever.  She denies any attacks of acute pancreatitis.  Other than a family history of ovarian cancer, there is no history of pancreatic cancer in the family.  She currently denies nausea, vomiting, abdominal pain and is doing very well. Her weight remains stable at 150 lbs She also denies unintentional weight loss and is tolerating a regular diet. She has well formed stools usually twice daily once in the morning and once in the afternoon. She denies steatorrhea, melena and hematochezia     PAST MEDICAL HISTORY:   1.  Iron deficiency anemia.   2.  Hypertension.   3.  Recurrent pyelonephritis.   4.  Thyroid disease.     5.  Herpes zoster.   6.  Polymyalgia rheumatica.         PAST SURGICAL HISTORY:   1.  Tonsillectomy.   2.  Hysterectomy.   3.  Cataract with phacoemulsification.      SOCIAL HISTORY:  No  history of smoking.  No history of alcohol.  No history of IV drug abuse.  Lives with her  in [place].      FAMILY HISTORY:  History of ovarian cancer in mother at 42 years of age.      ASSESSMENT AND PLAN:  This is an 80-year-old white female with hypertension, polymyalgia rheumatica who is being evaluated for incidentally identified pancreatic cystic foci on MR urogram along with pancreatic calcifications on the CT.  The following are our recommendations.   1.  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  However, given how asymptomatic she is, will recommend conservative management without endoscopic interventions.Since she neither has steatorrhea nor abdominal pain will hold off on starting pancreatic enzyme replacement therapy   2.  We will recommend to check a CT scan without IV contrast to evaluate for pancreatic duct stone.  In case she becomes symptomatic in the future, we can have a baseline CT to compare and this will be done at [facility].   3.  The patient will be set to follow up in 4-6 months and has been asked to contact us if she has recurrent symptoms in the interim.      A total of 45 minutes was spent  on the date of the encounter doing chart review, history and , documentation and further activities as noted above

## 2021-03-11 ENCOUNTER — HOSPITAL ENCOUNTER (OUTPATIENT)
Dept: CT IMAGING | Facility: CLINIC | Age: 81
Discharge: HOME | End: 2021-03-11
Attending: INTERNAL MEDICINE | Admitting: INTERNAL MEDICINE
Payer: COMMERCIAL

## 2021-03-11 DIAGNOSIS — K86.1 IDIOPATHIC CHRONIC PANCREATITIS (H): ICD-10-CM

## 2021-03-11 PROCEDURE — 74150 CT ABDOMEN W/O CONTRAST: CPT

## 2021-03-16 DIAGNOSIS — I10 ESSENTIAL HYPERTENSION, MALIGNANT: ICD-10-CM

## 2021-03-16 DIAGNOSIS — N18.4 CHRONIC KIDNEY DISEASE, STAGE IV (SEVERE) (H): Primary | ICD-10-CM

## 2021-03-16 LAB
ALBUMIN SERPL-MCNC: 4.3 G/DL (ref 3.4–5)
ANION GAP SERPL CALCULATED.3IONS-SCNC: 6 MMOL/L (ref 3–14)
BUN SERPL-MCNC: 33 MG/DL (ref 7–30)
CALCIUM SERPL-MCNC: 9.4 MG/DL (ref 8.5–10.1)
CHLORIDE SERPL-SCNC: 113 MMOL/L (ref 94–109)
CO2 SERPL-SCNC: 24 MMOL/L (ref 20–32)
CREAT SERPL-MCNC: 1.55 MG/DL (ref 0.52–1.04)
GFR SERPL CREATININE-BSD FRML MDRD: 31 ML/MIN/{1.73_M2}
GLUCOSE SERPL-MCNC: 131 MG/DL (ref 70–99)
HGB BLD-MCNC: 12.2 G/DL (ref 11.7–15.7)
PHOSPHATE SERPL-MCNC: 3.9 MG/DL (ref 2.5–4.5)
POTASSIUM SERPL-SCNC: 4.8 MMOL/L (ref 3.4–5.3)
PTH-INTACT SERPL-MCNC: 49 PG/ML (ref 18–80)
SODIUM SERPL-SCNC: 143 MMOL/L (ref 133–144)

## 2021-03-16 PROCEDURE — 80069 RENAL FUNCTION PANEL: CPT | Performed by: INTERNAL MEDICINE

## 2021-03-16 PROCEDURE — 36415 COLL VENOUS BLD VENIPUNCTURE: CPT | Performed by: INTERNAL MEDICINE

## 2021-03-16 PROCEDURE — 85018 HEMOGLOBIN: CPT | Performed by: INTERNAL MEDICINE

## 2021-03-16 PROCEDURE — 83970 ASSAY OF PARATHORMONE: CPT | Performed by: INTERNAL MEDICINE

## 2021-04-03 ENCOUNTER — HEALTH MAINTENANCE LETTER (OUTPATIENT)
Age: 81
End: 2021-04-03

## 2021-04-07 ENCOUNTER — HOSPITAL ENCOUNTER (EMERGENCY)
Facility: CLINIC | Age: 81
Discharge: HOME | End: 2021-04-07
Attending: PHYSICIAN ASSISTANT | Admitting: PHYSICIAN ASSISTANT
Payer: COMMERCIAL

## 2021-04-07 VITALS
BODY MASS INDEX: 24.11 KG/M2 | SYSTOLIC BLOOD PRESSURE: 134 MMHG | RESPIRATION RATE: 18 BRPM | TEMPERATURE: 97.3 F | OXYGEN SATURATION: 97 % | WEIGHT: 150 LBS | HEART RATE: 74 BPM | DIASTOLIC BLOOD PRESSURE: 66 MMHG | HEIGHT: 66 IN

## 2021-04-07 DIAGNOSIS — N30.00 ACUTE CYSTITIS WITHOUT HEMATURIA: ICD-10-CM

## 2021-04-07 LAB
ALBUMIN UR-MCNC: 30 MG/DL
APPEARANCE UR: ABNORMAL
BILIRUB UR QL STRIP: NEGATIVE
COLOR UR AUTO: YELLOW
GLUCOSE UR STRIP-MCNC: NEGATIVE MG/DL
HGB UR QL STRIP: ABNORMAL
HYALINE CASTS #/AREA URNS LPF: 3 /LPF (ref 0–2)
KETONES UR STRIP-MCNC: NEGATIVE MG/DL
LEUKOCYTE ESTERASE UR QL STRIP: ABNORMAL
MUCOUS THREADS #/AREA URNS LPF: PRESENT /LPF
NITRATE UR QL: NEGATIVE
PH UR STRIP: 5 PH (ref 5–7)
RBC #/AREA URNS AUTO: 23 /HPF (ref 0–2)
SOURCE: ABNORMAL
SP GR UR STRIP: 1.01 (ref 1–1.03)
UROBILINOGEN UR STRIP-MCNC: 0 MG/DL (ref 0–2)
WBC #/AREA URNS AUTO: >182 /HPF (ref 0–5)
WBC CLUMPS #/AREA URNS HPF: PRESENT /HPF
YEAST #/AREA URNS HPF: ABNORMAL /HPF

## 2021-04-07 PROCEDURE — G0463 HOSPITAL OUTPT CLINIC VISIT: HCPCS | Performed by: PHYSICIAN ASSISTANT

## 2021-04-07 PROCEDURE — 81001 URINALYSIS AUTO W/SCOPE: CPT | Performed by: FAMILY MEDICINE

## 2021-04-07 PROCEDURE — 87088 URINE BACTERIA CULTURE: CPT | Performed by: PHYSICIAN ASSISTANT

## 2021-04-07 PROCEDURE — 87186 SC STD MICRODIL/AGAR DIL: CPT | Performed by: PHYSICIAN ASSISTANT

## 2021-04-07 PROCEDURE — 99214 OFFICE O/P EST MOD 30 MIN: CPT | Performed by: PHYSICIAN ASSISTANT

## 2021-04-07 PROCEDURE — 87086 URINE CULTURE/COLONY COUNT: CPT | Performed by: PHYSICIAN ASSISTANT

## 2021-04-07 RX ORDER — GRANULES FOR ORAL 3 G/1
3 POWDER ORAL ONCE
Qty: 1 PACKET | Refills: 0 | Status: SHIPPED | OUTPATIENT
Start: 2021-04-07 | End: 2021-04-07

## 2021-04-07 ASSESSMENT — MIFFLIN-ST. JEOR: SCORE: 1152.15

## 2021-04-08 NOTE — ED PROVIDER NOTES
History     Chief Complaint   Patient presents with     UTI     HPI  Lupe Lepe is a 81 year old female with past medical history significant for prior history of ESBL UTI who presents to the urgent care with concerns of a possible urinary tract infection.  For the last 24 hours patient has had dysuria, increased urinary frequency, urgency, low back pain.  She denies any hematuria, voiding in small amounts, abdominal or flank pain.  No fever, chills, myalgias, cough, dyspnea, wheezing nausea or vomiting, abdominal or flank pain.  She does have a history of frequent urinary tract infections last treated approximately 1 month ago per her report.  She does have multiple drug allergies and is requesting to be treated with fosfomycin which she has tolerated well previously.      Allergies:  Allergies   Allergen Reactions     Bactrim      Acute renal failure and hyperkalemia     Ace Inhibitors Other (See Comments)     Hyperkalemia       Atorvastatin Calcium Itching     ELEVATED LFTS     Crestor [Rosuvastatin Calcium] Other (See Comments)     Elevated LFTS   In high doses     Penicillins Hives     Sulfamethoxazole-Trimethoprim Other (See Comments)     Kidney shutdown     Ciprofloxacin Itching and Rash     Allergy to IV form only, Has taken oral without problems     Problem List:    Patient Active Problem List    Diagnosis Date Noted     Renal artery stenosis (H) 08/25/2006     Priority: High     Left. Likely due to temporal arteritis. S/p left angioplasty 8/06. Increased BP 10/06. Renal duplex- Increased velocity left RA, ratios within normal limits. Right RA velocity slightly increased. MRA- normal left renal artery. Moderate right renal artery stenosis. Probable splenic artery stenosis. Angio 10/06 with bilateral renal artery angioplasty. Admit [facility] increased BP 11/06- renal angiogram this time shows no stenosis and no gradient. Renal ultrasound 6/08- normal. Renal ultrasound 6/08- normal with elevated  pressures.  MRA 4/10- minimal narrowing main right renal artery.       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 Duplex 7/06- Right SCV, brachial biphasic. Right radial monophasic, right ulnar biphasic-triphasic. Left SCV triphasic. Left brachial biphasic, left radial and ulnar monophasic. Brachial wrist indices  0.57 right/0.58 left. Angio 8/06- c/w arteritis, right RA stenosis. Repeat angiogram 6/06 no intracranial arteritis or stenosis, mild left intracranial artery plaque. Duplex 9/06- Right SCV and axillary triphasic. Right brachial, radial and ulnar monophasic. Left SCV biphasic-triphasic.  Left axillary and brachial monophasic. Left radial and ulnar monophasic-biphasic. Brachial wrist indices 0.63 right/0.60 left.  Duplex 2/08 unchanged. MRA 4/10- right vertebral occluded  Followed by Dr Aly at [place]        History of giant cell arteritis 03/27/2006     Priority: High     Prolonged low grade temps. Normal wbc, lft, UA, BCX. , CRP 92. Normal monospot, lymes screen. CT chest abdomen and pelvis negative 4/06. Bcx negative. BM BX negative. Fungal, AFB culture negative. Temporal artery biopsy positive. Flare with prednisone taper. Cellcept started 11/06, stopped 8/08 due to frequent UTIs. Started MTX 12/08 after [place] eval. Off prednisone 8/09.     Needs blood pressure checked in legs.  Sees DR. Ivory Aly at [facility].       Urinary tract infection without hematuria, site unspecified 11/09/2020     Priority: Medium     ESBL (extended spectrum beta-lactamase) producing bacteria infection 10/06/2020     Priority: Medium     Recurrent UTI 10/05/2020     Priority: Medium     Giant cell arteritis (H) 09/15/2020     Priority: Medium     Subclavian arterial stenosis (H) 06/29/2018     Priority: Medium     Cannot measure blood pressure in bilateral arms.  Blood pressure readings in leg are 10-20% higher than arm.       CKD (chronic  kidney disease) stage 4, GFR 15-29 ml/min (H) 08/31/2016     Priority: Medium     Proteinuria on ACEi, follows with  [facility] Dr. Ceferino Savage [phone]       Iron deficiency anemia, unspecified 08/30/2016     Priority: Medium     Diagnosis updated by automated process. Provider to review and confirm.       Chronic kidney disease, stage IV (severe) (H) 08/30/2016     Priority: Medium     Anemia of renal disease 06/09/2016     Priority: Medium     Mitral regurgitation 01/03/2014     Priority: Medium     Overview:   12/2013 Echo -   Normal left ventricular size, wall thickness, and systolic function with no obvious regional wall motion abnormalities.   The ejection fraction is visually estimated at 65%.   Mild left atrial dilatation.   Mild mitral regurgitation.   Mild-to-moderate tricuspid regurgitation.   Right ventricular systolic pressure estimated at 29.4 mmHg + RAP.   Compared to exam of 10/8/12--diastolic dysfxn less evident--R heart pressure is lower.       Dyslipidemia 09/27/2012     Priority: Medium     Overview:   3/2012 Lipids - , , HDL 54, LDL 80 on Crestor.       ABY (renal artery stenosis) (H) 09/27/2012     Priority: Medium     Overview:   2006 at [facility] S/p PTA to both renal arteries for malignant HTN which then improved.  2010 MRA abdomen - Widely patent bilateral main renal arteries except for minimal narrowing proximal R renal artery.       Type 2 diabetes mellitus with stage 4 chronic kidney disease, without long-term current use of insulin (H) 10/31/2010     Priority: Medium     DX 2006. 2003 2hour-glucose 202. FBS >126x2 1/06. No retinopathy, peripheral neuropathy.  Was on insulin previously, was able to get off this in 2014 or so.       Hyperlipidemia LDL goal <100 10/31/2010     Priority: Medium     Renovascular hypertension with goal blood pressure less than 130/80      Priority: Medium     Hospitalized with malignant HTN [facility] 11/06. Angio- no recurrant  renal artery stenosis. Metanephrines normal.  Aldosterone was undetectably low.  Urine catecholamines within normal limits.  Renin activity normal.        ACP (advance care planning) 05/06/2011     Priority: Low     Advance Care Planning 6/9/2016: ACP Review of Chart / Resources Provided:  Reviewed chart for advance care plan.  Lupe Lepe has no plan or code status on file however states presence of ACP document. Copy requested.   Added by Ivelisse Olivares  Pt has completed an Advance/Health Care Directive (HCD), to bring in copy to be scanned into Epic.           Frequent Urinary Tract Infections 01/23/2009     Priority: Low     Cysto 7/09 normal        Abdominal cramps 10/16/2008     Priority: Low     Episodic severe cramping associated with emesis since 2007. GI evaluation 10/08. [facility] evaluation 12/08. 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 Rx jeanne-pac for H. Pylori with resolution of symptoms 1/09. Recurrent 2012, shorter symptoms.   Evaluation 11/2017, 9/15/2020.  Unclear cause.  May be transient small bowel obstruction?  Recommend ER evaluation during episode to get CT scan and labs       Gastroesophageal reflux disease without esophagitis      Priority: Low     Paroxysmal atrial fibrillation (H)      Priority: Low     paroxymal single episode 2002. ECHO, Adenosine cardiolite normal.  Not on anticoagulation        Acquired hypothyroidism      Priority: Low     Osteopenia of multiple sites      Priority: Low     dexa 1999, 2003. Dexa 7/06 -lumbar spine -1.9,  right femur -1.0,  left femur is -1.6. Boniva started 2007.  Dexa 2/08- L spine -2.4, right femoral neck -1.4, left femoral neck -2.0. Dexa 5/11- L1-L4  -1.8 ,  right femoral neck -1.7, left femoral neck -1.9.            Past Medical History:    Past Medical History:   Diagnosis Date     Abdominal cramps 10/16/2008     Abnormal CT scan 1/12/2009     Arthritis      Closed fracture of unspecified part of fibula      Heart  disease      Hemorrhage of rectum and anus      HERPES ZOSTER NOS 5/23/2007     Hypertension      HYPOSMIA      IRON DEFIC ANEMIA NOS 3/27/2006     JOINT EFFUSION-L/LEG 4/7/2006     Other closed fractures of distal end of radius (alone)      Polymyalgia rheumatica (H)      Pyelonephritis 10/6/2020     Thyroid disease        Past Surgical History:    Past Surgical History:   Procedure Laterality Date     COLONOSCOPY  8/25/2008     ESOPHAGOSCOPY, GASTROSCOPY, DUODENOSCOPY (EGD), COMBINED N/A 11/16/2017    Procedure: COMBINED ESOPHAGOSCOPY, GASTROSCOPY, DUODENOSCOPY (EGD);  Gastroscopy;  Surgeon: Angel Guillen MD;  Location: [facility]     HC COLONOSCOPY THRU STOMA, DIAGNOSTIC  2000, 5/06, 8/08    Normal     HYSTERECTOMY, SIS  1970's     PHACOEMULSIFICATION WITH STANDARD INTRAOCULAR LENS IMPLANT Right 1/8/2018    Procedure: PHACOEMULSIFICATION WITH STANDARD INTRAOCULAR LENS IMPLANT;  Right Cataract Removal with Implant;  Surgeon: Sekou Dobbins MD;  Location: [facility]     PHACOEMULSIFICATION WITH STANDARD INTRAOCULAR LENS IMPLANT Left 1/29/2018    Procedure: PHACOEMULSIFICATION WITH STANDARD INTRAOCULAR LENS IMPLANT;  Left Cataract Removal with Implant;  Surgeon: Sekou Dobbins MD;  Location: [facility]     SURGICAL HISTORY OF -   1948    SIS, for fibroids     SURGICAL HISTORY OF -   12/16/2008    Esophagogastroduodenoscopy with biopsy     TONSILLECTOMY      Tonsilectomy       Family History:    Family History   Problem Relation Age of Onset     Cancer Mother         ovarian CA     Cancer Father         lung CA     Heart Disease Maternal Grandmother      Heart Disease Maternal Grandfather      Depression Sister         sucide     Breast Cancer Sister        Social History:  Marital Status:   [2]  Social History     Tobacco Use     Smoking status: Never Smoker     Smokeless tobacco: Never Used   Substance Use Topics     Alcohol use: No     Drug use: No        Medications:    fosfomycin (MONUROL) 3 g  "Packet  amLODIPine (NORVASC) 10 MG tablet  ASPIRIN 325 MG OR TBEC  chlorthalidone (HYGROTON) 25 MG tablet  cholecalciferol (D-VI-SOL,VITAMIN D3) 400 units/mL (10 mcg/mL) LIQD liquid  cloNIDine (CATAPRES) 0.1 MG tablet  levothyroxine (SYNTHROID/LEVOTHROID) 50 MCG tablet  levothyroxine (SYNTHROID/LEVOTHROID) 75 MCG tablet  lisinopril (ZESTRIL) 10 MG tablet  MULTIVITAMINS OR TABS  omeprazole (PRILOSEC) 20 MG DR capsule  ondansetron (ZOFRAN-ODT) 4 MG ODT tab  rosuvastatin (CRESTOR) 10 MG tablet  VITAMIN B-6 50 MG OR TABS      Review of Systems  CONSTITUTIONAL:NEGATIVE for fever, chills, change in weight  INTEGUMENTARY/SKIN: NEGATIVE for worrisome rashes, moles or lesions  RESP:NEGATIVE for significant cough or SOB  GI: NEGATIVE for abdominal pain diarrhea, nausea and vomiting  : POSITIVE for dysuria, increased frequency, urgency NEGATIVE For hematuria  Physical Exam   BP: 134/66  Pulse: 74  Temp: 97.3  F (36.3  C)  Resp: 18  Height: 167.6 cm (5' 6\")  Weight: 68 kg (150 lb)  SpO2: 97 %  Physical Exam  GENERAL APPEARANCE: healthy, alert and no distress  RESP: lungs clear to auscultation - no rales, rhonchi or wheezes  CV: regular rates and rhythm, normal S1 S2, no murmur noted  ABDOMEN:  soft, nontender, no HSM or masses and bowel sounds normal  BACK: No CVA tenderness  SKIN: no suspicious lesions or rashes  ED Course        Procedures          Critical Care time:  none            Results for orders placed or performed during the hospital encounter of 04/07/21   UA reflex to Microscopic     Status: Abnormal   Result Value Ref Range    Color Urine Yellow     Appearance Urine Cloudy     Glucose Urine Negative NEG^Negative mg/dL    Bilirubin Urine Negative NEG^Negative    Ketones Urine Negative NEG^Negative mg/dL    Specific Gravity Urine 1.015 1.003 - 1.035    Blood Urine Small (A) NEG^Negative    pH Urine 5.0 5.0 - 7.0 pH    Protein Albumin Urine 30 (A) NEG^Negative mg/dL    Urobilinogen mg/dL 0.0 0.0 - 2.0 mg/dL    " Nitrite Urine Negative NEG^Negative    Leukocyte Esterase Urine Large (A) NEG^Negative    Source Midstream Urine     RBC Urine 23 (H) 0 - 2 /HPF    WBC Urine >182 (H) 0 - 5 /HPF    WBC Clumps Present (A) NEG^Negative /HPF    Yeast Urine Few (A) NEG^Negative /HPF    Mucous Urine Present (A) NEG^Negative /LPF    Hyaline Casts 3 (H) 0 - 2 /LPF   Urine Culture Aerobic Bacterial     Status: Abnormal    Specimen: Midstream Urine   Result Value Ref Range    Specimen Description Midstream Urine     Special Requests Specimen received in preservative     Culture Micro >100,000 colonies/mL  Escherichia coli   (A)        Susceptibility    Escherichia coli - CAM     AMPICILLIN <=2 Sensitive ug/mL     CEFAZOLIN* <=4 Sensitive ug/mL      * Cefazolin CAM breakpoints are for the treatment of uncomplicated urinary tract infections.  For the treatment of systemic infections, please contact the laboratory for additional testing.     CEFOXITIN <=4 Sensitive ug/mL     CEFTAZIDIME <=1 Sensitive ug/mL     CEFTRIAXONE <=1 Sensitive ug/mL     CIPROFLOXACIN <=0.25 Sensitive ug/mL     GENTAMICIN <=1 Sensitive ug/mL     LEVOFLOXACIN <=0.12 Sensitive ug/mL     NITROFURANTOIN 32 Sensitive ug/mL     TOBRAMYCIN <=1 Sensitive ug/mL     Trimethoprim/Sulfa <=1/19 Sensitive ug/mL     AMPICILLIN/SULBACTAM <=2 Sensitive ug/mL     Piperacillin/Tazo <=4 Sensitive ug/mL     CEFEPIME <=1 Sensitive ug/mL     Medications - No data to display    Assessments & Plan (with Medical Decision Making)     I have reviewed the nursing notes.    I have reviewed the findings, diagnosis, plan and need for follow up with the patient.       New Prescriptions    FOSFOMYCIN (MONUROL) 3 G PACKET    Take 1 packet (3 g) by mouth once for 1 dose       Final diagnoses:   Acute cystitis without hematuria     81-year-old female presents to the urgent care with concern over possible urinary tract infection given 24 history of dysuria, frequency, urgency, low back pain.  She had benign  physical exam findings.  Urinalysis was consistent with infection.  She did have culture pending at time of discharge.  I did review results from most recent culture which was pansensitive.  I discussed/benefits of alternate antibiotics versus fosfomycin and patient repeatedly advocated for fosfomycin given her past history of ESBL and multiple drug reactions.  She was discharged home stable instructions for single 3 gram dose of fosfomycin.  Follow up with PCP if no improvement in three days.  Worrisome reasons to return to ER/UC sooner discussed.     Disclaimer: This note consists of symbols derived from keyboarding, dictation, and/or voice recognition software. As a result, there may be errors in the script that have gone undetected.  Please consider this when interpreting information found in the chart.    4/7/2021   [facility] EMERGENCY DEPT     Claudia Hua PA-C  04/12/21 [id number]

## 2021-04-08 NOTE — RESULT ENCOUNTER NOTE
[facility] Emergency Dept discharge antibiotic (if prescribed): Fosfomycin (MONUROL) 3 G packet, 1 packet (3 g) by mouth once for 1 dose(s).  Date of Rx (if applicable):  4/7/21  No changes in treatment per [facility] ED Lab Result Urine culture protocol.

## 2021-04-09 LAB
BACTERIA SPEC CULT: ABNORMAL
Lab: ABNORMAL
SPECIMEN SOURCE: ABNORMAL

## 2021-04-09 NOTE — RESULT ENCOUNTER NOTE
Final Urine Culture Report on 4/9/21  [facility] Emergency Dept discharge antibiotic prescribed: Fosfomycin  #1. Bacteria, >100,000 colonies/mL Escherichia coli, is SUSCEPTIBLE to Antibiotic.    Recommendations in treatment per [facility] ED lab result Urine Culture protocol.

## 2021-04-10 ENCOUNTER — TELEPHONE (OUTPATIENT)
Dept: EMERGENCY MEDICINE | Facility: CLINIC | Age: 81
End: 2021-04-10

## 2021-04-10 NOTE — TELEPHONE ENCOUNTER
[facility] Emergency Department/Urgent Care Lab result notification [Positive for uti and bacteria is susceptible to antibiotic]:    Reason for call:   Notify of Final urine culture result, confirm patient is taking antibiotic, assess symptoms, and advise per Emergency Dept/Urgent Care discharge instructions and Emergency Dept urine culture protocol.    Lab Result & Date of Final Report [copied from Result Note]:    Final Urine Culture Report on 4/9/21  [facility] Emergency Dept discharge antibiotic prescribed: Fosfomycin  #1. Bacteria, >100,000 colonies/mL Escherichia coli, is SUSCEPTIBLE to Antibiotic.    Recommendations in treatment per [facility] ED lab result Urine Culture protocol.       Current symptoms (include time patient called):    3:08 Spoke with patient and she stated that she is feeling fine and states that she did take the fosfomycin and feels it has helped her.    Recommendations/Instructions:   Take antibiotic as directed by the Emergency Dept/Urgent Care Provider.    UTI prevention/education reviewed with patient [Yes/No]:  No      Soco Ramirez  [employer] [facility]  Emergency Dept Lab Result RN  Ph# [phone]

## 2021-05-19 ENCOUNTER — TRANSFERRED RECORDS (OUTPATIENT)
Dept: HEALTH INFORMATION MANAGEMENT | Facility: CLINIC | Age: 81
End: 2021-05-19

## 2021-05-19 DIAGNOSIS — E03.9 ACQUIRED HYPOTHYROIDISM: Chronic | ICD-10-CM

## 2021-05-21 NOTE — TELEPHONE ENCOUNTER
Please clarify. Both sigs mention Sunday.     levothyroxine (SYNTHROID/LEVOTHROID) 75 MCG tablet (TAKE 1 TABLET BY MOUTH ON SUNDAYS, MONDAYS, WEDNESDAYS AND FRIDAYS)    levothyroxine (SYNTHROID/LEVOTHROID) 50 MCG tablet  (TAKE ON TUESDAYS, THURSDAYS, SATURDAYS, AND SUNDAYS)    TSH   Date Value Ref Range Status   06/25/2020 0.90 0.40 - 4.00 mU/L Final       Shawna MCCALLUM RN, BSN

## 2021-05-23 ENCOUNTER — HEALTH MAINTENANCE LETTER (OUTPATIENT)
Age: 81
End: 2021-05-23

## 2021-05-24 RX ORDER — LEVOTHYROXINE SODIUM 50 UG/1
TABLET ORAL
Qty: 48 TABLET | Refills: 1 | Status: SHIPPED | OUTPATIENT
Start: 2021-05-24 | End: 2021-07-02

## 2021-05-25 DIAGNOSIS — N18.4 TYPE 2 DIABETES MELLITUS WITH STAGE 4 CHRONIC KIDNEY DISEASE, WITHOUT LONG-TERM CURRENT USE OF INSULIN (H): ICD-10-CM

## 2021-05-25 DIAGNOSIS — E11.22 TYPE 2 DIABETES MELLITUS WITH STAGE 4 CHRONIC KIDNEY DISEASE, WITHOUT LONG-TERM CURRENT USE OF INSULIN (H): ICD-10-CM

## 2021-05-25 DIAGNOSIS — E78.5 HYPERLIPIDEMIA LDL GOAL <100: ICD-10-CM

## 2021-05-25 RX ORDER — ROSUVASTATIN CALCIUM 10 MG/1
TABLET, COATED ORAL
Qty: 30 TABLET | Refills: 0 | Status: SHIPPED | OUTPATIENT
Start: 2021-05-25 | End: 2021-06-22

## 2021-05-29 DIAGNOSIS — I15.0 RENOVASCULAR HYPERTENSION WITH GOAL BLOOD PRESSURE LESS THAN 130/80: Chronic | ICD-10-CM

## 2021-05-29 DIAGNOSIS — N18.4 CKD (CHRONIC KIDNEY DISEASE) STAGE 4, GFR 15-29 ML/MIN (H): Chronic | ICD-10-CM

## 2021-06-01 RX ORDER — CHLORTHALIDONE 25 MG/1
TABLET ORAL
Qty: 30 TABLET | Refills: 0 | OUTPATIENT
Start: 2021-06-01

## 2021-06-01 NOTE — TELEPHONE ENCOUNTER
Routing refill request to provider for review/approval because:  Labs out of range:    Creatinine   Date Value Ref Range Status   03/16/2021 1.55 (H) 0.52 - 1.04 mg/dL Final

## 2021-06-19 DIAGNOSIS — E78.5 HYPERLIPIDEMIA LDL GOAL <100: ICD-10-CM

## 2021-06-19 DIAGNOSIS — E03.9 ACQUIRED HYPOTHYROIDISM: Chronic | ICD-10-CM

## 2021-06-19 DIAGNOSIS — N18.4 TYPE 2 DIABETES MELLITUS WITH STAGE 4 CHRONIC KIDNEY DISEASE, WITHOUT LONG-TERM CURRENT USE OF INSULIN (H): ICD-10-CM

## 2021-06-19 DIAGNOSIS — E11.22 TYPE 2 DIABETES MELLITUS WITH STAGE 4 CHRONIC KIDNEY DISEASE, WITHOUT LONG-TERM CURRENT USE OF INSULIN (H): ICD-10-CM

## 2021-06-22 RX ORDER — LEVOTHYROXINE SODIUM 75 UG/1
TABLET ORAL
Qty: 16 TABLET | Refills: 0 | Status: SHIPPED | OUTPATIENT
Start: 2021-06-22 | End: 2021-07-02

## 2021-06-22 RX ORDER — ROSUVASTATIN CALCIUM 10 MG/1
TABLET, COATED ORAL
Qty: 30 TABLET | Refills: 0 | Status: SHIPPED | OUTPATIENT
Start: 2021-06-22 | End: 2021-07-02

## 2021-06-22 NOTE — TELEPHONE ENCOUNTER
"Medications are being filled for 1 time refill only due to:  Patient needs to be seen because due for Wellness Visit. Appt is set for 7/2/21.    Requested Prescriptions   Pending Prescriptions Disp Refills     levothyroxine (SYNTHROID/LEVOTHROID) 75 MCG tablet [Pharmacy Med Name: LEVOTHYROXINE 75 MCG TABLET] 48 tablet 3     Sig: TAKE 1 TABLET BY MOUTH ON SUNDAYS, MONDAYS, WEDNESDAYS AND FRIDAYS       Thyroid Protocol Passed - 6/19/2021 11:30 AM        Passed - Patient is 12 years or older        Passed - Recent (12 mo) or future (30 days) visit within the authorizing provider's specialty     Patient has had an office visit with the authorizing provider or a provider within the authorizing providers department within the previous 12 mos or has a future within next 30 days. See \"Patient Info\" tab in inbasket, or \"Choose Columns\" in Meds & Orders section of the refill encounter.              Passed - Medication is active on med list        Passed - Normal TSH on file in past 12 months     Recent Labs   Lab Test 06/25/20  0722   TSH 0.90              Passed - No active pregnancy on record     If patient is pregnant or has had a positive pregnancy test, please check TSH.          Passed - No positive pregnancy test in past 12 months     If patient is pregnant or has had a positive pregnancy test, please check TSH.             rosuvastatin (CRESTOR) 10 MG tablet [Pharmacy Med Name: ROSUVASTATIN CALCIUM 10 MG TAB] 30 tablet 0     Sig: TAKE 1 TABLET BY MOUTH EVERY DAY       Statins Protocol Passed - 6/19/2021 11:30 AM        Passed - LDL on file in past 12 months     Recent Labs   Lab Test 06/25/20  0722   LDL 70             Passed - No abnormal creatine kinase in past 12 months     No lab results found.             Passed - Recent (12 mo) or future (30 days) visit within the authorizing provider's specialty     Patient has had an office visit with the authorizing provider or a provider within the authorizing providers " "department within the previous 12 mos or has a future within next 30 days. See \"Patient Info\" tab in inbasket, or \"Choose Columns\" in Meds & Orders section of the refill encounter.              Passed - Medication is active on med list        Passed - Patient is age 18 or older        Passed - No active pregnancy on record        Passed - No positive pregnancy test in past 12 months             "

## 2021-07-02 ENCOUNTER — OFFICE VISIT (OUTPATIENT)
Dept: FAMILY MEDICINE | Facility: CLINIC | Age: 81
End: 2021-07-02
Payer: COMMERCIAL

## 2021-07-02 VITALS
WEIGHT: 152 LBS | SYSTOLIC BLOOD PRESSURE: 128 MMHG | TEMPERATURE: 96.3 F | RESPIRATION RATE: 16 BRPM | HEART RATE: 61 BPM | OXYGEN SATURATION: 98 % | BODY MASS INDEX: 24.53 KG/M2 | DIASTOLIC BLOOD PRESSURE: 66 MMHG

## 2021-07-02 DIAGNOSIS — N18.4 CKD (CHRONIC KIDNEY DISEASE) STAGE 4, GFR 15-29 ML/MIN (H): Chronic | ICD-10-CM

## 2021-07-02 DIAGNOSIS — M85.89 OSTEOPENIA OF MULTIPLE SITES: ICD-10-CM

## 2021-07-02 DIAGNOSIS — I48.0 PAROXYSMAL ATRIAL FIBRILLATION (H): ICD-10-CM

## 2021-07-02 DIAGNOSIS — M31.6 GIANT CELL ARTERITIS (H): ICD-10-CM

## 2021-07-02 DIAGNOSIS — Z13.220 SCREENING FOR HYPERLIPIDEMIA: ICD-10-CM

## 2021-07-02 DIAGNOSIS — I15.0 RENOVASCULAR HYPERTENSION WITH GOAL BLOOD PRESSURE LESS THAN 130/80: Chronic | ICD-10-CM

## 2021-07-02 DIAGNOSIS — I77.1 STRICTURE OF ARTERY (H): ICD-10-CM

## 2021-07-02 DIAGNOSIS — K21.9 GASTROESOPHAGEAL REFLUX DISEASE WITHOUT ESOPHAGITIS: ICD-10-CM

## 2021-07-02 DIAGNOSIS — E87.5 HYPERKALEMIA: ICD-10-CM

## 2021-07-02 DIAGNOSIS — E78.5 HYPERLIPIDEMIA LDL GOAL <100: ICD-10-CM

## 2021-07-02 DIAGNOSIS — Z00.00 ENCOUNTER FOR MEDICARE ANNUAL WELLNESS EXAM: Primary | ICD-10-CM

## 2021-07-02 DIAGNOSIS — N18.4 TYPE 2 DIABETES MELLITUS WITH STAGE 4 CHRONIC KIDNEY DISEASE, WITHOUT LONG-TERM CURRENT USE OF INSULIN (H): ICD-10-CM

## 2021-07-02 DIAGNOSIS — E03.9 ACQUIRED HYPOTHYROIDISM: Chronic | ICD-10-CM

## 2021-07-02 DIAGNOSIS — N18.4 CKD (CHRONIC KIDNEY DISEASE) STAGE 4, GFR 15-29 ML/MIN (H): Primary | Chronic | ICD-10-CM

## 2021-07-02 DIAGNOSIS — E11.22 TYPE 2 DIABETES MELLITUS WITH STAGE 4 CHRONIC KIDNEY DISEASE, WITHOUT LONG-TERM CURRENT USE OF INSULIN (H): ICD-10-CM

## 2021-07-02 PROBLEM — N39.0 URINARY TRACT INFECTION WITHOUT HEMATURIA, SITE UNSPECIFIED: Status: RESOLVED | Noted: 2020-11-09 | Resolved: 2021-07-02

## 2021-07-02 LAB
ANION GAP SERPL CALCULATED.3IONS-SCNC: 6 MMOL/L (ref 3–14)
BUN SERPL-MCNC: 35 MG/DL (ref 7–30)
CALCIUM SERPL-MCNC: 9.4 MG/DL (ref 8.5–10.1)
CHLORIDE SERPL-SCNC: 113 MMOL/L (ref 94–109)
CHOLEST SERPL-MCNC: 164 MG/DL
CO2 SERPL-SCNC: 24 MMOL/L (ref 20–32)
CREAT SERPL-MCNC: 1.6 MG/DL (ref 0.52–1.04)
CREAT UR-MCNC: 117 MG/DL
GFR SERPL CREATININE-BSD FRML MDRD: 29 ML/MIN/{1.73_M2}
GLUCOSE SERPL-MCNC: 152 MG/DL (ref 70–99)
HBA1C MFR BLD: 7.3 % (ref 0–5.6)
HDLC SERPL-MCNC: 57 MG/DL
LDLC SERPL CALC-MCNC: 80 MG/DL
MICROALBUMIN UR-MCNC: 18 MG/L
MICROALBUMIN/CREAT UR: 15.21 MG/G CR (ref 0–25)
NONHDLC SERPL-MCNC: 107 MG/DL
POTASSIUM SERPL-SCNC: 5.9 MMOL/L (ref 3.4–5.3)
SODIUM SERPL-SCNC: 143 MMOL/L (ref 133–144)
TRIGL SERPL-MCNC: 135 MG/DL
TSH SERPL DL<=0.005 MIU/L-ACNC: 0.47 MU/L (ref 0.4–4)

## 2021-07-02 PROCEDURE — 84443 ASSAY THYROID STIM HORMONE: CPT | Performed by: INTERNAL MEDICINE

## 2021-07-02 PROCEDURE — 36415 COLL VENOUS BLD VENIPUNCTURE: CPT | Performed by: INTERNAL MEDICINE

## 2021-07-02 PROCEDURE — 80061 LIPID PANEL: CPT | Performed by: INTERNAL MEDICINE

## 2021-07-02 PROCEDURE — 83036 HEMOGLOBIN GLYCOSYLATED A1C: CPT | Performed by: INTERNAL MEDICINE

## 2021-07-02 PROCEDURE — 80048 BASIC METABOLIC PNL TOTAL CA: CPT | Performed by: INTERNAL MEDICINE

## 2021-07-02 PROCEDURE — 99214 OFFICE O/P EST MOD 30 MIN: CPT | Mod: 25 | Performed by: INTERNAL MEDICINE

## 2021-07-02 PROCEDURE — 99397 PER PM REEVAL EST PAT 65+ YR: CPT | Performed by: INTERNAL MEDICINE

## 2021-07-02 PROCEDURE — 82043 UR ALBUMIN QUANTITATIVE: CPT | Performed by: INTERNAL MEDICINE

## 2021-07-02 RX ORDER — CHLORTHALIDONE 25 MG/1
25 TABLET ORAL DAILY
Qty: 90 TABLET | Refills: 3 | Status: SHIPPED | OUTPATIENT
Start: 2021-07-02

## 2021-07-02 RX ORDER — AMLODIPINE BESYLATE 10 MG/1
10 TABLET ORAL DAILY
Qty: 90 TABLET | Refills: 3 | Status: SHIPPED | OUTPATIENT
Start: 2021-07-02

## 2021-07-02 RX ORDER — LEVOTHYROXINE SODIUM 75 UG/1
TABLET ORAL
Qty: 48 TABLET | Refills: 4 | Status: SHIPPED | OUTPATIENT
Start: 2021-07-02

## 2021-07-02 RX ORDER — LEVOTHYROXINE SODIUM 50 UG/1
TABLET ORAL
Qty: 36 TABLET | Refills: 4 | Status: SHIPPED | OUTPATIENT
Start: 2021-07-02

## 2021-07-02 RX ORDER — LISINOPRIL 10 MG/1
10 TABLET ORAL 2 TIMES DAILY
Qty: 180 TABLET | Refills: 3 | Status: SHIPPED | OUTPATIENT
Start: 2021-07-02 | End: 2022-01-01

## 2021-07-02 RX ORDER — ROSUVASTATIN CALCIUM 10 MG/1
10 TABLET, COATED ORAL DAILY
Qty: 90 TABLET | Refills: 3 | Status: SHIPPED | OUTPATIENT
Start: 2021-07-02

## 2021-07-02 NOTE — PROGRESS NOTES
Assessment & Plan     Encounter for Medicare annual wellness exam    Renovascular hypertension with goal blood pressure less than 130/80  -follows with nephrology.  - stable, refill provided  - chlorthalidone (HYGROTON) 25 MG tablet; Take 1 tablet (25 mg) by mouth daily  - lisinopril (ZESTRIL) 10 MG tablet; Take 1 tablet (10 mg) by mouth 2 times daily  - amLODIPine (NORVASC) 10 MG tablet; Take 1 tablet (10 mg) by mouth daily  - OFFICE/OUTPT VISIT,ESTLEVL IV    CKD (chronic kidney disease) stage 4, GFR 15-29 ml/min (H)  - stable, refill provided  - chlorthalidone (HYGROTON) 25 MG tablet; Take 1 tablet (25 mg) by mouth daily  - DX Hip/Pelvis/Spine; Future  - OFFICE/OUTPT VISIT,ESTLEVL IV    Acquired hypothyroidism  - stable, refill provided  - levothyroxine (SYNTHROID/LEVOTHROID) 50 MCG tablet; 1 tablet by mouth Tue-Thu-Sun  - levothyroxine (SYNTHROID/LEVOTHROID) 75 MCG tablet; Take 1 tablet by mouth on Sundays, Mondays, Wed, Fri  - OFFICE/OUTPT VISIT,ESTLEVL IV    Type 2 diabetes mellitus with stage 4 chronic kidney disease, without long-term current use of insulin (H)  - stable, refill provided  - HEMOGLOBIN A1C  - TSH WITH FREE T4 REFLEX  - rosuvastatin (CRESTOR) 10 MG tablet; Take 1 tablet (10 mg) by mouth daily  - **A1C FUTURE 3mo; Future  - Basic metabolic panel  - OFFICE/OUTPT VISIT,ESTLEVL IV    Gastroesophageal reflux disease without esophagitis  - stable, refill provided  - omeprazole (PRILOSEC) 20 MG DR capsule; Take 1 capsule (20 mg) by mouth every 48 hours as needed (GERD)  - OFFICE/OUTPT VISIT,ESTLEVL IV    Osteopenia of multiple sites - due for dexa. 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 blood sugar is up a bit  2. Recheck A1c (lab only) in 3 months.  If not improved or higher, then would need to Rx for meter and perhaps medications  3. Keep working on diet/exercise    Medications  1. Filled x 1 year.     Bone density:  1. Get updated test.  If worse, would have you work with Dr. Ash to help choose correct treatment    Return in about 53 weeks (around 7/8/2022) for Annual Wellness Visit.    Li Horton, [facility]    Joy Desir is a 81 year old who presents for the following health issues     HPI     Diabetic Foot Exam:  Medicare  Visit: Today  Td: Not today   Refill: 90 days supply please      Diabetes Follow-up      How often are you checking your blood sugar? Not at all    What concerns do you have today about your diabetes? None     Do you have any of these symptoms? (Select all that apply)  No numbness or tingling in feet.  No redness, sores or blisters on feet.  No complaints of excessive thirst.  No reports of blurry vision.  No significant changes to weight.              Hyperlipidemia Follow-Up      Are you regularly taking any medication or supplement to lower your cholesterol?   Yes- Rosuvastatin 10 mg    Are you having muscle aches or other side effects that you think could be caused by your cholesterol lowering medication?  No    Hypertension Follow-up      Do you check your blood pressure regularly outside of the clinic? Yes     Are you following a low salt diet? Yes    Are your blood pressures ever more than  "140 on the top number (systolic) OR more   than 90 on the bottom number (diastolic), for example 140/90? No    BP Readings from Last 2 Encounters:   21 128/66   21 134/66     Hemoglobin A1C (%)   Date Value   2021 7.3 (H)   2020 6.6 (H)     LDL Cholesterol Calculated (mg/dL)   Date Value   2020 70   2019 67           Annual Wellness Visit    Patient has been advised of split billing requirements and indicates understanding: Yes     Are you in the first 12 months of your Medicare Part B coverage?  No    Physical Health:    In general, how would you rate your overall physical health? good    Outside of work, how many days during the week do you exercise?6-7 days/week    Outside of work, approximately how many minutes a day do you exercise?15-30 minutes    If you drink alcohol do you typically have >3 drinks per day or >7 drinks per week? No    Do you usually eat at least 4 servings of fruit and vegetables a day, include whole grains & fiber and avoid regularly eating high fat or \"junk\" foods? Yes    Do you have any problems taking medications regularly? No    Do you have any side effects from medications? none    Needs assistance for the following daily activities: no assistance needed    Which of the following safety concerns are present in your home?  none identified     Hearing impairment: No    In the past 6 months, have you been bothered by leaking of urine? no    Mental Health:    In general, how would you rate your overall mental or emotional health? good  PHQ-2 Score: 0    Do you feel safe in your environment? Yes    Have you ever done Advance Care Planning? (For example, a Health Directive, POLST, or a discussion with a medical provider or your loved ones about your wishes)? Yes, advance care planning is on file.    Fall risk:  Fallen 2 or more times in the past year?: No  Any fall with injury in the past year?: No    Cognitive Screenin) Repeat 3 items (Leader, " Season, Table)  All 3 words repeated back.  2) Clock draw: NORMAL  3) 3 item recall: Recalls 3 objects  Results: 3 items recalled: COGNITIVE IMPAIRMENT LESS LIKELY    Mini-CogTM Copyright S Joanna. Licensed by the author for use in [facility]; reprinted with permission (kyle@[facility]). All rights reserved.      Do you have sleep apnea, excessive snoring or daytime drowsiness?: no    Current providers sharing in care for this patient include:   Patient Care Team:  Li Horton DO as PCP - General (Internal Medicine)  Li Horton DO as MD (Internal Medicine)  Li Horton DO as Assigned PCP  Blayne Mclean MD as Hospitalist (Infectious Diseases)  Blayne Mclean MD as Assigned Infectious Disease Provider  Paul Shen MD as Assigned Surgical Provider  Guru Caitlin Liang MD as Assigned Gastroenterology Provider    Patient has been advised of split billing requirements and indicates understanding: Yes    Review of Systems   Constitutional, HEENT, cardiovascular, pulmonary, GI, , musculoskeletal, neuro, skin, endocrine and psych systems are negative, except as otherwise noted.      Objective    /66   Pulse 61   Temp 96.3  F (35.7  C) (Tympanic)   Resp 16   Wt 68.9 kg (152 lb)   SpO2 98%   Breastfeeding No   BMI 24.53 kg/m    Body mass index is 24.53 kg/m .  Physical Exam

## 2021-07-02 NOTE — PATIENT INSTRUCTIONS
Patient Education   Personalized Prevention Plan  You are due for the preventive services outlined below.  Your care team is available to assist you in scheduling these services.  If you have already completed any of these items, please share that information with your care team to update in your medical record.  Health Maintenance Due   Topic Date Due     ANNUAL REVIEW OF  ORDERS  Never done     Diabetic Foot Exam  06/09/2017     Diptheria Tetanus Pertussis (DTAP/TDAP/TD) Vaccine (3 - Td) 09/29/2018     FALL RISK ASSESSMENT  04/30/2021             Diabetes:  1. blood sugar is up a bit  2. Recheck A1c (lab only) in 3 months.  If not improved or higher, then would need to Rx for meter and perhaps medications  3. Keep working on diet/exercise    Medications  1. Filled x 1 year.     Bone density:  1. Get updated test.  If worse, would have you work with Dr. Ash to help choose correct treatment    Vaccines:  1. Consider updated tetanus  Patient Education   Personalized Prevention Plan  You are due for the preventive services outlined below.  Your care team is available to assist you in scheduling these services.  If you have already completed any of these items, please share that information with your care team to update in your medical record.  Health Maintenance Due   Topic Date Due     Diabetic Foot Exam  06/09/2017     Diptheria Tetanus Pertussis (DTAP/TDAP/TD) Vaccine (3 - Td) 09/29/2018     FALL RISK ASSESSMENT  04/30/2021

## 2021-07-02 NOTE — RESULT ENCOUNTER NOTE
Discussed with patient directly.   Urine is negative for protein which is good.    The kidney function is stable  The potassium is elevated - recommend to hold lisinopril can call her Nephrologist.  Recheck BMP early next week, preferable with Nephrology if she is able to get in touch w/the clinic.  Otherwise here at [place].

## 2021-07-09 DIAGNOSIS — E87.5 HYPERKALEMIA: ICD-10-CM

## 2021-07-09 DIAGNOSIS — N18.4 CKD (CHRONIC KIDNEY DISEASE) STAGE 4, GFR 15-29 ML/MIN (H): Chronic | ICD-10-CM

## 2021-07-09 LAB
ANION GAP SERPL CALCULATED.3IONS-SCNC: 6 MMOL/L (ref 3–14)
BUN SERPL-MCNC: 37 MG/DL (ref 7–30)
CALCIUM SERPL-MCNC: 8.8 MG/DL (ref 8.5–10.1)
CHLORIDE SERPL-SCNC: 111 MMOL/L (ref 94–109)
CO2 SERPL-SCNC: 22 MMOL/L (ref 20–32)
CREAT SERPL-MCNC: 1.56 MG/DL (ref 0.52–1.04)
GFR SERPL CREATININE-BSD FRML MDRD: 30 ML/MIN/{1.73_M2}
GLUCOSE SERPL-MCNC: 130 MG/DL (ref 70–99)
POTASSIUM SERPL-SCNC: 4.4 MMOL/L (ref 3.4–5.3)
SODIUM SERPL-SCNC: 139 MMOL/L (ref 133–144)

## 2021-07-09 PROCEDURE — 80048 BASIC METABOLIC PNL TOTAL CA: CPT | Performed by: INTERNAL MEDICINE

## 2021-07-09 PROCEDURE — 36415 COLL VENOUS BLD VENIPUNCTURE: CPT | Performed by: INTERNAL MEDICINE

## 2021-07-09 NOTE — RESULT ENCOUNTER NOTE
Kidney function is stable. Potassium has normalized.  Follow-up with Nephrologist about when to restart lisinopril, if at all

## 2021-08-01 DIAGNOSIS — K21.9 GASTROESOPHAGEAL REFLUX DISEASE WITHOUT ESOPHAGITIS: ICD-10-CM

## 2021-08-02 ENCOUNTER — HOSPITAL ENCOUNTER (OUTPATIENT)
Dept: BONE DENSITY | Facility: CLINIC | Age: 81
Discharge: HOME | End: 2021-08-02
Attending: INTERNAL MEDICINE | Admitting: INTERNAL MEDICINE
Payer: COMMERCIAL

## 2021-08-02 DIAGNOSIS — M85.89 OSTEOPENIA OF MULTIPLE SITES: ICD-10-CM

## 2021-08-02 DIAGNOSIS — N18.4 CKD (CHRONIC KIDNEY DISEASE) STAGE 4, GFR 15-29 ML/MIN (H): Chronic | ICD-10-CM

## 2021-08-02 PROCEDURE — 77080 DXA BONE DENSITY AXIAL: CPT

## 2021-08-02 NOTE — LETTER
August 4, 2021      Lupe Lepe  [address] [facility] [id number]        Dear ,    We are writing to inform you of your test results.    The bone density is stable compared to 2019.    You are on the borderline of considering prescription medications to manage low bone density. It is okay to monitor off medications and recheck in about 2 to 3 years.    Resulted Orders   DX Hip/Pelvis/Spine    Narrative    DXA HIP/PELVIS/SPINE  8/2/2021 2:29 PM    TECHNIQUE: The lumbar spine and both hips were scanned with DXA  technique performed using a [employer] scanner. DXA results are  reported according to T-score. The T-score is the standard deviation  from the peak bone mass in a normal young adult patient. A T-score of  -1.0 to -2.5 correlates with osteopenia. A T-score of less than -2.5  correlates with osteoporosis.    In accordance with the ISCD (International Society for Clinical  Densitometry), the lowest BMD between the total hip and femoral neck  will be reported.    INDICATION: Postmenopausal screening.    COMPARISON: DXA scan from 7/23/2019.    FINDINGS:   Lumbar spine: The T-score is -1.3 from L1 to L4. There has been a 2.1%  decrease in bone density since the prior examination. This is not  statistically significant.    Hips: The left hip T-score is -2.0. The right hip T-score is -2.2.  Bone mineral density in the worst hip is 0.743 gm/cm2. There has been  a 0.1% increase in bone density since the prior examination. This is  not statistically significant.       Impression    IMPRESSION: Mild osteopenia in the lumbar spine. Advanced osteopenia  but not yet osteoporosis in the hips bilaterally. No significant  change.    NELSON PARRISH MD         SYSTEM ID:  [id number]       If you have any questions or concerns, please call the clinic at the number listed above.       Sincerely,      Li Horton,

## 2021-08-03 NOTE — RESULT ENCOUNTER NOTE
The bone density is stable compared to 2019.  You are on the borderline of considering prescription medications to manage low bone density.  It is okay to monitor off medications and recheck in about 2 to 3 years

## 2021-08-04 ENCOUNTER — TRANSFERRED RECORDS (OUTPATIENT)
Dept: HEALTH INFORMATION MANAGEMENT | Facility: CLINIC | Age: 81
End: 2021-08-04

## 2021-09-23 NOTE — ED PROVIDER NOTES
History     Chief Complaint   Patient presents with     UTI     dysuria, frequency, started yesterday     Abdominal Pain     lower abdominal pain     HPI  Lupe Lepe is a 81 year old female who presents with second day of mild urinary frequency, some mild suprapubic discomfort dysuria.  Denies nausea, flank pain, headache, fever chills or sweats, abdominal pain.  She has history of recurrent urinary tract infection with ESBL E. coli.  Most recently had successful treatment with fosfomycin in April of this year.  Urine culture at that time grew pansensitive E. coli.    Allergies:  Allergies   Allergen Reactions     Bactrim      Acute renal failure and hyperkalemia     Ace Inhibitors Other (See Comments)     Hyperkalemia       Crestor [Rosuvastatin Calcium] Other (See Comments)     Elevated LFTS   In high doses     Lipitor [Atorvastatin Calcium] Itching     ELEVATED LFTS     Pcn [Penicillins] Hives     Rosuvastatin Itching     Other reaction(s): Edema     Sulfamethoxazole-Trimethoprim Other (See Comments)     Kidney shutdown     Ciprofloxacin Itching and Rash     Allergy to IV form only, Has taken oral without problems       Problem List:    Patient Active Problem List    Diagnosis Date Noted     Renal artery stenosis (H) 08/25/2006     Priority: High     Left. Likely due to temporal arteritis. S/p left angioplasty 8/06. Increased BP 10/06. Renal duplex- Increased velocity left RA, ratios within normal limits. Right RA velocity slightly increased. MRA- normal left renal artery. Moderate right renal artery stenosis. Probable splenic artery stenosis. Angio 10/06 with bilateral renal artery angioplasty. Admit [facility] increased BP 11/06- renal angiogram this time shows no stenosis and no gradient. Renal ultrasound 6/08- normal. Renal ultrasound 6/08- normal with elevated pressures.  MRA 4/10- minimal narrowing main right renal artery.       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 Duplex 7/06- Right SCV, brachial biphasic. Right radial monophasic, right ulnar biphasic-triphasic. Left SCV triphasic. Left brachial biphasic, left radial and ulnar monophasic. Brachial wrist indices  0.57 right/0.58 left. Angio 8/06- c/w arteritis, right RA stenosis. Repeat angiogram 6/06 no intracranial arteritis or stenosis, mild left intracranial artery plaque. Duplex 9/06- Right SCV and axillary triphasic. Right brachial, radial and ulnar monophasic. Left SCV biphasic-triphasic.  Left axillary and brachial monophasic. Left radial and ulnar monophasic-biphasic. Brachial wrist indices 0.63 right/0.60 left.  Duplex 2/08 unchanged. MRA 4/10- right vertebral occluded  Followed by Dr Aly at [place]        History of giant cell arteritis 03/27/2006     Priority: High     Prolonged low grade temps. Normal wbc, lft, UA, BCX. , CRP 92. Normal monospot, lymes screen. CT chest abdomen and pelvis negative 4/06. Bcx negative. BM BX negative. Fungal, AFB culture negative. Temporal artery biopsy positive. Flare with prednisone taper. Cellcept started 11/06, stopped 8/08 due to frequent UTIs. Started MTX 12/08 after [place] eval. Off prednisone 8/09.     Needs blood pressure checked in legs.  Sees DR. Ivory Aly at [facility].       ESBL (extended spectrum beta-lactamase) producing bacteria infection 10/06/2020     Priority: Medium     Recurrent UTI 10/05/2020     Priority: Medium     Giant cell arteritis (H) 09/15/2020     Priority: Medium     Subclavian arterial stenosis (H) 06/29/2018     Priority: Medium     Cannot measure blood pressure in bilateral arms.  Blood pressure readings in leg are 10-20% higher than arm.       CKD (chronic kidney disease) stage 4, GFR 15-29 ml/min (H) 08/31/2016     Priority: Medium     Proteinuria on ACEi, follows with [facility] Dr. Ash       Iron deficiency anemia, unspecified 08/30/2016     Priority: Medium      Diagnosis updated by automated process. Provider to review and confirm.       Chronic kidney disease, stage IV (severe) (H) 08/30/2016     Priority: Medium     Anemia of renal disease 06/09/2016     Priority: Medium     Mitral regurgitation 01/03/2014     Priority: Medium     Overview:   12/2013 Echo -   Normal left ventricular size, wall thickness, and systolic function with no obvious regional wall motion abnormalities.   The ejection fraction is visually estimated at 65%.   Mild left atrial dilatation.   Mild mitral regurgitation.   Mild-to-moderate tricuspid regurgitation.   Right ventricular systolic pressure estimated at 29.4 mmHg + RAP.   Compared to exam of 10/8/12--diastolic dysfxn less evident--R heart pressure is lower.       ABY (renal artery stenosis) (H) 09/27/2012     Priority: Medium     Overview:   2006 at  [place] S/p PTA to both renal arteries for malignant HTN which then improved.  2010 MRA abdomen - Widely patent bilateral main renal arteries except for minimal narrowing proximal R renal artery.       Type 2 diabetes mellitus with stage 4 chronic kidney disease, without long-term current use of insulin (H) 10/31/2010     Priority: Medium     DX 2006. 2003 2hour-glucose 202. FBS >126x2 1/06. No retinopathy, peripheral neuropathy.  Was on insulin previously, was able to get off this in 2014 or so.       Hyperlipidemia LDL goal <100 10/31/2010     Priority: Medium     Renovascular hypertension with goal blood pressure less than 130/80      Priority: Medium     Hospitalized with malignant HTN [facility] 11/06. Angio- no recurrant renal artery stenosis. Metanephrines normal.  Aldosterone was undetectably low.  Urine catecholamines within normal limits.  Renin activity normal.        ACP (advance care planning) 05/06/2011     Priority: Low     Advance Care Planning 6/9/2016: ACP Review of Chart / Resources Provided:  Reviewed chart for advance care plan.  Lupe Lepe has no plan or code status on  file however states presence of ACP document. Copy requested.   Added by Ivelisse Olivares  Pt has completed an Advance/Health Care Directive (HCD), to bring in copy to be scanned into Epic.           Frequent Urinary Tract Infections 01/23/2009     Priority: Low     Cysto 7/09 normal        Abdominal cramps 10/16/2008     Priority: Low     Episodic severe cramping associated with emesis since 2007. GI evaluation 10/08. [place] evaluation 12/08. 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 Rx jeanne-pac for H. Pylori with resolution of symptoms 1/09. Recurrent 2012, shorter symptoms.   Evaluation 11/2017, 9/15/2020.  Unclear cause.  May be transient small bowel obstruction?  Recommend ER evaluation during episode to get CT scan and labs       Gastroesophageal reflux disease without esophagitis      Priority: Low     Paroxysmal atrial fibrillation (H)      Priority: Low     paroxymal single episode 2002. ECHO, Adenosine cardiolite normal.  Not on anticoagulation        Acquired hypothyroidism      Priority: Low     Osteopenia of multiple sites      Priority: Low     dexa 1999, 2003. Dexa 7/06 -lumbar spine -1.9,  right femur -1.0,  left femur is -1.6. Boniva started 2007.  Dexa 2/08- L spine -2.4, right femoral neck -1.4, left femoral neck -2.0. Dexa 5/11- L1-L4  -1.8 ,  right femoral neck -1.7, left femoral neck -1.9.            Past Medical History:    Past Medical History:   Diagnosis Date     Abdominal cramps 10/16/2008     Abnormal CT scan 1/12/2009     Arthritis      Closed fracture of unspecified part of fibula      Heart disease      Hemorrhage of rectum and anus      HERPES ZOSTER NOS 5/23/2007     Hypertension      HYPOSMIA      IRON DEFIC ANEMIA NOS 3/27/2006     JOINT EFFUSION-L/LEG 4/7/2006     Other closed fractures of distal end of radius (alone)      Polymyalgia rheumatica (H)      Pyelonephritis 10/6/2020     Thyroid disease        Past Surgical History:    Past Surgical History:    Procedure Laterality Date     COLONOSCOPY  8/25/2008     ESOPHAGOSCOPY, GASTROSCOPY, DUODENOSCOPY (EGD), COMBINED N/A 11/16/2017    Procedure: COMBINED ESOPHAGOSCOPY, GASTROSCOPY, DUODENOSCOPY (EGD);  Gastroscopy;  Surgeon: Angel Guillen MD;  Location: [facility]     HYSTERECTOMY, SIS  1970's     PHACOEMULSIFICATION WITH STANDARD INTRAOCULAR LENS IMPLANT Right 1/8/2018    Procedure: PHACOEMULSIFICATION WITH STANDARD INTRAOCULAR LENS IMPLANT;  Right Cataract Removal with Implant;  Surgeon: Sekou Dobbins MD;  Location: [facility]     PHACOEMULSIFICATION WITH STANDARD INTRAOCULAR LENS IMPLANT Left 1/29/2018    Procedure: PHACOEMULSIFICATION WITH STANDARD INTRAOCULAR LENS IMPLANT;  Left Cataract Removal with Implant;  Surgeon: Sekou Dobbins MD;  Location: [facility]     SURGICAL HISTORY OF -   1948    SIS, for fibroids     SURGICAL HISTORY OF -   12/16/2008    Esophagogastroduodenoscopy with biopsy     TONSILLECTOMY      Tonsilectomy     ZZHC COLONOSCOPY THRU STOMA, DIAGNOSTIC  2000, 5/06, 8/08    Normal       Family History:    Family History   Problem Relation Age of Onset     Cancer Mother         ovarian CA     Cancer Father         lung CA     Heart Disease Maternal Grandmother      Heart Disease Maternal Grandfather      Depression Sister         sucide     Breast Cancer Sister        Social History:  Marital Status:   [2]  Social History     Tobacco Use     Smoking status: Never Smoker     Smokeless tobacco: Never Used   Substance Use Topics     Alcohol use: No     Drug use: No        Medications:    amLODIPine (NORVASC) 10 MG tablet  ASPIRIN 325 MG OR TBEC  chlorthalidone (HYGROTON) 25 MG tablet  cholecalciferol (D-VI-SOL,VITAMIN D3) 400 units/mL (10 mcg/mL) LIQD liquid  cloNIDine (CATAPRES) 0.1 MG tablet  levothyroxine (SYNTHROID/LEVOTHROID) 50 MCG tablet  levothyroxine (SYNTHROID/LEVOTHROID) 75 MCG tablet  lisinopril (ZESTRIL) 10 MG tablet  MULTIVITAMINS OR TABS  omeprazole (PRILOSEC) 20 MG   "capsule  rosuvastatin (CRESTOR) 10 MG tablet  VITAMIN B-6 50 MG OR TABS          Review of Systems  All other systems reviewed and are negative.    Physical Exam   BP: (!) 153/68  Pulse: 77  Temp: 98.2  F (36.8  C)  Resp: 16  Height: 165.1 cm (5' 5\")  Weight: 68 kg (150 lb)  SpO2: 98 %      Physical Exam  Nontoxic appearing no respiratory distress alert and oriented ×3  Head atraumatic normocephalic   Neck supple full active painless range of motion  Lungs clear to auscultation  No CVA tenderness  Heart regular no murmur  Abdomen soft nontender bowel sounds positive   Strength and sensation grossly intact throughout the extremities, gait and station normal  Speech is fluent, good eye contact, thought processes are rational  Lower extremities without swelling, redness or tenderness  Pedal pulses symmetrical and strong    ED Course        Procedures                Critical Care time:  none        Results for orders placed or performed during the hospital encounter of 09/22/21   UA with Microscopic reflex to Culture     Status: Abnormal    Specimen: Urine, Midstream   Result Value Ref Range    Color Urine Yellow Colorless, Straw, Light Yellow, Yellow    Appearance Urine Hazy (A) Clear    Glucose Urine 50  (A) Negative mg/dL    Bilirubin Urine Negative Negative    Ketones Urine Negative Negative mg/dL    Specific Gravity Urine 1.011 1.003 - 1.035    Blood Urine Small (A) Negative    pH Urine 5.0 5.0 - 7.0    Protein Albumin Urine Negative Negative mg/dL    Urobilinogen Urine Normal Normal, 2.0 mg/dL    Nitrite Urine Negative Negative    Leukocyte Esterase Urine Large (A) Negative    Bacteria Urine Few (A) None Seen /HPF    WBC Clumps Urine Present (A) None Seen /HPF    Mucus Urine Present (A) None Seen /LPF    RBC Urine 6 (H) <=2 /HPF    WBC Urine 167 (H) <=5 /HPF    Squamous Epithelials Urine <1 <=1 /HPF    Hyaline Casts Urine 1 <=2 /LPF    Narrative    Urine Culture ordered based on laboratory criteria   Urine Culture "     Status: Abnormal (Preliminary result)    Specimen: Urine, Midstream   Result Value Ref Range    Culture >100,000 CFU/mL Escherichia coli (A)        Susceptibility    Escherichia coli - CAM     Ampicillin 8.0 Susceptible ug/mL     Ampicillin/ Sulbactam <=2.0 Susceptible ug/mL     Piperacillin/Tazobactam <=4.0 Susceptible ug/mL     Cefazolin* <=4.0 Susceptible ug/mL      * Cefazolin CAM breakpoints are for the treatment of uncomplicated urinary tract infections. For the treatment of systemic infections, please contact the laboratory for additional testing.     Cefoxitin <=4.0 Susceptible ug/mL     Ceftazidime <=1.0 Susceptible ug/mL     Ceftriaxone <=1.0 Susceptible ug/mL     Cefepime <=1.0 Susceptible ug/mL     Gentamicin <=1.0 Susceptible ug/mL     Tobramycin <=1.0 Susceptible ug/mL     Ciprofloxacin <=0.25 Susceptible ug/mL     Levofloxacin <=0.12 Susceptible ug/mL     Nitrofurantoin <=16.0 Susceptible ug/mL     Trimethoprim/Sulfamethoxazole <=1/19 Susceptible ug/mL            No results found for this or any previous visit (from the past 24 hour(s)).    Medications - No data to display    Assessments & Plan (with Medical Decision Making)  81-year-old female with recurrent cystitis/urinary tract infections, known ESBL E. coli, most recently treated with fosfomycin in April presents with urinary frequency and dysuria, no systemic symptoms.  Exam is unremarkable.  Urine appears infected culture pending.  Treated with fosfomycin 3 g dose.  Drink plenty of fluids.  Follow-up urology.  Return criteria reviewed       I have reviewed the nursing notes.    I have reviewed the findings, diagnosis, plan and need for follow up with the patient.       Discharge Medication List as of 9/22/2021  8:09 PM      START taking these medications    Details   fosfomycin (MONUROL) 3 g Packet Take 1 packet (3 g) by mouth once for 1 dose, Disp-1 packet, R-0, E-Prescribe             Final diagnoses:   Urinary tract infection without  hematuria, site unspecified       9/22/2021   [facility] EMERGENCY DEPT     Richard De La Rosa MD  09/24/21 0009

## 2021-09-23 NOTE — RESULT ENCOUNTER NOTE
[facility] Emergency Dept discharge antibiotic (if prescribed): Fosfomycin (MONUROL) 3 G packet, 1 packet (3 g) by mouth once for 1 dose  No changes in treatment per [facility] ED Lab Result Urine culture protocol.

## 2021-09-23 NOTE — DISCHARGE INSTRUCTIONS
Past medicine as prescribed    Plenty of fluids    Return here for fever, chills, flank pain, vomiting or any other concern.

## 2021-09-25 NOTE — TELEPHONE ENCOUNTER
"[facility] Emergency Department/Urgent Care Lab result notification [Positive for uti and bacteria is susceptible to antibiotic]:    Reason for call:   Notify of Final urine culture result, confirm patient is taking antibiotic, assess symptoms, and advise per Emergency Dept/Urgent Care discharge instructions and Emergency Dept urine culture protocol.    Lab Result & Date of Final Report [copied from Result Note]:    Final Urine Culture Report on 9/24/21  [facility] Emergency Dept discharge antibiotic prescribed: Fosfomycin (MONUROL) 3 G packet, 1 packet (3 g) by mouth once for 1 dose  #1. Bacteria, >100,000 colonies/mL Escherichia coli    Recommendations in treatment per [facility] ED lab result Urine Culture protocol.    Current symptoms (include time patient called):    3:52  Spoke with patient  She states that she is feeling much better. No dysuria and states \"things are clearing up\" Patient declined to schedule follow up she states her primary is always on top of things for her.  Recommendations/Instructions:   Take antibiotic as directed by the Emergency Dept/Urgent Care Provider.    UTI prevention/education reviewed with patient [Yes/No]:  Patient states she is aware of prevention techniques she has had recurrent UTI    Please contact you PCP or return to the Emergency Department if your:    Symptoms return    Symptoms worsen or other concerning symptoms    Soco Ramirez RN  [facility] [place]  Emergency Dept Lab Result RN  Ph# [phone]         "

## 2021-10-21 NOTE — RESULT ENCOUNTER NOTE
The Hemoglobin A1c is very high.  We need to restart medication.  The most recent medication you were on was Glipizide 2.5 mg daily.  I will re-order this; make visit to discuss blood sugar in more detail.

## 2021-10-27 PROBLEM — K86.1 IDIOPATHIC CHRONIC PANCREATITIS (H): Status: ACTIVE | Noted: 2021-01-01

## 2021-10-27 NOTE — PATIENT INSTRUCTIONS
1. Order for Joana - if not covered at [facility], we would try  Speciality pharmacy  2. Start Novolog 70/30 10 units twice daily  3. Can follow-up with diabetes ed - Pamela.  Can increase insulin by 1 unit every 2 days (11 twice daily, 12 twice daily) until at goal blood sugar of < 180.  If you start having low blood sugar, then go back to previous insulin dose.  4. Stop glipizde

## 2021-10-27 NOTE — TELEPHONE ENCOUNTER
Continuous Blood Gluose  (Freestyle Joana 14 Day [place]) and Continuous Gluc Sensor (Freestyle Joana 14 Day Sensor) prescriptions sent to [facility], [phone]. Katlyn Rivas on 10/27/2021 at 1:46 PM

## 2021-10-27 NOTE — PROGRESS NOTES
Assessment & Plan     Type 2 diabetes mellitus with stage 4 chronic kidney disease, without long-term current use of insulin (H)  Worsening which patient suspects is due to degeneration of her pancreas from chronic pancreatitis.  This may be correct.  No other explanation.  Resume insulin, 7030 per her preference that she has been on this before.  She strongly prefers to use the mel instead of traditional glucometer.  Start low at 10 units twice a day.  Follow-up with diabetes Ed and adjust as needed.  - insulin aspart prot & aspart (NOVOLOG MIX 70/30 FLEXPEN) (70-30) 100 UNIT/ML pen; Inject 10 Units Subcutaneous 2 times daily (with meals)  - insulin pen needle (ULTICARE MICRO) 32G X 4 MM miscellaneous; Use 2 pen needles daily or as directed.  - alcohol swab prep pads; Use to swab area of injection/lizandro as directed.  - Continuous Blood Gluc  (FREESTYLE MEL 14 DAY READER) KO; 1 each every 14 days  - Continuous Blood Gluc Sensor (FREESTYLE MEL 14 DAY SENSOR) MISC; 1 each every 14 days  - [facility] Adult Diabetes Educator Referral; Future    Idiopathic chronic pancreatitis (H) -follows with pancreas GI, upcoming appointment later this month    High priority for 2019-nCoV vaccine  - COVID-19,PF,PFIZER        Addendum 11/2/2021  On 11/1/21, patient met with diabetes educator and insulin was changed to basal/bolus - Lantus 10 units HS and Humalog 2 units TID.    Patient meets the requirements for a continuous glucose monitor:   - Has diagnosis of Type 2 Diabetes Mellitus  - Has been checking blood sugars 4 times per day x 30 days   - Is treated with >/=3 insulin injections per day; Patient takes basal insulin 1/day and takes rapid acting insulin 3/day. OR Patient is on a continuous subcutaneous insulin infusion pump.   Changes to the insulin treatment regimen are made frequently by the patient based on daily blood sugars.   Patient makes daily adjustments to insulin regimen based on:   - pre meal blood sugars  / correction scale  - bedtime and overnight blood sugar trends  - blood sugar patterns around exercise and activities of daily living   - variability in blood sugars / blood sugar trends              Patient Instructions   1. Order for Joana - if not covered at [facility], we would try  Speciality pharmacy  2. Start Novolog 70/30 10 units twice daily  3. Can follow-up with diabetes ed - Pamela.  Can increase insulin by 1 unit every 2 days (11 twice daily, 12 twice daily) until at goal blood sugar of < 180.  If you start having low blood sugar, then go back to previous insulin dose.  4. Stop glipizde       No follow-ups on file.    Li Horton, [facility] Des[facility] is a 81 year old who presents for the following health issues     HPI     Diabetic Foot Exam:    Tdap: Medicare only pays Td:  Covid-19: Pfizer - can have 3rd dose - need consent: last done 3/3/21 - Today    Pt verbalize that she was dx with new problem - Dr. Alex from [facility]     Diabetes Follow-up    How often are you checking your blood sugar? One time daily  What time of day are you checking your blood sugars (select all that apply)?  Before meals  Have you had any blood sugars above 200?  Yes 600 or over  Have you had any blood sugars below 70?  No 200    What symptoms do you notice when your blood sugar is low?  None    What concerns do you have today about your diabetes? Blood sugar is often over 200     Do you have any of these symptoms? (Select all that apply)  No numbness or tingling in feet.  No redness, sores or blisters on feet.  No complaints of excessive thirst.  No reports of blurry vision.  No significant changes to weight.    Have you had a diabetic eye exam in the last 12 months? No     She is following with Dr. Alex for chronic pancreatitis - next appointment 11/18    blood sugar 200-500; blood sugar down to 200-300 since starting glipizide    Was rarely checking blood sugar prior to recent A1C on  10/20    Is feeling more thirsty the last few weeks    Is otherwise feeling well, no recent steroid shot or oral steroids.    She has been carb counting since recent blood work; wasn't doing this prior because the blood sugar had been well controlled.      Would prefer to restart insulin 70/30 because she is very familiar with this as she was on it previously.    Wants fortunato, needs from [facility] medical        Patient meets the requirements for a continuous glucose monitor:   - Has diagnosis of Type 2 Diabetes Mellitus  - Has been checking blood sugars 4 times per day x 30 days   - Is treated with >/=3 insulin injections per day; Patient takes basal insulin 2/day and takes rapid acting insulin 2/day. OR Patient is on a continuous subcutaneous insulin infusion pump.   Changes to the insulin treatment regimen are made frequently by the patient based on daily blood sugars.   Patient makes daily adjustments to insulin regimen based on:   - pre meal blood sugars / correction scale  - bedtime and overnight blood sugar trends  - blood sugar patterns around exercise and activities of daily living   - variability in blood sugars / blood sugar trends         BP Readings from Last 2 Encounters:   10/27/21 132/76   09/22/21 (!) 148/72     Hemoglobin A1C (%)   Date Value   10/20/2021 11.1 (H)   07/02/2021 7.3 (H)   11/17/2020 6.6 (H)     LDL Cholesterol Calculated (mg/dL)   Date Value   07/02/2021 80   06/25/2020 70               Hypertension Follow-up      Do you check your blood pressure regularly outside of the clinic? No     Are you following a low salt diet? Yes    Are your blood pressures ever more than 140 on the top number (systolic) OR more   than 90 on the bottom number (diastolic), for example 140/90? No            Review of Systems   Constitutional, HEENT, cardiovascular, pulmonary, gi and gu systems are negative, except as otherwise noted.      Objective    /76   Pulse 84   Temp 97.4  F (36.3  C) (Tympanic)    Resp 14   Wt 67.6 kg (149 lb)   SpO2 97%   Breastfeeding No   BMI 24.79 kg/m    Body mass index is 24.79 kg/m .  Physical Exam   GENERAL APPEARANCE: healthy, alert and no distress  DIABETIC FOOT EXAM: normal DP and PT pulses, no trophic changes or ulcerative lesions and normal sensory exam    No results found for this or any previous visit (from the past 24 hour(s)).

## 2021-10-27 NOTE — TELEPHONE ENCOUNTER
Pt was seeing today by Dr. Horton and had a new Diagnostic done by Dr. Rosales in [facility] - nothing on her problem list.   PT request new diagnostic be on there.  Route to Dr. Horton.  Jolanta Estevez CMA (MA)   (aka: Kisha Estevez)

## 2021-10-28 NOTE — TELEPHONE ENCOUNTER
Spoke with Dr. Horton today and she wants the doctor who gave the patient new diagostic to add this on her chart (problem list).   Routing to him. (Guru Caitlin Liang MD)  Jolanta Estevez CMA ([facility])   (aka: Kisha Estevez)

## 2021-11-01 NOTE — LETTER
"    11/1/2021         RE: Lupe Lepe  [address] [facility] [address]        Dear Colleague,    Thank you for referring your patient, Lupe Lepe, to the [facility] [place]. Please see a copy of my visit note below.    Diabetes Self-Management Education & Support    Presents for: Individual review.  Type of Service: Telephone Visit  Audio only visit done, as patient does not have access to audio-visual technology.  Individual visit provided, given no group classes are available for 2 months.   How would patient like to obtain AVS? MyChart    SUBJECTIVE/OBJECTIVE:  Presents for: Individual review  Accompanied by: Self  Focus of Visit: Taking Medication  Diabetes type: Type 2, Other  Disease course: Getting harder to manage  How confident are you filling out medical forms by yourself:: Extremely  Other concerns:: None  Cultural Influences/Ethnic Background:  Choose not to answer    Diabetes Symptoms & Complications:  Complications assessed today?: No    Patient Problem List and Family Medical History reviewed for relevant medical history, current medical status, and diabetes risk factors.    Vitals:  There were no vitals taken for this visit.  Estimated body mass index is 24.79 kg/m  as calculated from the following:    Height as of 9/22/21: 1.651 m (5' 5\").    Weight as of 10/27/21: 67.6 kg (149 lb).   Last 3 BP:   BP Readings from Last 3 Encounters:   10/27/21 132/76   09/22/21 (!) 148/72   07/02/21 128/66       History   Smoking Status     Never Smoker   Smokeless Tobacco     Never Used       Labs:  Lab Results   Component Value Date    A1C 11.1 10/20/2021    A1C 7.3 07/02/2021     Lab Results   Component Value Date     07/09/2021     Lab Results   Component Value Date    LDL 80 07/02/2021     HDL Cholesterol   Date Value Ref Range Status   07/02/2021 57 >49 mg/dL Final   ]  GFR Estimate   Date Value Ref Range Status   07/09/2021 30 (L) >60 " mL/min/[1.73_m2] Final     Comment:     Non  GFR Calc  Starting 12/18/2018, serum creatinine based estimated GFR (eGFR) will be   calculated using the Chronic Kidney Disease Epidemiology Collaboration   (CKD-EPI) equation.       GFR Estimate If Black   Date Value Ref Range Status   07/09/2021 35 (L) >60 mL/min/[1.73_m2] Final     Comment:      GFR Calc  Starting 12/18/2018, serum creatinine based estimated GFR (eGFR) will be   calculated using the Chronic Kidney Disease Epidemiology Collaboration   (CKD-EPI) equation.       Lab Results   Component Value Date    CR 1.56 07/09/2021     No results found for: MICROALBUMIN    Healthy Eating:   Has cut down on carbohydrates in the last week  Healthy Eating Assessed Today: Yes  Meal planning/habits: Carb counting  Meals include: Breakfast, Lunch, Dinner  Breakfast: yogurt (chobani, 1 carb) , blueberries (1/2 cup) , 2 pieces of bread (2 carbs)  Lunch: egg omelet with 1 bread (smaller)  Dinner: chicken, cottage cheese / pineapple and 1-2 chocolate chip cookies, squash / veggies.   Limits potatoes.  no milk.  (Try to have 3 carb choices for consistency right now)  Snacks: Not a big snacker, doesn't eat after dinner ever due to stomach.  Maybe in the middle of the day - 1 carb choice or less of small doritos    Being Active:  Not assessed at this visit     Monitoring:  Monitoring Assessed Today: Yes  Blood Glucose Meter: One Touch, FreeStyle    Taking Medications:  Diabetes Medication(s)     Insulin       insulin aspart prot & aspart (NOVOLOG MIX 70/30 FLEXPEN) (70-30) 100 UNIT/ML pen    Inject 10 Units Subcutaneous 2 times daily (with meals)     insulin glargine (LANTUS SOLOSTAR) 100 UNIT/ML pen    Inject 10 Units Subcutaneous At Bedtime     insulin lispro (HUMALOG KWIKPEN) 100 UNIT/ML (1 unit dial) KWIKPEN    Inject 2 units with breakfast, 1 unit with lunch and 2 units with dinner.  (Max dose 25 units per 24 hours with needle prime and ongoing  adjustment)      has worked the 70/30 up from 5-0-5-0 to 8-0-8-0    Taking Medication Assessed Today: Yes  Current Treatments: Insulin Injections  Dose schedule: Pre-breakfast, Pre-dinner    Problem Solving:  Insulin action, hypoglycemia / hyperglycemia assessment      Reducing Risks:  Reducing Risks Assessed Today: Yes    Healthy Coping:  Healthy Coping Assessed Today: Yes  Emotional response to diabetes: Ready to learn  Stage of change: ACTION (Actively working towards change)  Patient Activation Measure Survey Score:  HONEY Score (Last Two) 2/10/2011 5/6/2011   HONEY Raw Score 47 52   Activation Score 77.5 100   HONEY Level 4 4       Diabetes knowledge and skills assessment:   Patient is knowledgeable in diabetes management concepts related to: Healthy Eating, Being Active, Monitoring, Taking Medication, Problem Solving, Reducing Risks and Healthy Coping  Continue  education on the following diabetes management concepts: Taking Medication & continuous glucose monitoring   Based on learning assessment above, most appropriate setting for further diabetes education would be: Individual setting.    INTERVENTIONS:  Education provided today on:  AADE Self-Care Behaviors:  Diabetes Pathophysiology  Healthy Eating: consistency in amount, composition, and timing of food intake  Monitoring: log and interpret results and individual blood glucose targets  Taking Medication: action of prescribed medication  Problem Solving: high blood glucose - causes, signs/symptoms, treatment and prevention, low blood glucose - causes, signs/symptoms, treatment and prevention, carrying a carbohydrate source at all times, safe travel and when to call health care provider    Opportunities for ongoing education and support in diabetes-self management were discussed.  Pt verbalized understanding of concepts discussed and recommendations provided today.       ASSESSMENT:  Patient explained her history; has ideopathoic chronic pancreatitis - MD at the  [facility] said this was going to happen and that she would stop producing insulin eventually.  Pat has never had to pay attention in the past because A1c was always good.  Checked blood sugars and started taking glipizide really helped got out the 600s and back to 400s. Then made an appointmetn with MD and began 70/30, which brought blood sugars down to mostly 2-300s.  She has a huge knowledge base with Diabetes ( has type 1).  Already fluently counts carbohydrates and is familiar with insulin.   Discussed the pros and cons of 70/30 versus MDI with long acting basal and rapid acting.  For flexibility with eating she would prefer to switch to long & rapid acting.  Sent recommendations to PCP for switching; keep doses near the same for initial transfer and then titrate up.  Reviewed monitoring - recommend continuing with OTC supplies as part B does not allow you to fill a continuous glucose monitor & strips.  The goal is to begin the Joana continuous glucose monitor asap and running test strips through part B can delay this process.  She will continue to use the FreeStyle precision mayte on Ed's  and/or get a glucocard at a [place].  Will use [employer] [facility] for DME company for Freestyle joana.  She is familiar with this device as this is what Ed uses.     Monitoring:   Has an old one touch Ultra link   Has been using [facility] joana & strips on the FreeStyle Joana to double check   160, 200s, 350 at 8pm, 503  Fasting - 397, 227, 245, 206, (usually 250-300)   10:30am - 300s   Middle of the day (pre & post lunch) , 264, 227, 331 after lunch, 253 after lunch, 503 after lunch, 303 after lunch , 361 after lunch    430 - before supper 291, 247, 226, 254, 331, 253, 205  Eats at 5pm   7:30pm - 340, 315, 269, 335,   Bedtime 9pm: 340, 314, 435, 417,  335,     Other -   Will be switching to [facility] 1/1/22  On currently formulary Pat reports:   Latus is ier 2  Novolog is tier 3  Humalog tier 2   Pharmacy: [facility] target      Patient's most recent   Lab Results   Component Value Date    A1C 11.1 10/20/2021    A1C 7.3 07/02/2021    is not meeting goal of <8.0    PLAN  Proceed with Joana at [facility]  Switch insulins and Pat to mychart every 3-4 days for adjustment     Homa Dupree RD, LD, [facility]  Diabetes Education    Time Spent: 60 minutes  Encounter Type: Individual

## 2021-11-02 NOTE — TELEPHONE ENCOUNTER
Hi Dr. Horton,     Please note if you approve of this plan or indicate an alternate plan.     Switch from Novolog 70/30 to long acting and rapid acting per patient request for more flexibility in dosing the rapid acting insulin for different meals.   She had worked up to 8-0-8-0 of the 70/30 (split 11 units basal 5 units rapid).  Most blood sugars in the 2-300s on this regimen, improved from 5-600s.     Switch to Lantus / Basaglar at 10 units every pm and then Humalog/Novolog  2-1-2-0 (lunch is smallest meal).  Total daily dose of 15 units (0.22units/kg) and titrate up.        Thanks!  Pamela Dupree RD, LD, [facility]  Diabetes Education

## 2021-11-03 NOTE — TELEPHONE ENCOUNTER
RUDY Bautsita, please see Rising message.    Claudia Morris RN  [facility]

## 2021-11-04 NOTE — TELEPHONE ENCOUNTER
[facility] CGM order form completed and routed to Dr. Horton for review and signature.

## 2021-11-05 NOTE — TELEPHONE ENCOUNTER
Form completed, signed, and faxed to [facility] at [phone]. Copy of form sent to scan and copy placed in basket.

## 2021-11-08 NOTE — LETTER
2021         RE: Lupe Lepe  [address] [facility] [address]        Dear Colleague,    Thank you for referring your patient, Lupe Lepe, to the [facility] [place]. Please see a copy of my visit note below.    Diabetes Follow-up    Subjective/Objective:  Type of Service: Telephone Visit    Diabetes is being managed with   Lifestyle (diet/activity), Diabetes Medications   Diabetes Medication(s)     Insulin       insulin aspart prot & aspart (NOVOLOG MIX 70/30 FLEXPEN) (70-30) 100 UNIT/ML pen    Inject 10 Units Subcutaneous 2 times daily (with meals)     insulin glargine (LANTUS SOLOSTAR) 100 UNIT/ML pen    Inject 10 Units Subcutaneous At Bedtime     insulin lispro (HUMALOG KWIKPEN) 100 UNIT/ML (1 unit dial) KWIKPEN    Inject 2 units with breakfast, 1 unit with lunch and 2 units with dinner.  (Max dose 25 units per 24 hours with needle prime and ongoing adjustment)      removed 70/30 from medication list.  Transitioned to Humalog & Lantus     Lantus 10 units (0.15units/kg)   Humalog 2-2-2-0 (0.09units/kg)   Wt Readings from Last 5 Encounters:   10/27/21 67.6 kg (149 lb)   21 68 kg (150 lb)   21 68.9 kg (152 lb)   21 68 kg (150 lb)   21 68.5 kg (151 lb)       BG/Food Lo/3: 160 (2 units with breakfast) , 315 (took 2 instead of 1 as number was higher), 166 (2 units), 379 bedtime   : 167, 127, 112 (took 0 due to being lower and ended up with)  448  :  179, 142, 115 (took 2 at dinner),  406 at night   (dinner is biggest meal, but doesn't feel to be eating too much,  does have grapes, crackers or cheese as a snack).   :  154, 201, 127, 245    (2-2-2-0)   : 180, 150, 166, 285  (2-2-2-0)   : 161    Assessment:  Pat self increased lunch from 1 to 2 units and this is working.  Continue with current dosing and monitoring what hs snacks are causing higher numbers.  Could consider covering snacks with 1 unit and/or  increasing dinner by a unt, however would recommend off setting this by lowering Lantus 1 unit due to the overnight drop.  Do not recommend more than 1 unit increases with how sensitive patient has been to insulin. Pat called [employer] - they are working on paperwork for the Libre2.    Plan/Response:  Continue current doses and udpate in 1 week.     Homa Dupree RD, LD, [facility]  Diabetes Education  Time spent: 15 minutes     A copy of this encounter was shared with the provider.

## 2021-11-08 NOTE — Clinical Note
Timur Horton (FYI only) - blood sugars came down more than I thought they would with the switch from 70/30 to Lantus & humalog, mostly in the 100s now.   She must still be making some natural insulin and is very sensitive.  Will hold here unit the fortunato is going, then maybe can fine tune a bit more.  I think they are resending some paperwork from [facility].  She is doing great.   Thanks! Pamela

## 2021-11-08 NOTE — PROGRESS NOTES
Diabetes Follow-up    Subjective/Objective:  Type of Service: Telephone Visit    Diabetes is being managed with   Lifestyle (diet/activity), Diabetes Medications   Diabetes Medication(s)     Insulin       insulin aspart prot & aspart (NOVOLOG MIX 70/30 FLEXPEN) (70-30) 100 UNIT/ML pen    Inject 10 Units Subcutaneous 2 times daily (with meals)     insulin glargine (LANTUS SOLOSTAR) 100 UNIT/ML pen    Inject 10 Units Subcutaneous At Bedtime     insulin lispro (HUMALOG KWIKPEN) 100 UNIT/ML (1 unit dial) KWIKPEN    Inject 2 units with breakfast, 1 unit with lunch and 2 units with dinner.  (Max dose 25 units per 24 hours with needle prime and ongoing adjustment)      removed 70/30 from medication list.  Transitioned to Humalog & Lantus     Lantus 10 units (0.15units/kg)   Humalog 2-2-2-0 (0.09units/kg)   Wt Readings from Last 5 Encounters:   10/27/21 67.6 kg (149 lb)   21 68 kg (150 lb)   21 68.9 kg (152 lb)   21 68 kg (150 lb)   21 68.5 kg (151 lb)       BG/Food Lo/3: 160 (2 units with breakfast) , 315 (took 2 instead of 1 as number was higher), 166 (2 units), 379 bedtime   : 167, 127, 112 (took 0 due to being lower and ended up with)  448  :  179, 142, 115 (took 2 at dinner),  406 at night   (dinner is biggest meal, but doesn't feel to be eating too much,  does have grapes, crackers or cheese as a snack).   :  154, 201, 127, 245    (2-2-2-0)   : 180, 150, 166, 285  (2-2-2-0)   : 161    Assessment:  Pat self increased lunch from 1 to 2 units and this is working.  Continue with current dosing and monitoring what hs snacks are causing higher numbers.  Could consider covering snacks with 1 unit and/or increasing dinner by a unt, however would recommend off setting this by lowering Lantus 1 unit due to the overnight drop.  Do not recommend more than 1 unit increases with how sensitive patient has been to insulin. Pat called [facility] - they are working on paperwork for the  Libre2.    Plan/Response:  Continue current doses and udpate in 1 week.     Homa Dupree RD, LD, [facility]  Diabetes Education  Time spent: 15 minutes     A copy of this encounter was shared with the provider.

## 2021-11-11 NOTE — TELEPHONE ENCOUNTER
Form updated and faxed back to [facility].  Patient is using Freestyle Joana 2.

## 2021-11-18 NOTE — PROGRESS NOTES
Karlee is a 81 year old who is being evaluated via a billable video visit.      How would you like to obtain your AVS? SMS Assisthart  If the video visit is dropped, the invitation should be resent by: Text to cell phone: [phone]  Will anyone else be joining your video visit? No      Video Start Time: 8:00 AM  Video-Visit Details    Type of service:  Video Visit    Video End Time:8:27 AM    Originating Location (pt. Location): Home    Distant Location (provider location):  [facility] [place]     Platform used for Video Visit: MoveInSync

## 2021-11-18 NOTE — PROGRESS NOTES
GASTROENTEROLOGY PROGRESS NOTE    CHIEF COMPLAINT:  New onset diabetes and crampy abdominal pain.    HISTORY OF PRESENT ILLNESS:  This is a very pleasant, 81-year-old white female with a history of polymyalgia rheumatica, treated on steroids in the past, pyelonephritis, hypertension, herpes zoster, iron deficiency anemia, renal insufficiency, possible renal mass and urinary tract infection with ESBL who underwent an MR urogram and was found to have innumerable pancreatic cystic foci likely to represent dilated side branch secondary to chronic calcific pancreatitis.  She was referred to me in March and was seen in clinic in 03/2021.  She was experiencing crampy abdominal pain, but was otherwise doing very well, and we recommended a CT scan without IV contrast at that point, which showed a pancreatic stone in the head, which was about 6 mm x 4 mm, which had slightly increased in size compared to the prior CT.  No dilatation of the main pancreatic duct was identified.  The patient was also recommended to get a DEXA scan, which showed mild osteopenia in the lumbar spine.  Clinically, she reports that her HbA1c has worsened all of a sudden. Her HbA1c was 6.6 as of 11/17/2020 and was noted to be 7.3 on 07/02/2021, but on 10/20 when tested by Dr. Horton was noted to be 11.1, and she all of a sudden has been started on insulin (was on insulin when she had steroid-induced hyperglycemia in early to late 2000s for polymyalgia rheumatica and temporal arteritis).  The patient reports she has not been on insulin since then.  Currently, she is on insulin, and it is unclear what could have precipitated this aggravation of the diabetes.  She otherwise complains of a crampy type of abdominal pain, which usually occurs every week or every other week after food. It is generalized and without radiation.  She reports having lost 6-7 pounds unintentionally, but is otherwise tolerating a regular diet.  She has well-formed stools usually  twice daily, once in the morning and once in the afternoon.  She denies melena, hematochezia or steatorrhea.  She denies dysphagia or odynophagia.  She denies acid reflux and is doing otherwise well.    PAST MEDICAL HISTORY:    1.  Iron deficiency anemia.  2.  Hypertension.  3.  Recurrent pyelonephritis.  4.  Thyroid disease.  5.  Herpes zoster.  6.  Polymyalgia rheumatica.    PAST SURGICAL HISTORY:    1.  Tonsillectomy.   2.  Hysterectomy.   3.  Cataract surgery.    SOCIAL HISTORY:  No history of smoking.  No history of alcohol.  No history of IV drug abuse.  Lives with her  in [place].    FAMILY HISTORY:  History of ovarian cancer in mother at 42 years of age.  History of lung cancer in father.  No history of pancreatic cancer or bile duct cancer in the family.    ASSESSMENT AND PLAN:  This is an 81-year-old white female with hypertension and polymyalgia rheumatica with incidentally identified pancreatic cystic foci on MR urogram along with pancreatic calcification on the CT, which was consistent with chronic calcific pancreatitis, currently with new aggravation of preexisting diabetes.  Her HbA1c increased from 7-11 within 2 months.  Currently on insulin.    The following are our recommendations:  1.  I discussed with the patient new onset diabetes or aggravation of preexisting diabetes in the setting of chronic pancreatitis.  I explained to her that this could be a harbinger for pancreatic cancer, which she is vulnerable to given her long-standing chronic pancreatitis history.  We will recommend a CT with IV contrast or MRI with IV contrast to evaluate for pancreatic cancer in this setting. Given the fact that her kidney function creatinine is 1.99, we will favor an MRI with IV contrast to evaluate for a pancreatic head mass as a cause for the new aggravation of diabetes.  2.  Given the crampy abdominal pain is more frequent, we will recommend a trial of pancreatic enzymes.  This would involve starting  her on Creon 24,000 units 3 capsules with meals and 2 capsules with snacks.  3.  We will hold off on endoscopic interventions, as it is not clear if the stone is in the pancreatic head, and we will need to trial conservative management first.  4.  We will recommend clinic followup in 3 months and will follow up with the patient after the MRI is performed.    A total of 45 minutes was spent on the day of encounter doing chart review, history and documentation and further activities as noted above.

## 2021-11-18 NOTE — TELEPHONE ENCOUNTER
Per Dr. Liang post clinic on 11/18/21    Needs MRI with IV for pancreatic cancer screening, imaging ordered.     Needs to be started on PERT (standard dosage with Creon)     Called to discuss with patient, no answer, unable to leave message. MyChart also sent.      ML

## 2021-11-18 NOTE — LETTER
11/18/2021         RE: Lupe Lepe  [address] [facility] [id number]        Dear Colleague,    Thank you for referring your patient, Lupe Lepe, to the [facility]. Please see a copy of my visit note below.    Karlee is a 81 year old who is being evaluated via a billable video visit.      How would you like to obtain your AVS? MyChart  If the video visit is dropped, the invitation should be resent by: Text to cell phone: [phone]  Will anyone else be joining your video visit? No      Video Start Time: 8:00 AM  Video-Visit Details    Type of service:  Video Visit    Video End Time:8:27 AM    Originating Location (pt. Location): Home    Distant Location (provider location):  [facility]     Platform used for Video Visit: [facility]    GASTROENTEROLOGY PROGRESS NOTE    CHIEF COMPLAINT:  New onset diabetes and crampy abdominal pain.    HISTORY OF PRESENT ILLNESS:  This is a very pleasant, 81-year-old white female with a history of polymyalgia rheumatica, treated on steroids in the past, pyelonephritis, hypertension, herpes zoster, iron deficiency anemia, renal insufficiency, possible renal mass and urinary tract infection with ESBL who underwent an MR urogram and was found to have innumerable pancreatic cystic foci likely to represent dilated side branch secondary to chronic calcific pancreatitis.  She was referred to me in March and was seen in clinic in 03/2021.  She was experiencing crampy abdominal pain, but was otherwise doing very well, and we recommended a CT scan without IV contrast at that point, which showed a pancreatic stone in the head, which was about 6 mm x 4 mm, which had slightly increased in size compared to the prior CT.  No dilatation of the main pancreatic duct was identified.  The patient was also recommended to get a DEXA scan, which showed mild osteopenia in the lumbar spine.   Clinically, she reports that her HbA1c has worsened all of a sudden. Her HbA1c was 6.6 as of 11/17/2020 and was noted to be 7.3 on 07/02/2021, but on 10/20 when tested by Dr. Horton was noted to be 11.1, and she all of a sudden has been started on insulin (was on insulin when she had steroid-induced hyperglycemia in early to late 2000s for polymyalgia rheumatica and temporal arteritis).  The patient reports she has not been on insulin since then.  Currently, she is on insulin, and it is unclear what could have precipitated this aggravation of the diabetes.  She otherwise complains of a crampy type of abdominal pain, which usually occurs every week or every other week after food. It is generalized and without radiation.  She reports having lost 6-7 pounds unintentionally, but is otherwise tolerating a regular diet.  She has well-formed stools usually twice daily, once in the morning and once in the afternoon.  She denies melena, hematochezia or steatorrhea.  She denies dysphagia or odynophagia.  She denies acid reflux and is doing otherwise well.    PAST MEDICAL HISTORY:    1.  Iron deficiency anemia.  2.  Hypertension.  3.  Recurrent pyelonephritis.  4.  Thyroid disease.  5.  Herpes zoster.  6.  Polymyalgia rheumatica.    PAST SURGICAL HISTORY:    1.  Tonsillectomy.   2.  Hysterectomy.   3.  Cataract surgery.    SOCIAL HISTORY:  No history of smoking.  No history of alcohol.  No history of IV drug abuse.  Lives with her  in [place].    FAMILY HISTORY:  History of ovarian cancer in mother at 42 years of age.  History of lung cancer in father.  No history of pancreatic cancer or bile duct cancer in the family.    ASSESSMENT AND PLAN:  This is an 81-year-old white female with hypertension and polymyalgia rheumatica with incidentally identified pancreatic cystic foci on MR urogram along with pancreatic calcification on the CT, which was consistent with chronic calcific pancreatitis, currently with new aggravation  of preexisting diabetes.  Her HbA1c increased from 7-11 within 2 months.  Currently on insulin.    The following are our recommendations:  1.  I discussed with the patient new onset diabetes or aggravation of preexisting diabetes in the setting of chronic pancreatitis.  I explained to her that this could be a harbinger for pancreatic cancer, which she is vulnerable to given her long-standing chronic pancreatitis history.  We will recommend a CT with IV contrast or MRI with IV contrast to evaluate for pancreatic cancer in this setting. Given the fact that her kidney function creatinine is 1.99, we will favor an MRI with IV contrast to evaluate for a pancreatic head mass as a cause for the new aggravation of diabetes.  2.  Given the crampy abdominal pain is more frequent, we will recommend a trial of pancreatic enzymes.  This would involve starting her on Creon 24,000 units 3 capsules with meals and 2 capsules with snacks.  3.  We will hold off on endoscopic interventions, as it is not clear if the stone is in the pancreatic head, and we will need to trial conservative management first.  4.  We will recommend clinic followup in 3 months and will follow up with the patient after the MRI is performed.    A total of 45 minutes was spent on the day of encounter doing chart review, history and documentation and further activities as noted above.    Again, thank you for allowing me to participate in the care of your patient.      Sincerely,      Guru Garth MD

## 2022-01-01 ENCOUNTER — TELEPHONE (OUTPATIENT)
Dept: FAMILY MEDICINE | Facility: CLINIC | Age: 82
End: 2022-01-01
Payer: COMMERCIAL

## 2022-01-01 ENCOUNTER — HOSPITAL ENCOUNTER (EMERGENCY)
Facility: CLINIC | Age: 82
Discharge: HOME | End: 2022-04-16
Attending: NURSE PRACTITIONER | Admitting: NURSE PRACTITIONER
Payer: COMMERCIAL

## 2022-01-01 ENCOUNTER — HEALTH MAINTENANCE LETTER (OUTPATIENT)
Age: 82
End: 2022-01-01

## 2022-01-01 VITALS
BODY MASS INDEX: 23.3 KG/M2 | DIASTOLIC BLOOD PRESSURE: 106 MMHG | TEMPERATURE: 97.6 F | HEIGHT: 66 IN | HEART RATE: 91 BPM | WEIGHT: 145 LBS | OXYGEN SATURATION: 97 % | SYSTOLIC BLOOD PRESSURE: 165 MMHG

## 2022-01-01 DIAGNOSIS — N39.0 URINARY TRACT INFECTION: ICD-10-CM

## 2022-01-01 LAB
ALBUMIN UR-MCNC: 100 MG/DL
APPEARANCE UR: ABNORMAL
BACTERIA #/AREA URNS HPF: ABNORMAL /HPF
BACTERIA UR CULT: ABNORMAL
BILIRUB UR QL STRIP: NEGATIVE
COLOR UR AUTO: YELLOW
GLUCOSE UR STRIP-MCNC: NEGATIVE MG/DL
HGB UR QL STRIP: ABNORMAL
KETONES UR STRIP-MCNC: NEGATIVE MG/DL
LEUKOCYTE ESTERASE UR QL STRIP: ABNORMAL
NITRATE UR QL: NEGATIVE
PH UR STRIP: 7 [PH] (ref 5–7)
RBC URINE: 18 /HPF
SP GR UR STRIP: 1.01 (ref 1–1.03)
UROBILINOGEN UR STRIP-MCNC: 2 MG/DL
WBC CLUMPS #/AREA URNS HPF: PRESENT /HPF
WBC URINE: >182 /HPF
YEAST #/AREA URNS HPF: ABNORMAL /HPF

## 2022-01-01 PROCEDURE — 81001 URINALYSIS AUTO W/SCOPE: CPT | Performed by: NURSE PRACTITIONER

## 2022-01-01 PROCEDURE — G0463 HOSPITAL OUTPT CLINIC VISIT: HCPCS | Performed by: NURSE PRACTITIONER

## 2022-01-01 PROCEDURE — 87186 SC STD MICRODIL/AGAR DIL: CPT | Performed by: NURSE PRACTITIONER

## 2022-01-01 PROCEDURE — 99214 OFFICE O/P EST MOD 30 MIN: CPT | Performed by: NURSE PRACTITIONER

## 2022-01-01 RX ORDER — GRANULES FOR ORAL 3 G/1
3 POWDER ORAL ONCE
Qty: 1 PACKET | Refills: 0 | Status: SHIPPED | OUTPATIENT
Start: 2022-01-01 | End: 2022-01-01

## 2022-01-01 ASSESSMENT — ENCOUNTER SYMPTOMS
DYSURIA: 1
FREQUENCY: 1

## 2022-01-07 NOTE — TELEPHONE ENCOUNTER
This RN received a phone call from Braden at [facility], pt was then brought into call. Pt needs Karen sensors, she has enough for several week. Pt is looking for list of medical supply companies that carry the Karen supplies. Pt was Dr Horton's pt was is now going to [facility] as her  gets care there now. Pt was advised to call [facility] back to get approved list of medical supply companies, pt says it is very expensive to get through pharmacy here, and to call [facility] or send them [employer] message with refill request with chosen supply company.  Tracie Lux RN

## 2022-04-16 NOTE — ED PROVIDER NOTES
History     Chief Complaint   Patient presents with     Rule out Urinary Tract Infection     HPI  Lupe Lepe is a 82 year old female who presents to the urgent care for evaluation due to concern of UTI. She began today with urinary frequency, urgency and dysuria. Pat has previously suffered from recurrent UTIs and ESBL requiring daily infusion therapy. No fevers, headache, dizziness, abdominal pain, flank pain, pelvic pain, hematuria and vaginal bleeding. Last UTI was 7 months ago and responded well to single dose of Fosfomycin which she would like to use again if indicated.     Allergies:  Allergies   Allergen Reactions     Bactrim      Acute renal failure and hyperkalemia     Ace Inhibitors Other (See Comments)     Hyperkalemia       Crestor [Rosuvastatin Calcium] Other (See Comments)     Elevated LFTS   In high doses     Lipitor [Atorvastatin Calcium] Itching     ELEVATED LFTS     Pcn [Penicillins] Hives     Rosuvastatin Itching     Other reaction(s): Edema     Sulfamethoxazole-Trimethoprim Other (See Comments)     Kidney shutdown     Ciprofloxacin Itching and Rash     Allergy to IV form only, Has taken oral without problems       Problem List:    Patient Active Problem List    Diagnosis Date Noted     Renal artery stenosis (H) 08/25/2006     Priority: High     Left. Likely due to temporal arteritis. S/p left angioplasty 8/06. Increased BP 10/06. Renal duplex- Increased velocity left RA, ratios within normal limits. Right RA velocity slightly increased. MRA- normal left renal artery. Moderate right renal artery stenosis. Probable splenic artery stenosis. Angio 10/06 with bilateral renal artery angioplasty. Admit [facility] increased BP 11/06- renal angiogram this time shows no stenosis and no gradient. Renal ultrasound 6/08- normal. Renal ultrasound 6/08- normal with elevated pressures.  MRA 4/10- minimal narrowing main right renal artery.       Stricture of artery (H) 07/12/2006     Priority: High      Decreased bilateral upper arterial flow due to TA. Duplex 7/06- Right SCV, brachial biphasic. Right radial monophasic, right ulnar biphasic-triphasic. Left SCV triphasic. Left brachial biphasic, left radial and ulnar monophasic. Brachial wrist indices  0.57 right/0.58 left. Angio 8/06- c/w arteritis, right RA stenosis. Repeat angiogram 6/06 no intracranial arteritis or stenosis, mild left intracranial artery plaque. Duplex 9/06- Right SCV and axillary triphasic. Right brachial, radial and ulnar monophasic. Left SCV biphasic-triphasic.  Left axillary and brachial monophasic. Left radial and ulnar monophasic-biphasic. Brachial wrist indices 0.63 right/0.60 left.  Duplex 2/08 unchanged. MRA 4/10- right vertebral occluded  Followed by Dr Aly at [facility]        History of giant cell arteritis 03/27/2006     Priority: High     Prolonged low grade temps. Normal wbc, lft, UA, BCX. , CRP 92. Normal monospot, lymes screen. CT chest abdomen and pelvis negative 4/06. Bcx negative. BM BX negative. Fungal, AFB culture negative. Temporal artery biopsy positive. Flare with prednisone taper. Cellcept started 11/06, stopped 8/08 due to frequent UTIs. Started MTX 12/08 after [place] eval. Off prednisone 8/09.     Needs blood pressure checked in legs.  Sees DR. Ivory Aly at [facility].       Idiopathic chronic pancreatitis (H) 10/27/2021     Priority: Medium     ESBL (extended spectrum beta-lactamase) producing bacteria infection 10/06/2020     Priority: Medium     Recurrent UTI 10/05/2020     Priority: Medium     Giant cell arteritis (H) 09/15/2020     Priority: Medium     Subclavian arterial stenosis (H) 06/29/2018     Priority: Medium     Cannot measure blood pressure in bilateral arms.  Blood pressure readings in leg are 10-20% higher than arm.       CKD (chronic kidney disease) stage 4, GFR 15-29 ml/min (H) 08/31/2016     Priority: Medium     Proteinuria on ACEi, follows with [facility] Dr. Ash        Iron deficiency anemia, unspecified 08/30/2016     Priority: Medium     Diagnosis updated by automated process. Provider to review and confirm.       Chronic kidney disease, stage IV (severe) (H) 08/30/2016     Priority: Medium     Anemia of renal disease 06/09/2016     Priority: Medium     Mitral regurgitation 01/03/2014     Priority: Medium     Overview:   12/2013 Echo -   Normal left ventricular size, wall thickness, and systolic function with no obvious regional wall motion abnormalities.   The ejection fraction is visually estimated at 65%.   Mild left atrial dilatation.   Mild mitral regurgitation.   Mild-to-moderate tricuspid regurgitation.   Right ventricular systolic pressure estimated at 29.4 mmHg + RAP.   Compared to exam of 10/8/12--diastolic dysfxn less evident--R heart pressure is lower.       ABY (renal artery stenosis) (H) 09/27/2012     Priority: Medium     Overview:   2006 at [facility] S/p PTA to both renal arteries for malignant HTN which then improved.  2010 MRA abdomen - Widely patent bilateral main renal arteries except for minimal narrowing proximal R renal artery.       Type 2 diabetes mellitus with stage 4 chronic kidney disease, without long-term current use of insulin (H) 10/31/2010     Priority: Medium     DX 2006. 2003 2hour-glucose 202. FBS >126x2 1/06. No retinopathy, peripheral neuropathy.  Was on insulin previously, was able to get off this in 2014 or so.       Hyperlipidemia LDL goal <100 10/31/2010     Priority: Medium     Renovascular hypertension with goal blood pressure less than 130/80      Priority: Medium     Hospitalized with malignant HTN [facility] 11/06. Angio- no recurrant renal artery stenosis. Metanephrines normal.  Aldosterone was undetectably low.  Urine catecholamines within normal limits.  Renin activity normal.        ACP (advance care planning) 05/06/2011     Priority: Low     Advance Care Planning 6/9/2016: ACP Review of Chart / Resources Provided:  Reviewed  chart for advance care plan.  Lupe Lepe has no plan or code status on file however states presence of ACP document. Copy requested.   Added by Ivelisse Olivares  Pt has completed an Advance/Health Care Directive (HCD), to bring in copy to be scanned into Epic.           Frequent Urinary Tract Infections 01/23/2009     Priority: Low     Cysto 7/09 normal        Abdominal cramps 10/16/2008     Priority: Low     Episodic severe cramping associated with emesis since 2007. GI evaluation 10/08. [place] evaluation 12/08. 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 Rx jeanne-pac for H. Pylori with resolution of symptoms 1/09. Recurrent 2012, shorter symptoms.   Evaluation 11/2017, 9/15/2020.  Unclear cause.  May be transient small bowel obstruction?  Recommend ER evaluation during episode to get CT scan and labs       Gastroesophageal reflux disease without esophagitis      Priority: Low     Paroxysmal atrial fibrillation (H)      Priority: Low     paroxymal single episode 2002. ECHO, Adenosine cardiolite normal.  Not on anticoagulation        Acquired hypothyroidism      Priority: Low     Osteopenia of multiple sites      Priority: Low     dexa 1999, 2003. Dexa 7/06 -lumbar spine -1.9,  right femur -1.0,  left femur is -1.6. Boniva started 2007.  Dexa 2/08- L spine -2.4, right femoral neck -1.4, left femoral neck -2.0. Dexa 5/11- L1-L4  -1.8 ,  right femoral neck -1.7, left femoral neck -1.9.            Past Medical History:    Past Medical History:   Diagnosis Date     Abdominal cramps 10/16/2008     Abnormal CT scan 1/12/2009     Arthritis      Closed fracture of unspecified part of fibula      Heart disease      Hemorrhage of rectum and anus      HERPES ZOSTER NOS 5/23/2007     Hypertension      HYPOSMIA      IRON DEFIC ANEMIA NOS 3/27/2006     JOINT EFFUSION-L/LEG 4/7/2006     Other closed fractures of distal end of radius (alone)      Polymyalgia rheumatica (H)      Pyelonephritis 10/6/2020      Thyroid disease        Past Surgical History:    Past Surgical History:   Procedure Laterality Date     COLONOSCOPY  8/25/2008     ESOPHAGOSCOPY, GASTROSCOPY, DUODENOSCOPY (EGD), COMBINED N/A 11/16/2017    Procedure: COMBINED ESOPHAGOSCOPY, GASTROSCOPY, DUODENOSCOPY (EGD);  Gastroscopy;  Surgeon: Angel Guillen MD;  Location: [facility]     HYSTERECTOMY, SIS  1970's     PHACOEMULSIFICATION WITH STANDARD INTRAOCULAR LENS IMPLANT Right 1/8/2018    Procedure: PHACOEMULSIFICATION WITH STANDARD INTRAOCULAR LENS IMPLANT;  Right Cataract Removal with Implant;  Surgeon: Sekou Dobbins MD;  Location: [facility] OR     PHACOEMULSIFICATION WITH STANDARD INTRAOCULAR LENS IMPLANT Left 1/29/2018    Procedure: PHACOEMULSIFICATION WITH STANDARD INTRAOCULAR LENS IMPLANT;  Left Cataract Removal with Implant;  Surgeon: Sekou Dobbins MD;  Location: [facility] OR     SURGICAL HISTORY OF -   1948    SIS, for fibroids     SURGICAL HISTORY OF -   12/16/2008    Esophagogastroduodenoscopy with biopsy     TONSILLECTOMY      Tonsilectomy     ZZ COLONOSCOPY THRU STOMA, DIAGNOSTIC  2000, 5/06, 8/08    Normal       Family History:    Family History   Problem Relation Age of Onset     Cancer Mother         ovarian CA     Cancer Father         lung CA     Heart Disease Maternal Grandmother      Heart Disease Maternal Grandfather      Depression Sister         sucide     Breast Cancer Sister        Social History:  Marital Status:   [2]  Social History     Tobacco Use     Smoking status: Never Smoker     Smokeless tobacco: Never Used   Substance Use Topics     Alcohol use: No     Drug use: No        Medications:    fosfomycin (MONUROL) 3 g Packet  alcohol swab prep pads  amLODIPine (NORVASC) 10 MG tablet  amylase-lipase-protease (CREON) 19162-32569 units CPEP per EC capsule  ASPIRIN 325 MG OR TBEC  chlorthalidone (HYGROTON) 25 MG tablet  cholecalciferol (D-VI-SOL,VITAMIN D3) 400 units/mL (10 mcg/mL) LIQD liquid  cloNIDine (CATAPRES) 0.1  "MG tablet  Continuous Blood Gluc  (FREESTYLE MEL 14 DAY READER) KO  Continuous Blood Gluc Sensor (FREESTYLE MEL 14 DAY SENSOR) MISC  insulin glargine (LANTUS SOLOSTAR) 100 UNIT/ML pen  insulin lispro (HUMALOG KWIKPEN) 100 UNIT/ML (1 unit dial) KWIKPEN  insulin pen needle (ULTICARE MICRO) 32G X 4 MM miscellaneous  levothyroxine (SYNTHROID/LEVOTHROID) 50 MCG tablet  levothyroxine (SYNTHROID/LEVOTHROID) 75 MCG tablet  MULTIVITAMINS OR TABS  omeprazole (PRILOSEC) 20 MG DR capsule  rosuvastatin (CRESTOR) 10 MG tablet  VITAMIN B-6 50 MG OR TABS          Review of Systems   Genitourinary: Positive for dysuria, frequency and urgency.   All other systems reviewed and are negative.      Physical Exam   BP: (!) 165/106  Pulse: 91  Temp: 97.6  F (36.4  C)  Height: 167.6 cm (5' 6\")  Weight: 65.8 kg (145 lb)  SpO2: 97 %      Physical Exam  Constitutional:       General: She is not in acute distress.     Appearance: Normal appearance.   Cardiovascular:      Rate and Rhythm: Normal rate.   Pulmonary:      Effort: Pulmonary effort is normal.   Abdominal:      General: There is no distension.      Palpations: Abdomen is soft.      Tenderness: There is no abdominal tenderness. There is no right CVA tenderness or left CVA tenderness.   Musculoskeletal:         General: Normal range of motion.   Skin:     General: Skin is warm.      Capillary Refill: Capillary refill takes less than 2 seconds.   Neurological:      General: No focal deficit present.      Mental Status: She is alert.         ED Course                 Procedures      Results for orders placed or performed during the hospital encounter of 04/16/22 (from the past 24 hour(s))   UA with Microscopic reflex to Culture    Specimen: Urine, Midstream   Result Value Ref Range    Color Urine Yellow Colorless, Straw, Light Yellow, Yellow    Appearance Urine Cloudy (A) Clear    Glucose Urine Negative Negative mg/dL    Bilirubin Urine Negative Negative    Ketones Urine " Negative Negative mg/dL    Specific Gravity Urine 1.014 1.003 - 1.035    Blood Urine Small (A) Negative    pH Urine 7.0 5.0 - 7.0    Protein Albumin Urine 100  (A) Negative mg/dL    Urobilinogen Urine 2.0 Normal, 2.0 mg/dL    Nitrite Urine Negative Negative    Leukocyte Esterase Urine Large (A) Negative    Bacteria Urine Few (A) None Seen /HPF    WBC Clumps Urine Present (A) None Seen /HPF    Budding Yeast Urine Many (A) None Seen /HPF    RBC Urine 18 (H) <=2 /HPF    WBC Urine >182 (H) <=5 /HPF    Narrative    Urine Culture ordered based on laboratory criteria     *Note: Due to a large number of results and/or encounters for the requested time period, some results have not been displayed. A complete set of results can be found in Results Review.       Medications - No data to display    Assessments & Plan (with Medical Decision Making)   Lupe Lepe is a 82 year old female who presents to the urgent care for evaluation due to concern of UTI. She began today with urinary frequency, urgency and dysuria. Patient well appearing and without worrisome findings on physical exam. Urinalysis indication infection. Urine culture pending. Patient would like to try single dose Fosfomycin, she will follow up with PCP in 3 days. Return precautions reviewed, all questions answered. Patient is agreeable to plan of care and discharged in no acute distress.     I have reviewed the nursing notes.    I have reviewed the findings, diagnosis, plan and need for follow up with the patient.    New Prescriptions    FOSFOMYCIN (MONUROL) 3 G PACKET    Take 1 packet (3 g) by mouth once for 1 dose       Final diagnoses:   Urinary tract infection       4/16/2022   [facility] EMERGENCY DEPT     Anjelica Beauchamp, APRN CNP  04/16/22 [id number]

## 2022-06-07 NOTE — MR AVS SNAPSHOT
After Visit Summary   11/12/2018    Lupe Lepe    MRN: [id number]           Patient Information     Date Of Birth          1940        Visit Information        Provider Department      11/12/2018 3:00 PM Roberto Rivas MD [facility]        Today's Diagnoses     Impacted cerumen of left ear    -  1      Care Instructions    Per physician's instructions            Follow-ups after your visit        Who to contact     If you have questions or need follow up information about today's clinic visit or your schedule please contact [facility] directly at [phone].  Normal or non-critical lab and imaging results will be communicated to you by Loudiehart, letter or phone within 4 business days after the clinic has received the results. If you do not hear from us within 7 days, please contact the clinic through Loudiehart or phone. If you have a critical or abnormal lab result, we will notify you by phone as soon as possible.  Submit refill requests through Localler or call your pharmacy and they will forward the refill request to us. Please allow 3 business days for your refill to be completed.          Additional Information About Your Visit        MyChart Information     Localler gives you secure access to your electronic health record. If you see a primary care provider, you can also send messages to your care team and make appointments. If you have questions, please call your primary care clinic.  If you do not have a primary care provider, please call [phone] and they will assist you.        Care EveryWhere ID     This is your Care EveryWhere ID. This could be used by other organizations to access your [facility] medical records  [id number]        Your Vitals Were     Pulse Temperature BMI (Body Mass Index)             91 98  F (36.7  C) (Tympanic) 25.02 kg/m2          Blood Pressure from Last 3 Encounters:   11/12/18 (!) 130/94   09/26/18 130/64   08/22/18 136/82     Weight from Last 3 Encounters:   11/12/18 70.3 kg (155 lb)   09/26/18 70.3 kg (155 lb)   08/22/18 70.3 kg (155 lb)              We Performed the Following     Remove Pablo        Primary Care Provider Office Phone # Fax #    Li Horton [phone] [address] [facility] [address]        Equal Access to Services     DARIO ABDI : Hadii aad ku hadasho Soomaali, waaxda luqadaha, qaybta kaalmada adeegyada, waxay idiin hayaan adeeg kharash laaurea . So [facility] [phone].    ATENCIÓN: Si habla español, tiene a saldana disposición servicios gratuitos de asistencia lingüística. Llame al [phone].    We comply with applicable federal civil rights laws and [place] laws. We do not discriminate on the basis of race, color, national origin, age, disability, sex, sexual orientation, or gender identity.            Thank you!     Thank you for choosing [facility]  for your care. Our goal is always to provide you with excellent care. Hearing back from our patients is one way we can continue to improve our services. Please take a few minutes to complete the written survey that you may receive in the mail after your visit with us. Thank you!             Your Updated Medication List - Protect others around you: Learn how to safely use, store and throw away your medicines at www.disposemymeds.org.          This list is accurate as of 11/12/18  3:18 PM.  Always use your most recent med list.                   Brand Name Dispense Instructions for use Diagnosis    amLODIPine 10 MG tablet    NORVASC    90 tablet    TAKE 1 TABLET (10 MG) BY MOUTH DAILY    Renovascular hypertension with goal blood pressure less than 130/80       aspirin 325 MG EC tablet     100    1 TABLET BY MOUTH DAILY        BENEFIBER Tabs      2 TABLETS ORALLY DAILY        calcium carbonate 600 mg-vitamin D 400 units 600-400 MG-UNIT per tablet    CALTRATE    100 tablet    Take 1 tablet by mouth 2 times daily.     Disorder of bone and cartilage, unspecified       chlorthalidone 25 MG tablet    HYGROTON    90 tablet    Take 1 tablet (25 mg) by mouth daily    Renovascular hypertension with goal blood pressure less than 130/80, CKD (chronic kidney disease) stage 4, GFR 15-29 ml/min (H)       cloNIDine 0.1 MG tablet    CATAPRES    10 tablet    ONE Twice DAILY as needed for SBP>180, diastolic BP>110    Renovascular hypertension with goal blood pressure less than 130/80       levothyroxine 25 MCG tablet    SYNTHROID/LEVOTHROID    216 tablet    75 MCG (3 TABS) ON SUNDAY, MONDAY, WEDNESDAY, FRIDAY. 50 MCG (2 TABS) ON TUESDAY, THURSDAY, SATURDAY    Acquired hypothyroidism       lisinopril 10 MG tablet    PRINIVIL/ZESTRIL    180 tablet    TAKE 1 TABLET (10 MG) BY MOUTH 2 TIMES DAILY    Renovascular hypertension with goal blood pressure less than 130/80       multivitamin per tablet      1 TABLET BY MOUTH DAILY        omeprazole 20 MG CR capsule    priLOSEC    60 capsule    TAKE 1 CAPSULE BY MOUTH EVERY 3RD DAY AS NEEDED    Gastroesophageal reflux disease without esophagitis       pyridoxine 50 MG Tabs    VITAMIN B-6    1 bottle    1 daily    Disturbance of skin sensation       rosuvastatin 10 MG tablet    CRESTOR    90 tablet    TAKE 1 TABLET (10 MG) BY MOUTH DAILY    Hyperlipidemia LDL goal <100          Complex Repair And Melolabial Flap Text: The defect edges were debeveled with a #15 scalpel blade.  The primary defect was closed partially with a complex linear closure.  Given the location of the remaining defect, shape of the defect and the proximity to free margins a melolabial flap was deemed most appropriate for complete closure of the defect.  Using a sterile surgical marker, an appropriate advancement flap was drawn incorporating the defect and placing the expected incisions within the relaxed skin tension lines where possible.    The area thus outlined was incised deep to adipose tissue with a #15 scalpel blade.  The skin margins were undermined to an appropriate distance in all directions utilizing iris scissors.

## 2023-06-21 NOTE — CONFIDENTIAL NOTE
Diabetes Follow-up    Subjective/Objective:  Lupe Lepe sent in blood glucose log for review.    Diabetes is being managed with   Lifestyle (diet/activity), Diabetes Medications   Diabetes Medication(s)     Insulin       insulin glargine (LANTUS SOLOSTAR) 100 UNIT/ML pen    Inject 10 Units Subcutaneous At Bedtime     insulin lispro (HUMALOG KWIKPEN) 100 UNIT/ML (1 unit dial) KWIKPEN    Inject 2 units with breakfast, 1 unit with lunch and 2 units with dinner.  (Max dose 25 units per 24 hours with needle prime and ongoing adjustment)        BG/Food Lo, 2, 2 and bedtime 10 Lantus    - 177, 256, 276, bed 144   11/10 - 135, 120, 208, bed 233    - 145, 226, 196, bed 181    - 146, 144, 333, bed 170    - 147, 104, 224, bed 216    - 147, 212, 200, bed 373   This morning 151     Morning: yogurt, blueberries, 2 raisin toast   Noon: cheese/crackers grapes   Or pronto pup   Snacks: grapes, maybe a cookie or small bag chips   Supper: just generally- meat,  vegetables, small amount of potatoes, cottage cheese & pineapple or salad    I believe I am getting the sensors tomorrow.   The meds were the same each day.     Assessment/Plan/Response:  Is averaging 240 before dinner, however this is from the afternoon snack, not necessarily lunch.  Could consider 1 unit with the snack and/or increasing lunch by 1 unit depending on how the post meal glucose looks.  May need to increase lantus by 1 unit, but is having a significant drop from bedtime to fasting.    No insulin changes at this time until the continuous glucose monitor is put on.  Karlee has been sensitive to insulin changes and is on small doses so will only recommend 1 unit changes at a time and wait until the sensor is placed.      Homa Dupree RD, LD, [facility]  Diabetes Education       Propranolol Pregnancy And Lactation Text: This medication is Pregnancy Category C and it isn't known if it is safe during pregnancy. It is excreted in breast milk.

## 2023-12-06 NOTE — TELEPHONE ENCOUNTER
----- Message from Angel Guillen MD sent at 11/16/2017  7:44 AM CST -----  Regarding: EGD complete  EGD - normal esophagus, stomach and duodenum.  No bleeding sources identified.    Thanks,  Angel       Ipledge Number (Optional): [phone]

## 2024-10-22 NOTE — RESULT ENCOUNTER NOTE
Final Urine Culture Report on 9/24/21  [facility] Emergency Dept discharge antibiotic prescribed: Fosfomycin (MONUROL) 3 G packet, 1 packet (3 g) by mouth once for 1 dose  #1. Bacteria, >100,000 colonies/mL Escherichia coli    Recommendations in treatment per [facility] ED lab result Urine Culture protocol.   Detail Level: Zone

## (undated) DEVICE — PREP PAD ALCOHOL 6818

## (undated) DEVICE — SOL WATER IRRIG 1000ML BOTTLE 07139-09

## (undated) DEVICE — SOL NACL 0.9% IRRIG 1000ML BOTTLE 07138-09

## (undated) RX ORDER — PHENYLEPHRINE HYDROCHLORIDE 25 MG/ML
SOLUTION/ DROPS OPHTHALMIC
Status: DISPENSED
Start: 2018-01-29

## (undated) RX ORDER — TROPICAMIDE 10 MG/ML
SOLUTION/ DROPS OPHTHALMIC
Status: DISPENSED
Start: 2018-01-29

## (undated) RX ORDER — CYCLOPENTOLATE HYDROCHLORIDE 10 MG/ML
SOLUTION/ DROPS OPHTHALMIC
Status: DISPENSED
Start: 2018-01-08

## (undated) RX ORDER — LIDOCAINE HYDROCHLORIDE 10 MG/ML
INJECTION, SOLUTION EPIDURAL; INFILTRATION; INTRACAUDAL; PERINEURAL
Status: DISPENSED
Start: 2017-11-16

## (undated) RX ORDER — CYCLOPENTOLATE HYDROCHLORIDE 10 MG/ML
SOLUTION/ DROPS OPHTHALMIC
Status: DISPENSED
Start: 2018-01-29

## (undated) RX ORDER — PHENYLEPHRINE HYDROCHLORIDE 25 MG/ML
SOLUTION/ DROPS OPHTHALMIC
Status: DISPENSED
Start: 2018-01-08

## (undated) RX ORDER — TROPICAMIDE 10 MG/ML
SOLUTION/ DROPS OPHTHALMIC
Status: DISPENSED
Start: 2018-01-08